# Patient Record
Sex: MALE | Race: WHITE | NOT HISPANIC OR LATINO | Employment: OTHER | URBAN - NONMETROPOLITAN AREA
[De-identification: names, ages, dates, MRNs, and addresses within clinical notes are randomized per-mention and may not be internally consistent; named-entity substitution may affect disease eponyms.]

---

## 2018-01-07 ENCOUNTER — HOSPITAL ENCOUNTER (EMERGENCY)
Facility: HOSPITAL | Age: 68
Discharge: HOME | End: 2018-01-07
Attending: EMERGENCY MEDICINE | Admitting: EMERGENCY MEDICINE

## 2018-01-07 ENCOUNTER — APPOINTMENT (OUTPATIENT)
Dept: GENERAL RADIOLOGY | Facility: HOSPITAL | Age: 68
End: 2018-01-07

## 2018-01-07 VITALS
TEMPERATURE: 98.2 F | OXYGEN SATURATION: 98 % | HEART RATE: 72 BPM | DIASTOLIC BLOOD PRESSURE: 62 MMHG | RESPIRATION RATE: 18 BRPM | HEIGHT: 74 IN | BODY MASS INDEX: 23.74 KG/M2 | SYSTOLIC BLOOD PRESSURE: 110 MMHG | WEIGHT: 185 LBS

## 2018-01-07 DIAGNOSIS — I95.2 HYPOTENSION DUE TO DRUGS: ICD-10-CM

## 2018-01-07 DIAGNOSIS — R55 NEAR SYNCOPE: Primary | ICD-10-CM

## 2018-01-07 LAB
A-A DO2: 24.7 MMHG (ref 0–300)
ALBUMIN SERPL-MCNC: 4.2 G/DL (ref 3.4–4.8)
ALBUMIN/GLOB SERPL: 1.5 G/DL (ref 1.5–2.5)
ALP SERPL-CCNC: 71 U/L (ref 40–129)
ALT SERPL W P-5'-P-CCNC: 24 U/L (ref 10–44)
ANION GAP SERPL CALCULATED.3IONS-SCNC: 6 MMOL/L (ref 3.6–11.2)
ARTERIAL PATENCY WRIST A: POSITIVE
AST SERPL-CCNC: 24 U/L (ref 10–34)
ATMOSPHERIC PRESS: 735 MMHG
BASE EXCESS BLDA CALC-SCNC: -1.6 MMOL/L
BASOPHILS # BLD AUTO: 0.02 10*3/MM3 (ref 0–0.3)
BASOPHILS NFR BLD AUTO: 0.2 % (ref 0–2)
BDY SITE: ABNORMAL
BILIRUB SERPL-MCNC: 0.6 MG/DL (ref 0.2–1.8)
BODY TEMPERATURE: 98.6 C
BUN BLD-MCNC: 19 MG/DL (ref 7–21)
BUN/CREAT SERPL: 15.2 (ref 7–25)
CALCIUM SPEC-SCNC: 9.2 MG/DL (ref 7.7–10)
CHLORIDE SERPL-SCNC: 106 MMOL/L (ref 99–112)
CO2 SERPL-SCNC: 24 MMOL/L (ref 24.3–31.9)
COHGB MFR BLD: 1.3 % (ref 0–5)
CREAT BLD-MCNC: 1.25 MG/DL (ref 0.43–1.29)
DEPRECATED RDW RBC AUTO: 41.5 FL (ref 37–54)
EOSINOPHIL # BLD AUTO: 0.23 10*3/MM3 (ref 0–0.7)
EOSINOPHIL NFR BLD AUTO: 2.7 % (ref 0–7)
ERYTHROCYTE [DISTWIDTH] IN BLOOD BY AUTOMATED COUNT: 13.3 % (ref 11.5–14.5)
GFR SERPL CREATININE-BSD FRML MDRD: 57 ML/MIN/1.73
GLOBULIN UR ELPH-MCNC: 2.8 GM/DL
GLUCOSE BLD-MCNC: 216 MG/DL (ref 70–110)
HCO3 BLDA-SCNC: 22.9 MMOL/L (ref 22–26)
HCT VFR BLD AUTO: 41.5 % (ref 42–52)
HCT VFR BLD CALC: 43 % (ref 42–52)
HGB BLD-MCNC: 14.4 G/DL (ref 14–18)
HGB BLDA-MCNC: 14.6 G/DL (ref 12–16)
HOROWITZ INDEX BLD+IHG-RTO: 21 %
IMM GRANULOCYTES # BLD: 0.02 10*3/MM3 (ref 0–0.03)
IMM GRANULOCYTES NFR BLD: 0.2 % (ref 0–0.5)
LYMPHOCYTES # BLD AUTO: 1.57 10*3/MM3 (ref 1–3)
LYMPHOCYTES NFR BLD AUTO: 18.6 % (ref 16–46)
MCH RBC QN AUTO: 30.3 PG (ref 27–33)
MCHC RBC AUTO-ENTMCNC: 34.7 G/DL (ref 33–37)
MCV RBC AUTO: 87.2 FL (ref 80–94)
METHGB BLD QL: 0.3 % (ref 0–3)
MODALITY: ABNORMAL
MONOCYTES # BLD AUTO: 0.57 10*3/MM3 (ref 0.1–0.9)
MONOCYTES NFR BLD AUTO: 6.8 % (ref 0–12)
NEUTROPHILS # BLD AUTO: 6.01 10*3/MM3 (ref 1.4–6.5)
NEUTROPHILS NFR BLD AUTO: 71.5 % (ref 40–75)
OSMOLALITY SERPL CALC.SUM OF ELEC: 280.7 MOSM/KG (ref 273–305)
OXYHGB MFR BLDV: 93.3 % (ref 85–100)
PCO2 BLDA: 38 MM HG (ref 35–45)
PH BLDA: 7.4 PH UNITS (ref 7.35–7.45)
PLATELET # BLD AUTO: 205 10*3/MM3 (ref 130–400)
PMV BLD AUTO: 11.5 FL (ref 6–10)
PO2 BLDA: 74.3 MM HG (ref 80–100)
POTASSIUM BLD-SCNC: 3.9 MMOL/L (ref 3.5–5.3)
PROT SERPL-MCNC: 7 G/DL (ref 6–8)
RBC # BLD AUTO: 4.76 10*6/MM3 (ref 4.7–6.1)
SAO2 % BLDCOA: 94.8 % (ref 90–100)
SODIUM BLD-SCNC: 136 MMOL/L (ref 135–153)
TROPONIN I SERPL-MCNC: <0.006 NG/ML
WBC NRBC COR # BLD: 8.42 10*3/MM3 (ref 4.5–12.5)

## 2018-01-07 PROCEDURE — 83050 HGB METHEMOGLOBIN QUAN: CPT | Performed by: EMERGENCY MEDICINE

## 2018-01-07 PROCEDURE — 93005 ELECTROCARDIOGRAM TRACING: CPT | Performed by: EMERGENCY MEDICINE

## 2018-01-07 PROCEDURE — 99284 EMERGENCY DEPT VISIT MOD MDM: CPT

## 2018-01-07 PROCEDURE — 84484 ASSAY OF TROPONIN QUANT: CPT | Performed by: EMERGENCY MEDICINE

## 2018-01-07 PROCEDURE — 71045 X-RAY EXAM CHEST 1 VIEW: CPT | Performed by: RADIOLOGY

## 2018-01-07 PROCEDURE — 36600 WITHDRAWAL OF ARTERIAL BLOOD: CPT | Performed by: EMERGENCY MEDICINE

## 2018-01-07 PROCEDURE — 82375 ASSAY CARBOXYHB QUANT: CPT | Performed by: EMERGENCY MEDICINE

## 2018-01-07 PROCEDURE — 82805 BLOOD GASES W/O2 SATURATION: CPT | Performed by: EMERGENCY MEDICINE

## 2018-01-07 PROCEDURE — 71045 X-RAY EXAM CHEST 1 VIEW: CPT

## 2018-01-07 PROCEDURE — 80053 COMPREHEN METABOLIC PANEL: CPT | Performed by: EMERGENCY MEDICINE

## 2018-01-07 PROCEDURE — 85025 COMPLETE CBC W/AUTO DIFF WBC: CPT | Performed by: EMERGENCY MEDICINE

## 2018-01-07 RX ORDER — LISINOPRIL 10 MG/1
10 TABLET ORAL DAILY
COMMUNITY
End: 2022-06-27

## 2018-01-07 RX ORDER — TAMSULOSIN HYDROCHLORIDE 0.4 MG/1
1 CAPSULE ORAL NIGHTLY
COMMUNITY

## 2018-01-07 RX ORDER — SODIUM CHLORIDE 0.9 % (FLUSH) 0.9 %
10 SYRINGE (ML) INJECTION AS NEEDED
Status: DISCONTINUED | OUTPATIENT
Start: 2018-01-07 | End: 2018-01-07 | Stop reason: HOSPADM

## 2018-01-07 RX ORDER — RANITIDINE 150 MG/1
150 TABLET ORAL NIGHTLY
COMMUNITY

## 2018-01-07 RX ADMIN — SODIUM CHLORIDE 500 ML: 9 INJECTION, SOLUTION INTRAVENOUS at 15:42

## 2018-01-07 NOTE — ED PROVIDER NOTES
Subjective   History of Present Illness  67-year-old white male here today for chest pain.  Initially EMS was called out for chest pain, but on arrival the patient complained more of nausea, tingling in his hands, weakness.  He states he was also diaphoretic.  The symptoms started suddenly while he was sitting at the table at home.  He has recently been started on blood pressure medicine, and at the time of this episode as his systolic blood pressure was 60 according to family members.  Previously his blood pressure had been well controlled in the 110's since starting medicine.  Patient relates that he had gone for a checkup and his blood pressure was noted to be high on the single visit and was started on blood pressure medicine.  Review of Systems   All other systems reviewed and are negative.      Past Medical History:   Diagnosis Date   • Kidney stones        Allergies   Allergen Reactions   • Penicillins        History reviewed. No pertinent surgical history.    History reviewed. No pertinent family history.    Social History     Social History   • Marital status: Single     Spouse name: N/A   • Number of children: N/A   • Years of education: N/A     Social History Main Topics   • Smoking status: Never Smoker   • Smokeless tobacco: Never Used   • Alcohol use No   • Drug use: None   • Sexual activity: Not Asked     Other Topics Concern   • None     Social History Narrative   • None           Objective   Physical Exam   Constitutional: He is oriented to person, place, and time. He appears well-developed and well-nourished.   HENT:   Head: Normocephalic and atraumatic.   Cardiovascular: Normal rate, regular rhythm and normal heart sounds.  Exam reveals no gallop and no friction rub.    No murmur heard.  Pulmonary/Chest: Effort normal and breath sounds normal. No respiratory distress. He has no wheezes. He has no rales.   Abdominal: Soft. Bowel sounds are normal. He exhibits no distension. There is no tenderness.    Musculoskeletal: Normal range of motion. He exhibits no edema.   Neurological: He is alert and oriented to person, place, and time.   Skin: Skin is warm and dry.   Psychiatric: He has a normal mood and affect.   Nursing note and vitals reviewed.      Procedures  Results for orders placed or performed during the hospital encounter of 01/07/18   Comprehensive Metabolic Panel   Result Value Ref Range    Glucose 216 (H) 70 - 110 mg/dL    BUN 19 7 - 21 mg/dL    Creatinine 1.25 0.43 - 1.29 mg/dL    Sodium 136 135 - 153 mmol/L    Potassium 3.9 3.5 - 5.3 mmol/L    Chloride 106 99 - 112 mmol/L    CO2 24.0 (L) 24.3 - 31.9 mmol/L    Calcium 9.2 7.7 - 10.0 mg/dL    Total Protein 7.0 6.0 - 8.0 g/dL    Albumin 4.20 3.40 - 4.80 g/dL    ALT (SGPT) 24 10 - 44 U/L    AST (SGOT) 24 10 - 34 U/L    Alkaline Phosphatase 71 40 - 129 U/L    Total Bilirubin 0.6 0.2 - 1.8 mg/dL    eGFR Non African Amer 57 (L) >60 mL/min/1.73    Globulin 2.8 gm/dL    A/G Ratio 1.5 1.5 - 2.5 g/dL    BUN/Creatinine Ratio 15.2 7.0 - 25.0    Anion Gap 6.0 3.6 - 11.2 mmol/L   Blood Gas, Arterial   Result Value Ref Range    Site Arterial: left radial     Fredy's Test Positive     pH, Arterial 7.397 7.350 - 7.450 pH units    pCO2, Arterial 38.0 35.0 - 45.0 mm Hg    pO2, Arterial 74.3 (L) 80.0 - 100.0 mm Hg    HCO3, Arterial 22.9 22.0 - 26.0 mmol/L    Base Excess, Arterial -1.6 mmol/L    O2 Saturation, Arterial 94.8 90.0 - 100.0 %    Hemoglobin, Blood Gas 14.6 12 - 16 g/dL    Hematocrit, Blood Gas 43.0 42.0 - 52.0 %    Oxyhemoglobin 93.3 85 - 100 %    Methemoglobin 0.30 0.00 - 3.00 %    Carboxyhemoglobin 1.3 0 - 5 %    A-a Gradiant 24.7 0.0 - 300.0 mmHg    Temperature 98.6 C    Barometric Pressure for Blood Gas 735 mmHg    Modality Room Air     FIO2 21 %   Troponin   Result Value Ref Range    Troponin I <0.006 <=0.040 ng/mL   CBC Auto Differential   Result Value Ref Range    WBC 8.42 4.50 - 12.50 10*3/mm3    RBC 4.76 4.70 - 6.10 10*6/mm3    Hemoglobin 14.4 14.0 -  18.0 g/dL    Hematocrit 41.5 (L) 42.0 - 52.0 %    MCV 87.2 80.0 - 94.0 fL    MCH 30.3 27.0 - 33.0 pg    MCHC 34.7 33.0 - 37.0 g/dL    RDW 13.3 11.5 - 14.5 %    RDW-SD 41.5 37.0 - 54.0 fl    MPV 11.5 (H) 6.0 - 10.0 fL    Platelets 205 130 - 400 10*3/mm3    Neutrophil % 71.5 40.0 - 75.0 %    Lymphocyte % 18.6 16.0 - 46.0 %    Monocyte % 6.8 0.0 - 12.0 %    Eosinophil % 2.7 0.0 - 7.0 %    Basophil % 0.2 0.0 - 2.0 %    Immature Grans % 0.2 0.0 - 0.5 %    Neutrophils, Absolute 6.01 1.40 - 6.50 10*3/mm3    Lymphocytes, Absolute 1.57 1.00 - 3.00 10*3/mm3    Monocytes, Absolute 0.57 0.10 - 0.90 10*3/mm3    Eosinophils, Absolute 0.23 0.00 - 0.70 10*3/mm3    Basophils, Absolute 0.02 0.00 - 0.30 10*3/mm3    Immature Grans, Absolute 0.02 0.00 - 0.03 10*3/mm3   Osmolality, Calculated   Result Value Ref Range    Osmolality Calc 280.7 273.0 - 305.0 mOsm/kg            ED Course  ED Course   Value Comment By Time   ECG 12 Lead Normal sinus rhythm.  Rate 72.  No acute ischemic changes. Ney Vieira MD 01/07 1807    Currently asymptomatic.  Hemodynamically stable.  Workup results discussed with patient and family.  Symptoms seem to be due to his blood pressure meds.  Will hold his lisinopril.  Recommend they continue checking BP at home.  In follow-up with primary. Ney Vieira MD 01/07 1807                  MDM  Number of Diagnoses or Management Options  Hypotension due to drugs:   Near syncope:      Amount and/or Complexity of Data Reviewed  Clinical lab tests: reviewed  Tests in the radiology section of CPT®: reviewed  Tests in the medicine section of CPT®: reviewed  Independent visualization of images, tracings, or specimens: yes    Risk of Complications, Morbidity, and/or Mortality  Presenting problems: moderate  Diagnostic procedures: moderate  Management options: moderate        Final diagnoses:   Near syncope   Hypotension due to drugs            Ney Vieira MD  01/07/18 [id number]

## 2021-01-29 ENCOUNTER — IMMUNIZATION (OUTPATIENT)
Dept: VACCINE CLINIC | Facility: HOSPITAL | Age: 71
End: 2021-01-29

## 2021-01-29 PROCEDURE — 0001A: CPT | Performed by: FAMILY MEDICINE

## 2021-01-29 PROCEDURE — 91300 HC SARSCOV02 VAC 30MCG/0.3ML IM: CPT | Performed by: FAMILY MEDICINE

## 2021-02-19 ENCOUNTER — IMMUNIZATION (OUTPATIENT)
Dept: VACCINE CLINIC | Facility: HOSPITAL | Age: 71
End: 2021-02-19

## 2021-02-19 PROCEDURE — 0002A: CPT | Performed by: INTERNAL MEDICINE

## 2021-02-19 PROCEDURE — 91300 HC SARSCOV02 VAC 30MCG/0.3ML IM: CPT | Performed by: INTERNAL MEDICINE

## 2022-06-26 ENCOUNTER — TRANSCRIBE ORDERS (OUTPATIENT)
Dept: ADMINISTRATIVE | Facility: HOSPITAL | Age: 72
End: 2022-06-26

## 2022-06-26 ENCOUNTER — HOSPITAL ENCOUNTER (OUTPATIENT)
Dept: GENERAL RADIOLOGY | Facility: HOSPITAL | Age: 72
Discharge: HOME | End: 2022-06-26

## 2022-06-26 DIAGNOSIS — R52 PAIN: Primary | ICD-10-CM

## 2022-06-26 DIAGNOSIS — R52 PAIN: ICD-10-CM

## 2022-06-26 PROCEDURE — 73070 X-RAY EXAM OF ELBOW: CPT

## 2022-06-27 ENCOUNTER — HOSPITAL ENCOUNTER (EMERGENCY)
Facility: HOSPITAL | Age: 72
Discharge: HOME | End: 2022-06-27
Attending: EMERGENCY MEDICINE | Admitting: EMERGENCY MEDICINE

## 2022-06-27 VITALS
HEART RATE: 84 BPM | SYSTOLIC BLOOD PRESSURE: 134 MMHG | BODY MASS INDEX: 21.76 KG/M2 | RESPIRATION RATE: 14 BRPM | WEIGHT: 175 LBS | HEIGHT: 75 IN | TEMPERATURE: 97.4 F | OXYGEN SATURATION: 99 % | DIASTOLIC BLOOD PRESSURE: 82 MMHG

## 2022-06-27 DIAGNOSIS — M70.21 OLECRANON BURSITIS OF RIGHT ELBOW: Primary | ICD-10-CM

## 2022-06-27 LAB
ALBUMIN SERPL-MCNC: 4.5 G/DL (ref 3.5–5.2)
ALBUMIN/GLOB SERPL: 1.6 G/DL
ALP SERPL-CCNC: 89 U/L (ref 39–117)
ALT SERPL W P-5'-P-CCNC: 5 U/L (ref 1–41)
ANION GAP SERPL CALCULATED.3IONS-SCNC: 11.4 MMOL/L (ref 5–15)
AST SERPL-CCNC: 20 U/L (ref 1–40)
BASOPHILS # BLD AUTO: 0.04 10*3/MM3 (ref 0–0.2)
BASOPHILS NFR BLD AUTO: 0.5 % (ref 0–1.5)
BILIRUB SERPL-MCNC: 0.6 MG/DL (ref 0–1.2)
BUN SERPL-MCNC: 15 MG/DL (ref 8–23)
BUN/CREAT SERPL: 14 (ref 7–25)
CALCIUM SPEC-SCNC: 9.2 MG/DL (ref 8.6–10.5)
CHLORIDE SERPL-SCNC: 106 MMOL/L (ref 98–107)
CO2 SERPL-SCNC: 24.6 MMOL/L (ref 22–29)
CREAT SERPL-MCNC: 1.07 MG/DL (ref 0.76–1.27)
CRP SERPL-MCNC: 0.47 MG/DL (ref 0–0.5)
D-LACTATE SERPL-SCNC: 1.3 MMOL/L (ref 0.5–2)
DEPRECATED RDW RBC AUTO: 43.8 FL (ref 37–54)
EGFRCR SERPLBLD CKD-EPI 2021: 73.3 ML/MIN/1.73
EOSINOPHIL # BLD AUTO: 0.21 10*3/MM3 (ref 0–0.4)
EOSINOPHIL NFR BLD AUTO: 2.8 % (ref 0.3–6.2)
ERYTHROCYTE [DISTWIDTH] IN BLOOD BY AUTOMATED COUNT: 13.5 % (ref 12.3–15.4)
ERYTHROCYTE [SEDIMENTATION RATE] IN BLOOD: 7 MM/HR (ref 0–20)
GLOBULIN UR ELPH-MCNC: 2.9 GM/DL
GLUCOSE SERPL-MCNC: 137 MG/DL (ref 65–99)
HCT VFR BLD AUTO: 43.7 % (ref 37.5–51)
HGB BLD-MCNC: 15.1 G/DL (ref 13–17.7)
IMM GRANULOCYTES # BLD AUTO: 0.03 10*3/MM3 (ref 0–0.05)
IMM GRANULOCYTES NFR BLD AUTO: 0.4 % (ref 0–0.5)
LYMPHOCYTES # BLD AUTO: 1.39 10*3/MM3 (ref 0.7–3.1)
LYMPHOCYTES NFR BLD AUTO: 18.3 % (ref 19.6–45.3)
MCH RBC QN AUTO: 30.5 PG (ref 26.6–33)
MCHC RBC AUTO-ENTMCNC: 34.6 G/DL (ref 31.5–35.7)
MCV RBC AUTO: 88.3 FL (ref 79–97)
MONOCYTES # BLD AUTO: 0.61 10*3/MM3 (ref 0.1–0.9)
MONOCYTES NFR BLD AUTO: 8 % (ref 5–12)
NEUTROPHILS NFR BLD AUTO: 5.33 10*3/MM3 (ref 1.7–7)
NEUTROPHILS NFR BLD AUTO: 70 % (ref 42.7–76)
NRBC BLD AUTO-RTO: 0 /100 WBC (ref 0–0.2)
PLATELET # BLD AUTO: 218 10*3/MM3 (ref 140–450)
PMV BLD AUTO: 10.5 FL (ref 6–12)
POTASSIUM SERPL-SCNC: 4 MMOL/L (ref 3.5–5.2)
PROT SERPL-MCNC: 7.4 G/DL (ref 6–8.5)
RBC # BLD AUTO: 4.95 10*6/MM3 (ref 4.14–5.8)
SODIUM SERPL-SCNC: 142 MMOL/L (ref 136–145)
WBC NRBC COR # BLD: 7.61 10*3/MM3 (ref 3.4–10.8)

## 2022-06-27 PROCEDURE — 36415 COLL VENOUS BLD VENIPUNCTURE: CPT

## 2022-06-27 PROCEDURE — 25010000002 CEFTRIAXONE PER 250 MG: Performed by: PHYSICIAN ASSISTANT

## 2022-06-27 PROCEDURE — 96365 THER/PROPH/DIAG IV INF INIT: CPT

## 2022-06-27 PROCEDURE — 99283 EMERGENCY DEPT VISIT LOW MDM: CPT

## 2022-06-27 PROCEDURE — 87040 BLOOD CULTURE FOR BACTERIA: CPT | Performed by: PHYSICIAN ASSISTANT

## 2022-06-27 PROCEDURE — 83605 ASSAY OF LACTIC ACID: CPT | Performed by: PHYSICIAN ASSISTANT

## 2022-06-27 PROCEDURE — 86140 C-REACTIVE PROTEIN: CPT | Performed by: PHYSICIAN ASSISTANT

## 2022-06-27 PROCEDURE — 80053 COMPREHEN METABOLIC PANEL: CPT | Performed by: PHYSICIAN ASSISTANT

## 2022-06-27 PROCEDURE — 85025 COMPLETE CBC W/AUTO DIFF WBC: CPT | Performed by: PHYSICIAN ASSISTANT

## 2022-06-27 PROCEDURE — 85652 RBC SED RATE AUTOMATED: CPT | Performed by: PHYSICIAN ASSISTANT

## 2022-06-27 RX ORDER — SODIUM CHLORIDE 0.9 % (FLUSH) 0.9 %
10 SYRINGE (ML) INJECTION AS NEEDED
Status: DISCONTINUED | OUTPATIENT
Start: 2022-06-27 | End: 2022-06-27 | Stop reason: HOSPADM

## 2022-06-27 RX ORDER — CEPHALEXIN 500 MG/1
500 CAPSULE ORAL 4 TIMES DAILY
Qty: 28 CAPSULE | Refills: 0 | Status: SHIPPED | OUTPATIENT
Start: 2022-06-27 | End: 2022-07-04

## 2022-06-27 RX ADMIN — SODIUM CHLORIDE 500 ML: 9 INJECTION, SOLUTION INTRAVENOUS at 13:58

## 2022-06-27 RX ADMIN — SODIUM CHLORIDE 2 G: 900 INJECTION INTRAVENOUS at 14:44

## 2022-07-02 LAB
BACTERIA SPEC AEROBE CULT: NORMAL
BACTERIA SPEC AEROBE CULT: NORMAL

## 2025-03-28 PROCEDURE — 99285 EMERGENCY DEPT VISIT HI MDM: CPT

## 2025-03-29 ENCOUNTER — APPOINTMENT (OUTPATIENT)
Dept: CT IMAGING | Facility: HOSPITAL | Age: 75
DRG: 329 | End: 2025-03-29
Payer: MEDICARE

## 2025-03-29 ENCOUNTER — HOSPITAL ENCOUNTER (INPATIENT)
Facility: HOSPITAL | Age: 75
LOS: 23 days | Discharge: NURSING, REHAB OR LONG-TERM CARE | DRG: 329 | End: 2025-04-21
Admitting: INTERNAL MEDICINE
Payer: MEDICARE

## 2025-03-29 ENCOUNTER — ANESTHESIA EVENT (OUTPATIENT)
Dept: PERIOP | Facility: HOSPITAL | Age: 75
End: 2025-03-29
Payer: MEDICARE

## 2025-03-29 ENCOUNTER — APPOINTMENT (OUTPATIENT)
Dept: GENERAL RADIOLOGY | Facility: HOSPITAL | Age: 75
DRG: 329 | End: 2025-03-29
Payer: MEDICARE

## 2025-03-29 ENCOUNTER — APPOINTMENT (OUTPATIENT)
Dept: ULTRASOUND IMAGING | Facility: HOSPITAL | Age: 75
DRG: 329 | End: 2025-03-29
Payer: MEDICARE

## 2025-03-29 ENCOUNTER — ANESTHESIA (OUTPATIENT)
Dept: PERIOP | Facility: HOSPITAL | Age: 75
End: 2025-03-29
Payer: MEDICARE

## 2025-03-29 DIAGNOSIS — R59.1 LYMPHADENOPATHY: ICD-10-CM

## 2025-03-29 DIAGNOSIS — R19.7 DIARRHEA, UNSPECIFIED TYPE: ICD-10-CM

## 2025-03-29 DIAGNOSIS — C78.7 METASTATIC COLON CANCER TO LIVER: ICD-10-CM

## 2025-03-29 DIAGNOSIS — R62.7 FAILURE TO THRIVE IN ADULT: Primary | ICD-10-CM

## 2025-03-29 DIAGNOSIS — C18.9 METASTATIC COLON CANCER TO LIVER: ICD-10-CM

## 2025-03-29 DIAGNOSIS — I82.412 ACUTE DEEP VEIN THROMBOSIS (DVT) OF FEMORAL VEIN OF LEFT LOWER EXTREMITY: ICD-10-CM

## 2025-03-29 LAB
ACANTHOCYTES BLD QL SMEAR: ABNORMAL
ACANTHOCYTES BLD QL SMEAR: NORMAL
ALBUMIN SERPL-MCNC: 2.2 G/DL (ref 3.5–5.2)
ALBUMIN/GLOB SERPL: 0.5 G/DL
ALP SERPL-CCNC: 316 U/L (ref 39–117)
ALT SERPL W P-5'-P-CCNC: 11 U/L (ref 1–41)
ANION GAP SERPL CALCULATED.3IONS-SCNC: 9.8 MMOL/L (ref 5–15)
APTT PPP: 30.4 SECONDS (ref 24.5–35.9)
AST SERPL-CCNC: 20 U/L (ref 1–40)
B PARAPERT DNA SPEC QL NAA+PROBE: NOT DETECTED
B PERT DNA SPEC QL NAA+PROBE: NOT DETECTED
BASOPHILS # BLD AUTO: 0.05 10*3/MM3 (ref 0–0.2)
BASOPHILS NFR BLD AUTO: 0.4 % (ref 0–1.5)
BILIRUB SERPL-MCNC: 0.4 MG/DL (ref 0–1.2)
BUN SERPL-MCNC: 21 MG/DL (ref 8–23)
BUN/CREAT SERPL: 18.4 (ref 7–25)
C PNEUM DNA NPH QL NAA+NON-PROBE: NOT DETECTED
CALCIUM SPEC-SCNC: 8.7 MG/DL (ref 8.6–10.5)
CHLORIDE SERPL-SCNC: 101 MMOL/L (ref 98–107)
CK SERPL-CCNC: 25 U/L (ref 20–200)
CO2 SERPL-SCNC: 22.2 MMOL/L (ref 22–29)
CREAT SERPL-MCNC: 1.14 MG/DL (ref 0.76–1.27)
D-LACTATE SERPL-SCNC: 1.8 MMOL/L (ref 0.5–2)
D-LACTATE SERPL-SCNC: 2.1 MMOL/L (ref 0.5–2)
DEPRECATED RDW RBC AUTO: 46.2 FL (ref 37–54)
DEPRECATED RDW RBC AUTO: 49.9 FL (ref 37–54)
EGFRCR SERPLBLD CKD-EPI 2021: 66.7 ML/MIN/1.73
EOSINOPHIL # BLD AUTO: 0.06 10*3/MM3 (ref 0–0.4)
EOSINOPHIL NFR BLD AUTO: 0.5 % (ref 0.3–6.2)
ERYTHROCYTE [DISTWIDTH] IN BLOOD BY AUTOMATED COUNT: 15.3 % (ref 12.3–15.4)
ERYTHROCYTE [DISTWIDTH] IN BLOOD BY AUTOMATED COUNT: 15.5 % (ref 12.3–15.4)
FERRITIN SERPL-MCNC: 480.3 NG/ML (ref 30–400)
FLUAV SUBTYP SPEC NAA+PROBE: NOT DETECTED
FLUBV RNA ISLT QL NAA+PROBE: NOT DETECTED
GEN 5 1HR TROPONIN T REFLEX: 41 NG/L
GLOBULIN UR ELPH-MCNC: 4.7 GM/DL
GLUCOSE SERPL-MCNC: 106 MG/DL (ref 65–99)
HADV DNA SPEC NAA+PROBE: NOT DETECTED
HCOV 229E RNA SPEC QL NAA+PROBE: NOT DETECTED
HCOV HKU1 RNA SPEC QL NAA+PROBE: NOT DETECTED
HCOV NL63 RNA SPEC QL NAA+PROBE: NOT DETECTED
HCOV OC43 RNA SPEC QL NAA+PROBE: NOT DETECTED
HCT VFR BLD AUTO: 29.2 % (ref 37.5–51)
HCT VFR BLD AUTO: 29.5 % (ref 37.5–51)
HGB BLD-MCNC: 7.9 G/DL (ref 13–17.7)
HGB BLD-MCNC: 8.5 G/DL (ref 13–17.7)
HMPV RNA NPH QL NAA+NON-PROBE: NOT DETECTED
HPIV1 RNA ISLT QL NAA+PROBE: NOT DETECTED
HPIV2 RNA SPEC QL NAA+PROBE: NOT DETECTED
HPIV3 RNA NPH QL NAA+PROBE: NOT DETECTED
HPIV4 P GENE NPH QL NAA+PROBE: NOT DETECTED
HYPOCHROMIA BLD QL: ABNORMAL
HYPOCHROMIA BLD QL: NORMAL
IMM GRANULOCYTES # BLD AUTO: 0.08 10*3/MM3 (ref 0–0.05)
IMM GRANULOCYTES NFR BLD AUTO: 0.6 % (ref 0–0.5)
INR PPP: 1.24 (ref 0.9–1.1)
IRON 24H UR-MRATE: 13 MCG/DL (ref 59–158)
IRON SATN MFR SERPL: 9 % (ref 20–50)
LIPASE SERPL-CCNC: 25 U/L (ref 13–60)
LYMPHOCYTES # BLD AUTO: 2.23 10*3/MM3 (ref 0.7–3.1)
LYMPHOCYTES # BLD MANUAL: 2.14 10*3/MM3 (ref 0.7–3.1)
LYMPHOCYTES NFR BLD AUTO: 17.4 % (ref 19.6–45.3)
LYMPHOCYTES NFR BLD MANUAL: 5 % (ref 5–12)
M PNEUMO IGG SER IA-ACNC: NOT DETECTED
MAGNESIUM SERPL-MCNC: 2 MG/DL (ref 1.6–2.4)
MAGNESIUM SERPL-MCNC: 2.2 MG/DL (ref 1.6–2.4)
MCH RBC QN AUTO: 23.9 PG (ref 26.6–33)
MCH RBC QN AUTO: 23.9 PG (ref 26.6–33)
MCHC RBC AUTO-ENTMCNC: 27.1 G/DL (ref 31.5–35.7)
MCHC RBC AUTO-ENTMCNC: 28.8 G/DL (ref 31.5–35.7)
MCV RBC AUTO: 82.9 FL (ref 79–97)
MCV RBC AUTO: 88.2 FL (ref 79–97)
METAMYELOCYTES NFR BLD MANUAL: 2 % (ref 0–0)
MONOCYTES # BLD AUTO: 1.4 10*3/MM3 (ref 0.1–0.9)
MONOCYTES # BLD: 0.63 10*3/MM3 (ref 0.1–0.9)
MONOCYTES NFR BLD AUTO: 10.9 % (ref 5–12)
NEUTROPHILS # BLD AUTO: 9.55 10*3/MM3 (ref 1.7–7)
NEUTROPHILS NFR BLD AUTO: 70.2 % (ref 42.7–76)
NEUTROPHILS NFR BLD AUTO: 9.03 10*3/MM3 (ref 1.7–7)
NEUTROPHILS NFR BLD MANUAL: 75 % (ref 42.7–76)
NEUTS BAND NFR BLD MANUAL: 1 % (ref 0–5)
NRBC BLD AUTO-RTO: 0 /100 WBC (ref 0–0.2)
NT-PROBNP SERPL-MCNC: 975.2 PG/ML (ref 0–1800)
PHOSPHATE SERPL-MCNC: 3.3 MG/DL (ref 2.5–4.5)
PLAT MORPH BLD: NORMAL
PLAT MORPH BLD: NORMAL
PLATELET # BLD AUTO: 309 10*3/MM3 (ref 140–450)
PLATELET # BLD AUTO: 333 10*3/MM3 (ref 140–450)
PMV BLD AUTO: 8.3 FL (ref 6–12)
PMV BLD AUTO: 9.2 FL (ref 6–12)
POTASSIUM SERPL-SCNC: 4.1 MMOL/L (ref 3.5–5.2)
PROT SERPL-MCNC: 6.9 G/DL (ref 6–8.5)
PROTHROMBIN TIME: 15.7 SECONDS (ref 11.6–15.1)
QT INTERVAL: 396 MS
QTC INTERVAL: 445 MS
RBC # BLD AUTO: 3.31 10*6/MM3 (ref 4.14–5.8)
RBC # BLD AUTO: 3.56 10*6/MM3 (ref 4.14–5.8)
RETICS # AUTO: 0.07 10*6/MM3 (ref 0.02–0.13)
RETICS/RBC NFR AUTO: 1.99 % (ref 0.7–1.9)
RHINOVIRUS RNA SPEC NAA+PROBE: NOT DETECTED
RSV RNA NPH QL NAA+NON-PROBE: NOT DETECTED
SARS-COV-2 RNA RESP QL NAA+PROBE: NOT DETECTED
SODIUM SERPL-SCNC: 133 MMOL/L (ref 136–145)
T4 FREE SERPL-MCNC: 1.02 NG/DL (ref 0.92–1.68)
TIBC SERPL-MCNC: 150 MCG/DL (ref 298–536)
TRANSFERRIN SERPL-MCNC: 101 MG/DL (ref 200–360)
TROPONIN T % DELTA: -7
TROPONIN T NUMERIC DELTA: -3 NG/L
TROPONIN T SERPL HS-MCNC: 44 NG/L
TSH SERPL DL<=0.05 MIU/L-ACNC: 5.27 UIU/ML (ref 0.27–4.2)
VARIANT LYMPHS NFR BLD MANUAL: 17 % (ref 19.6–45.3)
WBC NRBC COR # BLD AUTO: 12.56 10*3/MM3 (ref 3.4–10.8)
WBC NRBC COR # BLD AUTO: 12.85 10*3/MM3 (ref 3.4–10.8)

## 2025-03-29 PROCEDURE — 36415 COLL VENOUS BLD VENIPUNCTURE: CPT | Performed by: INTERNAL MEDICINE

## 2025-03-29 PROCEDURE — 25010000002 CEFEPIME PER 500 MG

## 2025-03-29 PROCEDURE — 43246 EGD PLACE GASTROSTOMY TUBE: CPT | Performed by: SURGERY

## 2025-03-29 PROCEDURE — 25510000001 IOPAMIDOL PER 1 ML

## 2025-03-29 PROCEDURE — 84100 ASSAY OF PHOSPHORUS: CPT | Performed by: SURGERY

## 2025-03-29 PROCEDURE — 25010000002 VANCOMYCIN 1 G RECONSTITUTED SOLUTION 1 EACH VIAL

## 2025-03-29 PROCEDURE — 84439 ASSAY OF FREE THYROXINE: CPT

## 2025-03-29 PROCEDURE — 71045 X-RAY EXAM CHEST 1 VIEW: CPT | Performed by: RADIOLOGY

## 2025-03-29 PROCEDURE — 45100 BIOPSY OF RECTUM: CPT | Performed by: SURGERY

## 2025-03-29 PROCEDURE — 25010000002 MORPHINE PER 10 MG: Performed by: INTERNAL MEDICINE

## 2025-03-29 PROCEDURE — 3E0G76Z INTRODUCTION OF NUTRITIONAL SUBSTANCE INTO UPPER GI, VIA NATURAL OR ARTIFICIAL OPENING: ICD-10-PCS | Performed by: SURGERY

## 2025-03-29 PROCEDURE — 93970 EXTREMITY STUDY: CPT | Performed by: RADIOLOGY

## 2025-03-29 PROCEDURE — 83540 ASSAY OF IRON: CPT | Performed by: INTERNAL MEDICINE

## 2025-03-29 PROCEDURE — 0D1E4Z4 BYPASS LARGE INTESTINE TO CUTANEOUS, PERCUTANEOUS ENDOSCOPIC APPROACH: ICD-10-PCS | Performed by: SURGERY

## 2025-03-29 PROCEDURE — 0DBP8ZX EXCISION OF RECTUM, VIA NATURAL OR ARTIFICIAL OPENING ENDOSCOPIC, DIAGNOSTIC: ICD-10-PCS | Performed by: SURGERY

## 2025-03-29 PROCEDURE — 71045 X-RAY EXAM CHEST 1 VIEW: CPT

## 2025-03-29 PROCEDURE — 82550 ASSAY OF CK (CPK): CPT

## 2025-03-29 PROCEDURE — 71260 CT THORAX DX C+: CPT

## 2025-03-29 PROCEDURE — 85025 COMPLETE CBC W/AUTO DIFF WBC: CPT | Performed by: INTERNAL MEDICINE

## 2025-03-29 PROCEDURE — 83880 ASSAY OF NATRIURETIC PEPTIDE: CPT

## 2025-03-29 PROCEDURE — 25010000002 HEPARIN (PORCINE) PER 1000 UNITS: Performed by: SURGERY

## 2025-03-29 PROCEDURE — 25810000003 LACTATED RINGERS SOLUTION

## 2025-03-29 PROCEDURE — 85045 AUTOMATED RETICULOCYTE COUNT: CPT | Performed by: SURGERY

## 2025-03-29 PROCEDURE — 25810000003 SODIUM CHLORIDE 0.9 % SOLUTION 250 ML FLEX CONT

## 2025-03-29 PROCEDURE — 0202U NFCT DS 22 TRGT SARS-COV-2: CPT

## 2025-03-29 PROCEDURE — 25010000002 MORPHINE PER 10 MG

## 2025-03-29 PROCEDURE — 74177 CT ABD & PELVIS W/CONTRAST: CPT

## 2025-03-29 PROCEDURE — 93970 EXTREMITY STUDY: CPT

## 2025-03-29 PROCEDURE — 84484 ASSAY OF TROPONIN QUANT: CPT

## 2025-03-29 PROCEDURE — 93005 ELECTROCARDIOGRAM TRACING: CPT

## 2025-03-29 PROCEDURE — 25010000002 ROPIVACAINE PER 1 MG: Performed by: ANESTHESIOLOGY

## 2025-03-29 PROCEDURE — 25810000003 SODIUM CHLORIDE 0.9 % SOLUTION: Performed by: INTERNAL MEDICINE

## 2025-03-29 PROCEDURE — 85730 THROMBOPLASTIN TIME PARTIAL: CPT | Performed by: SURGERY

## 2025-03-29 PROCEDURE — 25010000002 MORPHINE PER 10 MG: Performed by: SURGERY

## 2025-03-29 PROCEDURE — 85610 PROTHROMBIN TIME: CPT | Performed by: SURGERY

## 2025-03-29 PROCEDURE — 80053 COMPREHEN METABOLIC PANEL: CPT

## 2025-03-29 PROCEDURE — 25010000002 CEFEPIME PER 500 MG: Performed by: INTERNAL MEDICINE

## 2025-03-29 PROCEDURE — 85007 BL SMEAR W/DIFF WBC COUNT: CPT

## 2025-03-29 PROCEDURE — 82728 ASSAY OF FERRITIN: CPT | Performed by: INTERNAL MEDICINE

## 2025-03-29 PROCEDURE — 25010000002 ONDANSETRON PER 1 MG

## 2025-03-29 PROCEDURE — 85025 COMPLETE CBC W/AUTO DIFF WBC: CPT

## 2025-03-29 PROCEDURE — 25810000003 LACTATED RINGERS PER 1000 ML: Performed by: NURSE ANESTHETIST, CERTIFIED REGISTERED

## 2025-03-29 PROCEDURE — 93010 ELECTROCARDIOGRAM REPORT: CPT | Performed by: INTERNAL MEDICINE

## 2025-03-29 PROCEDURE — 71260 CT THORAX DX C+: CPT | Performed by: RADIOLOGY

## 2025-03-29 PROCEDURE — 82607 VITAMIN B-12: CPT | Performed by: SURGERY

## 2025-03-29 PROCEDURE — 83735 ASSAY OF MAGNESIUM: CPT

## 2025-03-29 PROCEDURE — 84466 ASSAY OF TRANSFERRIN: CPT | Performed by: INTERNAL MEDICINE

## 2025-03-29 PROCEDURE — 76870 US EXAM SCROTUM: CPT

## 2025-03-29 PROCEDURE — 76870 US EXAM SCROTUM: CPT | Performed by: RADIOLOGY

## 2025-03-29 PROCEDURE — 25810000003 SODIUM CHLORIDE PER 500 ML: Performed by: SURGERY

## 2025-03-29 PROCEDURE — 25510000001 IOPAMIDOL 61 % SOLUTION

## 2025-03-29 PROCEDURE — 25010000002 FENTANYL CITRATE (PF) 50 MCG/ML SOLUTION: Performed by: NURSE ANESTHETIST, CERTIFIED REGISTERED

## 2025-03-29 PROCEDURE — 74177 CT ABD & PELVIS W/CONTRAST: CPT | Performed by: RADIOLOGY

## 2025-03-29 PROCEDURE — 83735 ASSAY OF MAGNESIUM: CPT | Performed by: SURGERY

## 2025-03-29 PROCEDURE — 25010000002 NEOSTIGMINE 10 MG/10ML SOLUTION: Performed by: NURSE ANESTHETIST, CERTIFIED REGISTERED

## 2025-03-29 PROCEDURE — 85007 BL SMEAR W/DIFF WBC COUNT: CPT | Performed by: INTERNAL MEDICINE

## 2025-03-29 PROCEDURE — 25810000003 LACTATED RINGERS PER 1000 ML: Performed by: SURGERY

## 2025-03-29 PROCEDURE — 44188 LAP COLOSTOMY: CPT | Performed by: SURGERY

## 2025-03-29 PROCEDURE — 25010000002 ONDANSETRON PER 1 MG: Performed by: NURSE ANESTHETIST, CERTIFIED REGISTERED

## 2025-03-29 PROCEDURE — 83605 ASSAY OF LACTIC ACID: CPT

## 2025-03-29 PROCEDURE — 99222 1ST HOSP IP/OBS MODERATE 55: CPT | Performed by: INTERNAL MEDICINE

## 2025-03-29 PROCEDURE — 25010000002 GLYCOPYRROLATE 0.4 MG/2ML SOLUTION: Performed by: NURSE ANESTHETIST, CERTIFIED REGISTERED

## 2025-03-29 PROCEDURE — 25010000002 PROPOFOL 200 MG/20ML EMULSION: Performed by: NURSE ANESTHETIST, CERTIFIED REGISTERED

## 2025-03-29 PROCEDURE — 84443 ASSAY THYROID STIM HORMONE: CPT

## 2025-03-29 PROCEDURE — 25010000002 CEFEPIME PER 500 MG: Performed by: SURGERY

## 2025-03-29 PROCEDURE — 83690 ASSAY OF LIPASE: CPT

## 2025-03-29 PROCEDURE — 82746 ASSAY OF FOLIC ACID SERUM: CPT | Performed by: SURGERY

## 2025-03-29 PROCEDURE — 0DH63UZ INSERTION OF FEEDING DEVICE INTO STOMACH, PERCUTANEOUS APPROACH: ICD-10-PCS | Performed by: SURGERY

## 2025-03-29 PROCEDURE — 99222 1ST HOSP IP/OBS MODERATE 55: CPT | Performed by: SURGERY

## 2025-03-29 DEVICE — ABSORBABLE HEMOSTAT (OXIDIZED REGENERATED CELLULOSE)
Type: IMPLANTABLE DEVICE | Site: RECTUM | Status: FUNCTIONAL
Brand: SURGICEL

## 2025-03-29 DEVICE — PROXIMATE RELOADABLE LINEAR CUTTER WITH SAFETY LOCK-OUT.  55MM LINEAR CUTTER.
Type: IMPLANTABLE DEVICE | Site: ABDOMEN | Status: FUNCTIONAL
Brand: PROXIMATE

## 2025-03-29 RX ORDER — FAMOTIDINE 10 MG/ML
INJECTION, SOLUTION INTRAVENOUS AS NEEDED
Status: DISCONTINUED | OUTPATIENT
Start: 2025-03-29 | End: 2025-03-29 | Stop reason: SURG

## 2025-03-29 RX ORDER — BISACODYL 10 MG
10 SUPPOSITORY, RECTAL RECTAL DAILY PRN
Status: DISCONTINUED | OUTPATIENT
Start: 2025-03-29 | End: 2025-03-30

## 2025-03-29 RX ORDER — ACETAMINOPHEN 160 MG/5ML
650 SOLUTION ORAL EVERY 4 HOURS PRN
Status: DISCONTINUED | OUTPATIENT
Start: 2025-03-29 | End: 2025-03-31

## 2025-03-29 RX ORDER — NALOXONE HCL 0.4 MG/ML
0.4 VIAL (ML) INJECTION
Status: DISCONTINUED | OUTPATIENT
Start: 2025-03-29 | End: 2025-04-01

## 2025-03-29 RX ORDER — ACETAMINOPHEN 650 MG/1
650 SUPPOSITORY RECTAL EVERY 4 HOURS PRN
Status: DISCONTINUED | OUTPATIENT
Start: 2025-03-29 | End: 2025-03-29

## 2025-03-29 RX ORDER — SODIUM CHLORIDE 0.9 % (FLUSH) 0.9 %
10 SYRINGE (ML) INJECTION EVERY 12 HOURS SCHEDULED
Status: DISCONTINUED | OUTPATIENT
Start: 2025-03-29 | End: 2025-03-29 | Stop reason: HOSPADM

## 2025-03-29 RX ORDER — SODIUM CHLORIDE, SODIUM LACTATE, POTASSIUM CHLORIDE, CALCIUM CHLORIDE 600; 310; 30; 20 MG/100ML; MG/100ML; MG/100ML; MG/100ML
INJECTION, SOLUTION INTRAVENOUS CONTINUOUS PRN
Status: DISCONTINUED | OUTPATIENT
Start: 2025-03-29 | End: 2025-03-29 | Stop reason: SURG

## 2025-03-29 RX ORDER — NEOSTIGMINE METHYLSULFATE 1 MG/ML
INJECTION INTRAVENOUS AS NEEDED
Status: DISCONTINUED | OUTPATIENT
Start: 2025-03-29 | End: 2025-03-29 | Stop reason: SURG

## 2025-03-29 RX ORDER — SODIUM CHLORIDE 9 MG/ML
40 INJECTION, SOLUTION INTRAVENOUS AS NEEDED
Status: DISCONTINUED | OUTPATIENT
Start: 2025-03-29 | End: 2025-03-29 | Stop reason: HOSPADM

## 2025-03-29 RX ORDER — GLYCOPYRROLATE 0.2 MG/ML
INJECTION INTRAMUSCULAR; INTRAVENOUS AS NEEDED
Status: DISCONTINUED | OUTPATIENT
Start: 2025-03-29 | End: 2025-03-29 | Stop reason: SURG

## 2025-03-29 RX ORDER — PROPOFOL 10 MG/ML
INJECTION, EMULSION INTRAVENOUS AS NEEDED
Status: DISCONTINUED | OUTPATIENT
Start: 2025-03-29 | End: 2025-03-29 | Stop reason: SURG

## 2025-03-29 RX ORDER — ONDANSETRON 2 MG/ML
INJECTION INTRAMUSCULAR; INTRAVENOUS AS NEEDED
Status: DISCONTINUED | OUTPATIENT
Start: 2025-03-29 | End: 2025-03-29 | Stop reason: SURG

## 2025-03-29 RX ORDER — FENTANYL CITRATE 50 UG/ML
INJECTION, SOLUTION INTRAMUSCULAR; INTRAVENOUS AS NEEDED
Status: DISCONTINUED | OUTPATIENT
Start: 2025-03-29 | End: 2025-03-29 | Stop reason: SURG

## 2025-03-29 RX ORDER — POLYETHYLENE GLYCOL 3350 17 G/17G
17 POWDER, FOR SOLUTION ORAL DAILY PRN
Status: DISCONTINUED | OUTPATIENT
Start: 2025-03-29 | End: 2025-03-30

## 2025-03-29 RX ORDER — ONDANSETRON 2 MG/ML
4 INJECTION INTRAMUSCULAR; INTRAVENOUS ONCE
Status: COMPLETED | OUTPATIENT
Start: 2025-03-29 | End: 2025-03-29

## 2025-03-29 RX ORDER — SODIUM CHLORIDE 0.9 % (FLUSH) 0.9 %
10 SYRINGE (ML) INJECTION AS NEEDED
Status: DISCONTINUED | OUTPATIENT
Start: 2025-03-29 | End: 2025-03-29 | Stop reason: HOSPADM

## 2025-03-29 RX ORDER — MORPHINE SULFATE 2 MG/ML
2 INJECTION, SOLUTION INTRAMUSCULAR; INTRAVENOUS EVERY 4 HOURS PRN
Status: DISCONTINUED | OUTPATIENT
Start: 2025-03-29 | End: 2025-04-06

## 2025-03-29 RX ORDER — SODIUM CHLORIDE 0.9 % (FLUSH) 0.9 %
10 SYRINGE (ML) INJECTION AS NEEDED
Status: DISCONTINUED | OUTPATIENT
Start: 2025-03-29 | End: 2025-04-21 | Stop reason: HOSPADM

## 2025-03-29 RX ORDER — ACETAMINOPHEN 325 MG/1
650 TABLET ORAL EVERY 4 HOURS PRN
Status: DISCONTINUED | OUTPATIENT
Start: 2025-03-29 | End: 2025-03-31

## 2025-03-29 RX ORDER — IPRATROPIUM BROMIDE AND ALBUTEROL SULFATE 2.5; .5 MG/3ML; MG/3ML
3 SOLUTION RESPIRATORY (INHALATION) ONCE AS NEEDED
Status: DISCONTINUED | OUTPATIENT
Start: 2025-03-29 | End: 2025-03-29 | Stop reason: HOSPADM

## 2025-03-29 RX ORDER — ROCURONIUM BROMIDE 10 MG/ML
INJECTION, SOLUTION INTRAVENOUS AS NEEDED
Status: DISCONTINUED | OUTPATIENT
Start: 2025-03-29 | End: 2025-03-29 | Stop reason: SURG

## 2025-03-29 RX ORDER — SODIUM CHLORIDE, SODIUM LACTATE, POTASSIUM CHLORIDE, CALCIUM CHLORIDE 600; 310; 30; 20 MG/100ML; MG/100ML; MG/100ML; MG/100ML
125 INJECTION, SOLUTION INTRAVENOUS CONTINUOUS
Status: ACTIVE | OUTPATIENT
Start: 2025-03-29 | End: 2025-03-29

## 2025-03-29 RX ORDER — SODIUM CHLORIDE 9 MG/ML
40 INJECTION, SOLUTION INTRAVENOUS AS NEEDED
Status: DISCONTINUED | OUTPATIENT
Start: 2025-03-29 | End: 2025-04-02

## 2025-03-29 RX ORDER — MEPERIDINE HYDROCHLORIDE 25 MG/ML
12.5 INJECTION INTRAMUSCULAR; INTRAVENOUS; SUBCUTANEOUS
Status: DISCONTINUED | OUTPATIENT
Start: 2025-03-29 | End: 2025-03-29 | Stop reason: HOSPADM

## 2025-03-29 RX ORDER — HEPARIN SODIUM 5000 [USP'U]/ML
5000 INJECTION, SOLUTION INTRAVENOUS; SUBCUTANEOUS EVERY 8 HOURS SCHEDULED
Status: DISCONTINUED | OUTPATIENT
Start: 2025-03-29 | End: 2025-03-31

## 2025-03-29 RX ORDER — SODIUM CHLORIDE 9 MG/ML
INJECTION, SOLUTION INTRAVENOUS AS NEEDED
Status: DISCONTINUED | OUTPATIENT
Start: 2025-03-29 | End: 2025-03-29 | Stop reason: HOSPADM

## 2025-03-29 RX ORDER — AMOXICILLIN 250 MG
2 CAPSULE ORAL 2 TIMES DAILY
Status: DISCONTINUED | OUTPATIENT
Start: 2025-03-29 | End: 2025-03-30

## 2025-03-29 RX ORDER — BISACODYL 5 MG/1
5 TABLET, DELAYED RELEASE ORAL DAILY PRN
Status: DISCONTINUED | OUTPATIENT
Start: 2025-03-29 | End: 2025-03-30

## 2025-03-29 RX ORDER — MORPHINE SULFATE 2 MG/ML
2 INJECTION, SOLUTION INTRAMUSCULAR; INTRAVENOUS EVERY 4 HOURS PRN
Status: DISCONTINUED | OUTPATIENT
Start: 2025-03-29 | End: 2025-04-01

## 2025-03-29 RX ORDER — ONDANSETRON 2 MG/ML
4 INJECTION INTRAMUSCULAR; INTRAVENOUS AS NEEDED
Status: DISCONTINUED | OUTPATIENT
Start: 2025-03-29 | End: 2025-03-29 | Stop reason: HOSPADM

## 2025-03-29 RX ORDER — ROPIVACAINE HYDROCHLORIDE 5 MG/ML
INJECTION, SOLUTION EPIDURAL; INFILTRATION; PERINEURAL
Status: COMPLETED | OUTPATIENT
Start: 2025-03-29 | End: 2025-03-29

## 2025-03-29 RX ORDER — IOPAMIDOL 755 MG/ML
100 INJECTION, SOLUTION INTRAVASCULAR
Status: COMPLETED | OUTPATIENT
Start: 2025-03-29 | End: 2025-03-29

## 2025-03-29 RX ORDER — IOPAMIDOL 612 MG/ML
100 INJECTION, SOLUTION INTRAVASCULAR
Status: COMPLETED | OUTPATIENT
Start: 2025-03-29 | End: 2025-03-29

## 2025-03-29 RX ORDER — OXYCODONE AND ACETAMINOPHEN 5; 325 MG/1; MG/1
1 TABLET ORAL ONCE AS NEEDED
Status: DISCONTINUED | OUTPATIENT
Start: 2025-03-29 | End: 2025-03-29 | Stop reason: HOSPADM

## 2025-03-29 RX ORDER — FENTANYL CITRATE 50 UG/ML
50 INJECTION, SOLUTION INTRAMUSCULAR; INTRAVENOUS
Status: DISCONTINUED | OUTPATIENT
Start: 2025-03-29 | End: 2025-03-29 | Stop reason: HOSPADM

## 2025-03-29 RX ORDER — SODIUM CHLORIDE 0.9 % (FLUSH) 0.9 %
10 SYRINGE (ML) INJECTION EVERY 12 HOURS SCHEDULED
Status: DISCONTINUED | OUTPATIENT
Start: 2025-03-29 | End: 2025-04-21 | Stop reason: HOSPADM

## 2025-03-29 RX ADMIN — ONDANSETRON 4 MG: 2 INJECTION INTRAMUSCULAR; INTRAVENOUS at 04:29

## 2025-03-29 RX ADMIN — VANCOMYCIN HYDROCHLORIDE 1000 MG: 1 INJECTION, POWDER, LYOPHILIZED, FOR SOLUTION INTRAVENOUS at 07:02

## 2025-03-29 RX ADMIN — MORPHINE SULFATE 2 MG: 2 INJECTION, SOLUTION INTRAMUSCULAR; INTRAVENOUS at 08:51

## 2025-03-29 RX ADMIN — SODIUM CHLORIDE 1000 ML: 9 INJECTION, SOLUTION INTRAVENOUS at 13:33

## 2025-03-29 RX ADMIN — MORPHINE SULFATE 4 MG: 4 INJECTION, SOLUTION INTRAMUSCULAR; INTRAVENOUS at 04:29

## 2025-03-29 RX ADMIN — FAMOTIDINE 20 MG: 10 INJECTION, SOLUTION INTRAVENOUS at 15:00

## 2025-03-29 RX ADMIN — SODIUM CHLORIDE, POTASSIUM CHLORIDE, SODIUM LACTATE AND CALCIUM CHLORIDE: 600; 310; 30; 20 INJECTION, SOLUTION INTRAVENOUS at 15:49

## 2025-03-29 RX ADMIN — Medication 10 ML: at 13:35

## 2025-03-29 RX ADMIN — ROPIVACAINE HYDROCHLORIDE 200 MG: 5 INJECTION, SOLUTION EPIDURAL; INFILTRATION; PERINEURAL at 15:23

## 2025-03-29 RX ADMIN — ONDANSETRON 4 MG: 2 INJECTION INTRAMUSCULAR; INTRAVENOUS at 15:00

## 2025-03-29 RX ADMIN — SODIUM CHLORIDE, POTASSIUM CHLORIDE, SODIUM LACTATE AND CALCIUM CHLORIDE: 600; 310; 30; 20 INJECTION, SOLUTION INTRAVENOUS at 15:00

## 2025-03-29 RX ADMIN — CEFEPIME HYDROCHLORIDE 1000 MG: 1 INJECTION, POWDER, FOR SOLUTION INTRAMUSCULAR; INTRAVENOUS at 13:32

## 2025-03-29 RX ADMIN — IOPAMIDOL 60 ML: 755 INJECTION, SOLUTION INTRAVENOUS at 08:34

## 2025-03-29 RX ADMIN — SODIUM CHLORIDE, POTASSIUM CHLORIDE, SODIUM LACTATE AND CALCIUM CHLORIDE 125 ML/HR: 600; 310; 30; 20 INJECTION, SOLUTION INTRAVENOUS at 18:34

## 2025-03-29 RX ADMIN — ROCURONIUM BROMIDE 30 MG: 10 SOLUTION INTRAVENOUS at 15:06

## 2025-03-29 RX ADMIN — FENTANYL CITRATE 100 MCG: 50 INJECTION INTRAMUSCULAR; INTRAVENOUS at 15:00

## 2025-03-29 RX ADMIN — GLYCOPYRROLATE 0.6 MG: 0.2 INJECTION INTRAMUSCULAR; INTRAVENOUS at 17:00

## 2025-03-29 RX ADMIN — IOPAMIDOL 80 ML: 612 INJECTION, SOLUTION INTRAVENOUS at 05:37

## 2025-03-29 RX ADMIN — CEFEPIME 2000 MG: 2 INJECTION, POWDER, FOR SOLUTION INTRAVENOUS at 06:30

## 2025-03-29 RX ADMIN — PROPOFOL 100 MG: 10 INJECTION, EMULSION INTRAVENOUS at 15:06

## 2025-03-29 RX ADMIN — CEFEPIME HYDROCHLORIDE 1000 MG: 1 INJECTION, POWDER, FOR SOLUTION INTRAMUSCULAR; INTRAVENOUS at 21:09

## 2025-03-29 RX ADMIN — DESOGESTREL AND ETHINYL ESTRADIOL 3 MG: KIT at 17:00

## 2025-03-29 RX ADMIN — SODIUM CHLORIDE, SODIUM LACTATE, POTASSIUM CHLORIDE, AND CALCIUM CHLORIDE 1000 ML: .6; .31; .03; .02 INJECTION, SOLUTION INTRAVENOUS at 04:29

## 2025-03-29 RX ADMIN — HEPARIN SODIUM 5000 UNITS: 5000 INJECTION INTRAVENOUS; SUBCUTANEOUS at 21:09

## 2025-03-29 RX ADMIN — MORPHINE SULFATE 2 MG: 2 INJECTION, SOLUTION INTRAMUSCULAR; INTRAVENOUS at 19:53

## 2025-03-29 RX ADMIN — Medication 10 ML: at 21:09

## 2025-03-29 NOTE — CONSULTS
Consulting physician:  Dr. Monzon    Referring physician: hospitalist    Date of consultation: 03/29/25     Chief complaint locally advanced and metastatic rectal cancer with high-grade rectal obstruction    Subjective     Patient is a 74 y.o. male who presented to the ED with abdominal pain and weakness.  Patient is along standing history of a perineal mass which she thought was hemorrhoids.  He has lost a significant amount of weight and states he cannot eat.  He has lost a significant amount of weight.  Workup has demonstrated a large rectal mass with a large stool burden resulting in a high-grade obstruction.  In addition patient has a left femoral DVT secondary to compression from large left inguinal adenopathy.      Review of Systems  Review of Systems - General ROS: Positive for - weight loss, weakness positive  Psychological ROS: negative for - behavioral disorder  Ophthalmic ROS: negative for - dry eyes  ENT ROS: negative for - vertigo or vocal changes  Hematological and Lymphatic ROS: Active for - swollen lymph nodes, DVT, PE.   Respiratory ROS: negative for - sputum changes or stridor.  Cardiovascular ROS: negative for - irregular heartbeat or murmur  Gastrointestinal ROS: Positive for - blood in stools and diarrhea  Genitourinary ROS: negative for - hematuria or incontinence  Musculoskeletal ROS: Positive for - gait disturbance secondary to weakness      History  Past Medical History:   Diagnosis Date    Kidney stones      History reviewed. No pertinent surgical history.  History reviewed. No pertinent family history.  Social History     Tobacco Use    Smoking status: Never    Smokeless tobacco: Never   Vaping Use    Vaping status: Never Used   Substance Use Topics    Alcohol use: No    Drug use: Never     Medications Prior to Admission   Medication Sig Dispense Refill Last Dose/Taking    raNITIdine (ZANTAC) 150 MG tablet Take 150 mg by mouth Every Night.       tamsulosin (FLOMAX) 0.4 MG capsule 24 hr  "capsule Take 1 capsule by mouth Every Night.        Allergies:  Penicillins    Objective     Vital Signs  Temp:  [98.3 °F (36.8 °C)] 98.3 °F (36.8 °C)  Heart Rate:  [] 112  Resp:  [18] 18  BP: ()/(42-60) 94/52    Physical Exam:  General:  This is a malnourished male in no acute distress  Vital signs: Stable, afebrile  HEENT exam:  WNL. Sclerae are anicteric.  EOMI  Neck:  Supple, FROM.  No JVD.  Trachea midline  Lungs:  Respiratory effort normal. Auscultation: Clear, without wheezes, rhonchi, rales  Heart:  Regular rate and rhythm, without murmur, gallop, rub.  No pedal edema  Abdomen: Softly distended.  Bowel sounds are present  Musculoskeletal:  Muscle strength/tone is not evaluated  Psych:  Alert, oriented x 3.  Mood and affect are appropriate  Skin:  Warm with good turgor.  Without rash or lesion  Extremities:  Examination of the extremities revealed no cyanosis, clubbing or edema.    Results Review:   Results from last 7 days   Lab Units 03/29/25  0636 03/29/25  0434   CK TOTAL U/L  --  25   HSTROP T ng/L 41* 44*     Results from last 7 days   Lab Units 03/29/25  0800 03/29/25  0434   LACTATE mmol/L 1.8 2.1*   WBC 10*3/mm3  --  12.85*   HEMOGLOBIN g/dL  --  8.5*   HEMATOCRIT %  --  29.5*   PLATELETS 10*3/mm3  --  309         Results from last 7 days   Lab Units 03/29/25  0434   SODIUM mmol/L 133*   POTASSIUM mmol/L 4.1   MAGNESIUM mg/dL 2.2   CHLORIDE mmol/L 101   CO2 mmol/L 22.2   BUN mg/dL 21   CREATININE mg/dL 1.14   CALCIUM mg/dL 8.7   GLUCOSE mg/dL 106*   ALBUMIN g/dL 2.2*   BILIRUBIN mg/dL 0.4   ALK PHOS U/L 316*   AST (SGOT) U/L 20   ALT (SGPT) U/L 11   Estimated Creatinine Clearance: 38.9 mL/min (by C-G formula based on SCr of 1.14 mg/dL).  No results found for: \"AMMONIA\"      No results found for: \"BLOODCX\"  No results found for: \"URINECX\"  No results found for: \"WOUNDCX\"  No results found for: \"STOOLCX\"    Imaging:  Imaging Results (Last 24 Hours)       Procedure Component Value Units " 56M pmhx of CAD, HTN presents with fever, chills, cough, SOB for 2 days. The patient presents out of a concern for a possible COVID infection. They have not recently travelled to any of the current high risk areas and have not been in close contact with a known positive COVID19-infected patient.  This patient complains of URI-like symptoms: SOB, fever, cough, body aches.  Associated Symptoms:  +JOSHI, +SOB, no edema or chest pain.   Duration of symptoms:  3 days Date/Time    CT Chest With Contrast Diagnostic [[id number]] Collected: 03/29/25 0849     Updated: 03/29/25 0855    Narrative:      EXAMINATION: CTA chest with contrast     CLINICAL HISTORY: Pain     COMPARISON: None available     TECHNIQUE: Contiguous 3 mm thick slices were obtained through the chest  after the administration of Isovue-370 intravenous contrast. Coronal and  sagittal reformats were performed.     FINDINGS:  The heart is normal in size configuration. The thoracic aorta is normal  in caliber. No aneurysmal dilatation. No dissection. The central  pulmonary arteries are normal in caliber. No pulmonary embolus. The  central airways are patent. No pneumothorax is appreciated. The few  small nodules are noted within both lungs. These measure up to 5 mm and  are of uncertain clinical significance. Correlation with prior imaging  would be helpful as available. In the absence of prior imaging, chest CT  follow-up in 6 months is recommended.       Impression:      1. No pulmonary embolus.  2. Normal caliber thoracic aorta without aneurysmal dilatation or  dissection.  3. Multiple tiny pulmonary nodules. These may be inflammatory in nature  representing noncalcified granulomas. However, the findings are  nonspecific. Given the appearance and multiplicity of the lesions,  6-month follow-up CT is recommended.     This report was finalized on 3/29/2025 8:53 AM by Bairon Sales MD.       US Scrotum & Testicles [[id number]] Collected: 03/29/25 0646     Updated: 03/29/25 0648    Narrative:      CLINICAL HISTORY: Scrotal swelling.     COMPARISON: CT of the abdomen and pelvis performed today.     TECHNIQUE: Grayscale and duplex Doppler sonography of the testicles was  obtained.     FINDINGS: The right testicle is not visible sonographically.     There is a large left hydrocele.  The left testis is normal in size and  echogenicity, measuring 3.1 x 3.9 x 2 cm.  There is blood flow within  the left testis.       Impression:          LARGE LEFT HYDROCELE.     RIGHT TESTIS NOT SEEN.     This report was finalized on 3/29/2025 6:46 AM by Sarah Canseco MD.       US Venous Doppler Lower Extremity Bilateral (duplex) [[id number]] Collected: 03/29/25 0640     Updated: 03/29/25 0646    Narrative:      CLINICAL HISTORY: Lower extremity swelling.  Mass.     COMPARISON: No ultrasound available for direct comparison.     TECHNIQUE:   Grayscale and color Doppler sonography of the lower  extremities was obtained, with Doppler spectral analysis of the inflow  and outflow vasculature of the lower extremities.     FINDINGS:     Right lower extremity:  The right common femoral, femoral, popliteal veins are patent,  compressible, and have normal waveforms with duplex Doppler.  The  peroneal and posterior tibial veins are patent with color Doppler.     Left lower extremity:  The left common femoral vein and the proximal and mid portions of the  left superficial femoral vein are noncompressible and have no flow with  duplex Doppler.  The distal superficial femoral vein is patent with  color Doppler.  The popliteal vein is patent and has a present waveform.   The posterior tibial and peroneal veins are patent and compressible.       Impression:         POSITIVE FOR DEEP VENOUS THROMBOSIS IN THE LEFT COMMON AND SUPERFICIAL  FEMORAL VEINS.     NEGATIVE FOR DEEP VENOUS THROMBOSIS IN THE RIGHT LOWER EXTREMITY.     This report was finalized on 3/29/2025 6:44 AM by Sarah Canseco MD.       XR Chest AP [[id number]] Collected: 03/29/25 0614     Updated: 03/29/25 0618    Narrative:      CLINICAL HISTORY: Fatigue.     COMPARISON: None available.     TECHNIQUE:  Single AP view of the chest.     FINDINGS: Lungs are clear; the known nodules at the lung bases are too  small to see radiographically.  There is mild fullness in the left  hilum. No pleural effusion or pneumothorax is identified.   Cardiomediastinal silhouette is normal.  No acute osseous or upper  abdominal  abnormality is seen.       Impression:         NO ACUTE ABNORMALITY IN THE CHEST.     MILD FULLNESS IN THE LEFT HILUM, WHICH COULD BE DUE TO ENLARGED LYMPH  NODES, MASS, OR ENLARGED VESSELS.  CT SUGGESTED FOR FURTHER EVALUATION.     This report was finalized on 3/29/2025 6:16 AM by Sarah Canseco MD.       CT Abdomen Pelvis With Contrast [[id number]] Collected: 03/29/25 0555     Updated: 03/29/25 0616    Narrative:      CLINICAL HISTORY: Fatigue, pelvic and rectal mass, diarrhea.     COMPARISON: None available.     TECHNIQUE: IV contrast was administered and CT of the abdomen and pelvis  was obtained.  Multiplanar reformats were generated. Limited exposure  control, adjustment of the mA and/or KV according to patient size or use  of iterative reconstruction technique was utilized.     FINDINGS:    Lower chest: Scattered bibasilar pulmonary nodules measuring up to 5 mm.     Hepatobiliary: Hypodense mass in segment 2 of the liver measuring 3.4 x  3.5 cm.  The liver is not cirrhotic.  Gallbladder is normal.     Pancreas: normal.     Spleen: Calcified granulomata scattered through the otherwise normal  spleen.     Adrenals: normal.      Kidneys, ureters, bladder: Bilateral nonobstructing renal calculi with  an index calculus in the upper pole right kidney measuring 5 mm.   Ureters are normal in caliber.  Urinary bladder is normal.     Reproductive: Posterior wall of the prostate gland is invaded by the  heterogeneous rectal mass.  A left-sided hydrocele is noted.  The  irregular mucosal thickening and enhancement also extends to the lateral  aspect of the scrotal skin on the left side.     GI tract/Bowel: Irregular diffuse thickening of the rectal mucosa from  the level of the rectosigmoid junction and extending into the anal  region, as well as the skin surface of the perineum.  A rim-enhancing  fluid collection in the right perianal region measures 3.2 x 3.8 x 3 cm.   The rectal mass produces moderate constipation.  The  small bowel is  normal in caliber and wall thickness     Appendix: Not well seen.  No findings for acute appendicitis.     Peritoneum: normal, no free air or fluid.     Vascular: Aortoiliac atherosclerosis is noted.  There is a questionable  thrombus in the left femoral vein with edema in the imaged left lower  extremity.     Lymph nodes: Severely enlarged heterogeneous lymph nodes in the groin,  with an index left groin mass measuring 8 x 4.4 cm.  The cortex of the  lymph node is irregular, with some posterior invasion into the left  adductor musculature.  An index left external iliac node measures up to  2.8 cm in short axis.  Multiple retroperitoneal nodes are also noted.     Musculoskeletal and abdominal wall: Degenerative changes in the lumbar  spine and the hip joints.     Acute DVT and possible abscess were discussed with Dr. Rodriguez at 6:12 AM  EST on 3/29/2025.       Impression:         RECTAL MASS WITH EXTENSIVE LOCAL DISEASE INVOLVING THE PERINEUM,  INCLUDING AN ABSCESS OR NECROTIC MASS IN THE RIGHT PERINEUM MEASURING  3.3 X 3.8 X 3 CM.  THERE IS ALSO LOCAL INVASION INTO THE POSTERIOR  ASPECT OF THE PROSTATE GLAND.  EXTENSIVE INGUINAL, PELVIC, AND  RETROPERITONEAL ADENOPATHY, INCLUDING A LOCALLY AGGRESSIVE LEFT INGUINAL  NODE THAT INVADES INTO THE LEFT ADDUCTOR MUSCULATURE.     METASTATIC DISEASE INVOLVING THE LEFT HEPATIC LOBE AND POSSIBLY THE LUNG  BASES.     POSSIBLE DEEP VENOUS THROMBOSIS IN THE LEFT SUPERFICIAL FEMORAL VEIN.   CONFIRMATION WITH ULTRASOUND IS RECOMMENDED.     CONSTIPATION AS A RESULT OF THE EXTENSIVE RECTAL MASS.     LARGE LEFT HYDROCELE.     This report was finalized on 3/29/2025 6:14 AM by Saarh Canseco MD.               CT report and images were reviewed.  I agree with the assessment      Impression:  Locally advanced rectal cancer with high-grade obstruction and metastatic to regional lymph nodes and liver.  Diarrhea secondary to high-grade rectal obstruction and likely destruction  of sphincter  DVT secondary to tumor compression of left femoral vessels       Plan:  Proceed with diverting colostomy, laparoscopic  Biopsy of rectal mass for treatment planning  PEG tube for severe malnutrition      Discussion:  At this point in time and despite understanding the extent of disease patient wishes to be a full code    Matilde Monzon MD  03/29/25  13:44 EDT    Time: Time spent:    Please note that portions of this note were completed with a voice recognition program.

## 2025-03-29 NOTE — ANESTHESIA POSTPROCEDURE EVALUATION
Patient: Pradeep Donald    Procedure Summary       Date: 03/29/25 Room / Location: [facility] OR 01 /  [facility] OR    Anesthesia Start: 1501 Anesthesia Stop: 1713    Procedures:       COLOSTOMY LAPAROSCOPIC (Abdomen)      PERCUTANEOUS ENDOSCOPIC GASTROSTOMY TUBE INSERTION (Abdomen)      RECTAL MASS EXCISION - biopsy of rectal mass (Rectum) Diagnosis:       Metastatic colon cancer to liver      (Metastatic colon cancer to liver [C18.9, C78.7])    Surgeons: Matilde Monzon MD Provider: Matty Kelly MD    Anesthesia Type: general ASA Status: 4            Anesthesia Type: general    Vitals  Vitals Value Taken Time   /69 03/29/25 17:30   Temp 97 °F (36.1 °C) 03/29/25 17:15   Pulse 61 03/29/25 17:34   Resp 20 03/29/25 17:30   SpO2 99 % 03/29/25 17:34   Vitals shown include unfiled device data.        Post Anesthesia Care and Evaluation    Patient location during evaluation: PACU  Patient participation: complete - patient participated  Level of consciousness: awake and alert  Pain score: 1  Pain management: adequate    Airway patency: patent  Anesthetic complications: No anesthetic complications  PONV Status: controlled  Cardiovascular status: acceptable  Respiratory status: acceptable  Hydration status: acceptable  No anesthesia care post op

## 2025-03-29 NOTE — CASE MANAGEMENT/SOCIAL WORK
Discharge Planning Assessment   [facility]     Patient Name: Pradeep Donald  MRN: [id number]  Today's Date: 3/29/2025    Admit Date: 3/29/2025    Plan: Pt lives at home alone plans to return home at discharge pt's sister is at bedside. Pcp is Carmen Pretty though pt states he has not been to see her in a long time, he uses [facility] and has [employer]. Pt does not use home health. Pt reports having issues with performing adl's. Pt uses a walker and cane prn from unknown provider and he does not use o2.   Discharge Needs Assessment       Row Name 03/29/25 1048       Living Environment    People in Home alone    Current Living Arrangements home    Primary Care Provided by self;other (see comments)    Family Caregiver if Needed sibling(s)    Quality of Family Relationships helpful;involved;supportive       Transition Planning    Patient/Family Anticipates Transition to home       Discharge Needs Assessment    Readmission Within the Last 30 Days no previous admission in last 30 days    Equipment Currently Used at Home cane, straight;walker, rolling    Concerns to be Addressed discharge planning    Anticipated Changes Related to Illness inability to care for self                   Discharge Plan       Row Name 03/29/25 1049       Plan    Plan Pt lives at home alone plans to return home at discharge pt's sister is at bedside. Pcp is Carmen Pretty though pt states he has not been to see her in a long time, he uses [facility] and has [employer]. Pt does not use home health. Pt reports having issues with performing adl's. Pt uses a walker and cane prn from unknown provider and he does not use o2.    Patient/Family in Agreement with Plan yes                    Expected Discharge Date and Time       Expected Discharge Date Expected Discharge Time    Mar 31, 2025            Demographic Summary       Row Name 03/29/25 1048       General Information    Admission Type  inpatient    Arrived From home    Referral Source emergency department    Reason for Consult discharge planning                    Rosa Rodriguez, RN

## 2025-03-29 NOTE — ANESTHESIA PROCEDURE NOTES
Airway  Reason: elective    Date/Time: 3/29/2025 3:07 PM  Airway not difficult    General Information and Staff    Patient location during procedure: OR  CRNA/CAA: Salena Farias CRNA    Indications and Patient Condition  Indications for airway management: airway protection    Preoxygenated: yes  MILS maintained throughout    Mask difficulty assessment: 1 - vent by mask    Final Airway Details    Final airway type: endotracheal airway      Successful airway: ETT  Cuffed: yes   Successful intubation technique: direct laryngoscopy  Endotracheal tube insertion site: oral  Blade: Berkowitz  Blade size: 2  ETT size (mm): 7.5  Cormack-Lehane Classification: grade I - full view of glottis  Placement verified by: chest auscultation   Measured from: lips  Number of attempts at approach: 1  Assessment: lips, teeth, and gum same as pre-op and atraumatic intubation

## 2025-03-29 NOTE — ED PROVIDER NOTES
Subjective   History of Present Illness  Patient is a 74-year-old male without significant past medical history presenting to the ER with complaint of weakness, fatigue, extreme weight loss as well as diarrhea.  Patient sister said that she found him to be in a state of disarray at his home today.  He said that he has been losing weight over the past couple of months and that he can no longer get out of bed and take care of himself.  He has been having profuse diarrhea nonbloody.  Says that he has what he thinks is hemorrhoids and a hard area in his left groin.  Says that he knew this was to come to the doctor but did not want to.    History provided by:  Patient   used: No        Review of Systems   Constitutional:  Positive for fatigue and unexpected weight change. Negative for chills and fever.   HENT:  Negative for congestion and rhinorrhea.    Eyes:  Negative for pain and redness.   Respiratory:  Negative for cough and shortness of breath.    Cardiovascular:  Negative for chest pain and palpitations.   Gastrointestinal:  Positive for abdominal pain and diarrhea. Negative for nausea, rectal pain and vomiting.   Genitourinary:  Negative for dysuria, flank pain, frequency and hematuria.   Skin:  Negative for color change, pallor, rash and wound.   Neurological:  Negative for tremors, syncope and weakness.       Past Medical History:   Diagnosis Date    Kidney stones        Allergies   Allergen Reactions    Penicillins        No past surgical history on file.    No family history on file.    Social History     Socioeconomic History    Marital status: Single   Tobacco Use    Smoking status: Never    Smokeless tobacco: Never   Substance and Sexual Activity    Alcohol use: No           Objective   Physical Exam  Vitals and nursing note reviewed.   Constitutional:       General: He is not in acute distress.     Appearance: He is ill-appearing.      Comments: Exceptionally gaunt and cachectic   HENT:       Head: Normocephalic and atraumatic.      Right Ear: External ear normal.      Left Ear: External ear normal.      Nose: Nose normal. No congestion or rhinorrhea.      Mouth/Throat:      Mouth: Mucous membranes are dry.      Pharynx: No oropharyngeal exudate or posterior oropharyngeal erythema.   Eyes:      Extraocular Movements: Extraocular movements intact.      Conjunctiva/sclera: Conjunctivae normal.      Pupils: Pupils are equal, round, and reactive to light.   Cardiovascular:      Rate and Rhythm: Normal rate.      Pulses: Normal pulses.      Heart sounds: Normal heart sounds. No murmur heard.  Pulmonary:      Effort: Pulmonary effort is normal. No respiratory distress.      Breath sounds: Normal breath sounds.   Abdominal:      General: Abdomen is flat. Bowel sounds are normal. There is no distension.      Palpations: Abdomen is soft. There is no mass.      Tenderness: There is abdominal tenderness. There is no guarding or rebound.      Hernia: No hernia is present.   Genitourinary:     Comments: Enlarged testicles with masses to the bilateral groins  Mass protruding from patient's rectum  Musculoskeletal:         General: No swelling or tenderness. Normal range of motion.      Cervical back: Normal range of motion and neck supple. No tenderness.      Right lower leg: Edema present.      Left lower leg: Edema present.      Comments: Left lower extremity is bigger than the right but both are swollen   Skin:     General: Skin is warm.      Capillary Refill: Capillary refill takes less than 2 seconds.      Coloration: Skin is not pale.      Findings: No erythema.   Neurological:      General: No focal deficit present.      Mental Status: He is alert and oriented to person, place, and time. Mental status is at baseline.      Cranial Nerves: No cranial nerve deficit.      Sensory: No sensory deficit.      Motor: No weakness.         Procedures           ED Course  ED Course as of 03/29/25 0735   Sat Mar 29,  2025 0518 WBC(!): 12.85 [LAURA]   0518 Hemoglobin(!): 8.5 [LAURA]   0518 Platelets: 309 [LAURA]   0519 Comprehensive Metabolic Panel(!)  Overall unremarkable [LAURA]   0519 Lactate(!!): 2.1 [LAURA]   0519 HS Troponin T(!): 44 [LAURA]   0519 proBNP: 975.2 [LAURA]   0540 Respiratory Panel PCR w/COVID-19(SARS-CoV-2) [facility] In-House, NP Swab in UTM/VTM, 2 HR TAT - Swab, Nasopharynx  NEgative [LAURA]   0612 US Venous Doppler Lower Extremity Bilateral (duplex)  Per rads:  L superficial femoral vein  Abscesses possible in R perineum on US, pending imaging [LAURA]   0628 XR Chest AP  IMPRESSION:     NO ACUTE ABNORMALITY IN THE CHEST.     MILD FULLNESS IN THE LEFT HILUM, WHICH COULD BE DUE TO ENLARGED LYMPH  NODES, MASS, OR ENLARGED VESSELS.  CT SUGGESTED FOR FURTHER EVALUATION.   [LAURA]   0629 CT Abdomen Pelvis With Contrast  IMPRESSION:     RECTAL MASS WITH EXTENSIVE LOCAL DISEASE INVOLVING THE PERINEUM,  INCLUDING AN ABSCESS OR NECROTIC MASS IN THE RIGHT PERINEUM MEASURING  3.3 X 3.8 X 3 CM.  THERE IS ALSO LOCAL INVASION INTO THE POSTERIOR  ASPECT OF THE PROSTATE GLAND.  EXTENSIVE INGUINAL, PELVIC, AND  RETROPERITONEAL ADENOPATHY, INCLUDING A LOCALLY AGGRESSIVE LEFT INGUINAL  NODE THAT INVADES INTO THE LEFT ADDUCTOR MUSCULATURE.     METASTATIC DISEASE INVOLVING THE LEFT HEPATIC LOBE AND POSSIBLY THE LUNG  BASES.     POSSIBLE DEEP VENOUS THROMBOSIS IN THE LEFT SUPERFICIAL FEMORAL VEIN.   CONFIRMATION WITH ULTRASOUND IS RECOMMENDED.     CONSTIPATION AS A RESULT OF THE EXTENSIVE RECTAL MASS.     LARGE LEFT HYDROCELE.   [LAURA]   0650 US Scrotum & Testicles  IMPRESSION:  LARGE LEFT HYDROCELE.  RIGHT TESTIS NOT SEEN. [LAURA]   0711 Dr Monzon with surgery says will do biopsy if requested but HANH MON hospitalist x 1 [LAURA]   0783 Discussed with Dr. Barney, agrees to admit [LAURA]      ED Course User Index  [LAURA] Óscar Rodriguez MD                                                       Medical Decision Making  MDM:  Patient is a 74-year-old male with  past medical history as above presenting the emergency department complaint of fatigue, weight loss, masses in his groin and his rectum.  Patient has physical characteristics of cancer with severe weight loss, fatigue, failure to thrive and multiple masses all over his body.  Labs today show that patient has a leukocytosis of 12 with some anemia to 8.5 but no thrombocytopenia.  CMP is overall unremarkable.  Lactic is 2.1.  Troponin is 44 and will continue to trend.  BNP is 975.  Patient's viral testing is negative.  Lungs do not sound like CHF.  Lower concern for ACS given patient's history and longstanding issues.  Pending CT chest abdomen pelvis along with chest x-ray and DVT ultrasound.  DVT ultrasound shows possible left superficial femoral vein occlusion and CT abdomen pelvis confirms with possible abscess as well as rectal masses throughout his perineum and groin.  Patient will need admission and general surgery recommendations.  Obtaining blood cultures and start broad-spectrum antibiotics.  Will start blood thinners for DVT.  Will continue to monitor.    -----------------------------------------------------            03/29/25 03/29/25 03/29/25 03/29/25               0445      0500      0515      0519     -----------------------------------------------------   BP:       110/57    106/52    104/53               BP Location:                                           Patient Position:                                           Pulse:                                    72       Resp:                                              Temp:                                              TempSrc:                                           SpO2:                          95%                 Weight:                                            Height:                                           -----------------------------------------------------       Óscar Rodriguez MD  Emergency Medicine      Problems Addressed:  Acute deep vein thrombosis (DVT) of femoral vein of left lower extremity: complicated acute illness or injury  Diarrhea, unspecified type: complicated acute illness or injury  Failure to thrive in adult: complicated acute illness or injury  Lymphadenopathy: complicated acute illness or injury    Amount and/or Complexity of Data Reviewed  Labs: ordered. Decision-making details documented in ED Course.  Radiology: ordered. Decision-making details documented in ED Course.  ECG/medicine tests: ordered.    Risk  Prescription drug management.  Decision regarding hospitalization.        Final diagnoses:   Failure to thrive in adult   Diarrhea, unspecified type   Lymphadenopathy   Acute deep vein thrombosis (DVT) of femoral vein of left lower extremity       ED Disposition  ED Disposition       ED Disposition   Decision to Admit    Condition   --    Comment   --               No follow-up provider specified.       Medication List      No changes were made to your prescriptions during this visit.            Óscar Rodriguez MD  03/29/25 0735

## 2025-03-29 NOTE — PLAN OF CARE
Goal Outcome Evaluation:  Plan of Care Reviewed With: patient        Progress: no change  Outcome Evaluation: Patient admitted from ER this shift. A&Ox4, family at bedside. Normal saline bolus complete per order. IV abx administered per order. Wound care consult ordered and wound care complete per standing orders. VSS on room air. Plan of care ongoing.

## 2025-03-29 NOTE — OP NOTE
Laparoscopic colostomy  Biopsy of rectal mass  PEG tube with laparoscopic assistance    Pre-op: Locally advanced rectal cancer with high-grade obstruction  Severe malnutrition    Post-op:  Same    Surgeon:  Matilde Monzon M.D., F.A.C.S.    Assistant: Jennifer    Anesthesia:  general with block      Indications: Elderly gentleman with fungating obstructing advanced rectal versus anal carcinoma with extensive inguinal metastases as well as liver metastases.  The patient has constant diarrhea doing to the high-grade obstruction and has no control of his bowel movements and has near obstruction of the rectum secondary to the above. diverting colostomy was recommended.  As patient wanted to be full code placement of a feeding tube for severe malnutrition was also recommended       Procedure Details   After obtaining informed consent and receiving preoperative antibiotics and with venous compression boots in place, the patient was taken to the operating room and placed under anesthesia.  The abdomen was prepped and draped in a sterile fashion.  A small incision was made just inferior to the umbilicus and the Veress needle was inserted.  Pneumoperitoneum was obtained to 15 mmHg and the 5 mm trocar was placed.  Camera was inserted, confirming position within the abdomen.  Under direct visualization a right lower quadrant 5 mm trocar was placed.  The sigmoid colon was identified.  In order to take down the attachments to mobilize for the colostomy a third 5 mm port was placed in the left lower quadrant.  The sigmoid colon was mobilized by taking down the lateral attachments and the window was created at the base of the colon.  An incision was then made around the left lower quadrant trocar and this was brought down to the fascia.  The fascia was incised and the colon was brought through the incision.  The colon was divided with the SHEA stapler and the distal end was dropped back in the peritoneal cavity.  The colon was then  sutured directly to the fascia with interrupted 0 Vicryl sutures.  Once this was accomplished attention was turned to the PEG.  Based on the CT scan due to the distention of the colon the stomach was posterior to the colon and without laparoscopic visualization of the colon it was felt there would be a high likelihood of injury to the colon by doing the standard PEG procedure.  The gastroscope was inserted through the oral cavity down into the stomach.  With the distended stomach a window was identified to place the PEG through.  Small incision was made just inferior lateral to the xiphoid and the needle trocar was placed.  The stomach was cannot be elevated and this was visualized through the laparoscope.  The guidewire was then passed and grasped with the scope.  The feeding tube was placed over the wire and brought out through the incision at 1 cm from the skin.  Pneumoperitoneum was deflated and with visualization the stomach was up against the anterior abdominal wall.  The trocars were removed and the fascia of the 5 mm trocar was closed with 3-0 Monocryl sutures.  Skin was closed with 4-0 Monocryl subcuticular stitch.  The colostomy was then matured with 3-0 Vicryl sutures and dressing was placed.  Following placement of dressing in the colostomy bag the patient was turned to the lateral position where a huge fungating tumor was identified.  The normal rectal sphincter could not be recognized and the mass extended through the peritoneum into the base of the penis.  It is not clear whether this was a primary rectal cancer or primary anal cancer.  2 pieces of tissue were trimmed off with the curved Munoz scissors and sent to pathology for evaluation.  Bleeding was controlled at the biopsy sites.  Dressing was placed.  Patient tolerated the procedure well and was taken to the recovery room in stable condition    Findings:    Colon Resection  Operation performed with curative intent No   Tumor Location (select all  that apply) Rectum, NOS   Extent of colon and vascular resection  (select all that apply) Other no resection, diverting colostomy       Patient had a palliative diverting colostomy for either a distal rectal or locally advanced anal cancer    Estimated Blood Loss:  Minimal    Blood Administered: none           Drains: none           Specimens:   ID Type Source Tests Collected by Time   A : rectal mass biopsy Tissue Large Intestine, Rectum TISSUE EXAM, [facility] ([facility], [facility]) Matilde Monzon MD 3/29/2025 2068        Grafts and Implants: No       Complications:  none           Disposition: PACU - hemodynamically stable.           Condition: stable

## 2025-03-29 NOTE — ANESTHESIA PREPROCEDURE EVALUATION
Anesthesia Evaluation     Patient summary reviewed and Nursing notes reviewed   no history of anesthetic complications:   NPO Solid Status: > 8 hours  NPO Liquid Status: > 8 hours           Airway   Mallampati: II  TM distance: >3 FB  Neck ROM: full  Dental - normal exam     Pulmonary - negative pulmonary ROS    breath sounds clear to auscultation  (-) not a smoker (never)  Cardiovascular   Exercise tolerance: poor (<4 METS)    ECG reviewed  Rhythm: regular  Rate: normal    (+) DVT (left leg) current      Neuro/Psych  (+) weakness  GI/Hepatic/Renal/Endo    (+) GI bleeding (rectal bleed) lower active bleeding, liver disease, renal disease- stones    Musculoskeletal (-) negative ROS    Abdominal   (+) scaphoid    Abdomen: soft.   Substance History - negative use     OB/GYN          Other   blood dyscrasia anemia,   history of cancer active    ROS/Med Hx Other: Weight loss. Hx appendectomy.  Normal sinus rhythm with sinus arrhythmia  Septal infarct , age undetermined  Abnormal ECG  When compared with ECG of 07-Jan-2018 14:27,  Septal infarct is now present  Inverted T waves have replaced nonspecific T wave abnormality in Inferior  leads  Confirmed by Jayden Hyde ([id number]) on 3/29/2025 10:56:59 AM                     Anesthesia Plan    ASA 4     general and general with block     (General with possible TAP block.)  intravenous induction     Anesthetic plan, risks, benefits, and alternatives have been provided, discussed and informed consent has been obtained with: patient.  Pre-procedure education provided  Use of blood products discussed with  Consented to blood products.    Plan discussed with CRNA.    CODE STATUS:

## 2025-03-29 NOTE — ANESTHESIA PROCEDURE NOTES
"Peripheral Block      Patient reassessed immediately prior to procedure    Patient location during procedure: OR  Reason for block: at surgeon's request and post-op pain management  Performed by  CRNA/CAA: Salena Farias CRNA  Preanesthetic Checklist  Completed: patient identified, IV checked, site marked, risks and benefits discussed, surgical consent, monitors and equipment checked, pre-op evaluation and timeout performed  Prep:  Pt Position: supine  Sterile barriers:cap, gloves, sterile barriers and mask  Prep: ChloraPrep  Patient monitoring: blood pressure monitoring, continuous pulse oximetry and EKG  Procedure    Nursing cardiac assessment comments yes: Sedation, GA, Spinal,Epidural   Performed under: general  Guidance:ultrasound guided    ULTRASOUND INTERPRETATION.  Using ultrasound guidance a 20 G (20g 4\" Stimuplex) gauge needle was placed in close proximity to the nerve, at which point, under ultrasound guidance anesthetic was injected in the area of the nerve and spread of the anesthesia was seen on ultrasound in close proximity thereto.  There were no abnormalities seen on ultrasound; a digital image was taken; and the patient tolerated the procedure with no complications. Images:still images obtained    Laterality:Bilateral  Block Type:TAP  Injection Technique:single-shot  Needle Type:short-bevel  Needle Gauge:20 G  Resistance on Injection: none    Medications Used: ropivacaine (NAROPIN) injection 0.5 % - Peripheral Nerve, Transversus Abdominus Plane   200 mg - 3/29/2025 3:23:00 PM      Medications  Preservative Free Saline:10ml  Comment:Block Injection:  Total volume divided equally between Right and Left block        Post Assessment  Injection Assessment: negative aspiration for heme, incremental injection and no paresthesia on injection  Patient Tolerance:comfortable throughout block  Complications:no  Additional Notes  The pt was in the supine position under general anesthesia.    Under " Ultrasound guidance, a BBraun 4inch 360 degree needle was advanced with Normal Saline hydro dissection of tissue.  The Internal Oblique and Transversus Abdominus muscles where visualized.  At or before the aponeurosis of Internal Oblique, local anesthetic spread was visualized in the Transversus Abdominus Plane. Injection was made incrementally with aspiration every 5 mls.  There was no  intravascular injection,  injection pressure was normal, there was no neural injection, and the procedure was completed without difficulty. The same procedure was completed for left and right sided tap blocks. Thank You.    Performed by: Salena Farias CRNA

## 2025-03-29 NOTE — H&P
[facility]      Admit Date: 3/29/2025     Chief complaint blood per rectum    Subjective   Mr. Donald is a pleasant 74-year-old male without significant past medical history and has not seen a doctor for long time, not taking any medications at home and never had colonoscopy who presenting to the ER with complaint of bloody stool per rectum associated with lower left quadrant pain, fever or chills and anorexia.  He also had weakness, fatigue, extreme weight loss as well as diarrhea.  Patient sister said that she found him to be in a state of disarray at his home today.  He said that he has been losing weight over the past couple of months and that he can no longer get out of bed and take care of himself.  He has been having profuse diarrhea nonbloody.  Says that he has what he thinks is hemorrhoids and a hard area in his left groin.    - In ER, chest x-ray showed no acute abnormality of the chest.  - CT of abdomen with contrast showed rectal mass with extensive local disease involving the perineum.  Abscess or necrotic mass in the right perineum measured 3.3 x 3.8 cm.  There is also local invasion into the posterior aspect of the prostate gland, pelvic and retroperitoneal adenopathy.  Metastatic disease involving the left hepatic lobe and possibly the lung bases.  - Scrotal ultrasound showed large left hydrocele.  - Lower extremity Doppler was positive for DVT in the left common and superficial femoral veins      History  Past Medical History:   Diagnosis Date    Kidney stones      No past surgical history on file.  No family history on file.  Social History     Tobacco Use    Smoking status: Never    Smokeless tobacco: Never   Substance Use Topics    Alcohol use: No     (Not in a hospital admission)    Allergies:  Penicillins      Review of Systems  12 point review of systems is negative unless stated above in the history of present illness      Objective     Vital  Signs  Temp:  [98.3 °F (36.8 °C)] 98.3 °F (36.8 °C)  Heart Rate:  [72-86] 72  Resp:  [18] 18  BP: ()/(42-57) 104/53    Physical Exam:  General alert oriented x 3 cachectic in mild distress.    Head atraumatic normocephalic.    Neck supple JVD supple.    Cardiovascular regular rate and rhythm with no murmurs.    Respiratory clear with clear breath sounds bilaterally no wheezing and no crackles.    GI left lower quadrant tenderness, not distended with normal bowel sounds.    Extremities pitting edema and swelling in the left lower extremity.    Skin no jaundice and no erythema.      Results Review:     I reviewed the patient's new imaging results and agree with the interpretation.  I reviewed the patient's other test results and agree with the interpretation.    Results from last 7 days   Lab Units 03/29/25  0434   WBC 10*3/mm3 12.85*   HEMOGLOBIN g/dL 8.5*   PLATELETS 10*3/mm3 309     Results from last 7 days   Lab Units 03/29/25  0434   SODIUM mmol/L 133*   POTASSIUM mmol/L 4.1   CHLORIDE mmol/L 101   CO2 mmol/L 22.2   BUN mg/dL 21   CREATININE mg/dL 1.14   CALCIUM mg/dL 8.7   GLUCOSE mg/dL 106*     Results from last 7 days   Lab Units 03/29/25  0434   BILIRUBIN mg/dL 0.4   ALK PHOS U/L 316*   AST (SGOT) U/L 20   ALT (SGPT) U/L 11     Results from last 7 days   Lab Units 03/29/25  0434   MAGNESIUM mg/dL 2.2         Results from last 7 days   Lab Units 03/29/25  0636 03/29/25  0434   CK TOTAL U/L  --  25   HSTROP T ng/L 41* 44*       Imaging Results (Last 24 Hours)       Procedure Component Value Units Date/Time    CT Chest With Contrast Diagnostic [[id number]] Resulted: 03/29/25 0807     Updated: 03/29/25 0807    US Scrotum & Testicles [[id number]] Collected: 03/29/25 0646     Updated: 03/29/25 0648    Narrative:      CLINICAL HISTORY: Scrotal swelling.     COMPARISON: CT of the abdomen and pelvis performed today.     TECHNIQUE: Grayscale and duplex Doppler sonography of the testicles was  obtained.      FINDINGS: The right testicle is not visible sonographically.     There is a large left hydrocele.  The left testis is normal in size and  echogenicity, measuring 3.1 x 3.9 x 2 cm.  There is blood flow within  the left testis.       Impression:         LARGE LEFT HYDROCELE.     RIGHT TESTIS NOT SEEN.     This report was finalized on 3/29/2025 6:46 AM by Sarah Canseco MD.       US Venous Doppler Lower Extremity Bilateral (duplex) [[id number]] Collected: 03/29/25 0640     Updated: 03/29/25 0646    Narrative:      CLINICAL HISTORY: Lower extremity swelling.  Mass.     COMPARISON: No ultrasound available for direct comparison.     TECHNIQUE:   Grayscale and color Doppler sonography of the lower  extremities was obtained, with Doppler spectral analysis of the inflow  and outflow vasculature of the lower extremities.     FINDINGS:     Right lower extremity:  The right common femoral, femoral, popliteal veins are patent,  compressible, and have normal waveforms with duplex Doppler.  The  peroneal and posterior tibial veins are patent with color Doppler.     Left lower extremity:  The left common femoral vein and the proximal and mid portions of the  left superficial femoral vein are noncompressible and have no flow with  duplex Doppler.  The distal superficial femoral vein is patent with  color Doppler.  The popliteal vein is patent and has a present waveform.   The posterior tibial and peroneal veins are patent and compressible.       Impression:         POSITIVE FOR DEEP VENOUS THROMBOSIS IN THE LEFT COMMON AND SUPERFICIAL  FEMORAL VEINS.     NEGATIVE FOR DEEP VENOUS THROMBOSIS IN THE RIGHT LOWER EXTREMITY.     This report was finalized on 3/29/2025 6:44 AM by Sarah Canseco MD.       XR Chest AP [[id number]] Collected: 03/29/25 0614     Updated: 03/29/25 0618    Narrative:      CLINICAL HISTORY: Fatigue.     COMPARISON: None available.     TECHNIQUE:  Single AP view of the chest.     FINDINGS: Lungs are clear; the known  nodules at the lung bases are too  small to see radiographically.  There is mild fullness in the left  hilum. No pleural effusion or pneumothorax is identified.   Cardiomediastinal silhouette is normal.  No acute osseous or upper  abdominal abnormality is seen.       Impression:         NO ACUTE ABNORMALITY IN THE CHEST.     MILD FULLNESS IN THE LEFT HILUM, WHICH COULD BE DUE TO ENLARGED LYMPH  NODES, MASS, OR ENLARGED VESSELS.  CT SUGGESTED FOR FURTHER EVALUATION.     This report was finalized on 3/29/2025 6:16 AM by Sarah Canseco MD.       CT Abdomen Pelvis With Contrast [[id number]] Collected: 03/29/25 0555     Updated: 03/29/25 0616    Narrative:      CLINICAL HISTORY: Fatigue, pelvic and rectal mass, diarrhea.     COMPARISON: None available.     TECHNIQUE: IV contrast was administered and CT of the abdomen and pelvis  was obtained.  Multiplanar reformats were generated. Limited exposure  control, adjustment of the mA and/or KV according to patient size or use  of iterative reconstruction technique was utilized.     FINDINGS:    Lower chest: Scattered bibasilar pulmonary nodules measuring up to 5 mm.     Hepatobiliary: Hypodense mass in segment 2 of the liver measuring 3.4 x  3.5 cm.  The liver is not cirrhotic.  Gallbladder is normal.     Pancreas: normal.     Spleen: Calcified granulomata scattered through the otherwise normal  spleen.     Adrenals: normal.      Kidneys, ureters, bladder: Bilateral nonobstructing renal calculi with  an index calculus in the upper pole right kidney measuring 5 mm.   Ureters are normal in caliber.  Urinary bladder is normal.     Reproductive: Posterior wall of the prostate gland is invaded by the  heterogeneous rectal mass.  A left-sided hydrocele is noted.  The  irregular mucosal thickening and enhancement also extends to the lateral  aspect of the scrotal skin on the left side.     GI tract/Bowel: Irregular diffuse thickening of the rectal mucosa from  the level of the  rectosigmoid junction and extending into the anal  region, as well as the skin surface of the perineum.  A rim-enhancing  fluid collection in the right perianal region measures 3.2 x 3.8 x 3 cm.   The rectal mass produces moderate constipation.  The small bowel is  normal in caliber and wall thickness     Appendix: Not well seen.  No findings for acute appendicitis.     Peritoneum: normal, no free air or fluid.     Vascular: Aortoiliac atherosclerosis is noted.  There is a questionable  thrombus in the left femoral vein with edema in the imaged left lower  extremity.     Lymph nodes: Severely enlarged heterogeneous lymph nodes in the groin,  with an index left groin mass measuring 8 x 4.4 cm.  The cortex of the  lymph node is irregular, with some posterior invasion into the left  adductor musculature.  An index left external iliac node measures up to  2.8 cm in short axis.  Multiple retroperitoneal nodes are also noted.     Musculoskeletal and abdominal wall: Degenerative changes in the lumbar  spine and the hip joints.     Acute DVT and possible abscess were discussed with Dr. Rodriguez at 6:12 AM  EST on 3/29/2025.       Impression:         RECTAL MASS WITH EXTENSIVE LOCAL DISEASE INVOLVING THE PERINEUM,  INCLUDING AN ABSCESS OR NECROTIC MASS IN THE RIGHT PERINEUM MEASURING  3.3 X 3.8 X 3 CM.  THERE IS ALSO LOCAL INVASION INTO THE POSTERIOR  ASPECT OF THE PROSTATE GLAND.  EXTENSIVE INGUINAL, PELVIC, AND  RETROPERITONEAL ADENOPATHY, INCLUDING A LOCALLY AGGRESSIVE LEFT INGUINAL  NODE THAT INVADES INTO THE LEFT ADDUCTOR MUSCULATURE.     METASTATIC DISEASE INVOLVING THE LEFT HEPATIC LOBE AND POSSIBLY THE LUNG  BASES.     POSSIBLE DEEP VENOUS THROMBOSIS IN THE LEFT SUPERFICIAL FEMORAL VEIN.   CONFIRMATION WITH ULTRASOUND IS RECOMMENDED.     CONSTIPATION AS A RESULT OF THE EXTENSIVE RECTAL MASS.     LARGE LEFT HYDROCELE.     This report was finalized on 3/29/2025 6:14 AM by Sarah Canseco MD.                   Assessment &  Plan   - New diagnosis colorectal cancer  - Abscess or necrotic mass in the right perineum measured 3.3 x 3.8 cm  - Anemia from bleeding per rectum  - blood per rectum   - Leukocytosis  - Acute left lower extremity  DVT  - Elevated lactic acid from cancer  - Failure to thrive, anorexia and weight loss  - Non-STEMI    Patient looks very weak and lost significant weight.  I had long discussion with his sister at bedside.   Admit to [facility] bed.    IV fluid and keep on clear liquid diet.  Appreciate surgery input. Started on heparin for DVT proph.  CT angiogram pending.  Consulted hematology/oncology for metastatic colon cancer and also DVT treatment options.  Follow-up cultures. Continue antibiotic empiric treatment with cefepime.    Stress ulcer prophylaxis  VTE prophylaxis    CODE STATUS: Full code      I discussed the patient's findings and my recommendations with patient and family.       Jayden Barney MD  03/29/25  08:19 EDT

## 2025-03-30 LAB
BASOPHILS # BLD AUTO: 0.07 10*3/MM3 (ref 0–0.2)
BASOPHILS NFR BLD AUTO: 0.5 % (ref 0–1.5)
CEA SERPL-MCNC: 38.4 NG/ML
DEPRECATED RDW RBC AUTO: 51.6 FL (ref 37–54)
EOSINOPHIL # BLD AUTO: 0.03 10*3/MM3 (ref 0–0.4)
EOSINOPHIL NFR BLD AUTO: 0.2 % (ref 0.3–6.2)
ERYTHROCYTE [DISTWIDTH] IN BLOOD BY AUTOMATED COUNT: 15.6 % (ref 12.3–15.4)
FOLATE SERPL-MCNC: 5.46 NG/ML (ref 4.78–24.2)
HCT VFR BLD AUTO: 29.4 % (ref 37.5–51)
HGB BLD-MCNC: 7.9 G/DL (ref 13–17.7)
IMM GRANULOCYTES # BLD AUTO: 0.1 10*3/MM3 (ref 0–0.05)
IMM GRANULOCYTES NFR BLD AUTO: 0.7 % (ref 0–0.5)
LYMPHOCYTES # BLD AUTO: 0.85 10*3/MM3 (ref 0.7–3.1)
LYMPHOCYTES NFR BLD AUTO: 5.6 % (ref 19.6–45.3)
MAGNESIUM SERPL-MCNC: 2.1 MG/DL (ref 1.6–2.4)
MCH RBC QN AUTO: 24.2 PG (ref 26.6–33)
MCHC RBC AUTO-ENTMCNC: 26.9 G/DL (ref 31.5–35.7)
MCV RBC AUTO: 90.2 FL (ref 79–97)
MONOCYTES # BLD AUTO: 1.07 10*3/MM3 (ref 0.1–0.9)
MONOCYTES NFR BLD AUTO: 7.1 % (ref 5–12)
NEUTROPHILS NFR BLD AUTO: 12.99 10*3/MM3 (ref 1.7–7)
NEUTROPHILS NFR BLD AUTO: 85.9 % (ref 42.7–76)
NRBC BLD AUTO-RTO: 0 /100 WBC (ref 0–0.2)
PHOSPHATE SERPL-MCNC: 4 MG/DL (ref 2.5–4.5)
PLATELET # BLD AUTO: 340 10*3/MM3 (ref 140–450)
PMV BLD AUTO: 8.5 FL (ref 6–12)
PREALB SERPL-MCNC: 3.7 MG/DL (ref 20–40)
RBC # BLD AUTO: 3.26 10*6/MM3 (ref 4.14–5.8)
VIT B12 BLD-MCNC: 451 PG/ML (ref 211–946)
WBC NRBC COR # BLD AUTO: 15.11 10*3/MM3 (ref 3.4–10.8)

## 2025-03-30 PROCEDURE — 84100 ASSAY OF PHOSPHORUS: CPT | Performed by: SURGERY

## 2025-03-30 PROCEDURE — 85025 COMPLETE CBC W/AUTO DIFF WBC: CPT | Performed by: SURGERY

## 2025-03-30 PROCEDURE — 25010000002 MORPHINE PER 10 MG: Performed by: SURGERY

## 2025-03-30 PROCEDURE — 99232 SBSQ HOSP IP/OBS MODERATE 35: CPT | Performed by: INTERNAL MEDICINE

## 2025-03-30 PROCEDURE — 83735 ASSAY OF MAGNESIUM: CPT | Performed by: SURGERY

## 2025-03-30 PROCEDURE — 25010000002 CEFEPIME PER 500 MG: Performed by: SURGERY

## 2025-03-30 PROCEDURE — 94799 UNLISTED PULMONARY SVC/PX: CPT

## 2025-03-30 PROCEDURE — 25010000002 NA FERRIC GLUC CPLX PER 12.5 MG: Performed by: INTERNAL MEDICINE

## 2025-03-30 PROCEDURE — 25010000002 HEPARIN (PORCINE) PER 1000 UNITS: Performed by: SURGERY

## 2025-03-30 PROCEDURE — 84134 ASSAY OF PREALBUMIN: CPT | Performed by: SURGERY

## 2025-03-30 PROCEDURE — 94761 N-INVAS EAR/PLS OXIMETRY MLT: CPT

## 2025-03-30 PROCEDURE — 82378 CARCINOEMBRYONIC ANTIGEN: CPT | Performed by: INTERNAL MEDICINE

## 2025-03-30 RX ORDER — AMOXICILLIN 250 MG
2 CAPSULE ORAL 2 TIMES DAILY
Status: DISCONTINUED | OUTPATIENT
Start: 2025-03-30 | End: 2025-04-02

## 2025-03-30 RX ORDER — POLYETHYLENE GLYCOL 3350 17 G/17G
17 POWDER, FOR SOLUTION ORAL 2 TIMES DAILY
Status: DISCONTINUED | OUTPATIENT
Start: 2025-03-30 | End: 2025-04-02

## 2025-03-30 RX ORDER — BISACODYL 5 MG/1
5 TABLET, DELAYED RELEASE ORAL DAILY PRN
Status: DISCONTINUED | OUTPATIENT
Start: 2025-03-30 | End: 2025-04-02

## 2025-03-30 RX ADMIN — Medication 10 ML: at 21:52

## 2025-03-30 RX ADMIN — SENNOSIDES AND DOCUSATE SODIUM 2 TABLET: 50; 8.6 TABLET ORAL at 21:25

## 2025-03-30 RX ADMIN — POLYETHYLENE GLYCOL (3350) 17 G: 17 POWDER, FOR SOLUTION ORAL at 21:25

## 2025-03-30 RX ADMIN — SODIUM CHLORIDE 125 MG: 9 INJECTION, SOLUTION INTRAVENOUS at 09:52

## 2025-03-30 RX ADMIN — CEFEPIME HYDROCHLORIDE 1000 MG: 1 INJECTION, POWDER, FOR SOLUTION INTRAMUSCULAR; INTRAVENOUS at 21:25

## 2025-03-30 RX ADMIN — MORPHINE SULFATE 2 MG: 2 INJECTION, SOLUTION INTRAMUSCULAR; INTRAVENOUS at 13:24

## 2025-03-30 RX ADMIN — CEFEPIME HYDROCHLORIDE 1000 MG: 1 INJECTION, POWDER, FOR SOLUTION INTRAMUSCULAR; INTRAVENOUS at 09:52

## 2025-03-30 RX ADMIN — HEPARIN SODIUM 5000 UNITS: 5000 INJECTION INTRAVENOUS; SUBCUTANEOUS at 21:25

## 2025-03-30 RX ADMIN — HEPARIN SODIUM 5000 UNITS: 5000 INJECTION INTRAVENOUS; SUBCUTANEOUS at 05:37

## 2025-03-30 RX ADMIN — DOCUSATE SODIUM 50MG AND SENNOSIDES 8.6MG 2 TABLET: 8.6; 5 TABLET, FILM COATED ORAL at 09:52

## 2025-03-30 RX ADMIN — MORPHINE SULFATE 2 MG: 2 INJECTION, SOLUTION INTRAMUSCULAR; INTRAVENOUS at 00:16

## 2025-03-30 NOTE — PROGRESS NOTES
LOS: 1 day   Patient Care Team:  Carmen Pretty APRN as PCP - General (Family Medicine)    Post op day # 1, status post laparoscopic colostomy, PEG placement and anal biopsy    Subjective     Interval History:  Patient's main complaint today is inability to urinate.  He has been unable to void except for small amounts from overflow incontinence.  His bladder scan showed almost 900 cc.  Nursing staff was unable to pass a Walsh.    History taken from patient, nursing staff.      Objective     Vital Signs  Temp:  [97.3 °F (36.3 °C)-98.5 °F (36.9 °C)] 98 °F (36.7 °C)  Heart Rate:  [59-73] 73  Resp:  [16-22] 20  BP: ()/(50-63) 100/53    Physical Exam:   This is a thin malnourished male in no acute distress  HEENT examination: Sclera are anicteric  Abdomen: Colostomy in left lower quadrant.  No flatus or stool  Skin/incisions: Incision sites were inspected and demonstrate no drainage or erythema.    Walsh catheter was unable to be placed at bedside and therefore it was determined that patient be brought to the preop area and Walsh placed under sedation with an alternative being placement of a percutaneous suprapubic catheter      Procedure Note    Procedure: Placement of 16 Kazakh coudé catheter under propofol sedation     Indication: Postoperative urinary retention    Anesthesia: Propofol    Description:  The risks and benefits of the procedure were discussed.  The patient had a large foreskin and the penile orifice was unable to be visualized at the bedside and blind passage of the Walsh was not successful.  Patient was taken to the preop area of the OR where the penis and scrotum were prepped with Betadine.  Under propofol anesthesia and appropriate retraction the penile orifice was able to be identified.  A 12 Kazakh Walsh was able to be passed but once the orifice was actually identified a 16 coudé catheter was placed without difficulty.    Complications:  none         Results Review:    Results from last 7  "days   Lab Units 03/29/25  0636 03/29/25 0434   CK TOTAL U/L  --  25   HSTROP T ng/L 41* 44*     Results from last 7 days   Lab Units 03/30/25 0150 03/29/25 1944 03/29/25  0800 03/29/25 0434   LACTATE mmol/L  --   --  1.8 2.1*   WBC 10*3/mm3 15.11* 12.56*  --  12.85*   HEMOGLOBIN g/dL 7.9* 7.9*  --  8.5*   HEMATOCRIT % 29.4* 29.2*  --  29.5*   PLATELETS 10*3/mm3 340 333  --  309   INR   --  1.24*  --   --          Results from last 7 days   Lab Units 03/30/25 0150 03/29/25 1944 03/29/25 0434   SODIUM mmol/L  --   --  133*   POTASSIUM mmol/L  --   --  4.1   MAGNESIUM mg/dL 2.1 2.0 2.2   CHLORIDE mmol/L  --   --  101   CO2 mmol/L  --   --  22.2   BUN mg/dL  --   --  21   CREATININE mg/dL  --   --  1.14   CALCIUM mg/dL  --   --  8.7   GLUCOSE mg/dL  --   --  106*   ALBUMIN g/dL  --   --  2.2*   BILIRUBIN mg/dL  --   --  0.4   ALK PHOS U/L  --   --  316*   AST (SGOT) U/L  --   --  20   ALT (SGPT) U/L  --   --  11   Estimated Creatinine Clearance: 44.7 mL/min (by C-G formula based on SCr of 1.14 mg/dL).  No results found for: \"AMMONIA\"      No results found for: \"BLOODCX\"  No results found for: \"URINECX\"  No results found for: \"WOUNDCX\"  No results found for: \"STOOLCX\"    Imaging:  Imaging Results (Last 24 Hours)       ** No results found for the last 24 hours. **             Impression:  Locally advanced rectal versus anal cancer with metastatic disease  Extensive inguinal adenopathy resulting in DVT  Acute urinary retention    Plan:  Start tube feeds  May start heparin drip in a.m. or other anticoagulation.    Matilde Monzon MD  03/30/25  17:48 EDT      Please note that portions of this note were completed with a voice recognition program.    "

## 2025-03-30 NOTE — NURSING NOTE
Pt IV infiltrated redness and swelling noted at IV site. Site marked, photo taken, Wound placed in LDA, Notified MD, Elevate extremity 24-48hrs.

## 2025-03-30 NOTE — PLAN OF CARE
Goal Outcome Evaluation:  Plan of Care Reviewed With: patient           Outcome Evaluation: Pt resting in bed at this time. VSS on room air. Pt unable to void, 16 Fr coude cath placed by Dr. Monzon. Pt sat u in chair today. Pt IV infilitrated with ferric gluconate infusing. Wound care done per orders. No acute changes or concerns at this time. Will continue with plan of care.

## 2025-03-30 NOTE — NURSING NOTE
Patient had two bladder scans over 500 ml, Dr. Barney was notified. Order to place doe catheter. Several people attempted doe catheter with no luck. Dr. Barney notified about trouble placing doe catheter and no urology was on call until Tuesday. Dr Barney said to just monitor UOP and renal functions. Dr. Drake arrived on unit around 1553 today and was told about bladder scans and attempted to place a coude catheter on unit without success. Dr. Monzon took patient with PACU RN's x2 to PACU to place a doe with sedation, consent signed and in chart, sister notified per pt request.   1728- patient arrived back to floor with doe from surgery. 16 Fr Coude catheter in place at this time.

## 2025-03-30 NOTE — PLAN OF CARE
Goal Outcome Evaluation:           Progress: no change  Outcome Evaluation: Pt has been resting in bed through the night. Pt complained of pain PRN pain meds given per orders. No acute changes or concerns at this time.

## 2025-03-30 NOTE — CONSULTS
Date:  03/30/25  Referring Provider: Dr. Barney  Reason for Consultation: Colon Cancer    Patient Care Team:  Carmen Pretty APRN as PCP - General (Family Medicine)    Chief complaint: Rectal bleeding, LLQ pain, diarrhea, weight loss    History of present illness:  Pradeep Donald is a 74 y.o. year-old male evalutated at the request of Dr. Barney  for evaluation and treatment of locally advanced and metastatic rectal cancer.  Mr. Donald presented to the ER on 3/29/25.  He hadn't seen a doctor in a long time and presented with bloody stool, LLQ pain, generalized weakness, diarrhea, weight loss, and inability to care for himself.  He underwent further evaluation and was found to have a  obstructing rectal mass which stretched from the  area of the rectosigmoid junction into the anal region as well as the skin surfaceof the perineum.  The mass also invaded into the prostate and  infiltrated to the lateral aspect of the L scrotal skin.  Three were severely enlarged heterogenous LNs in the L groin with index mass measuring 8x4.4 cm and the mass invaded into the adductor musculature.  Multiple retroperitoneal LNs were noted and there was a hypodense mass in segment 2 of the liver measuring 3.4 x 3.5 cm. Thre was a rim enhancing fluid collection in the R perianal region measuring 3.8 cm in maximal dimension.  Given obstructing mass, Dr. Monzon took him for diverting colostomy, biopsy of the rectal mass as well as PEG tube placement for severe malnutrition on 3/29/25.  Pathology is pending.          History  Past Medical History:   Diagnosis Date    Kidney stones        History reviewed. No pertinent surgical history.    History reviewed. No pertinent family history.    Social History     Tobacco Use    Smoking status: Never    Smokeless tobacco: Never   Vaping Use    Vaping status: Never Used   Substance Use Topics    Alcohol use: No    Drug use: Never       Allergies:  Penicillins    Outpatient Medications:  No medications  prior to admission.       Inpatient Medications:  Scheduled Meds:  cefepime, 1,000 mg, Intravenous, Q12H  ferric gluconate, 125 mg, Intravenous, Daily  heparin (porcine), 5,000 Units, Subcutaneous, Q8H  senna-docusate sodium, 2 tablet, Oral, BID  sodium chloride, 10 mL, Intravenous, Q12H        Continuous Infusions:       PRN Meds:    acetaminophen **OR** acetaminophen **OR** [DISCONTINUED] acetaminophen    senna-docusate sodium **AND** polyethylene glycol **AND** bisacodyl **AND** bisacodyl    Morphine **AND** naloxone    Morphine    sodium chloride    sodium chloride    Review of Systems  A comprehensive 14 point review of systems was performed.  Significant findings as mentioned above.  All other systems reviewed and are negative.       Physical Exam:  Vital Signs   Patient Vitals for the past 24 hrs:   BP Temp Temp src Pulse Resp SpO2 Height Weight   03/30/25 1000 104/56 97.7 °F (36.5 °C) Oral 73 20 91 % -- --   03/30/25 0548 104/57 98.5 °F (36.9 °C) Oral -- 20 98 % -- --   03/30/25 0318 108/58 98.1 °F (36.7 °C) Oral -- 16 -- -- --   03/29/25 2323 -- -- -- 60 -- -- -- --   03/29/25 2240 103/63 97.9 °F (36.6 °C) Oral -- 16 -- -- --   03/29/25 2140 103/59 98 °F (36.7 °C) Oral -- 16 -- -- --   03/29/25 2040 119/57 98 °F (36.7 °C) Oral -- 16 -- -- --   03/29/25 1940 101/57 97.9 °F (36.6 °C) Oral -- 16 -- -- --   03/29/25 1910 111/59 97.9 °F (36.6 °C) Oral -- 16 -- -- --   03/29/25 1840 105/54 97.9 °F (36.6 °C) Oral 67 16 95 % -- --   03/29/25 1825 103/53 97.9 °F (36.6 °C) Oral 59 16 97 % -- --   03/29/25 1810 111/55 97.9 °F (36.6 °C) Oral 67 16 97 % -- --   03/29/25 1755 114/51 97.3 °F (36.3 °C) Oral 63 16 97 % -- --   03/29/25 1745 115/68 -- -- 62 12 99 % -- --   03/29/25 1740 119/68 -- -- 62 15 99 % -- --   03/29/25 1735 120/66 -- -- 61 13 99 % -- --   03/29/25 1730 115/69 -- -- 63 20 99 % -- --   03/29/25 1725 112/68 -- -- 67 18 100 % -- --   03/29/25 1720 108/64 -- -- 67 12 100 % -- --   03/29/25 1715 110/64 97  "°F (36.1 °C) Tympanic 67 13 100 % -- --   03/29/25 1439 104/49 98 °F (36.7 °C) Tympanic 74 9 98 % -- --   03/29/25 1129 92/50 97.7 °F (36.5 °C) Oral 71 18 97 % 190.5 cm (75\") 57.3 kg (126 lb 4 oz)     Not examined today      Labs / Studies:  Lab Results   Component Value Date    WBC 15.11 (H) 03/30/2025    HGB 7.9 (L) 03/30/2025    HCT 29.4 (L) 03/30/2025    MCV 90.2 03/30/2025    RDW 15.6 (H) 03/30/2025     03/30/2025    NEUTRORELPCT 85.9 (H) 03/30/2025    LYMPHORELPCT 5.6 (L) 03/30/2025    MONORELPCT 7.1 03/30/2025    EOSRELPCT 0.2 (L) 03/30/2025    BASORELPCT 0.5 03/30/2025    NEUTROABS 12.99 (H) 03/30/2025    LYMPHSABS 0.85 03/30/2025       Lab Results   Component Value Date     (L) 03/29/2025    K 4.1 03/29/2025    CO2 22.2 03/29/2025     03/29/2025    BUN 21 03/29/2025    CREATININE 1.14 03/29/2025    EGFRIFNONA 57 (L) 01/07/2018    GLUCOSE 106 (H) 03/29/2025    CALCIUM 8.7 03/29/2025    ALKPHOS 316 (H) 03/29/2025    AST 20 03/29/2025    ALT 11 03/29/2025    BILITOT 0.4 03/29/2025    ALBUMIN 2.2 (L) 03/29/2025    PROTEINTOT 6.9 03/29/2025    MG 2.1 03/30/2025    PHOS 4.0 03/30/2025     Lab Results   Component Value Date    FERRITIN 480.30 (H) 03/29/2025    IRON 13 (L) 03/29/2025    TIBC 150 (L) 03/29/2025    LABIRON 9 (L) 03/29/2025       Imaging Results (Last 72 Hours)       Procedure Component Value Units Date/Time    CT Chest With Contrast Diagnostic [[id number]] Collected: 03/29/25 0849     Updated: 03/29/25 0855    Narrative:      EXAMINATION: CTA chest with contrast     CLINICAL HISTORY: Pain     COMPARISON: None available     TECHNIQUE: Contiguous 3 mm thick slices were obtained through the chest  after the administration of Isovue-370 intravenous contrast. Coronal and  sagittal reformats were performed.     FINDINGS:  The heart is normal in size configuration. The thoracic aorta is normal  in caliber. No aneurysmal dilatation. No dissection. The central  pulmonary arteries are normal " in caliber. No pulmonary embolus. The  central airways are patent. No pneumothorax is appreciated. The few  small nodules are noted within both lungs. These measure up to 5 mm and  are of uncertain clinical significance. Correlation with prior imaging  would be helpful as available. In the absence of prior imaging, chest CT  follow-up in 6 months is recommended.       Impression:      1. No pulmonary embolus.  2. Normal caliber thoracic aorta without aneurysmal dilatation or  dissection.  3. Multiple tiny pulmonary nodules. These may be inflammatory in nature  representing noncalcified granulomas. However, the findings are  nonspecific. Given the appearance and multiplicity of the lesions,  6-month follow-up CT is recommended.     This report was finalized on 3/29/2025 8:53 AM by Bairon Sales MD.       US Scrotum & Testicles [[id number]] Collected: 03/29/25 0646     Updated: 03/29/25 0648    Narrative:      CLINICAL HISTORY: Scrotal swelling.     COMPARISON: CT of the abdomen and pelvis performed today.     TECHNIQUE: Grayscale and duplex Doppler sonography of the testicles was  obtained.     FINDINGS: The right testicle is not visible sonographically.     There is a large left hydrocele.  The left testis is normal in size and  echogenicity, measuring 3.1 x 3.9 x 2 cm.  There is blood flow within  the left testis.       Impression:         LARGE LEFT HYDROCELE.     RIGHT TESTIS NOT SEEN.     This report was finalized on 3/29/2025 6:46 AM by Sarah Canseco MD.       US Venous Doppler Lower Extremity Bilateral (duplex) [[id number]] Collected: 03/29/25 0640     Updated: 03/29/25 0646    Narrative:      CLINICAL HISTORY: Lower extremity swelling.  Mass.     COMPARISON: No ultrasound available for direct comparison.     TECHNIQUE:   Grayscale and color Doppler sonography of the lower  extremities was obtained, with Doppler spectral analysis of the inflow  and outflow vasculature of the lower extremities.     FINDINGS:      Right lower extremity:  The right common femoral, femoral, popliteal veins are patent,  compressible, and have normal waveforms with duplex Doppler.  The  peroneal and posterior tibial veins are patent with color Doppler.     Left lower extremity:  The left common femoral vein and the proximal and mid portions of the  left superficial femoral vein are noncompressible and have no flow with  duplex Doppler.  The distal superficial femoral vein is patent with  color Doppler.  The popliteal vein is patent and has a present waveform.   The posterior tibial and peroneal veins are patent and compressible.       Impression:         POSITIVE FOR DEEP VENOUS THROMBOSIS IN THE LEFT COMMON AND SUPERFICIAL  FEMORAL VEINS.     NEGATIVE FOR DEEP VENOUS THROMBOSIS IN THE RIGHT LOWER EXTREMITY.     This report was finalized on 3/29/2025 6:44 AM by Sarah Canseco MD.       XR Chest AP [[id number]] Collected: 03/29/25 0614     Updated: 03/29/25 0618    Narrative:      CLINICAL HISTORY: Fatigue.     COMPARISON: None available.     TECHNIQUE:  Single AP view of the chest.     FINDINGS: Lungs are clear; the known nodules at the lung bases are too  small to see radiographically.  There is mild fullness in the left  hilum. No pleural effusion or pneumothorax is identified.   Cardiomediastinal silhouette is normal.  No acute osseous or upper  abdominal abnormality is seen.       Impression:         NO ACUTE ABNORMALITY IN THE CHEST.     MILD FULLNESS IN THE LEFT HILUM, WHICH COULD BE DUE TO ENLARGED LYMPH  NODES, MASS, OR ENLARGED VESSELS.  CT SUGGESTED FOR FURTHER EVALUATION.     This report was finalized on 3/29/2025 6:16 AM by Sarah Canseco MD.       CT Abdomen Pelvis With Contrast [[id number]] Collected: 03/29/25 0555     Updated: 03/29/25 0616    Narrative:      CLINICAL HISTORY: Fatigue, pelvic and rectal mass, diarrhea.     COMPARISON: None available.     TECHNIQUE: IV contrast was administered and CT of the abdomen and pelvis  was  obtained.  Multiplanar reformats were generated. Limited exposure  control, adjustment of the mA and/or KV according to patient size or use  of iterative reconstruction technique was utilized.     FINDINGS:    Lower chest: Scattered bibasilar pulmonary nodules measuring up to 5 mm.     Hepatobiliary: Hypodense mass in segment 2 of the liver measuring 3.4 x  3.5 cm.  The liver is not cirrhotic.  Gallbladder is normal.     Pancreas: normal.     Spleen: Calcified granulomata scattered through the otherwise normal  spleen.     Adrenals: normal.      Kidneys, ureters, bladder: Bilateral nonobstructing renal calculi with  an index calculus in the upper pole right kidney measuring 5 mm.   Ureters are normal in caliber.  Urinary bladder is normal.     Reproductive: Posterior wall of the prostate gland is invaded by the  heterogeneous rectal mass.  A left-sided hydrocele is noted.  The  irregular mucosal thickening and enhancement also extends to the lateral  aspect of the scrotal skin on the left side.     GI tract/Bowel: Irregular diffuse thickening of the rectal mucosa from  the level of the rectosigmoid junction and extending into the anal  region, as well as the skin surface of the perineum.  A rim-enhancing  fluid collection in the right perianal region measures 3.2 x 3.8 x 3 cm.   The rectal mass produces moderate constipation.  The small bowel is  normal in caliber and wall thickness     Appendix: Not well seen.  No findings for acute appendicitis.     Peritoneum: normal, no free air or fluid.     Vascular: Aortoiliac atherosclerosis is noted.  There is a questionable  thrombus in the left femoral vein with edema in the imaged left lower  extremity.     Lymph nodes: Severely enlarged heterogeneous lymph nodes in the groin,  with an index left groin mass measuring 8 x 4.4 cm.  The cortex of the  lymph node is irregular, with some posterior invasion into the left  adductor musculature.  An index left external iliac  node measures up to  2.8 cm in short axis.  Multiple retroperitoneal nodes are also noted.     Musculoskeletal and abdominal wall: Degenerative changes in the lumbar  spine and the hip joints.     Acute DVT and possible abscess were discussed with Dr. Rodriguez at 6:12 AM  EST on 3/29/2025.       Impression:         RECTAL MASS WITH EXTENSIVE LOCAL DISEASE INVOLVING THE PERINEUM,  INCLUDING AN ABSCESS OR NECROTIC MASS IN THE RIGHT PERINEUM MEASURING  3.3 X 3.8 X 3 CM.  THERE IS ALSO LOCAL INVASION INTO THE POSTERIOR  ASPECT OF THE PROSTATE GLAND.  EXTENSIVE INGUINAL, PELVIC, AND  RETROPERITONEAL ADENOPATHY, INCLUDING A LOCALLY AGGRESSIVE LEFT INGUINAL  NODE THAT INVADES INTO THE LEFT ADDUCTOR MUSCULATURE.     METASTATIC DISEASE INVOLVING THE LEFT HEPATIC LOBE AND POSSIBLY THE LUNG  BASES.     POSSIBLE DEEP VENOUS THROMBOSIS IN THE LEFT SUPERFICIAL FEMORAL VEIN.   CONFIRMATION WITH ULTRASOUND IS RECOMMENDED.     CONSTIPATION AS A RESULT OF THE EXTENSIVE RECTAL MASS.     LARGE LEFT HYDROCELE.     This report was finalized on 3/29/2025 6:14 AM by Sarah Canseco MD.                 Assessment & Plan:  Pradeep Donald is a 74 y.o. year-old male presenting with rectal bleeding with obstructing mass with both locally advanced and metastatic disease in the setting of significant debility, generalized weakness, malnutrition with comorbid LLE DVT likely related to hypercoagulable state of malignancy combined with bulky L groin adenopathy.    Metastatic rectal cancer:  -  Given locally advanced obstructive cancer, surgical diversion was necessary and Dr. Monzon performed diverting colostomy on 3/29/25.  -  He has invasion locally into the prostate and perineum as well as evidence of retroperitoneal LAD, bulky L inguinal LAD, liver metastasis.  He has many tiny lung nodules which may also represent metastatic disease v. Granulomatous disease.    -  Rectal mass has been biopsied with pathology pending.  -  Will send CEA level  -   Given metastatic disease, his condition could potentially be treated but would not be curable.  -  The challenge is likely to come from his profoundly debilitated state.   -  Will examine him tomorrow and see how he does as he heals from his surgery and takes in nutriition.    2.  Anemia:  -  Likely a combined iron deficiency anemia and anemia of chronic disease.  -  B12, Folate have been sent and are pending.  -  Would give Venofer 200 mg IV daily x 5 days.    3.  LLE DVT:  -  Likely related to compressive bulky adenopathy in the L groin and hypercoagulable state related to his malignancy  -  When felt safe from surgical point of view, would start anticoagulation with unfractionated heparin ggt.  Would start without a bolus given increased risk of bleeding.  When the initial ptt checked, would only adjust if over-anticoagulated, but would wait for the second ptt check to make adjustment.      Will see Mr. oDnald at the bedside tomorrow.-  If you have any questions, please don't hesitate to call          Lila Regalado MD  03/30/25  10:47 EDT

## 2025-03-31 LAB
ABO GROUP BLD: NORMAL
ABO GROUP BLD: NORMAL
ACANTHOCYTES BLD QL SMEAR: NORMAL
ANISOCYTOSIS BLD QL: NORMAL
APTT PPP: 36 SECONDS (ref 24.5–35.9)
BASOPHILS # BLD AUTO: 0.05 10*3/MM3 (ref 0–0.2)
BASOPHILS NFR BLD AUTO: 0.4 % (ref 0–1.5)
BLD GP AB SCN SERPL QL: NEGATIVE
C3 FRG RBC-MCNC: NORMAL
DACRYOCYTES BLD QL SMEAR: NORMAL
DEPRECATED RDW RBC AUTO: 47.7 FL (ref 37–54)
ELLIPTOCYTES BLD QL SMEAR: NORMAL
EOSINOPHIL # BLD AUTO: 0.26 10*3/MM3 (ref 0–0.4)
EOSINOPHIL NFR BLD AUTO: 2.2 % (ref 0.3–6.2)
ERYTHROCYTE [DISTWIDTH] IN BLOOD BY AUTOMATED COUNT: 15.6 % (ref 12.3–15.4)
GLUCOSE BLDC GLUCOMTR-MCNC: 101 MG/DL (ref 70–130)
GLUCOSE BLDC GLUCOMTR-MCNC: 101 MG/DL (ref 70–130)
GLUCOSE BLDC GLUCOMTR-MCNC: 112 MG/DL (ref 70–130)
GLUCOSE BLDC GLUCOMTR-MCNC: 95 MG/DL (ref 70–130)
HCT VFR BLD AUTO: 23.8 % (ref 37.5–51)
HCT VFR BLD AUTO: 25.9 % (ref 37.5–51)
HGB BLD-MCNC: 6.8 G/DL (ref 13–17.7)
HGB BLD-MCNC: 7.6 G/DL (ref 13–17.7)
HYPOCHROMIA BLD QL: NORMAL
IMM GRANULOCYTES # BLD AUTO: 0.1 10*3/MM3 (ref 0–0.05)
IMM GRANULOCYTES NFR BLD AUTO: 0.8 % (ref 0–0.5)
INR PPP: 1.26 (ref 0.9–1.1)
LYMPHOCYTES # BLD AUTO: 2.08 10*3/MM3 (ref 0.7–3.1)
LYMPHOCYTES NFR BLD AUTO: 17.6 % (ref 19.6–45.3)
MAGNESIUM SERPL-MCNC: 2.1 MG/DL (ref 1.6–2.4)
MCH RBC QN AUTO: 24.2 PG (ref 26.6–33)
MCHC RBC AUTO-ENTMCNC: 28.6 G/DL (ref 31.5–35.7)
MCV RBC AUTO: 84.7 FL (ref 79–97)
MONOCYTES # BLD AUTO: 1.23 10*3/MM3 (ref 0.1–0.9)
MONOCYTES NFR BLD AUTO: 10.4 % (ref 5–12)
NEUTROPHILS NFR BLD AUTO: 68.6 % (ref 42.7–76)
NEUTROPHILS NFR BLD AUTO: 8.12 10*3/MM3 (ref 1.7–7)
NRBC BLD AUTO-RTO: 0 /100 WBC (ref 0–0.2)
PHOSPHATE SERPL-MCNC: 4.5 MG/DL (ref 2.5–4.5)
PLAT MORPH BLD: NORMAL
PLATELET # BLD AUTO: 343 10*3/MM3 (ref 140–450)
PMV BLD AUTO: 9.3 FL (ref 6–12)
PROTHROMBIN TIME: 16 SECONDS (ref 11.6–15.1)
RBC # BLD AUTO: 2.81 10*6/MM3 (ref 4.14–5.8)
RH BLD: POSITIVE
RH BLD: POSITIVE
T&S EXPIRATION DATE: NORMAL
UFH PPP CHRO-ACNC: <0.1 IU/ML (ref 0.3–0.7)
UFH PPP CHRO-ACNC: <0.1 IU/ML (ref 0.3–0.7)
WBC NRBC COR # BLD AUTO: 11.84 10*3/MM3 (ref 3.4–10.8)

## 2025-03-31 PROCEDURE — 86901 BLOOD TYPING SEROLOGIC RH(D): CPT

## 2025-03-31 PROCEDURE — 99232 SBSQ HOSP IP/OBS MODERATE 35: CPT | Performed by: INTERNAL MEDICINE

## 2025-03-31 PROCEDURE — 85018 HEMOGLOBIN: CPT | Performed by: INTERNAL MEDICINE

## 2025-03-31 PROCEDURE — 25810000003 LACTATED RINGERS SOLUTION: Performed by: STUDENT IN AN ORGANIZED HEALTH CARE EDUCATION/TRAINING PROGRAM

## 2025-03-31 PROCEDURE — 84100 ASSAY OF PHOSPHORUS: CPT | Performed by: SURGERY

## 2025-03-31 PROCEDURE — 25010000002 HEPARIN (PORCINE) PER 1000 UNITS: Performed by: STUDENT IN AN ORGANIZED HEALTH CARE EDUCATION/TRAINING PROGRAM

## 2025-03-31 PROCEDURE — 82948 REAGENT STRIP/BLOOD GLUCOSE: CPT

## 2025-03-31 PROCEDURE — 85610 PROTHROMBIN TIME: CPT | Performed by: STUDENT IN AN ORGANIZED HEALTH CARE EDUCATION/TRAINING PROGRAM

## 2025-03-31 PROCEDURE — 86923 COMPATIBILITY TEST ELECTRIC: CPT

## 2025-03-31 PROCEDURE — 85025 COMPLETE CBC W/AUTO DIFF WBC: CPT | Performed by: SURGERY

## 2025-03-31 PROCEDURE — 86850 RBC ANTIBODY SCREEN: CPT

## 2025-03-31 PROCEDURE — 25010000002 HEPARIN (PORCINE) PER 1000 UNITS: Performed by: SURGERY

## 2025-03-31 PROCEDURE — 36415 COLL VENOUS BLD VENIPUNCTURE: CPT | Performed by: SURGERY

## 2025-03-31 PROCEDURE — P9016 RBC LEUKOCYTES REDUCED: HCPCS

## 2025-03-31 PROCEDURE — 25010000002 HEPARIN (PORCINE) 25000-0.45 UT/250ML-% SOLUTION: Performed by: STUDENT IN AN ORGANIZED HEALTH CARE EDUCATION/TRAINING PROGRAM

## 2025-03-31 PROCEDURE — 86900 BLOOD TYPING SEROLOGIC ABO: CPT

## 2025-03-31 PROCEDURE — 85520 HEPARIN ASSAY: CPT

## 2025-03-31 PROCEDURE — 85520 HEPARIN ASSAY: CPT | Performed by: STUDENT IN AN ORGANIZED HEALTH CARE EDUCATION/TRAINING PROGRAM

## 2025-03-31 PROCEDURE — 85730 THROMBOPLASTIN TIME PARTIAL: CPT | Performed by: STUDENT IN AN ORGANIZED HEALTH CARE EDUCATION/TRAINING PROGRAM

## 2025-03-31 PROCEDURE — 99233 SBSQ HOSP IP/OBS HIGH 50: CPT | Performed by: STUDENT IN AN ORGANIZED HEALTH CARE EDUCATION/TRAINING PROGRAM

## 2025-03-31 PROCEDURE — 85007 BL SMEAR W/DIFF WBC COUNT: CPT | Performed by: SURGERY

## 2025-03-31 PROCEDURE — 25010000002 NA FERRIC GLUC CPLX PER 12.5 MG: Performed by: INTERNAL MEDICINE

## 2025-03-31 PROCEDURE — 85014 HEMATOCRIT: CPT | Performed by: INTERNAL MEDICINE

## 2025-03-31 PROCEDURE — 83735 ASSAY OF MAGNESIUM: CPT | Performed by: SURGERY

## 2025-03-31 PROCEDURE — 36430 TRANSFUSION BLD/BLD COMPNT: CPT

## 2025-03-31 PROCEDURE — 25010000002 MORPHINE PER 10 MG: Performed by: SURGERY

## 2025-03-31 PROCEDURE — 25010000002 CEFEPIME PER 500 MG: Performed by: SURGERY

## 2025-03-31 RX ORDER — ACETAMINOPHEN 500 MG
1000 TABLET ORAL 3 TIMES DAILY
Status: DISCONTINUED | OUTPATIENT
Start: 2025-03-31 | End: 2025-04-21 | Stop reason: HOSPADM

## 2025-03-31 RX ORDER — NYSTATIN 100000 [USP'U]/ML
5 SUSPENSION ORAL 4 TIMES DAILY
Status: DISPENSED | OUTPATIENT
Start: 2025-03-31 | End: 2025-04-01

## 2025-03-31 RX ORDER — HEPARIN SODIUM 5000 [USP'U]/ML
50 INJECTION, SOLUTION INTRAVENOUS; SUBCUTANEOUS AS NEEDED
Status: DISCONTINUED | OUTPATIENT
Start: 2025-03-31 | End: 2025-03-31

## 2025-03-31 RX ORDER — PANTOPRAZOLE SODIUM 40 MG/10ML
40 INJECTION, POWDER, LYOPHILIZED, FOR SOLUTION INTRAVENOUS
Status: DISCONTINUED | OUTPATIENT
Start: 2025-03-31 | End: 2025-04-21 | Stop reason: HOSPADM

## 2025-03-31 RX ORDER — MIDODRINE HYDROCHLORIDE 2.5 MG/1
5 TABLET ORAL
Status: DISCONTINUED | OUTPATIENT
Start: 2025-03-31 | End: 2025-04-01

## 2025-03-31 RX ORDER — HEPARIN SODIUM 10000 [USP'U]/100ML
26 INJECTION, SOLUTION INTRAVENOUS
Status: DISCONTINUED | OUTPATIENT
Start: 2025-03-31 | End: 2025-04-02

## 2025-03-31 RX ORDER — FOLIC ACID 1 MG/1
1 TABLET ORAL DAILY
Status: DISCONTINUED | OUTPATIENT
Start: 2025-03-31 | End: 2025-04-02

## 2025-03-31 RX ORDER — HEPARIN SODIUM 5000 [USP'U]/ML
50 INJECTION, SOLUTION INTRAVENOUS; SUBCUTANEOUS ONCE
Status: COMPLETED | OUTPATIENT
Start: 2025-03-31 | End: 2025-03-31

## 2025-03-31 RX ORDER — HEPARIN SODIUM 5000 [USP'U]/ML
25 INJECTION, SOLUTION INTRAVENOUS; SUBCUTANEOUS AS NEEDED
Status: DISCONTINUED | OUTPATIENT
Start: 2025-03-31 | End: 2025-03-31

## 2025-03-31 RX ORDER — LANOLIN ALCOHOL/MO/W.PET/CERES
1000 CREAM (GRAM) TOPICAL DAILY
Status: DISCONTINUED | OUTPATIENT
Start: 2025-03-31 | End: 2025-04-02

## 2025-03-31 RX ORDER — OXYCODONE HYDROCHLORIDE 5 MG/1
5 TABLET ORAL EVERY 4 HOURS PRN
Refills: 0 | Status: DISCONTINUED | OUTPATIENT
Start: 2025-03-31 | End: 2025-04-21 | Stop reason: HOSPADM

## 2025-03-31 RX ORDER — TRAMADOL HYDROCHLORIDE 50 MG/1
50 TABLET ORAL 3 TIMES DAILY
Status: DISCONTINUED | OUTPATIENT
Start: 2025-03-31 | End: 2025-04-01

## 2025-03-31 RX ORDER — HYDROXYZINE HYDROCHLORIDE 10 MG/1
10 TABLET, FILM COATED ORAL NIGHTLY
Status: DISCONTINUED | OUTPATIENT
Start: 2025-03-31 | End: 2025-04-21 | Stop reason: HOSPADM

## 2025-03-31 RX ADMIN — HYDROXYZINE HYDROCHLORIDE 10 MG: 10 TABLET, FILM COATED ORAL at 20:13

## 2025-03-31 RX ADMIN — Medication 1000 MCG: at 12:06

## 2025-03-31 RX ADMIN — POLYETHYLENE GLYCOL (3350) 17 G: 17 POWDER, FOR SOLUTION ORAL at 20:13

## 2025-03-31 RX ADMIN — TRAMADOL HYDROCHLORIDE 50 MG: 50 TABLET, COATED ORAL at 20:13

## 2025-03-31 RX ADMIN — TRAMADOL HYDROCHLORIDE 50 MG: 50 TABLET, COATED ORAL at 15:46

## 2025-03-31 RX ADMIN — NYSTATIN 500000 UNITS: 100000 SUSPENSION ORAL at 12:06

## 2025-03-31 RX ADMIN — MIDODRINE HYDROCHLORIDE 5 MG: 2.5 TABLET ORAL at 17:17

## 2025-03-31 RX ADMIN — SODIUM CHLORIDE, POTASSIUM CHLORIDE, SODIUM LACTATE AND CALCIUM CHLORIDE 1000 ML: 600; 310; 30; 20 INJECTION, SOLUTION INTRAVENOUS at 12:06

## 2025-03-31 RX ADMIN — PANTOPRAZOLE SODIUM 40 MG: 40 INJECTION, POWDER, FOR SOLUTION INTRAVENOUS at 17:17

## 2025-03-31 RX ADMIN — SENNOSIDES AND DOCUSATE SODIUM 2 TABLET: 50; 8.6 TABLET ORAL at 20:13

## 2025-03-31 RX ADMIN — SENNOSIDES AND DOCUSATE SODIUM 2 TABLET: 50; 8.6 TABLET ORAL at 08:16

## 2025-03-31 RX ADMIN — HEPARIN SODIUM 5000 UNITS: 5000 INJECTION INTRAVENOUS; SUBCUTANEOUS at 06:33

## 2025-03-31 RX ADMIN — HEPARIN SODIUM 18 UNITS/KG/HR: 10000 INJECTION, SOLUTION INTRAVENOUS at 09:51

## 2025-03-31 RX ADMIN — CEFEPIME HYDROCHLORIDE 1000 MG: 1 INJECTION, POWDER, FOR SOLUTION INTRAMUSCULAR; INTRAVENOUS at 09:26

## 2025-03-31 RX ADMIN — ACETAMINOPHEN 1000 MG: 500 TABLET ORAL at 20:13

## 2025-03-31 RX ADMIN — FOLIC ACID 1 MG: 1 TABLET ORAL at 12:06

## 2025-03-31 RX ADMIN — HEPARIN SODIUM 2900 UNITS: 5000 INJECTION INTRAVENOUS; SUBCUTANEOUS at 17:20

## 2025-03-31 RX ADMIN — Medication 10 ML: at 08:17

## 2025-03-31 RX ADMIN — MORPHINE SULFATE 2 MG: 2 INJECTION, SOLUTION INTRAMUSCULAR; INTRAVENOUS at 12:06

## 2025-03-31 RX ADMIN — POLYETHYLENE GLYCOL (3350) 17 G: 17 POWDER, FOR SOLUTION ORAL at 08:16

## 2025-03-31 RX ADMIN — MIDODRINE HYDROCHLORIDE 5 MG: 2.5 TABLET ORAL at 12:06

## 2025-03-31 RX ADMIN — ACETAMINOPHEN 1000 MG: 500 TABLET ORAL at 15:45

## 2025-03-31 RX ADMIN — SODIUM CHLORIDE 125 MG: 9 INJECTION, SOLUTION INTRAVENOUS at 09:26

## 2025-03-31 RX ADMIN — Medication 10 ML: at 20:19

## 2025-03-31 RX ADMIN — NYSTATIN 500000 UNITS: 100000 SUSPENSION ORAL at 17:18

## 2025-03-31 NOTE — DISCHARGE PLACEMENT REQUEST
"Jaclyn Donald (74 y.o. Male)       Date of Birth   1950    Social Security Number       Address   [address] [id number]    Home Phone   [phone]    MRN   [id number]       Mu-ism   None    Marital Status   Single                            Admission Date   3/29/2025    Admission Type   Emergency    Admitting Provider   Jayden Barney MD    Attending Provider   Quintin Jon DO    Department, Room/Bed   [address], [address]32/       Discharge Date       Discharge Disposition       Discharge Destination                                 Attending Provider: Quintin Jon DO    Allergies: Penicillins    Isolation: None   Infection: None   Code Status: CPR    Ht: 190.5 cm (75\")   Wt: 57.3 kg (126 lb 4 oz)    Admission Cmt: None   Principal Problem: Metastatic colon cancer to liver [C18.9,C78.7]                   Active Insurance as of 3/29/2025       Primary Coverage       Payor Plan Insurance Group Employer/Plan Group    [employer] MEDICARE REPLACEMENT [employer] MEDICARE ADVANTAGE GROUP [id number]       Payor Plan Address Payor Plan Phone Number Payor Plan Fax Number Effective Dates    [address] [address]   1/1/2025 - None Entered    [facility] [id number]         Subscriber Name Subscriber Birth Date Member ID       JACLYN DONALD 1950 [id number]                     Emergency Contacts        (Rel.) Home Phone Work Phone Mobile Phone    Kisha Vogel (Sister) [phone] -- --                 History & Physical        Jayden Barney MD at 03/29/25 0819              [facility] HOSPITALIST HISTORY AND PHYSICAL      Admit Date: 3/29/2025     Chief complaint blood per rectum    Subjective  Mr. Donald is a pleasant 74-year-old male without significant past medical history and has not seen a doctor for long time, not taking any medications at home and never had colonoscopy who presenting to the ER " with complaint of bloody stool per rectum associated with lower left quadrant pain, fever or chills and anorexia.  He also had weakness, fatigue, extreme weight loss as well as diarrhea.  Patient sister said that she found him to be in a state of disarray at his home today.  He said that he has been losing weight over the past couple of months and that he can no longer get out of bed and take care of himself.  He has been having profuse diarrhea nonbloody.  Says that he has what he thinks is hemorrhoids and a hard area in his left groin.    - In ER, chest x-ray showed no acute abnormality of the chest.  - CT of abdomen with contrast showed rectal mass with extensive local disease involving the perineum.  Abscess or necrotic mass in the right perineum measured 3.3 x 3.8 cm.  There is also local invasion into the posterior aspect of the prostate gland, pelvic and retroperitoneal adenopathy.  Metastatic disease involving the left hepatic lobe and possibly the lung bases.  - Scrotal ultrasound showed large left hydrocele.  - Lower extremity Doppler was positive for DVT in the left common and superficial femoral veins      History  Past Medical History:   Diagnosis Date    Kidney stones      No past surgical history on file.  No family history on file.  Social History     Tobacco Use    Smoking status: Never    Smokeless tobacco: Never   Substance Use Topics    Alcohol use: No     (Not in a hospital admission)    Allergies:  Penicillins      Review of Systems  12 point review of systems is negative unless stated above in the history of present illness      Objective    Vital Signs  Temp:  [98.3 °F (36.8 °C)] 98.3 °F (36.8 °C)  Heart Rate:  [72-86] 72  Resp:  [18] 18  BP: ()/(42-57) 104/53    Physical Exam:  General alert oriented x 3 cachectic in mild distress.    Head atraumatic normocephalic.    Neck supple JVD supple.    Cardiovascular regular rate and rhythm with no murmurs.    Respiratory clear with clear  breath sounds bilaterally no wheezing and no crackles.    GI left lower quadrant tenderness, not distended with normal bowel sounds.    Extremities pitting edema and swelling in the left lower extremity.    Skin no jaundice and no erythema.      Results Review:     I reviewed the patient's new imaging results and agree with the interpretation.  I reviewed the patient's other test results and agree with the interpretation.    Results from last 7 days   Lab Units 03/29/25  0434   WBC 10*3/mm3 12.85*   HEMOGLOBIN g/dL 8.5*   PLATELETS 10*3/mm3 309     Results from last 7 days   Lab Units 03/29/25  0434   SODIUM mmol/L 133*   POTASSIUM mmol/L 4.1   CHLORIDE mmol/L 101   CO2 mmol/L 22.2   BUN mg/dL 21   CREATININE mg/dL 1.14   CALCIUM mg/dL 8.7   GLUCOSE mg/dL 106*     Results from last 7 days   Lab Units 03/29/25  0434   BILIRUBIN mg/dL 0.4   ALK PHOS U/L 316*   AST (SGOT) U/L 20   ALT (SGPT) U/L 11     Results from last 7 days   Lab Units 03/29/25  0434   MAGNESIUM mg/dL 2.2         Results from last 7 days   Lab Units 03/29/25  0636 03/29/25  0434   CK TOTAL U/L  --  25   HSTROP T ng/L 41* 44*       Imaging Results (Last 24 Hours)       Procedure Component Value Units Date/Time    CT Chest With Contrast Diagnostic [[id number]] Resulted: 03/29/25 0807     Updated: 03/29/25 0807    US Scrotum & Testicles [[id number]] Collected: 03/29/25 0646     Updated: 03/29/25 0648    Narrative:      CLINICAL HISTORY: Scrotal swelling.     COMPARISON: CT of the abdomen and pelvis performed today.     TECHNIQUE: Grayscale and duplex Doppler sonography of the testicles was  obtained.     FINDINGS: The right testicle is not visible sonographically.     There is a large left hydrocele.  The left testis is normal in size and  echogenicity, measuring 3.1 x 3.9 x 2 cm.  There is blood flow within  the left testis.       Impression:         LARGE LEFT HYDROCELE.     RIGHT TESTIS NOT SEEN.     This report was finalized on 3/29/2025 6:46 AM by  Sarah Canseco MD.       US Venous Doppler Lower Extremity Bilateral (duplex) [[id number]] Collected: 03/29/25 0640     Updated: 03/29/25 0646    Narrative:      CLINICAL HISTORY: Lower extremity swelling.  Mass.     COMPARISON: No ultrasound available for direct comparison.     TECHNIQUE:   Grayscale and color Doppler sonography of the lower  extremities was obtained, with Doppler spectral analysis of the inflow  and outflow vasculature of the lower extremities.     FINDINGS:     Right lower extremity:  The right common femoral, femoral, popliteal veins are patent,  compressible, and have normal waveforms with duplex Doppler.  The  peroneal and posterior tibial veins are patent with color Doppler.     Left lower extremity:  The left common femoral vein and the proximal and mid portions of the  left superficial femoral vein are noncompressible and have no flow with  duplex Doppler.  The distal superficial femoral vein is patent with  color Doppler.  The popliteal vein is patent and has a present waveform.   The posterior tibial and peroneal veins are patent and compressible.       Impression:         POSITIVE FOR DEEP VENOUS THROMBOSIS IN THE LEFT COMMON AND SUPERFICIAL  FEMORAL VEINS.     NEGATIVE FOR DEEP VENOUS THROMBOSIS IN THE RIGHT LOWER EXTREMITY.     This report was finalized on 3/29/2025 6:44 AM by Sarah Canseco MD.       XR Chest AP [[id number]] Collected: 03/29/25 0614     Updated: 03/29/25 0618    Narrative:      CLINICAL HISTORY: Fatigue.     COMPARISON: None available.     TECHNIQUE:  Single AP view of the chest.     FINDINGS: Lungs are clear; the known nodules at the lung bases are too  small to see radiographically.  There is mild fullness in the left  hilum. No pleural effusion or pneumothorax is identified.   Cardiomediastinal silhouette is normal.  No acute osseous or upper  abdominal abnormality is seen.       Impression:         NO ACUTE ABNORMALITY IN THE CHEST.     MILD FULLNESS IN THE LEFT  HILUM, WHICH COULD BE DUE TO ENLARGED LYMPH  NODES, MASS, OR ENLARGED VESSELS.  CT SUGGESTED FOR FURTHER EVALUATION.     This report was finalized on 3/29/2025 6:16 AM by Sarah Canseco MD.       CT Abdomen Pelvis With Contrast [[id number]] Collected: 03/29/25 0555     Updated: 03/29/25 0616    Narrative:      CLINICAL HISTORY: Fatigue, pelvic and rectal mass, diarrhea.     COMPARISON: None available.     TECHNIQUE: IV contrast was administered and CT of the abdomen and pelvis  was obtained.  Multiplanar reformats were generated. Limited exposure  control, adjustment of the mA and/or KV according to patient size or use  of iterative reconstruction technique was utilized.     FINDINGS:    Lower chest: Scattered bibasilar pulmonary nodules measuring up to 5 mm.     Hepatobiliary: Hypodense mass in segment 2 of the liver measuring 3.4 x  3.5 cm.  The liver is not cirrhotic.  Gallbladder is normal.     Pancreas: normal.     Spleen: Calcified granulomata scattered through the otherwise normal  spleen.     Adrenals: normal.      Kidneys, ureters, bladder: Bilateral nonobstructing renal calculi with  an index calculus in the upper pole right kidney measuring 5 mm.   Ureters are normal in caliber.  Urinary bladder is normal.     Reproductive: Posterior wall of the prostate gland is invaded by the  heterogeneous rectal mass.  A left-sided hydrocele is noted.  The  irregular mucosal thickening and enhancement also extends to the lateral  aspect of the scrotal skin on the left side.     GI tract/Bowel: Irregular diffuse thickening of the rectal mucosa from  the level of the rectosigmoid junction and extending into the anal  region, as well as the skin surface of the perineum.  A rim-enhancing  fluid collection in the right perianal region measures 3.2 x 3.8 x 3 cm.   The rectal mass produces moderate constipation.  The small bowel is  normal in caliber and wall thickness     Appendix: Not well seen.  No findings for acute  appendicitis.     Peritoneum: normal, no free air or fluid.     Vascular: Aortoiliac atherosclerosis is noted.  There is a questionable  thrombus in the left femoral vein with edema in the imaged left lower  extremity.     Lymph nodes: Severely enlarged heterogeneous lymph nodes in the groin,  with an index left groin mass measuring 8 x 4.4 cm.  The cortex of the  lymph node is irregular, with some posterior invasion into the left  adductor musculature.  An index left external iliac node measures up to  2.8 cm in short axis.  Multiple retroperitoneal nodes are also noted.     Musculoskeletal and abdominal wall: Degenerative changes in the lumbar  spine and the hip joints.     Acute DVT and possible abscess were discussed with Dr. Rodriguez at 6:12 AM  EST on 3/29/2025.       Impression:         RECTAL MASS WITH EXTENSIVE LOCAL DISEASE INVOLVING THE PERINEUM,  INCLUDING AN ABSCESS OR NECROTIC MASS IN THE RIGHT PERINEUM MEASURING  3.3 X 3.8 X 3 CM.  THERE IS ALSO LOCAL INVASION INTO THE POSTERIOR  ASPECT OF THE PROSTATE GLAND.  EXTENSIVE INGUINAL, PELVIC, AND  RETROPERITONEAL ADENOPATHY, INCLUDING A LOCALLY AGGRESSIVE LEFT INGUINAL  NODE THAT INVADES INTO THE LEFT ADDUCTOR MUSCULATURE.     METASTATIC DISEASE INVOLVING THE LEFT HEPATIC LOBE AND POSSIBLY THE LUNG  BASES.     POSSIBLE DEEP VENOUS THROMBOSIS IN THE LEFT SUPERFICIAL FEMORAL VEIN.   CONFIRMATION WITH ULTRASOUND IS RECOMMENDED.     CONSTIPATION AS A RESULT OF THE EXTENSIVE RECTAL MASS.     LARGE LEFT HYDROCELE.     This report was finalized on 3/29/2025 6:14 AM by Sarah Canseco MD.                   Assessment & Plan  - New diagnosis colorectal cancer  - Abscess or necrotic mass in the right perineum measured 3.3 x 3.8 cm  - Anemia from bleeding per rectum  - blood per rectum   - Leukocytosis  - Acute left lower extremity  DVT  - Elevated lactic acid from cancer  - Failure to thrive, anorexia and weight loss  - Non-STEMI    Patient looks very weak and lost  significant weight.  I had long discussion with his sister at bedside.   Admit to [facility] bed.    IV fluid and keep on clear liquid diet.  Appreciate surgery input. Started on heparin for DVT proph.  CT angiogram pending.  Consulted hematology/oncology for metastatic colon cancer and also DVT treatment options.  Follow-up cultures. Continue antibiotic empiric treatment with cefepime.    Stress ulcer prophylaxis  VTE prophylaxis    CODE STATUS: Full code      I discussed the patient's findings and my recommendations with patient and family.       Jayden Barney MD  03/29/25  08:19 EDT        Electronically signed by Jayden Barney MD at 03/29/25 1225       Vital Signs (last day)       Date/Time Temp Temp src Pulse Resp BP Patient Position SpO2    03/31/25 1014 -- -- -- 16 92/50 Lying --    03/31/25 0924 98.4 (36.9) -- 68 18 93/50 -- 96    03/31/25 0606 98.1 (36.7) Oral 72 16 99/50 -- 93    03/31/25 0551 98.2 (36.8) Oral 69 20 96/48 -- 95    03/31/25 0539 98.2 (36.8) Oral 73 16 94/50 -- 97    03/31/25 0259 98.2 (36.8) Oral 74 17 98/50 Lying --    03/30/25 2337 98.1 (36.7) Oral 75 16 92/51 Lying --    03/30/25 2150 -- -- -- -- -- -- 97    03/30/25 1905 97.5 (36.4) Oral 75 20 90/46 Lying --    03/30/25 1726 98 (36.7) Oral 73 20 100/53 Lying 90    03/30/25 1336 98 (36.7) Oral -- 22 98/50 Lying --    03/30/25 1000 97.7 (36.5) Oral 73 20 104/56 Lying 91    03/30/25 0548 98.5 (36.9) Oral -- 20 104/57 Lying 98    03/30/25 0318 98.1 (36.7) Oral -- 16 108/58 Lying --          Lines, Drains & Airways       Active LDAs       Name Placement date Placement time Site Days    Peripheral IV 03/31/25 0600 Posterior;Right Wrist 03/31/25  0600  Wrist  less than 1    Peripheral IV 03/31/25 0951 Anterior;Proximal;Right Forearm 03/31/25  0951  Forearm  less than 1    Gastrostomy/Enterostomy Percutaneous endoscopic gastrostomy (PEG) 20 Fr. LUQ 03/29/25  1623  LUQ  1    Colostomy LLQ 03/29/25  1635  LLQ  1    Urethral  Catheter Coude 16 Fr. 03/30/25  1736  -- less than 1                  Current Facility-Administered Medications   Medication Dose Route Frequency Provider Last Rate Last Admin    acetaminophen (TYLENOL) tablet 1,000 mg  1,000 mg Per PEG Tube TID Quintin Jon DO        sennosides-docusate (PERICOLACE) 8.6-50 MG per tablet 2 tablet  2 tablet Oral BID Matilde Monzon MD   2 tablet at 03/31/25 0816    And    polyethylene glycol (MIRALAX) packet 17 g  17 g Oral BID Matilde Monzon MD   17 g at 03/31/25 0816    And    bisacodyl (DULCOLAX) EC tablet 5 mg  5 mg Oral Daily PRN Matilde Monzon MD        cefepime 1000 mg IVPB in 100 mL NS (VTB)  1,000 mg Intravenous Q12H Matilde Monzon MD   1,000 mg at 03/31/25 0926    ferric gluconate (FERRLECIT) 125 mg in sodium chloride 0.9 % 100 mL IVPB  125 mg Intravenous Daily Jayden Barney  mL/hr at 03/31/25 0926 125 mg at 03/31/25 0926    folic acid (FOLVITE) tablet 1 mg  1 mg Per PEG Tube Daily Quintin Jon DO   1 mg at 03/31/25 1206    heparin 45149 units/250 mL (100 units/mL) in 0.45 % NaCl infusion  18 Units/kg/hr (Dosing Weight) Intravenous Titrated Quintin Jon DO 10.31 mL/hr at 03/31/25 0951 18 Units/kg/hr at 03/31/25 0951    midodrine (PROAMATINE) tablet 5 mg  5 mg Per PEG Tube TID AC Quintin Jon DO   5 mg at 03/31/25 1206    morphine injection 2 mg  2 mg Intravenous Q4H PRN Matilde Monzon MD   2 mg at 03/31/25 1206    And    naloxone (NARCAN) injection 0.4 mg  0.4 mg Intravenous Q5 Min PRN Matilde Monzon MD        morphine injection 2 mg  2 mg Intravenous Q4H PRN Matilde Monzon MD   2 mg at 03/30/25 1324    nystatin (MYCOSTATIN) 100,000 unit/mL suspension 500,000 Units  5 mL Swish & Spit 4x Daily Quintin Jon,    500,000 Units at 03/31/25 1206    oxyCODONE (ROXICODONE) immediate release tablet 5 mg  5 mg Per PEG Tube Q4H PRN Quintin Jon, DO         pantoprazole (PROTONIX) injection 40 mg  40 mg Intravenous BID AC Quintin Jon DO        Pharmacy to Dose Heparin   Not Applicable Continuous PRN Quintin Jon DO        sodium chloride 0.9 % flush 10 mL  10 mL Intravenous Q12H Matilde Monzon MD   10 mL at 03/31/25 0817    sodium chloride 0.9 % flush 10 mL  10 mL Intravenous PRN Matilde Monzon MD        sodium chloride 0.9 % infusion 40 mL  40 mL Intravenous PRN Matilde Monzon MD        traMADol (ULTRAM) tablet 50 mg  50 mg Per PEG Tube TID Quintin Jon DO        vitamin B-12 (CYANOCOBALAMIN) tablet 1,000 mcg  1,000 mcg Per PEG Tube Daily Quintin Jon DO   1,000 mcg at 03/31/25 1206        Operative/Procedure Notes (most recent note)        Matilde Monzon MD at 03/29/25 1527          Laparoscopic colostomy  Biopsy of rectal mass  PEG tube with laparoscopic assistance    Pre-op: Locally advanced rectal cancer with high-grade obstruction  Severe malnutrition    Post-op:  Same    Surgeon:  Matilde Monzon M.D., F.A.C.S.    Assistant: Jennifer    Anesthesia:  general with block      Indications: Elderly gentleman with fungating obstructing advanced rectal versus anal carcinoma with extensive inguinal metastases as well as liver metastases.  The patient has constant diarrhea doing to the high-grade obstruction and has no control of his bowel movements and has near obstruction of the rectum secondary to the above. diverting colostomy was recommended.  As patient wanted to be full code placement of a feeding tube for severe malnutrition was also recommended       Procedure Details   After obtaining informed consent and receiving preoperative antibiotics and with venous compression boots in place, the patient was taken to the operating room and placed under anesthesia.  The abdomen was prepped and draped in a sterile fashion.  A small incision was made just inferior to the umbilicus and the Veress needle was  inserted.  Pneumoperitoneum was obtained to 15 mmHg and the 5 mm trocar was placed.  Camera was inserted, confirming position within the abdomen.  Under direct visualization a right lower quadrant 5 mm trocar was placed.  The sigmoid colon was identified.  In order to take down the attachments to mobilize for the colostomy a third 5 mm port was placed in the left lower quadrant.  The sigmoid colon was mobilized by taking down the lateral attachments and the window was created at the base of the colon.  An incision was then made around the left lower quadrant trocar and this was brought down to the fascia.  The fascia was incised and the colon was brought through the incision.  The colon was divided with the SHEA stapler and the distal end was dropped back in the peritoneal cavity.  The colon was then sutured directly to the fascia with interrupted 0 Vicryl sutures.  Once this was accomplished attention was turned to the PEG.  Based on the CT scan due to the distention of the colon the stomach was posterior to the colon and without laparoscopic visualization of the colon it was felt there would be a high likelihood of injury to the colon by doing the standard PEG procedure.  The gastroscope was inserted through the oral cavity down into the stomach.  With the distended stomach a window was identified to place the PEG through.  Small incision was made just inferior lateral to the xiphoid and the needle trocar was placed.  The stomach was cannot be elevated and this was visualized through the laparoscope.  The guidewire was then passed and grasped with the scope.  The feeding tube was placed over the wire and brought out through the incision at 1 cm from the skin.  Pneumoperitoneum was deflated and with visualization the stomach was up against the anterior abdominal wall.  The trocars were removed and the fascia of the 5 mm trocar was closed with 3-0 Monocryl sutures.  Skin was closed with 4-0 Monocryl subcuticular  stitch.  The colostomy was then matured with 3-0 Vicryl sutures and dressing was placed.  Following placement of dressing in the colostomy bag the patient was turned to the lateral position where a huge fungating tumor was identified.  The normal rectal sphincter could not be recognized and the mass extended through the peritoneum into the base of the penis.  It is not clear whether this was a primary rectal cancer or primary anal cancer.  2 pieces of tissue were trimmed off with the curved Munoz scissors and sent to pathology for evaluation.  Bleeding was controlled at the biopsy sites.  Dressing was placed.  Patient tolerated the procedure well and was taken to the recovery room in stable condition    Findings:    Colon Resection  Operation performed with curative intent No   Tumor Location (select all that apply) Rectum, NOS   Extent of colon and vascular resection  (select all that apply) Other no resection, diverting colostomy       Patient had a palliative diverting colostomy for either a distal rectal or locally advanced anal cancer    Estimated Blood Loss:  Minimal    Blood Administered: none           Drains: none           Specimens:   ID Type Source Tests Collected by Time   A : rectal mass biopsy Tissue Large Intestine, Rectum TISSUE EXAM, [facility] ([facility], [facility]) Matilde Monzon MD 3/29/2025 1657        Grafts and Implants: No       Complications:  none           Disposition: PACU - hemodynamically stable.           Condition: stable        Electronically signed by Matilde Monzon MD at 03/29/25 4823          Physician Progress Notes (most recent note)        Jayden Barney MD at 03/30/25 2103                [facility]    Subjective     History:   Pradeep Donald is a 74 y.o. male admitted on 3/29/2025 secondary to Metastatic colon cancer to liver     Procedures: None    History taken from: patient, chart, and RN.      Objective     Vital Signs  Temp:   [97.5 °F (36.4 °C)-98.5 °F (36.9 °C)] 97.5 °F (36.4 °C)  Heart Rate:  [60-75] 75  Resp:  [16-22] 20  BP: ()/(46-63) 90/46    Intake/Output Summary (Last 24 hours) at 3/30/2025 2103  Last data filed at 3/30/2025 1726  Gross per 24 hour   Intake 1140 ml   Output 1290 ml   Net -150 ml         Physical Exam:  General alert oriented x 3 cachectic in mild distress.    Head atraumatic normocephalic.    Neck supple JVD supple.    Cardiovascular regular rate and rhythm with no murmurs.    Respiratory clear with clear breath sounds bilaterally no wheezing and no crackles.    GI left lower quadrant tenderness, not distended with normal bowel sounds.    Extremities pitting edema and swelling in the left lower extremity.    Skin no jaundice and no erythema    Results Review:    Results from last 7 days   Lab Units 03/30/25  0150 03/29/25 1944 03/29/25 0434   WBC 10*3/mm3 15.11* 12.56* 12.85*   HEMOGLOBIN g/dL 7.9* 7.9* 8.5*   PLATELETS 10*3/mm3 340 333 309     Results from last 7 days   Lab Units 03/29/25  0434   SODIUM mmol/L 133*   POTASSIUM mmol/L 4.1   CHLORIDE mmol/L 101   CO2 mmol/L 22.2   BUN mg/dL 21   CREATININE mg/dL 1.14   CALCIUM mg/dL 8.7   GLUCOSE mg/dL 106*     Results from last 7 days   Lab Units 03/29/25  0434   BILIRUBIN mg/dL 0.4   ALK PHOS U/L 316*   AST (SGOT) U/L 20   ALT (SGPT) U/L 11     Results from last 7 days   Lab Units 03/30/25  0150 03/29/25 1944 03/29/25 0434   MAGNESIUM mg/dL 2.1 2.0 2.2     Results from last 7 days   Lab Units 03/29/25 1944   INR  1.24*     Results from last 7 days   Lab Units 03/29/25  0636 03/29/25  0434   CK TOTAL U/L  --  25   HSTROP T ng/L 41* 44*       Imaging Results (Last 24 Hours)       ** No results found for the last 24 hours. **              Medications:  cefepime, 1,000 mg, Intravenous, Q12H  ferric gluconate, 125 mg, Intravenous, Daily  heparin (porcine), 5,000 Units, Subcutaneous, Q8H  senna-docusate sodium, 2 tablet, Oral, BID   And  polyethylene  glycol, 17 g, Oral, BID  sodium chloride, 10 mL, Intravenous, Q12H               Assessment & Plan   - New diagnosis colorectal cancer  - Abscess or necrotic mass in the right perineum measured 3.3 x 3.8 cm  - Anemia from bleeding per rectum  -Iron deficiency anemia  - blood per rectum   - Leukocytosis  - Acute left lower extremity  DVT  - Elevated lactic acid from cancer  - Failure to thrive, anorexia and weight loss  - Non-STEMI     Patient looks very weak and lost significant weight.  I had long discussion with his sister at bedside.   Admit to [facility].    IV fluid and keep on clear liquid diet.  Appreciate surgery input. Started on heparin for DVT proph.  CT angiogram pending.  Consulted hematology/oncology for metastatic colon cancer and also DVT treatment options.  Follow-up cultures. Continue antibiotic empiric treatment with cefepime.     Stress ulcer prophylaxis  VTE prophylaxis     CODE STATUS: Full code    3/30: Found to have urinary retention and and failed hold Walsh catheter insertion attempts on the floor patient went to OR and surgeon was able to place Walsh catheter.  Continue Walsh catheter and strict intake and output.  Started on Ferrlecit IV.  Appreciate surgery and oncology input.    Disposition pending clinical improvement    Jayden Barney MD  03/30/25  21:03 EDT      Electronically signed by Jayden Barney MD at 03/30/25 2113          Consult Notes (most recent note)        SabineLila cooper MD at 03/30/25 1046        Consult Orders    1. Inpatient Hematology & Oncology Consult [[id number]] ordered by Matilde Monzon MD at 03/29/25 0819                 Date:  03/30/25  Referring Provider: Dr. Barney  Reason for Consultation: Colon Cancer    Patient Care Team:  Carmen Pretty APRN as PCP - General (Family Medicine)    Chief complaint: Rectal bleeding, LLQ pain, diarrhea, weight loss    History of present illness:  Pradeep Donald is a 74 y.o. year-old male evalutated  at the request of Dr. Barney  for evaluation and treatment of locally advanced and metastatic rectal cancer.  Mr. Donald presented to the ER on 3/29/25.  He hadn't seen a doctor in a long time and presented with bloody stool, LLQ pain, generalized weakness, diarrhea, weight loss, and inability to care for himself.  He underwent further evaluation and was found to have a  obstructing rectal mass which stretched from the  area of the rectosigmoid junction into the anal region as well as the skin surfaceof the perineum.  The mass also invaded into the prostate and  infiltrated to the lateral aspect of the L scrotal skin.  Three were severely enlarged heterogenous LNs in the L groin with index mass measuring 8x4.4 cm and the mass invaded into the adductor musculature.  Multiple retroperitoneal LNs were noted and there was a hypodense mass in segment 2 of the liver measuring 3.4 x 3.5 cm. Thre was a rim enhancing fluid collection in the R perianal region measuring 3.8 cm in maximal dimension.  Given obstructing mass, Dr. Monzon took him for diverting colostomy, biopsy of the rectal mass as well as PEG tube placement for severe malnutrition on 3/29/25.  Pathology is pending.          History  Past Medical History:   Diagnosis Date    Kidney stones        History reviewed. No pertinent surgical history.    History reviewed. No pertinent family history.    Social History     Tobacco Use    Smoking status: Never    Smokeless tobacco: Never   Vaping Use    Vaping status: Never Used   Substance Use Topics    Alcohol use: No    Drug use: Never       Allergies:  Penicillins    Outpatient Medications:  No medications prior to admission.       Inpatient Medications:  Scheduled Meds:  cefepime, 1,000 mg, Intravenous, Q12H  ferric gluconate, 125 mg, Intravenous, Daily  heparin (porcine), 5,000 Units, Subcutaneous, Q8H  senna-docusate sodium, 2 tablet, Oral, BID  sodium chloride, 10 mL, Intravenous, Q12H        Continuous Infusions:   "     PRN Meds:    acetaminophen **OR** acetaminophen **OR** [DISCONTINUED] acetaminophen    senna-docusate sodium **AND** polyethylene glycol **AND** bisacodyl **AND** bisacodyl    Morphine **AND** naloxone    Morphine    sodium chloride    sodium chloride    Review of Systems  A comprehensive 14 point review of systems was performed.  Significant findings as mentioned above.  All other systems reviewed and are negative.       Physical Exam:  Vital Signs   Patient Vitals for the past 24 hrs:   BP Temp Temp src Pulse Resp SpO2 Height Weight   03/30/25 1000 104/56 97.7 °F (36.5 °C) Oral 73 20 91 % -- --   03/30/25 0548 104/57 98.5 °F (36.9 °C) Oral -- 20 98 % -- --   03/30/25 0318 108/58 98.1 °F (36.7 °C) Oral -- 16 -- -- --   03/29/25 2323 -- -- -- 60 -- -- -- --   03/29/25 2240 103/63 97.9 °F (36.6 °C) Oral -- 16 -- -- --   03/29/25 2140 103/59 98 °F (36.7 °C) Oral -- 16 -- -- --   03/29/25 2040 119/57 98 °F (36.7 °C) Oral -- 16 -- -- --   03/29/25 1940 101/57 97.9 °F (36.6 °C) Oral -- 16 -- -- --   03/29/25 1910 111/59 97.9 °F (36.6 °C) Oral -- 16 -- -- --   03/29/25 1840 105/54 97.9 °F (36.6 °C) Oral 67 16 95 % -- --   03/29/25 1825 103/53 97.9 °F (36.6 °C) Oral 59 16 97 % -- --   03/29/25 1810 111/55 97.9 °F (36.6 °C) Oral 67 16 97 % -- --   03/29/25 1755 114/51 97.3 °F (36.3 °C) Oral 63 16 97 % -- --   03/29/25 1745 115/68 -- -- 62 12 99 % -- --   03/29/25 1740 119/68 -- -- 62 15 99 % -- --   03/29/25 1735 120/66 -- -- 61 13 99 % -- --   03/29/25 1730 115/69 -- -- 63 20 99 % -- --   03/29/25 1725 112/68 -- -- 67 18 100 % -- --   03/29/25 1720 108/64 -- -- 67 12 100 % -- --   03/29/25 1715 110/64 97 °F (36.1 °C) Tympanic 67 13 100 % -- --   03/29/25 1439 104/49 98 °F (36.7 °C) Tympanic 74 9 98 % -- --   03/29/25 1129 92/50 97.7 °F (36.5 °C) Oral 71 18 97 % 190.5 cm (75\") 57.3 kg (126 lb 4 oz)     Not examined today      Labs / Studies:  Lab Results   Component Value Date    WBC 15.11 (H) 03/30/2025    HGB 7.9 (L) " 03/30/2025    HCT 29.4 (L) 03/30/2025    MCV 90.2 03/30/2025    RDW 15.6 (H) 03/30/2025     03/30/2025    NEUTRORELPCT 85.9 (H) 03/30/2025    LYMPHORELPCT 5.6 (L) 03/30/2025    MONORELPCT 7.1 03/30/2025    EOSRELPCT 0.2 (L) 03/30/2025    BASORELPCT 0.5 03/30/2025    NEUTROABS 12.99 (H) 03/30/2025    LYMPHSABS 0.85 03/30/2025       Lab Results   Component Value Date     (L) 03/29/2025    K 4.1 03/29/2025    CO2 22.2 03/29/2025     03/29/2025    BUN 21 03/29/2025    CREATININE 1.14 03/29/2025    EGFRIFNONA 57 (L) 01/07/2018    GLUCOSE 106 (H) 03/29/2025    CALCIUM 8.7 03/29/2025    ALKPHOS 316 (H) 03/29/2025    AST 20 03/29/2025    ALT 11 03/29/2025    BILITOT 0.4 03/29/2025    ALBUMIN 2.2 (L) 03/29/2025    PROTEINTOT 6.9 03/29/2025    MG 2.1 03/30/2025    PHOS 4.0 03/30/2025     Lab Results   Component Value Date    FERRITIN 480.30 (H) 03/29/2025    IRON 13 (L) 03/29/2025    TIBC 150 (L) 03/29/2025    LABIRON 9 (L) 03/29/2025       Imaging Results (Last 72 Hours)       Procedure Component Value Units Date/Time    CT Chest With Contrast Diagnostic [[id number]] Collected: 03/29/25 0849     Updated: 03/29/25 0855    Narrative:      EXAMINATION: CTA chest with contrast     CLINICAL HISTORY: Pain     COMPARISON: None available     TECHNIQUE: Contiguous 3 mm thick slices were obtained through the chest  after the administration of Isovue-370 intravenous contrast. Coronal and  sagittal reformats were performed.     FINDINGS:  The heart is normal in size configuration. The thoracic aorta is normal  in caliber. No aneurysmal dilatation. No dissection. The central  pulmonary arteries are normal in caliber. No pulmonary embolus. The  central airways are patent. No pneumothorax is appreciated. The few  small nodules are noted within both lungs. These measure up to 5 mm and  are of uncertain clinical significance. Correlation with prior imaging  would be helpful as available. In the absence of prior imaging,  chest CT  follow-up in 6 months is recommended.       Impression:      1. No pulmonary embolus.  2. Normal caliber thoracic aorta without aneurysmal dilatation or  dissection.  3. Multiple tiny pulmonary nodules. These may be inflammatory in nature  representing noncalcified granulomas. However, the findings are  nonspecific. Given the appearance and multiplicity of the lesions,  6-month follow-up CT is recommended.     This report was finalized on 3/29/2025 8:53 AM by Bairon Sales MD.       US Scrotum & Testicles [[id number]] Collected: 03/29/25 0646     Updated: 03/29/25 0648    Narrative:      CLINICAL HISTORY: Scrotal swelling.     COMPARISON: CT of the abdomen and pelvis performed today.     TECHNIQUE: Grayscale and duplex Doppler sonography of the testicles was  obtained.     FINDINGS: The right testicle is not visible sonographically.     There is a large left hydrocele.  The left testis is normal in size and  echogenicity, measuring 3.1 x 3.9 x 2 cm.  There is blood flow within  the left testis.       Impression:         LARGE LEFT HYDROCELE.     RIGHT TESTIS NOT SEEN.     This report was finalized on 3/29/2025 6:46 AM by Sarah Canseco MD.       US Venous Doppler Lower Extremity Bilateral (duplex) [[id number]] Collected: 03/29/25 0640     Updated: 03/29/25 0646    Narrative:      CLINICAL HISTORY: Lower extremity swelling.  Mass.     COMPARISON: No ultrasound available for direct comparison.     TECHNIQUE:   Grayscale and color Doppler sonography of the lower  extremities was obtained, with Doppler spectral analysis of the inflow  and outflow vasculature of the lower extremities.     FINDINGS:     Right lower extremity:  The right common femoral, femoral, popliteal veins are patent,  compressible, and have normal waveforms with duplex Doppler.  The  peroneal and posterior tibial veins are patent with color Doppler.     Left lower extremity:  The left common femoral vein and the proximal and mid portions of  the  left superficial femoral vein are noncompressible and have no flow with  duplex Doppler.  The distal superficial femoral vein is patent with  color Doppler.  The popliteal vein is patent and has a present waveform.   The posterior tibial and peroneal veins are patent and compressible.       Impression:         POSITIVE FOR DEEP VENOUS THROMBOSIS IN THE LEFT COMMON AND SUPERFICIAL  FEMORAL VEINS.     NEGATIVE FOR DEEP VENOUS THROMBOSIS IN THE RIGHT LOWER EXTREMITY.     This report was finalized on 3/29/2025 6:44 AM by Sarah Canseco MD.       XR Chest AP [[id number]] Collected: 03/29/25 0614     Updated: 03/29/25 0618    Narrative:      CLINICAL HISTORY: Fatigue.     COMPARISON: None available.     TECHNIQUE:  Single AP view of the chest.     FINDINGS: Lungs are clear; the known nodules at the lung bases are too  small to see radiographically.  There is mild fullness in the left  hilum. No pleural effusion or pneumothorax is identified.   Cardiomediastinal silhouette is normal.  No acute osseous or upper  abdominal abnormality is seen.       Impression:         NO ACUTE ABNORMALITY IN THE CHEST.     MILD FULLNESS IN THE LEFT HILUM, WHICH COULD BE DUE TO ENLARGED LYMPH  NODES, MASS, OR ENLARGED VESSELS.  CT SUGGESTED FOR FURTHER EVALUATION.     This report was finalized on 3/29/2025 6:16 AM by Sarah Canseco MD.       CT Abdomen Pelvis With Contrast [[id number]] Collected: 03/29/25 0555     Updated: 03/29/25 0616    Narrative:      CLINICAL HISTORY: Fatigue, pelvic and rectal mass, diarrhea.     COMPARISON: None available.     TECHNIQUE: IV contrast was administered and CT of the abdomen and pelvis  was obtained.  Multiplanar reformats were generated. Limited exposure  control, adjustment of the mA and/or KV according to patient size or use  of iterative reconstruction technique was utilized.     FINDINGS:    Lower chest: Scattered bibasilar pulmonary nodules measuring up to 5 mm.     Hepatobiliary: Hypodense mass in  segment 2 of the liver measuring 3.4 x  3.5 cm.  The liver is not cirrhotic.  Gallbladder is normal.     Pancreas: normal.     Spleen: Calcified granulomata scattered through the otherwise normal  spleen.     Adrenals: normal.      Kidneys, ureters, bladder: Bilateral nonobstructing renal calculi with  an index calculus in the upper pole right kidney measuring 5 mm.   Ureters are normal in caliber.  Urinary bladder is normal.     Reproductive: Posterior wall of the prostate gland is invaded by the  heterogeneous rectal mass.  A left-sided hydrocele is noted.  The  irregular mucosal thickening and enhancement also extends to the lateral  aspect of the scrotal skin on the left side.     GI tract/Bowel: Irregular diffuse thickening of the rectal mucosa from  the level of the rectosigmoid junction and extending into the anal  region, as well as the skin surface of the perineum.  A rim-enhancing  fluid collection in the right perianal region measures 3.2 x 3.8 x 3 cm.   The rectal mass produces moderate constipation.  The small bowel is  normal in caliber and wall thickness     Appendix: Not well seen.  No findings for acute appendicitis.     Peritoneum: normal, no free air or fluid.     Vascular: Aortoiliac atherosclerosis is noted.  There is a questionable  thrombus in the left femoral vein with edema in the imaged left lower  extremity.     Lymph nodes: Severely enlarged heterogeneous lymph nodes in the groin,  with an index left groin mass measuring 8 x 4.4 cm.  The cortex of the  lymph node is irregular, with some posterior invasion into the left  adductor musculature.  An index left external iliac node measures up to  2.8 cm in short axis.  Multiple retroperitoneal nodes are also noted.     Musculoskeletal and abdominal wall: Degenerative changes in the lumbar  spine and the hip joints.     Acute DVT and possible abscess were discussed with Dr. Rodriguez at 6:12 AM  EST on 3/29/2025.       Impression:         RECTAL  MASS WITH EXTENSIVE LOCAL DISEASE INVOLVING THE PERINEUM,  INCLUDING AN ABSCESS OR NECROTIC MASS IN THE RIGHT PERINEUM MEASURING  3.3 X 3.8 X 3 CM.  THERE IS ALSO LOCAL INVASION INTO THE POSTERIOR  ASPECT OF THE PROSTATE GLAND.  EXTENSIVE INGUINAL, PELVIC, AND  RETROPERITONEAL ADENOPATHY, INCLUDING A LOCALLY AGGRESSIVE LEFT INGUINAL  NODE THAT INVADES INTO THE LEFT ADDUCTOR MUSCULATURE.     METASTATIC DISEASE INVOLVING THE LEFT HEPATIC LOBE AND POSSIBLY THE LUNG  BASES.     POSSIBLE DEEP VENOUS THROMBOSIS IN THE LEFT SUPERFICIAL FEMORAL VEIN.   CONFIRMATION WITH ULTRASOUND IS RECOMMENDED.     CONSTIPATION AS A RESULT OF THE EXTENSIVE RECTAL MASS.     LARGE LEFT HYDROCELE.     This report was finalized on 3/29/2025 6:14 AM by Sarah Canseco MD.                 Assessment & Plan:  Pradeep Donald is a 74 y.o. year-old male presenting with rectal bleeding with obstructing mass with both locally advanced and metastatic disease in the setting of significant debility, generalized weakness, malnutrition with comorbid LLE DVT likely related to hypercoagulable state of malignancy combined with bulky L groin adenopathy.    Metastatic rectal cancer:  -  Given locally advanced obstructive cancer, surgical diversion was necessary and Dr. Monzon performed diverting colostomy on 3/29/25.  -  He has invasion locally into the prostate and perineum as well as evidence of retroperitoneal LAD, bulky L inguinal LAD, liver metastasis.  He has many tiny lung nodules which may also represent metastatic disease v. Granulomatous disease.    -  Rectal mass has been biopsied with pathology pending.  -  Will send CEA level  -  Given metastatic disease, his condition could potentially be treated but would not be curable.  -  The challenge is likely to come from his profoundly debilitated state.   -  Will examine him tomorrow and see how he does as he heals from his surgery and takes in nutriition.    2.  Anemia:  -  Likely a combined iron  deficiency anemia and anemia of chronic disease.  -  B12, Folate have been sent and are pending.  -  Would give Venofer 200 mg IV daily x 5 days.    3.  LLE DVT:  -  Likely related to compressive bulky adenopathy in the L groin and hypercoagulable state related to his malignancy  -  When felt safe from surgical point of view, would start anticoagulation with unfractionated heparin ggt.  Would start without a bolus given increased risk of bleeding.  When the initial ptt checked, would only adjust if over-anticoagulated, but would wait for the second ptt check to make adjustment.      Will see Mr. Donald at the bedside tomorrow.-  If you have any questions, please don't hesitate to call          Lila Regalado MD  03/30/25  10:47 EDT      Electronically signed by Lila Regalado MD at 03/30/25 5386          Gen: Alert, NAD  Head: NC, AT   Eyes: PERRL, EOMI, normal lids/conjunctiva  ENT: normal hearing, patent oropharynx without erythema/exudate, uvula midline  Neck: supple, no tenderness, Trachea midline  Pulm: Bilateral BS, normal resp effort, no wheeze/stridor/retractions  CV: RRR, no M/R/G, 2+ radial and dp pulses bl, no edema  Abd: soft, NT/ND, +BS, no hepatosplenomegaly  Mskel: extremities x4 with normal ROM and no joint effusions. no ctl spine ttp.   Skin: no rash, no bruising   Neuro: AAOx3, no sensory/motor deficits, CN 2-12 intact

## 2025-03-31 NOTE — NURSING NOTE
Abd pads secured with foam tape noted to gluteals.  Copious serous drainage noted to both abd pads and to packing within the wound.  Wound does not appear to be pressure related but is a cancerous lesion to perirectal area extending from the coccyx down the inner gluteal fold to the perineum.  Wound bed is moist and red with a small area of black eschar noted.  Wound bed bleeding when packing removed.  Cleansed wound with wound cleanser and gently patted dry.  Covered with Vaseline gauze and then an abd pad.  Secured with paper tape.  Order placed to changed dressing BID.    Stage III PI to right elbow. See attached flowsheet documentation.  Cleansed wound with wound cleanser, TheraHoney gel applied to wound bed, covered with a silicone bordered dressing.  Order placed for BID dressing changes.    Stage II PI to right hip/greater trochanter.  See attached flowsheet documentation.  Order placed to cleanse with wound cleanser, pat dry, and cover with silicone bordered dressing BID.    Dry scab noted to the tip of the right ring finger.  Scab is intact.  No drainage.  No erythema.  Patient feels this is related to a fall he had sometime prior to admit.  Order placed to assess daily and leave open to air.    Surgical lap sites to abdomen noted.      Patient with new colostomy to [facility].  Appliance is in place at this time.  Ostomy protocol orders initiated.  Will follow patient for discharge teaching needs.    Karri score 16.  PI prevention orders initiated.           03/31/25 1450   Wound 03/29/25 1533 abdomen Surgical   Placement Date/Time: 03/29/25 1533   Location: abdomen  Primary Wound Type: (c) Surgical   Dressing Appearance dry;intact;other (see comments);no drainage  (2x2 covaderm x's 2)   Base unable to visualize   Wound 03/30/25 0955 Right posterior elbow Pressure Injury   Placement Date/Time: 03/30/25 0955   Present on Original Admission: No  Side: Right  Orientation: posterior  Location: elbow  Primary Wound  Type: Pressure Injury   Pressure Injury Stage 3   Dressing Appearance intact;no drainage   Closure None   Base moist;pink;yellow;subcutaneous   Periwound intact   Periwound Temperature warm   Periwound Skin Turgor soft   Edges open   Wound Length (cm) 0.8 cm   Wound Width (cm) 1.5 cm   Wound Depth (cm) 0.5 cm   Wound Surface Area (cm^2) 0.94 cm^2   Wound Volume (cm^3) 0.314 cm^3   Drainage Amount none   Care, Wound cleansed with;wound cleanser;honey applied   Dressing Care dressing applied   Wound 03/30/25 1900 Right posterior greater trochanter Pressure Injury   Placement Date/Time: 03/30/25 1900   Present on Original Admission: No  Side: Right  Orientation: posterior  Location: greater trochanter  Primary Wound Type: (c) Pressure Injury   Pressure Injury Stage 2   Dressing Appearance dry;intact;no drainage   Closure None   Base red;nonblanchable;moist;other (see comments)  (ruptured blister)   Periwound intact;pink   Periwound Temperature warm   Periwound Skin Turgor soft   Edges open   Wound Length (cm) 1 cm   Wound Width (cm) 1 cm   Wound Depth (cm) 0.1 cm   Wound Surface Area (cm^2) 0.79 cm^2   Wound Volume (cm^3) 0.052 cm^3   Drainage Characteristics/Odor serous   Drainage Amount scant   Dressing Care dressing applied   Wound 03/31/25 1553 medial perirectal Atypical Malignant Ulcer   Placement Date/Time: 03/31/25 1553   Present on Original Admission: Yes  Orientation: medial  Location: perirectal  Primary Wound Type: Atypical  Secondary Wound Type - Atypical: Malignant Ulcer   Pressure Injury Stage OTH  (Cancerous lesions)   Dressing Appearance copious drainage   Closure None   Base bleeding;moist;red;black;eschar   Black (%), Wound Tissue Color 10   Yellow (%), Wound Tissue Color 90   Periwound intact   Periwound Temperature warm   Periwound Skin Turgor firm   Edges open   Drainage Characteristics/Odor serosanguineous   Drainage Amount copious   Care, Wound cleansed with;wound cleanser   Dressing Care  petroleum-based;gauze;abdominal pad   Periwound Care dry periwound area maintained

## 2025-03-31 NOTE — CONSULTS
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  Evaluate for palliative pelvic radiotherapy.    History of Present Illness:   Mr.Verlin MAKAYLA Donald is a 74-year-old white male who was admitted to [facility] [place] on 2025 for weakness, fatigue, diarrhea, significant nonintentional weight loss, pelvic pain, bilateral inguinal masses, lower extremity swelling and rectal bleeding.  The patient stated that he was bed ridden and unable to take care of himself.  The patient states that he has been unable to walk for 4 days.    Physical examination revealed an elderly frail, somewhat cachectic white male with a a mass protruding from the rectum, bilateral inguinal lymphadenopathy, and scrotal enlargement.    CT scans of the abdomen and pelvis (2025) revealed a rectal mass with extensive local disease involving the perineum including an abscess/necrotic mass in the right perineum measuring 3.3 x 3.8 x 3 cm.  The mass appeared to invade into the posterior aspect of the prostate gland.  Extensive inguinal, pelvic and retroperitoneal lymphadenopathy including a locally aggressive left inguinal mass that invaded into the left adductor musculature was noted.  Metastatic disease involving the left hepatic lobe and possibly the lung bases.  Deep venous thrombosis in the left superior femoral vein was noted.  A large left hydrocele was also present.    Mr. Donald underwent creation of a diverting colostomy on 2025.  PEG placement for nutritional support was also placed.  Biopsy of the rectal mass was performed with results pending.    Mr. Donald is referred  to our service for consideration of low pelvic radiotherapy to reduce tumor bulk at the primary rectal/anal canal site, to reduce inguinal adenopathy which is believed to cause lower extremity swelling, and to relieve pain.    Subjective      Review of Systems:   Constitutional: Positive for fatigue, diminished appetite, nonintentional weight loss  HEENT: Positive for diminished vision  Cardiovascular: Negative  Pulmonary: Negative  Gastrointestinal: Positive for diarrhea, bleeding, and sensation of mass in the lower pelvis  Genitourinary: Positive for discomfort from left hydrocele  Endocrine: Negative  Hematopoietic: Negative  Immunologic: Negative  Musculoskeletal: Negative for swollen joints.  Positive for arthralgias in major and minor joints  Neurologic: Negative  Psychiatric: Positive for anxiety and depression    Past Oncology History:   Oncology/Hematology History    No history exists.      Past Radiation History:  No previous exposures to ionizing radiation therapy and there are not relative contraindications including connective tissue disorder.     Past Medical History:   Past Medical History:   Diagnosis Date    Kidney stones      Past Surgical History:   Past Surgical History:   Procedure Laterality Date    COLOSTOMY N/A 3/29/2025    Procedure: COLOSTOMY LAPAROSCOPIC;  Surgeon: Matilde Monzon MD;  Location: [facility];  Service: General;  Laterality: N/A;    PEG TUBE INSERTION N/A 3/29/2025    Procedure: PERCUTANEOUS ENDOSCOPIC GASTROSTOMY TUBE INSERTION;  Surgeon: Matilde Monzon MD;  Location: [facility] OR;  Service: General;  Laterality: N/A;    RECTAL MASS EXCISION N/A 3/29/2025    Procedure: RECTAL MASS EXCISION - biopsy of rectal mass;  Surgeon: Matilde Monzon MD;  Location: [facility];  Service: General;  Laterality: N/A;     Family History: History reviewed. No pertinent family history.    Social History:   Social History     Socioeconomic History    Marital status: Single   Tobacco Use     Smoking status: Never    Smokeless tobacco: Never   Vaping Use    Vaping status: Never Used   Substance and Sexual Activity    Alcohol use: No    Drug use: Never    Sexual activity: Defer     The patient resides at [place].  Unmarried.  No offspring.  Education: Masters degree in education.  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  No history of  service.  The patient denies smoking and use of EtOH.  He did chew tobacco in the past but has quit.    Medications:     Current Facility-Administered Medications:     acetaminophen (TYLENOL) tablet 1,000 mg, 1,000 mg, Per PEG Tube, TID, Quintin Jon DO, 1,000 mg at 03/31/25 1545    sennosides-docusate (PERICOLACE) 8.6-50 MG per tablet 2 tablet, 2 tablet, Oral, BID, 2 tablet at 03/31/25 0816 **AND** polyethylene glycol (MIRALAX) packet 17 g, 17 g, Oral, BID, 17 g at 03/31/25 0816 **AND** bisacodyl (DULCOLAX) EC tablet 5 mg, 5 mg, Oral, Daily PRN **AND** [DISCONTINUED] bisacodyl (DULCOLAX) suppository 10 mg, 10 mg, Rectal, Daily PRN, Matilde Monzon MD    cefepime 1000 mg IVPB in 100 mL NS (VTB), 1,000 mg, Intravenous, Q12H, Matilde Monzon MD, Stopped at 03/31/25 1330    ferric gluconate (FERRLECIT) 125 mg in sodium chloride 0.9 % 100 mL IVPB, 125 mg, Intravenous, Daily, Jayden Barney MD, Stopped at 03/31/25 1030    folic acid (FOLVITE) tablet 1 mg, 1 mg, Per PEG Tube, Daily, Quintin Jon DO, 1 mg at 03/31/25 1206    heparin 51890 units/250 mL (100 units/mL) in 0.45 % NaCl infusion, 22 Units/kg/hr (Dosing Weight), Intravenous, Titrated, Quintin Jon DO, Last Rate: 10.31 mL/hr at 03/31/25 0951, 18 Units/kg/hr at 03/31/25 0951    hydrOXYzine (ATARAX) tablet 10 mg, 10 mg, Oral, Nightly, Nisreen Wong, TODD    midodrine (PROAMATINE) tablet 5 mg, 5 mg, Per PEG Tube, TID AC, Quintin Jon, DO, 5 mg at 03/31/25  1717    morphine injection 2 mg, 2 mg, Intravenous, Q4H PRN, 2 mg at 03/31/25 1206 **AND** naloxone (NARCAN) injection 0.4 mg, 0.4 mg, Intravenous, Q5 Min PRN, Matilde Monzon MD    morphine injection 2 mg, 2 mg, Intravenous, Q4H PRN, Matilde Monzon MD, 2 mg at 03/30/25 1324    nystatin (MYCOSTATIN) 100,000 unit/mL suspension 500,000 Units, 5 mL, Swish & Spit, 4x Daily, Quintin Jon DO, 500,000 Units at 03/31/25 1718    oxyCODONE (ROXICODONE) immediate release tablet 5 mg, 5 mg, Per PEG Tube, Q4H PRN, Quintin Jon DO    pantoprazole (PROTONIX) injection 40 mg, 40 mg, Intravenous, BID AC, Quintin Jon DO, 40 mg at 03/31/25 1717    Pharmacy to Dose Heparin, , Not Applicable, Continuous PRN, Quintin Jon DO    sodium chloride 0.9 % flush 10 mL, 10 mL, Intravenous, Q12H, Matilde Monzon MD, 10 mL at 03/31/25 0817    sodium chloride 0.9 % flush 10 mL, 10 mL, Intravenous, PRN, Matilde Monzon MD    sodium chloride 0.9 % infusion 40 mL, 40 mL, Intravenous, PRN, Matilde Monzon MD    traMADol (ULTRAM) tablet 50 mg, 50 mg, Per PEG Tube, TID, Quintin Jon DO, 50 mg at 03/31/25 1546    vitamin B-12 (CYANOCOBALAMIN) tablet 1,000 mcg, 1,000 mcg, Per PEG Tube, Daily, uQintin Jon DO, 1,000 mcg at 03/31/25 1206    Allergies:   Allergies   Allergen Reactions    Penicillins      PHQ-9 Depression Screening  Little interest or pleasure in doing things? Not at all   Feeling down, depressed, or hopeless? Not at all   PHQ-2 Total Score 0   Trouble falling or staying asleep, or sleeping too much?     Feeling tired or having little energy?     Poor appetite or overeating?     Feeling bad about yourself - or that you are a failure or have let yourself or your family down?     Trouble concentrating on things, such as reading the newspaper or watching television?     Moving or speaking so slowly that other people could have noticed? Or the opposite -  being so fidgety or restless that you have been moving around a lot more than usual?     Thoughts that you would be better off dead, or of hurting yourself in some way?     PHQ-9 Total Score     If you checked off any problems, how difficult have these problems made it for you to do your work, take care of things at home, or get along with other people?       Distress Screenin     KPS: 40     Imaging:  CT Chest With Contrast Diagnostic  Result Date: 3/29/2025  1. No pulmonary embolus. 2. Normal caliber thoracic aorta without aneurysmal dilatation or dissection. 3. Multiple tiny pulmonary nodules. These may be inflammatory in nature representing noncalcified granulomas. However, the findings are nonspecific. Given the appearance and multiplicity of the lesions, 6-month follow-up CT is recommended.  This report was finalized on 3/29/2025 8:53 AM by Bairon Sales MD.      US Scrotum & Testicles  Result Date: 3/29/2025   LARGE LEFT HYDROCELE.  RIGHT TESTIS NOT SEEN.  This report was finalized on 3/29/2025 6:46 AM by Sarah Canseco MD.      US Venous Doppler Lower Extremity Bilateral (duplex)  Result Date: 3/29/2025   POSITIVE FOR DEEP VENOUS THROMBOSIS IN THE LEFT COMMON AND SUPERFICIAL FEMORAL VEINS.  NEGATIVE FOR DEEP VENOUS THROMBOSIS IN THE RIGHT LOWER EXTREMITY.  This report was finalized on 3/29/2025 6:44 AM by Sarah Canseco MD.      XR Chest AP  Result Date: 3/29/2025   NO ACUTE ABNORMALITY IN THE CHEST.  MILD FULLNESS IN THE LEFT HILUM, WHICH COULD BE DUE TO ENLARGED LYMPH NODES, MASS, OR ENLARGED VESSELS.  CT SUGGESTED FOR FURTHER EVALUATION.  This report was finalized on 3/29/2025 6:16 AM by Sarah Canseco MD.      CT Abdomen Pelvis With Contrast  Result Date: 3/29/2025   RECTAL MASS WITH EXTENSIVE LOCAL DISEASE INVOLVING THE PERINEUM, INCLUDING AN ABSCESS OR NECROTIC MASS IN THE RIGHT PERINEUM MEASURING 3.3 X 3.8 X 3 CM.  THERE IS ALSO LOCAL INVASION INTO THE POSTERIOR ASPECT OF THE PROSTATE GLAND.   EXTENSIVE INGUINAL, PELVIC, AND RETROPERITONEAL ADENOPATHY, INCLUDING A LOCALLY AGGRESSIVE LEFT INGUINAL NODE THAT INVADES INTO THE LEFT ADDUCTOR MUSCULATURE.  METASTATIC DISEASE INVOLVING THE LEFT HEPATIC LOBE AND POSSIBLY THE LUNG BASES.  POSSIBLE DEEP VENOUS THROMBOSIS IN THE LEFT SUPERFICIAL FEMORAL VEIN. CONFIRMATION WITH ULTRASOUND IS RECOMMENDED.  CONSTIPATION AS A RESULT OF THE EXTENSIVE RECTAL MASS.  LARGE LEFT HYDROCELE.  This report was finalized on 3/29/2025 6:14 AM by Sarah Canseco MD.       Pathology:  Biopsy results of of rectal mass obtained 03/29/2025 is pending    Objective     Physical Exam:  The patient is a frail, elderly cachectic appearing white male in mild distress secondary to low pelvic discomfort and lower extremity swelling.  Vital signs as below.  KPS 40     Vital Signs:   Vitals:    03/31/25 0606 03/31/25 0924 03/31/25 1014 03/31/25 1441   BP: 99/50 93/50 92/50 96/46  Comment: rn aware   BP Location:   Right arm Left arm   Patient Position:   Lying Lying   Pulse: 72 68     Resp: 16 18 16 16   Temp: 98.1 °F (36.7 °C) 98.4 °F (36.9 °C)  98.3 °F (36.8 °C)   TempSrc: Oral   Oral   SpO2: 93% 96%  96%   Weight:       Height:         Body mass index is 15.78 kg/m².     Head: Normocephalic, atraumatic.  Bilateral temporal wasting noted.  ENT: Eyes PERRLA, EOMs intact.  Sclera and conjunctiva clear.  Mouth: No intraoral lesions noted.  Edentulous.  Neck: Short, supple, trachea midline.  No thyromegaly.  No JVD.  No detectable cervical or periclavicular lymphadenopathy  Heart: Regular rate and rhythm  Lungs: Lungs appear clear to auscultation bilaterally  Abdomen: Scaphoid.  No organomegaly.  Normal bowel sounds present.  Pelvis: Bilateral bulky inguinal lymphadenopathy noted.  Genitalia: Scrotal and penile edema noted.  Walsh catheter in place.  Extremities: 2+ lower extremity swelling noted  Integumentary: No suspicious lesions noted on sun exposed skin  Musculoskeletal: No swollen or  erythematous joints  Neurologic: Cranial nerves appear intact no motor, sensory, or cerebellar deficit.  Gait not observed.  Psychiatric: Patient appears anxious.  Alert and oriented x 3.    Assessment / Plan      Assessment:  Locally advanced pelvic malignancy believed to arise in rectum or anus with invasion into posterior aspect of prostate gland and perineum with extensive inguinal, pelvic, when all and retroperitoneal lymph nodes, and hepatic metastases.    Stage IV    Recommendations:  I have reviewed Mr. Donald's  medical records and imaging studies.  I have also discussed the case in person with Frannie Regalado and Na.  The patient is a candidate for palliative pelvic and inguinal carla radiotherapy.  The goals of treatment are to reduce the primary cancer tumor bulk, to slow/stop bleeding, to relieve low pelvic discomfort, and to reduce left and right inguinal adenopathy which is causing lower extremity swelling.    I explained to the patient that palliative radiotherapy will consist in the administration of 3000 cGy to the low pelvis over a 2-week period I anticipate that radiotherapy will be administered using 3D conformal radiotherapy.  I discussed the rationale for palliative radiotherapy, duration of treatment, and anticipated acute and chronic side effects.  I stressed to the patient that radiotherapy is not curative treatment.    Mr. Donald stated that his radiotherapy related questions had been answered to his satisfaction.  The patient is undecided whether to proceed with radiotherapy at this time.  He wishes to review this option with his girlfriend.  I will request that the radiation oncology nurse contact the patient and/or the medical oncology service to learn of the patient's treatment choice.  A radiotherapy treatment CT scan will be done for treatment planning if the patient opts for palliative radiotherapy.    Time spent with patient 60 minutes (the total time included previsit review of  medical records and imaging studies, discussing the case with other physicians, obtaining an independent history of present illness from the patient, performing an appropriate medical examination/evaluation, and patient counseling).    Thank you for allowing us to participate in Mr. Daniels's treatment.    Paul Poon MD   03/31/25 19:20 EDT

## 2025-03-31 NOTE — CONSULTS
"Palliative Care Initial Consult     Attending Physician: Quintin Jon*  Referring Provider: Dr. Quintin Jon     assistance with advance directives, assistance with clarification of goals of care, and hospice referral or discussion  Code Status: full code   Code Status and Medical Interventions: CPR (Attempt to Resuscitate); Full Support   Ordered at: 03/29/25 2057     Code Status (Patient has no pulse and is not breathing):    CPR (Attempt to Resuscitate)     Medical Interventions (Patient has pulse or is breathing):    Full Support     Level Of Support Discussed With:    Patient      Advanced Directives: Advance Directive Status: Patient does not have advance directive   Healthcare surrogate: Kisha Vogel- Sister POA (going to bring paperwork)   Goals of Care: Unclear/undetermined at this time. We had a very lengthy discussion with patient about goals of care, advanced care planning and current CT reports. Pt and family are aware that his biopsy results are still pending.     HPI:  Pradeep Donald is a 74 y.o. male admitted on 3/29/2025 for metastatic colon cancer to liver. The only history that he reports is kidney stones in the past. He hasn't seeked medical care in nearly a decade or longer. He took no medications at home other than his yearly flu shot. His girlfriend reports that he had had a \"bad fall\" a few weeks ago. He has been having rectal pain and bleeding for over a year now. He reports having to use a pedro-pad to contain the blood but never told his family. He reports weight loss, weakness, fatigue, and bloody diarrhea. His sister Kisha reports that she is his medical POA however we do not have documentation of that yet. She plans to bring. He reports that upon arriving to the ED, he was having left sided abd pain/left groin pain. CT of abdomen with contrast showed rectal mass with extensive local disease involving the perineum.  Abscess or necrotic mass in the right perineum " measured 3.3 x 3.8 cm.  There is also local invasion into the posterior aspect of the prostate gland, pelvic and retroperitoneal adenopathy.  Metastatic disease involving the left hepatic lobe and possibly the lung bases, Scrotal ultrasound showed large left hydrocele and lower extremity Doppler was positive for DVT in the left common and superficial femoral veins. His initial labs troponin T 44 , sodium 133, alk phos 316, albumin 2.2, tsh 5.270, iron 13 iron sat 9, transferrin 101, TIBC 150, lactate 2.1 , wbc 12.85, H&H8.5/29.5 , CEA 38.40, hemoglobin did drop 6.8 and blood transfusion was provided. His most recent 7.6/25.9 H&H. Bp 96/46 hr 68 rr 16 sat 96 . Pt reports that he has been having insomnia and wants something to help him rest. He reports that his pain is controlled well with current regimen. He denies any anxiety, nausea or vomiting, tube feeding going at 10cc/hr and tolerating well. He reports diarrhea, rectal pain and left hip pressure.         ROS: Negative except as above in HPI.     Past Medical History:   Diagnosis Date    Kidney stones      Past Surgical History:   Procedure Laterality Date    COLOSTOMY N/A 3/29/2025    Procedure: COLOSTOMY LAPAROSCOPIC;  Surgeon: Matilde Monzon MD;  Location: [facility];  Service: General;  Laterality: N/A;    PEG TUBE INSERTION N/A 3/29/2025    Procedure: PERCUTANEOUS ENDOSCOPIC GASTROSTOMY TUBE INSERTION;  Surgeon: Matilde Monzon MD;  Location: [facility] OR;  Service: General;  Laterality: N/A;    RECTAL MASS EXCISION N/A 3/29/2025    Procedure: RECTAL MASS EXCISION - biopsy of rectal mass;  Surgeon: Matilde Monzon MD;  Location: [facility] OR;  Service: General;  Laterality: N/A;     Social History     Socioeconomic History    Marital status: Single   Tobacco Use    Smoking status: Never    Smokeless tobacco: Never   Vaping Use    Vaping status: Never Used   Substance and Sexual Activity    Alcohol use: No    Drug use: Never    Sexual activity: Defer      History reviewed. No pertinent family history.    Allergies   Allergen Reactions    Penicillins        Current Facility-Administered Medications   Medication Dose Route Frequency Provider Last Rate Last Admin    acetaminophen (TYLENOL) tablet 1,000 mg  1,000 mg Per PEG Tube TID Quintin Jon DO   1,000 mg at 03/31/25 1545    sennosides-docusate (PERICOLACE) 8.6-50 MG per tablet 2 tablet  2 tablet Oral BID Matilde Monzon MD   2 tablet at 03/31/25 0816    And    polyethylene glycol (MIRALAX) packet 17 g  17 g Oral BID Matilde Monzon MD   17 g at 03/31/25 0816    And    bisacodyl (DULCOLAX) EC tablet 5 mg  5 mg Oral Daily PRN Matilde Monzon MD        cefepime 1000 mg IVPB in 100 mL NS (VTB)  1,000 mg Intravenous Q12H Matilde Monzon MD   Stopped at 03/31/25 1330    ferric gluconate (FERRLECIT) 125 mg in sodium chloride 0.9 % 100 mL IVPB  125 mg Intravenous Daily Jayden Barney MD   Stopped at 03/31/25 1030    folic acid (FOLVITE) tablet 1 mg  1 mg Per PEG Tube Daily Quintin Jon DO   1 mg at 03/31/25 1206    heparin 41066 units/250 mL (100 units/mL) in 0.45 % NaCl infusion  18 Units/kg/hr (Dosing Weight) Intravenous Titrated Quintin Jon DO 10.31 mL/hr at 03/31/25 0951 18 Units/kg/hr at 03/31/25 0951    midodrine (PROAMATINE) tablet 5 mg  5 mg Per PEG Tube TID AC Quintin Jon DO   5 mg at 03/31/25 1206    morphine injection 2 mg  2 mg Intravenous Q4H PRN Matilde Monzon MD   2 mg at 03/31/25 1206    And    naloxone (NARCAN) injection 0.4 mg  0.4 mg Intravenous Q5 Min PRN Matilde Monzon MD        morphine injection 2 mg  2 mg Intravenous Q4H PRN Matilde Monzon MD   2 mg at 03/30/25 1324    nystatin (MYCOSTATIN) 100,000 unit/mL suspension 500,000 Units  5 mL Swish & Spit 4x Daily Quintin Jon DO   500,000 Units at 03/31/25 1206    oxyCODONE (ROXICODONE) immediate release tablet 5 mg  5 mg Per PEG Tube Q4H PRN Dontrell  "Quintin Villegas DO        pantoprazole (PROTONIX) injection 40 mg  40 mg Intravenous BID AC Quintin Jon DO        Pharmacy to Dose Heparin   Not Applicable Continuous PRN Quintin Jon DO        sodium chloride 0.9 % flush 10 mL  10 mL Intravenous Q12H Matilde Monzon MD   10 mL at 03/31/25 0817    sodium chloride 0.9 % flush 10 mL  10 mL Intravenous PRN Matilde Monzon MD        sodium chloride 0.9 % infusion 40 mL  40 mL Intravenous PRN Matilde Monzon MD        traMADol (ULTRAM) tablet 50 mg  50 mg Per PEG Tube TID Quintin Jon DO   50 mg at 03/31/25 1546    vitamin B-12 (CYANOCOBALAMIN) tablet 1,000 mcg  1,000 mcg Per PEG Tube Daily Quintin Jon DO   1,000 mcg at 03/31/25 1206     heparin, 18 Units/kg/hr (Dosing Weight), Last Rate: 18 Units/kg/hr (03/31/25 0951)  Pharmacy to Dose Heparin,         senna-docusate sodium **AND** polyethylene glycol **AND** bisacodyl **AND** [DISCONTINUED] bisacodyl    Morphine **AND** naloxone    Morphine    oxyCODONE    Pharmacy to Dose Heparin    sodium chloride    sodium chloride    Current medication reviewed for route, type, dose and frequency and are current per MAR.    Palliative Performance Scale Score:     BP 96/46 (BP Location: Left arm, Patient Position: Lying) Comment: rn aware  Pulse 68   Temp 98.3 °F (36.8 °C) (Oral)   Resp 16   Ht 190.5 cm (75\")   Wt 57.3 kg (126 lb 4 oz)   SpO2 96%   BMI 15.78 kg/m²     Intake/Output Summary (Last 24 hours) at 3/31/2025 1548  Last data filed at 3/31/2025 1330  Gross per 24 hour   Intake 2338.75 ml   Output 2000 ml   Net 338.75 ml       PE:  General Appearance:    Chronically ill appearing, alert, frail, cooperative, NAD   HEENT:    NC/AT, without obvious abnormality, EOMI, anicteric , dry MM    Neck:   supple, trachea midline, no JVD   Lungs:     CTAB without w/r/r    Heart:    RRR, normal S1 and S2, no M/R/G   Abdomen:     Soft, lt lower quad tenderness, ND, NABS , " abd concave   Extremities:   Moves all extremities with generalized weakness, no edema, cachetic   Pulses:   Pulses palpable and equal bilaterally   Skin:   Warm, dry, tinting    Neurologic:   A/Ox3, cooperative   Psych:   Calm, sad     Labs:   Results from last 7 days   Lab Units 03/31/25  1221 03/31/25  0204   WBC 10*3/mm3  --  11.84*   HEMOGLOBIN g/dL 7.6* 6.8*   HEMATOCRIT % 25.9* 23.8*   PLATELETS 10*3/mm3  --  343     Results from last 7 days   Lab Units 03/29/25  0434   SODIUM mmol/L 133*   POTASSIUM mmol/L 4.1   CHLORIDE mmol/L 101   CO2 mmol/L 22.2   BUN mg/dL 21   CREATININE mg/dL 1.14   GLUCOSE mg/dL 106*   CALCIUM mg/dL 8.7     Results from last 7 days   Lab Units 03/29/25  0434   SODIUM mmol/L 133*   POTASSIUM mmol/L 4.1   CHLORIDE mmol/L 101   CO2 mmol/L 22.2   BUN mg/dL 21   CREATININE mg/dL 1.14   CALCIUM mg/dL 8.7   BILIRUBIN mg/dL 0.4   ALK PHOS U/L 316*   ALT (SGPT) U/L 11   AST (SGOT) U/L 20   GLUCOSE mg/dL 106*     Imaging Results (Last 72 Hours)       Procedure Component Value Units Date/Time    CT Chest With Contrast Diagnostic [[id number]] Collected: 03/29/25 0849     Updated: 03/29/25 0855    Narrative:      EXAMINATION: CTA chest with contrast     CLINICAL HISTORY: Pain     COMPARISON: None available     TECHNIQUE: Contiguous 3 mm thick slices were obtained through the chest  after the administration of Isovue-370 intravenous contrast. Coronal and  sagittal reformats were performed.     FINDINGS:  The heart is normal in size configuration. The thoracic aorta is normal  in caliber. No aneurysmal dilatation. No dissection. The central  pulmonary arteries are normal in caliber. No pulmonary embolus. The  central airways are patent. No pneumothorax is appreciated. The few  small nodules are noted within both lungs. These measure up to 5 mm and  are of uncertain clinical significance. Correlation with prior imaging  would be helpful as available. In the absence of prior imaging, chest  CT  follow-up in 6 months is recommended.       Impression:      1. No pulmonary embolus.  2. Normal caliber thoracic aorta without aneurysmal dilatation or  dissection.  3. Multiple tiny pulmonary nodules. These may be inflammatory in nature  representing noncalcified granulomas. However, the findings are  nonspecific. Given the appearance and multiplicity of the lesions,  6-month follow-up CT is recommended.     This report was finalized on 3/29/2025 8:53 AM by Bairon Sales MD.       US Scrotum & Testicles [[id number]] Collected: 03/29/25 0646     Updated: 03/29/25 0648    Narrative:      CLINICAL HISTORY: Scrotal swelling.     COMPARISON: CT of the abdomen and pelvis performed today.     TECHNIQUE: Grayscale and duplex Doppler sonography of the testicles was  obtained.     FINDINGS: The right testicle is not visible sonographically.     There is a large left hydrocele.  The left testis is normal in size and  echogenicity, measuring 3.1 x 3.9 x 2 cm.  There is blood flow within  the left testis.       Impression:         LARGE LEFT HYDROCELE.     RIGHT TESTIS NOT SEEN.     This report was finalized on 3/29/2025 6:46 AM by Sarah Canseco MD.       US Venous Doppler Lower Extremity Bilateral (duplex) [[id number]] Collected: 03/29/25 0640     Updated: 03/29/25 0646    Narrative:      CLINICAL HISTORY: Lower extremity swelling.  Mass.     COMPARISON: No ultrasound available for direct comparison.     TECHNIQUE:   Grayscale and color Doppler sonography of the lower  extremities was obtained, with Doppler spectral analysis of the inflow  and outflow vasculature of the lower extremities.     FINDINGS:     Right lower extremity:  The right common femoral, femoral, popliteal veins are patent,  compressible, and have normal waveforms with duplex Doppler.  The  peroneal and posterior tibial veins are patent with color Doppler.     Left lower extremity:  The left common femoral vein and the proximal and mid portions of  the  left superficial femoral vein are noncompressible and have no flow with  duplex Doppler.  The distal superficial femoral vein is patent with  color Doppler.  The popliteal vein is patent and has a present waveform.   The posterior tibial and peroneal veins are patent and compressible.       Impression:         POSITIVE FOR DEEP VENOUS THROMBOSIS IN THE LEFT COMMON AND SUPERFICIAL  FEMORAL VEINS.     NEGATIVE FOR DEEP VENOUS THROMBOSIS IN THE RIGHT LOWER EXTREMITY.     This report was finalized on 3/29/2025 6:44 AM by Sarah Canseco MD.       XR Chest AP [[id number]] Collected: 03/29/25 0614     Updated: 03/29/25 0618    Narrative:      CLINICAL HISTORY: Fatigue.     COMPARISON: None available.     TECHNIQUE:  Single AP view of the chest.     FINDINGS: Lungs are clear; the known nodules at the lung bases are too  small to see radiographically.  There is mild fullness in the left  hilum. No pleural effusion or pneumothorax is identified.   Cardiomediastinal silhouette is normal.  No acute osseous or upper  abdominal abnormality is seen.       Impression:         NO ACUTE ABNORMALITY IN THE CHEST.     MILD FULLNESS IN THE LEFT HILUM, WHICH COULD BE DUE TO ENLARGED LYMPH  NODES, MASS, OR ENLARGED VESSELS.  CT SUGGESTED FOR FURTHER EVALUATION.     This report was finalized on 3/29/2025 6:16 AM by Sarah Canseco MD.       CT Abdomen Pelvis With Contrast [[id number]] Collected: 03/29/25 0555     Updated: 03/29/25 0616    Narrative:      CLINICAL HISTORY: Fatigue, pelvic and rectal mass, diarrhea.     COMPARISON: None available.     TECHNIQUE: IV contrast was administered and CT of the abdomen and pelvis  was obtained.  Multiplanar reformats were generated. Limited exposure  control, adjustment of the mA and/or KV according to patient size or use  of iterative reconstruction technique was utilized.     FINDINGS:    Lower chest: Scattered bibasilar pulmonary nodules measuring up to 5 mm.     Hepatobiliary: Hypodense mass in  segment 2 of the liver measuring 3.4 x  3.5 cm.  The liver is not cirrhotic.  Gallbladder is normal.     Pancreas: normal.     Spleen: Calcified granulomata scattered through the otherwise normal  spleen.     Adrenals: normal.      Kidneys, ureters, bladder: Bilateral nonobstructing renal calculi with  an index calculus in the upper pole right kidney measuring 5 mm.   Ureters are normal in caliber.  Urinary bladder is normal.     Reproductive: Posterior wall of the prostate gland is invaded by the  heterogeneous rectal mass.  A left-sided hydrocele is noted.  The  irregular mucosal thickening and enhancement also extends to the lateral  aspect of the scrotal skin on the left side.     GI tract/Bowel: Irregular diffuse thickening of the rectal mucosa from  the level of the rectosigmoid junction and extending into the anal  region, as well as the skin surface of the perineum.  A rim-enhancing  fluid collection in the right perianal region measures 3.2 x 3.8 x 3 cm.   The rectal mass produces moderate constipation.  The small bowel is  normal in caliber and wall thickness     Appendix: Not well seen.  No findings for acute appendicitis.     Peritoneum: normal, no free air or fluid.     Vascular: Aortoiliac atherosclerosis is noted.  There is a questionable  thrombus in the left femoral vein with edema in the imaged left lower  extremity.     Lymph nodes: Severely enlarged heterogeneous lymph nodes in the groin,  with an index left groin mass measuring 8 x 4.4 cm.  The cortex of the  lymph node is irregular, with some posterior invasion into the left  adductor musculature.  An index left external iliac node measures up to  2.8 cm in short axis.  Multiple retroperitoneal nodes are also noted.     Musculoskeletal and abdominal wall: Degenerative changes in the lumbar  spine and the hip joints.     Acute DVT and possible abscess were discussed with Dr. Rodriguez at 6:12 AM  EST on 3/29/2025.       Impression:         RECTAL  "MASS WITH EXTENSIVE LOCAL DISEASE INVOLVING THE PERINEUM,  INCLUDING AN ABSCESS OR NECROTIC MASS IN THE RIGHT PERINEUM MEASURING  3.3 X 3.8 X 3 CM.  THERE IS ALSO LOCAL INVASION INTO THE POSTERIOR  ASPECT OF THE PROSTATE GLAND.  EXTENSIVE INGUINAL, PELVIC, AND  RETROPERITONEAL ADENOPATHY, INCLUDING A LOCALLY AGGRESSIVE LEFT INGUINAL  NODE THAT INVADES INTO THE LEFT ADDUCTOR MUSCULATURE.     METASTATIC DISEASE INVOLVING THE LEFT HEPATIC LOBE AND POSSIBLY THE LUNG  BASES.     POSSIBLE DEEP VENOUS THROMBOSIS IN THE LEFT SUPERFICIAL FEMORAL VEIN.   CONFIRMATION WITH ULTRASOUND IS RECOMMENDED.     CONSTIPATION AS A RESULT OF THE EXTENSIVE RECTAL MASS.     LARGE LEFT HYDROCELE.     This report was finalized on 3/29/2025 6:14 AM by Sarah Canseco MD.               Diagnostics: Reviewed    A: Pradeep Donald is a 74 y.o. male admitted on 3/29/2025 for metastatic colon cancer to liver. The only history that he reports is kidney stones in the past. He hasn't seeked medical care in nearly a decade or longer. He took no medications at home other than his yearly flu shot. His girlfriend reports that he had had a \"bad fall\" a few weeks ago. He has been having rectal pain and bleeding for over a year now. He reports having to use a pedro-pad to contain the blood but never told his family. He reports weight loss, weakness, fatigue, and bloody diarrhea. His sister Kisha reports that she is his medical POA however we do not have documentation of that yet. She plans to bring. He reports that upon arriving to the ED, he was having left sided abd pain/left groin pain. CT of abdomen with contrast showed rectal mass with extensive local disease involving the perineum.  Abscess or necrotic mass in the right perineum measured 3.3 x 3.8 cm.  There is also local invasion into the posterior aspect of the prostate gland, pelvic and retroperitoneal adenopathy.  Metastatic disease involving the left hepatic lobe and possibly the lung bases, Scrotal " ultrasound showed large left hydrocele and lower extremity Doppler was positive for DVT in the left common and superficial femoral veins. His initial labs troponin T 44 , sodium 133, alk phos 316, albumin 2.2, tsh 5.270, iron 13 iron sat 9, transferrin 101, TIBC 150, lactate 2.1 , wbc 12.85, H&H8.5/29.5 , CEA 38.40, hemoglobin did drop 6.8 and blood transfusion was provided. His most recent 7.6/25.9 H&H. Bp 96/46 hr 68 rr 16 sat 96 . Pt reports that he has been having insomnia and wants something to help him rest. He reports that his pain is controlled well with current regimen. He denies any anxiety, nausea or vomiting, tube feeding going at 10cc/hr and tolerating well. He reports diarrhea, rectal pain and left hip pressure.          P: After lengthy conversation with sister, girlfriend and additional family members, pt wants to think about code status and goals of care. He reports that he is far too weak right now to go home and is likely not able to physically do rehab. He wants to wait to talk with Dr. Regalado about biopsy results before they make any decisions. We have discussed, LTC with hospice, [facility]. We also discussed NH placement with rehab vs. LTC placement/ seeking aggressive measures including chemotherapy and radiation. He does appear very stressed, fatigued and worried. He would like something for insomnia so atarax 10mg will be scheduled at bedtime per pt request. Will continue to provide symptomatic and supportive palliative care for patient and family.   Dr. Jon updated with conversation.     We appreciate the consult and the opportunity to participate in Pradeep Donald's care. We will continue to follow along. Please do not hesitate to contact us regarding further symptom management or goals of care needs, including after hours or on weekends via our on call provider at [phone].     Time: 70 minutes spent reviewing medical and  medication records, assessing and examining patient, discussing with family, answering questions, providing some guidance about a plan and documentation of care, and coordinating care with other healthcare members, with > 50% time spent face to face.     Nisreen Wong, APRN    3/31/2025

## 2025-03-31 NOTE — PROGRESS NOTES
LOS: 2 days   Patient Care Team:  Carmen Pretty APRN as PCP - General (Family Medicine)    Post op day # 2, status post diverting colostomy and biopsy of rectal mass as well as PEG placement    Subjective     Interval History:  Patient is tolerating his liquid diet.  No output per colostomy.  Denies nausea or vomiting.  Patient was able to get significant relief after Walsh catheter was placed.  Tube feeds started yesterday    History taken from patient.      Objective     Vital Signs  Temp:  [97.5 °F (36.4 °C)-98.4 °F (36.9 °C)] 98.3 °F (36.8 °C)  Heart Rate:  [68-75] 68  Resp:  [16-20] 16  BP: ()/(46-53) 96/46    Physical Exam:  This is a nourished male in no acute distress  HEENT examination: Sclera are anicteric  Abdomen: Softly distended.  Ostomy in left lower quadrant without stool or flatus  Skin/incisions: Incision sites were inspected and demonstrate no drainage or erythema     Results Review:    Results from last 7 days   Lab Units 03/29/25  0636 03/29/25  0434   CK TOTAL U/L  --  25   HSTROP T ng/L 41* 44*     Results from last 7 days   Lab Units 03/31/25  1221 03/31/25  0904 03/31/25  0204 03/30/25  0150 03/29/25 1944 03/29/25  0800 03/29/25  0434   LACTATE mmol/L  --   --   --   --   --  1.8 2.1*   WBC 10*3/mm3  --   --  11.84* 15.11* 12.56*  --  12.85*   HEMOGLOBIN g/dL 7.6*  --  6.8* 7.9* 7.9*  --  8.5*   HEMATOCRIT % 25.9*  --  23.8* 29.4* 29.2*  --  29.5*   PLATELETS 10*3/mm3  --   --  343 340 333  --  309   INR   --  1.26*  --   --  1.24*  --   --          Results from last 7 days   Lab Units 03/31/25  0118 03/30/25  0150 03/29/25 1944 03/29/25  0434   SODIUM mmol/L  --   --   --  133*   POTASSIUM mmol/L  --   --   --  4.1   MAGNESIUM mg/dL 2.1 2.1 2.0 2.2   CHLORIDE mmol/L  --   --   --  101   CO2 mmol/L  --   --   --  22.2   BUN mg/dL  --   --   --  21   CREATININE mg/dL  --   --   --  1.14   CALCIUM mg/dL  --   --   --  8.7   GLUCOSE mg/dL  --   --   --  106*   ALBUMIN g/dL  --   --   " --  2.2*   BILIRUBIN mg/dL  --   --   --  0.4   ALK PHOS U/L  --   --   --  316*   AST (SGOT) U/L  --   --   --  20   ALT (SGPT) U/L  --   --   --  11   Estimated Creatinine Clearance: 44.7 mL/min (by C-G formula based on SCr of 1.14 mg/dL).  No results found for: \"AMMONIA\"      No results found for: \"BLOODCX\"  No results found for: \"URINECX\"  No results found for: \"WOUNDCX\"  No results found for: \"STOOLCX\"    Imaging:  Imaging Results (Last 24 Hours)       ** No results found for the last 24 hours. **             Impression:  Status post diverting colostomy for locally advanced rectal versus anal cancer    Plan:  Radiation oncology consulted  Await pathology report  Walsh to stay in place  Advance diet    Matilde Monzon MD  03/31/25  15:11 EDT      Please note that portions of this note were completed with a voice recognition program.    "

## 2025-03-31 NOTE — PROGRESS NOTES
HEPARIN INFUSION  Pradeep Donald is a  74 y.o. male receiving heparin infusion.     Therapy for (VTE/Cardiac):   VTE  Patient Dosing Weight: 57.3 kg  Initial Bolus (Y/N):   N  Any Bolus (Y/N):   Y        Signs or Symptoms of Bleeding: N        VTE (PE/DVT)   Initial Bolus: 80 units/kg (Max 10,000 units)  Initial rate: 18 units/kg/hr (Max 1,500 units/hr)    Anti Xa Rebolus Infusion Hold time Change infusion Dose (Units/kg/hr) Next Anti Xa Level Due   < 0.11 50 Units/kg  (4000 Units Max) None Increase by  4 Units/kg/hr 6 hours   0.11 - 0.19 25 Units/kg  (2000 Units Max) None Increase by  3 Units/kg/hr 6 hours   0.2 - 0.29 0 None Increase by  2 Units/kg/hr 6 hours   0.3 - 0.7 0 None No Change 6 hours (after 2 consecutive levels in range check q24h @0700)   0.71 - 0.8 0 None Decrease by  1 Units/kg/hr 6 hours   0.81 - 0.9 0 None Decrease by  2 Units/kg/hr 6 hours   0.91 - 1 0 60 Minutes Decrease by  3 Units/kg/hr 6 hours   >1 0 Hold  After Anti Xa less than 0.7 decrease previous rate by  4 Units/kg/hr  Every 2 hours until Anti Xa is less than 0.7 then when infusion restarts in 6 hours     Recommend anti-Xa every 6 hours.          Date   Time   Anti-Xa Current Rate (Unit/kg/hr) Bolus   (Units) Rate Change   (Unit/kg/hr) New Rate (Unit/kg/hr) Next   Anti-Xa Comments  Pump Check Daily   3/31 0904 <0.1 - No initial Start drip 18 1600 Discussed initial rate with nurse Smart.                                                                                                                                                                                                                                  Pharmacy will continue to follow anti-Xa results and monitor for signs and symptoms of bleeding or thrombosis.    Yennifer Dempsey, PharmD  03/31/25 09:30 EDT

## 2025-03-31 NOTE — PROGRESS NOTES
"Date:  03/31/25    CC:  Obstructing Anorectal mass, rectal bleeding, weight loss, progressive debility,     Subjective:  Pradeep Donald complains today of LLE swelling.  He says his LLE seemed to \"go down\" but then started to swell again.  He is receiving PRBC for Hb 6.8.  Of note, Hb drop was prior to initiation of anticoagulation.  Mr. Donald says he has had rectal bleeding for \"a long time\" (hard for him to further quantitfy) which he has attributed to hemorrhoids which he thought were worsening with time.  He has had increasing difficulty with rectal bleeding, diarrhea and presented to the hospital  with obstruction.  He says he lives along but usually is up and about, saying he goes to his girlfriend's every night for dinner.  He did have a fall a few days prior to admission when he was trying to maneuver his girlfriend's walker to give to her.  Over time, however his appetite has worsened. He has been losing weight.  He denies n/v.  When he presented to the ER he had imaging as below revealing an obstructing anorectal mass which stretched from the area of the rectosigmoid junction into the anal region as well as the skin surfaceof the perineum. The mass also invaded into the prostate and infiltrated to the lateral aspect of the L scrotal skin. Three were severely enlarged heterogenous LNs in the L groin with index mass measuring 8x4.4 cm and the mass invaded into the adductor musculature. Multiple retroperitoneal LNs were noted and there was a hypodense mass in segment 2 of the liver measuring 3.4 x 3.5 cm. Thre was a rim enhancing fluid collection in the R perianal region measuring 3.8 cm in maximal dimension. Given obstructing mass, Dr. Monzon took him for diverting colostomy, biopsy of the rectal mass as well as PEG tube placement for severe malnutrition on 3/29/25. Pathology is still pending. Mr. Donald also developed difficulty with bladder outlet obstruction and doe catheter had to be placed under " anesthesia to decompress the bladder.  Mr. Donald says he hadn't been up to ambulate since admission.        Review Of Systems:  A comprehensive 14-point review of systems performed.  Significant findings as mentioned above.  All other systems reviewed and are negative.      Objective:  Medications:  Scheduled Meds:cefepime, 1,000 mg, Intravenous, Q12H  ferric gluconate, 125 mg, Intravenous, Daily  senna-docusate sodium, 2 tablet, Oral, BID   And  polyethylene glycol, 17 g, Oral, BID  sodium chloride, 10 mL, Intravenous, Q12H      Continuous Infusions:heparin, 18 Units/kg/hr (Dosing Weight), Last Rate: 18 Units/kg/hr (03/31/25 0951)  Pharmacy to Dose Heparin,       PRN Meds:.  acetaminophen **OR** acetaminophen **OR** [DISCONTINUED] acetaminophen    senna-docusate sodium **AND** polyethylene glycol **AND** bisacodyl **AND** [DISCONTINUED] bisacodyl    Morphine **AND** naloxone    Morphine    Pharmacy to Dose Heparin    sodium chloride    sodium chloride    Physical Exam:  Vital Signs     Patient Vitals for the past 24 hrs:   BP Temp Temp src Pulse Resp SpO2   03/31/25 0924 93/50 98.4 °F (36.9 °C) -- 68 18 96 %   03/31/25 0606 99/50 98.1 °F (36.7 °C) Oral 72 16 93 %   03/31/25 0551 96/48 98.2 °F (36.8 °C) Oral 69 20 95 %   03/31/25 0539 94/50 98.2 °F (36.8 °C) Oral 73 16 97 %   03/31/25 0259 98/50 98.2 °F (36.8 °C) Oral 74 17 --   03/30/25 2337 92/51 98.1 °F (36.7 °C) Oral 75 16 --   03/30/25 2150 -- -- -- -- -- 97 %   03/30/25 1905 90/46 97.5 °F (36.4 °C) Oral 75 20 --   03/30/25 1726 100/53 98 °F (36.7 °C) Oral 73 20 90 %   03/30/25 1336 98/50 98 °F (36.7 °C) Oral -- 22 --         General:  Awake, alert and oriented, cachectic  HEENT:  Pupils are equal, round and reactive to light and accommodation, Extra-ocular movements full, oropharynx clear, mucous membranes moist, edendulous  Neck:  No JVD, thyromegaly or lymphadenopathy  CV:  Regular rate and rhythm, no murmurs, rubs or gallops  Resp:  Lungs are clear to  auscultation bilaterally without wheezing  Abd:  Soft, non-tender, non-distended, bowel sounds present, no palpable organomegaly.  Stoma present with a small amount of bloody fluid in the bag.  Ext:  No clubbing, cyanosis.  He has L>>RLE edema.  Walsh anchored to RLE.  Lymph:  No cervical, supraclavicular, axillary,adenoapathy.  + bulky L inguinal LNS present  Neuro:  MS as above, CN II-XII intact, grossly non-focal exam    Labs / Studies:    Lab Results   Component Value Date    WBC 11.84 (H) 03/31/2025    HGB 6.8 (C) 03/31/2025    HCT 23.8 (L) 03/31/2025    MCV 84.7 03/31/2025    RDW 15.6 (H) 03/31/2025     03/31/2025    NEUTRORELPCT 68.6 03/31/2025    LYMPHORELPCT 17.6 (L) 03/31/2025    MONORELPCT 10.4 03/31/2025    EOSRELPCT 2.2 03/31/2025    BASORELPCT 0.4 03/31/2025    NEUTROABS 8.12 (H) 03/31/2025    LYMPHSABS 2.08 03/31/2025       Lab Results   Component Value Date     (L) 03/29/2025    K 4.1 03/29/2025    CO2 22.2 03/29/2025     03/29/2025    BUN 21 03/29/2025    CREATININE 1.14 03/29/2025    EGFRIFNONA 57 (L) 01/07/2018    GLUCOSE 106 (H) 03/29/2025    CALCIUM 8.7 03/29/2025    ALKPHOS 316 (H) 03/29/2025    AST 20 03/29/2025    ALT 11 03/29/2025    BILITOT 0.4 03/29/2025    ALBUMIN 2.2 (L) 03/29/2025    PROTEINTOT 6.9 03/29/2025    MG 2.1 03/31/2025    PHOS 4.5 03/31/2025     Lab Results   Component Value Date    FERRITIN 480.30 (H) 03/29/2025    IRON 13 (L) 03/29/2025    TIBC 150 (L) 03/29/2025    LABIRON 9 (L) 03/29/2025    WJIUKPXT65 451 03/29/2025    FOLATE 5.46 03/29/2025     Lab Results   Component Value Date    CEA 38.40 03/30/2025         Imaging Results (Last 72 Hours)       Procedure Component Value Units Date/Time    CT Chest With Contrast Diagnostic [[id number]] Collected: 03/29/25 0849     Updated: 03/29/25 0855    Narrative:      EXAMINATION: CTA chest with contrast     CLINICAL HISTORY: Pain     COMPARISON: None available     TECHNIQUE: Contiguous 3 mm thick slices were  obtained through the chest  after the administration of Isovue-370 intravenous contrast. Coronal and  sagittal reformats were performed.     FINDINGS:  The heart is normal in size configuration. The thoracic aorta is normal  in caliber. No aneurysmal dilatation. No dissection. The central  pulmonary arteries are normal in caliber. No pulmonary embolus. The  central airways are patent. No pneumothorax is appreciated. The few  small nodules are noted within both lungs. These measure up to 5 mm and  are of uncertain clinical significance. Correlation with prior imaging  would be helpful as available. In the absence of prior imaging, chest CT  follow-up in 6 months is recommended.       Impression:      1. No pulmonary embolus.  2. Normal caliber thoracic aorta without aneurysmal dilatation or  dissection.  3. Multiple tiny pulmonary nodules. These may be inflammatory in nature  representing noncalcified granulomas. However, the findings are  nonspecific. Given the appearance and multiplicity of the lesions,  6-month follow-up CT is recommended.     This report was finalized on 3/29/2025 8:53 AM by Bairon Sales MD.       US Scrotum & Testicles [[id number]] Collected: 03/29/25 0646     Updated: 03/29/25 0648    Narrative:      CLINICAL HISTORY: Scrotal swelling.     COMPARISON: CT of the abdomen and pelvis performed today.     TECHNIQUE: Grayscale and duplex Doppler sonography of the testicles was  obtained.     FINDINGS: The right testicle is not visible sonographically.     There is a large left hydrocele.  The left testis is normal in size and  echogenicity, measuring 3.1 x 3.9 x 2 cm.  There is blood flow within  the left testis.       Impression:         LARGE LEFT HYDROCELE.     RIGHT TESTIS NOT SEEN.     This report was finalized on 3/29/2025 6:46 AM by Sarah Canseco MD.       US Venous Doppler Lower Extremity Bilateral (duplex) [[id number]] Collected: 03/29/25 0640     Updated: 03/29/25 0646    Narrative:       CLINICAL HISTORY: Lower extremity swelling.  Mass.     COMPARISON: No ultrasound available for direct comparison.     TECHNIQUE:   Grayscale and color Doppler sonography of the lower  extremities was obtained, with Doppler spectral analysis of the inflow  and outflow vasculature of the lower extremities.     FINDINGS:     Right lower extremity:  The right common femoral, femoral, popliteal veins are patent,  compressible, and have normal waveforms with duplex Doppler.  The  peroneal and posterior tibial veins are patent with color Doppler.     Left lower extremity:  The left common femoral vein and the proximal and mid portions of the  left superficial femoral vein are noncompressible and have no flow with  duplex Doppler.  The distal superficial femoral vein is patent with  color Doppler.  The popliteal vein is patent and has a present waveform.   The posterior tibial and peroneal veins are patent and compressible.       Impression:         POSITIVE FOR DEEP VENOUS THROMBOSIS IN THE LEFT COMMON AND SUPERFICIAL  FEMORAL VEINS.     NEGATIVE FOR DEEP VENOUS THROMBOSIS IN THE RIGHT LOWER EXTREMITY.     This report was finalized on 3/29/2025 6:44 AM by Sarah Canseco MD.       XR Chest AP [[id number]] Collected: 03/29/25 0614     Updated: 03/29/25 0618    Narrative:      CLINICAL HISTORY: Fatigue.     COMPARISON: None available.     TECHNIQUE:  Single AP view of the chest.     FINDINGS: Lungs are clear; the known nodules at the lung bases are too  small to see radiographically.  There is mild fullness in the left  hilum. No pleural effusion or pneumothorax is identified.   Cardiomediastinal silhouette is normal.  No acute osseous or upper  abdominal abnormality is seen.       Impression:         NO ACUTE ABNORMALITY IN THE CHEST.     MILD FULLNESS IN THE LEFT HILUM, WHICH COULD BE DUE TO ENLARGED LYMPH  NODES, MASS, OR ENLARGED VESSELS.  CT SUGGESTED FOR FURTHER EVALUATION.     This report was finalized on 3/29/2025 6:16  AM by Sraah Canseco MD.       CT Abdomen Pelvis With Contrast [[id number]] Collected: 03/29/25 0555     Updated: 03/29/25 0616    Narrative:      CLINICAL HISTORY: Fatigue, pelvic and rectal mass, diarrhea.     COMPARISON: None available.     TECHNIQUE: IV contrast was administered and CT of the abdomen and pelvis  was obtained.  Multiplanar reformats were generated. Limited exposure  control, adjustment of the mA and/or KV according to patient size or use  of iterative reconstruction technique was utilized.     FINDINGS:    Lower chest: Scattered bibasilar pulmonary nodules measuring up to 5 mm.     Hepatobiliary: Hypodense mass in segment 2 of the liver measuring 3.4 x  3.5 cm.  The liver is not cirrhotic.  Gallbladder is normal.     Pancreas: normal.     Spleen: Calcified granulomata scattered through the otherwise normal  spleen.     Adrenals: normal.      Kidneys, ureters, bladder: Bilateral nonobstructing renal calculi with  an index calculus in the upper pole right kidney measuring 5 mm.   Ureters are normal in caliber.  Urinary bladder is normal.     Reproductive: Posterior wall of the prostate gland is invaded by the  heterogeneous rectal mass.  A left-sided hydrocele is noted.  The  irregular mucosal thickening and enhancement also extends to the lateral  aspect of the scrotal skin on the left side.     GI tract/Bowel: Irregular diffuse thickening of the rectal mucosa from  the level of the rectosigmoid junction and extending into the anal  region, as well as the skin surface of the perineum.  A rim-enhancing  fluid collection in the right perianal region measures 3.2 x 3.8 x 3 cm.   The rectal mass produces moderate constipation.  The small bowel is  normal in caliber and wall thickness     Appendix: Not well seen.  No findings for acute appendicitis.     Peritoneum: normal, no free air or fluid.     Vascular: Aortoiliac atherosclerosis is noted.  There is a questionable  thrombus in the left femoral vein  with edema in the imaged left lower  extremity.     Lymph nodes: Severely enlarged heterogeneous lymph nodes in the groin,  with an index left groin mass measuring 8 x 4.4 cm.  The cortex of the  lymph node is irregular, with some posterior invasion into the left  adductor musculature.  An index left external iliac node measures up to  2.8 cm in short axis.  Multiple retroperitoneal nodes are also noted.     Musculoskeletal and abdominal wall: Degenerative changes in the lumbar  spine and the hip joints.     Acute DVT and possible abscess were discussed with Dr. Rodriguez at 6:12 AM  EST on 3/29/2025.       Impression:         RECTAL MASS WITH EXTENSIVE LOCAL DISEASE INVOLVING THE PERINEUM,  INCLUDING AN ABSCESS OR NECROTIC MASS IN THE RIGHT PERINEUM MEASURING  3.3 X 3.8 X 3 CM.  THERE IS ALSO LOCAL INVASION INTO THE POSTERIOR  ASPECT OF THE PROSTATE GLAND.  EXTENSIVE INGUINAL, PELVIC, AND  RETROPERITONEAL ADENOPATHY, INCLUDING A LOCALLY AGGRESSIVE LEFT INGUINAL  NODE THAT INVADES INTO THE LEFT ADDUCTOR MUSCULATURE.     METASTATIC DISEASE INVOLVING THE LEFT HEPATIC LOBE AND POSSIBLY THE LUNG  BASES.     POSSIBLE DEEP VENOUS THROMBOSIS IN THE LEFT SUPERFICIAL FEMORAL VEIN.   CONFIRMATION WITH ULTRASOUND IS RECOMMENDED.     CONSTIPATION AS A RESULT OF THE EXTENSIVE RECTAL MASS.     LARGE LEFT HYDROCELE.     This report was finalized on 3/29/2025 6:14 AM by Sarah Canseco MD.               Assessment & Plan:  Pradeep Donald is a 74 y.o. year-old male presenting with rectal bleeding with obstructing mass with both locally advanced and metastatic disease in the setting of significant debility, generalized weakness, malnutrition with comorbid LLE DVT likely related to hypercoagulable state of malignancy combined with bulky L groin adenopathy.     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 Monzon performed diverting  colostomy on 3/29/25.  -  He has invasion locally into the prostate and perineum as well as evidence of retroperitoneal LAD, bulky L inguinal LAD, liver metastasis.  He has many tiny lung nodules which may also represent metastatic disease v. Granulomatous disease.    -  Anorectal mass has been biopsied with pathology pending.  -  CEA level elevated at 38.4.  -  Given metastatic disease, his condition could potentially be treated but would not be curable and at this point, he is quite debilitated such that I am not sure he would tolerate systemic therapy.  -  Will have to see how he does as he heals from his surgery and takes in nutriition.  -  Dr. Monzon has consulted Dr. Poon for consideration of palliative radiation and Dr. Poon believes he could offer palliation to the rectal mass and L groin.     2.  Anemia:  -  Likely a combined iron deficiency anemia and anemia of chronic disease.  -  B12, Folate have been sent and are pending.  -  Receiving Ferrlecit.  -  Given Hb <7, receiving PRBC today.     3.  LLE DVT:  -  Likely related to compressive bulky adenopathy in the L groin and hypercoagulable state related to his malignancy  -  When felt safe from surgical point of view, recommend starting anticoagulation with unfractionated heparin ggt.  Would start without a bolus given increased risk of bleeding.  When the initial ptt checked, would only adjust if over-anticoagulated, but would wait for the second ptt check to make adjustment.    4.  Inability to void:  -  Dr. Monzon placed doe catheter under anesthesia.  -  Will need further f/u with Urology at some point.    -  He has invasion of the prostate from the anorectal mass so I suspect that is the reason for obstructive uropathy.        We will continue to follow with you.  Dr. Weems will take my place on our inpatient service tomorrow.  Discussed with Dr. Poon and Dr. Jon.    Lila Regalado MD  03/31/25  10:13 EDT

## 2025-03-31 NOTE — PLAN OF CARE
Goal Outcome Evaluation:              Outcome Evaluation: pt resting in bed at this time with no complaints or concerns. VSS on room air. wound care completed as ordered. tube feed started as ordered. pt is tolerating well. blood started and pt is also tolerating well. plan of care on going             Could not find left pedal pulse with doppler, PA aware

## 2025-04-01 PROBLEM — E43 SEVERE PROTEIN-CALORIE MALNUTRITION: Status: ACTIVE | Noted: 2025-04-01

## 2025-04-01 LAB
ALBUMIN SERPL-MCNC: 1.5 G/DL (ref 3.5–5.2)
ALBUMIN/GLOB SERPL: 0.4 G/DL
ALP SERPL-CCNC: 270 U/L (ref 39–117)
ALT SERPL W P-5'-P-CCNC: 7 U/L (ref 1–41)
ANION GAP SERPL CALCULATED.3IONS-SCNC: 3.3 MMOL/L (ref 5–15)
AST SERPL-CCNC: 20 U/L (ref 1–40)
BASOPHILS # BLD AUTO: 0.08 10*3/MM3 (ref 0–0.2)
BASOPHILS # BLD AUTO: 0.09 10*3/MM3 (ref 0–0.2)
BASOPHILS NFR BLD AUTO: 0.7 % (ref 0–1.5)
BASOPHILS NFR BLD AUTO: 0.7 % (ref 0–1.5)
BH BB BLOOD EXPIRATION DATE: NORMAL
BH BB BLOOD TYPE BARCODE: 7300
BH BB DISPENSE STATUS: NORMAL
BH BB PRODUCT CODE: NORMAL
BH BB UNIT NUMBER: NORMAL
BILIRUB SERPL-MCNC: 0.2 MG/DL (ref 0–1.2)
BUN SERPL-MCNC: 20 MG/DL (ref 8–23)
BUN/CREAT SERPL: 17.9 (ref 7–25)
CALCIUM SPEC-SCNC: 7.8 MG/DL (ref 8.6–10.5)
CHLORIDE SERPL-SCNC: 105 MMOL/L (ref 98–107)
CO2 SERPL-SCNC: 24.7 MMOL/L (ref 22–29)
CREAT SERPL-MCNC: 1.12 MG/DL (ref 0.76–1.27)
CROSSMATCH INTERPRETATION: NORMAL
DEPRECATED RDW RBC AUTO: 46.7 FL (ref 37–54)
DEPRECATED RDW RBC AUTO: 48.4 FL (ref 37–54)
EGFRCR SERPLBLD CKD-EPI 2021: 68.1 ML/MIN/1.73
EOSINOPHIL # BLD AUTO: 0.4 10*3/MM3 (ref 0–0.4)
EOSINOPHIL # BLD AUTO: 0.5 10*3/MM3 (ref 0–0.4)
EOSINOPHIL NFR BLD AUTO: 3.3 % (ref 0.3–6.2)
EOSINOPHIL NFR BLD AUTO: 4.2 % (ref 0.3–6.2)
ERYTHROCYTE [DISTWIDTH] IN BLOOD BY AUTOMATED COUNT: 15.3 % (ref 12.3–15.4)
ERYTHROCYTE [DISTWIDTH] IN BLOOD BY AUTOMATED COUNT: 15.6 % (ref 12.3–15.4)
GLOBULIN UR ELPH-MCNC: 3.6 GM/DL
GLUCOSE BLDC GLUCOMTR-MCNC: 103 MG/DL (ref 70–130)
GLUCOSE BLDC GLUCOMTR-MCNC: 189 MG/DL (ref 70–130)
GLUCOSE BLDC GLUCOMTR-MCNC: 85 MG/DL (ref 70–130)
GLUCOSE BLDC GLUCOMTR-MCNC: 99 MG/DL (ref 70–130)
GLUCOSE SERPL-MCNC: 88 MG/DL (ref 65–99)
HCT VFR BLD AUTO: 24.4 % (ref 37.5–51)
HCT VFR BLD AUTO: 25.3 % (ref 37.5–51)
HGB BLD-MCNC: 7.1 G/DL (ref 13–17.7)
HGB BLD-MCNC: 7.2 G/DL (ref 13–17.7)
IMM GRANULOCYTES # BLD AUTO: 0.11 10*3/MM3 (ref 0–0.05)
IMM GRANULOCYTES # BLD AUTO: 0.14 10*3/MM3 (ref 0–0.05)
IMM GRANULOCYTES NFR BLD AUTO: 0.9 % (ref 0–0.5)
IMM GRANULOCYTES NFR BLD AUTO: 1.2 % (ref 0–0.5)
INR PPP: 1.33 (ref 0.9–1.1)
LYMPHOCYTES # BLD AUTO: 2.23 10*3/MM3 (ref 0.7–3.1)
LYMPHOCYTES # BLD AUTO: 2.39 10*3/MM3 (ref 0.7–3.1)
LYMPHOCYTES NFR BLD AUTO: 18.7 % (ref 19.6–45.3)
LYMPHOCYTES NFR BLD AUTO: 19.9 % (ref 19.6–45.3)
MCH RBC QN AUTO: 24.2 PG (ref 26.6–33)
MCH RBC QN AUTO: 24.4 PG (ref 26.6–33)
MCHC RBC AUTO-ENTMCNC: 28.5 G/DL (ref 31.5–35.7)
MCHC RBC AUTO-ENTMCNC: 29.1 G/DL (ref 31.5–35.7)
MCV RBC AUTO: 83.8 FL (ref 79–97)
MCV RBC AUTO: 85.2 FL (ref 79–97)
MONOCYTES # BLD AUTO: 1.32 10*3/MM3 (ref 0.1–0.9)
MONOCYTES # BLD AUTO: 1.33 10*3/MM3 (ref 0.1–0.9)
MONOCYTES NFR BLD AUTO: 11.1 % (ref 5–12)
MONOCYTES NFR BLD AUTO: 11.1 % (ref 5–12)
NEUTROPHILS NFR BLD AUTO: 64.1 % (ref 42.7–76)
NEUTROPHILS NFR BLD AUTO: 64.1 % (ref 42.7–76)
NEUTROPHILS NFR BLD AUTO: 7.66 10*3/MM3 (ref 1.7–7)
NEUTROPHILS NFR BLD AUTO: 7.7 10*3/MM3 (ref 1.7–7)
NRBC BLD AUTO-RTO: 0 /100 WBC (ref 0–0.2)
NRBC BLD AUTO-RTO: 0 /100 WBC (ref 0–0.2)
PLATELET # BLD AUTO: 264 10*3/MM3 (ref 140–450)
PLATELET # BLD AUTO: 299 10*3/MM3 (ref 140–450)
PMV BLD AUTO: 8.5 FL (ref 6–12)
PMV BLD AUTO: 8.7 FL (ref 6–12)
POTASSIUM SERPL-SCNC: 4.6 MMOL/L (ref 3.5–5.2)
PROT SERPL-MCNC: 5.1 G/DL (ref 6–8.5)
PROTHROMBIN TIME: 16.6 SECONDS (ref 11.6–15.1)
RBC # BLD AUTO: 2.91 10*6/MM3 (ref 4.14–5.8)
RBC # BLD AUTO: 2.97 10*6/MM3 (ref 4.14–5.8)
SODIUM SERPL-SCNC: 133 MMOL/L (ref 136–145)
UFH PPP CHRO-ACNC: 0.2 IU/ML (ref 0.3–0.7)
UFH PPP CHRO-ACNC: 0.25 IU/ML (ref 0.3–0.7)
UFH PPP CHRO-ACNC: 0.34 IU/ML (ref 0.3–0.7)
UFH PPP CHRO-ACNC: 0.38 IU/ML (ref 0.3–0.7)
UNIT  ABO: NORMAL
UNIT  RH: NORMAL
WBC NRBC COR # BLD AUTO: 11.93 10*3/MM3 (ref 3.4–10.8)
WBC NRBC COR # BLD AUTO: 12.02 10*3/MM3 (ref 3.4–10.8)

## 2025-04-01 PROCEDURE — 97163 PT EVAL HIGH COMPLEX 45 MIN: CPT

## 2025-04-01 PROCEDURE — 85610 PROTHROMBIN TIME: CPT | Performed by: STUDENT IN AN ORGANIZED HEALTH CARE EDUCATION/TRAINING PROGRAM

## 2025-04-01 PROCEDURE — 77300 RADIATION THERAPY DOSE PLAN: CPT | Performed by: RADIOLOGY

## 2025-04-01 PROCEDURE — 77334 RADIATION TREATMENT AID(S): CPT | Performed by: RADIOLOGY

## 2025-04-01 PROCEDURE — 85025 COMPLETE CBC W/AUTO DIFF WBC: CPT | Performed by: STUDENT IN AN ORGANIZED HEALTH CARE EDUCATION/TRAINING PROGRAM

## 2025-04-01 PROCEDURE — 77295 3-D RADIOTHERAPY PLAN: CPT | Performed by: RADIOLOGY

## 2025-04-01 PROCEDURE — 77290 THER RAD SIMULAJ FIELD CPLX: CPT | Performed by: RADIOLOGY

## 2025-04-01 PROCEDURE — 99233 SBSQ HOSP IP/OBS HIGH 50: CPT | Performed by: STUDENT IN AN ORGANIZED HEALTH CARE EDUCATION/TRAINING PROGRAM

## 2025-04-01 PROCEDURE — 80053 COMPREHEN METABOLIC PANEL: CPT | Performed by: STUDENT IN AN ORGANIZED HEALTH CARE EDUCATION/TRAINING PROGRAM

## 2025-04-01 PROCEDURE — 97167 OT EVAL HIGH COMPLEX 60 MIN: CPT

## 2025-04-01 PROCEDURE — 99221 1ST HOSP IP/OBS SF/LOW 40: CPT

## 2025-04-01 PROCEDURE — 77263 THER RADIOLOGY TX PLNG CPLX: CPT | Performed by: RADIOLOGY

## 2025-04-01 PROCEDURE — 82948 REAGENT STRIP/BLOOD GLUCOSE: CPT

## 2025-04-01 PROCEDURE — 85520 HEPARIN ASSAY: CPT

## 2025-04-01 PROCEDURE — 25010000002 HEPARIN (PORCINE) 25000-0.45 UT/250ML-% SOLUTION: Performed by: STUDENT IN AN ORGANIZED HEALTH CARE EDUCATION/TRAINING PROGRAM

## 2025-04-01 PROCEDURE — 25010000002 NA FERRIC GLUC CPLX PER 12.5 MG: Performed by: INTERNAL MEDICINE

## 2025-04-01 PROCEDURE — 85520 HEPARIN ASSAY: CPT | Performed by: STUDENT IN AN ORGANIZED HEALTH CARE EDUCATION/TRAINING PROGRAM

## 2025-04-01 RX ORDER — LIDOCAINE HYDROCHLORIDE 20 MG/ML
10 SOLUTION OROPHARYNGEAL
Status: DISCONTINUED | OUTPATIENT
Start: 2025-04-01 | End: 2025-04-21 | Stop reason: HOSPADM

## 2025-04-01 RX ORDER — HYDROCORTISONE 25 MG/G
CREAM TOPICAL 2 TIMES DAILY
Status: DISCONTINUED | OUTPATIENT
Start: 2025-04-01 | End: 2025-04-21 | Stop reason: HOSPADM

## 2025-04-01 RX ORDER — LACTULOSE 10 G/15ML
10 SOLUTION ORAL DAILY
Status: DISCONTINUED | OUTPATIENT
Start: 2025-04-01 | End: 2025-04-02

## 2025-04-01 RX ORDER — MIDODRINE HYDROCHLORIDE 2.5 MG/1
10 TABLET ORAL
Status: DISCONTINUED | OUTPATIENT
Start: 2025-04-01 | End: 2025-04-02

## 2025-04-01 RX ORDER — OXYCODONE HCL 10 MG/1
10 TABLET, FILM COATED, EXTENDED RELEASE ORAL EVERY 12 HOURS SCHEDULED
Refills: 0 | Status: DISPENSED | OUTPATIENT
Start: 2025-04-01 | End: 2025-04-09

## 2025-04-01 RX ORDER — TRAMADOL HYDROCHLORIDE 50 MG/1
100 TABLET ORAL 3 TIMES DAILY
Status: DISCONTINUED | OUTPATIENT
Start: 2025-04-01 | End: 2025-04-02

## 2025-04-01 RX ADMIN — ACETAMINOPHEN 1000 MG: 500 TABLET ORAL at 09:47

## 2025-04-01 RX ADMIN — OXYCODONE 5 MG: 5 TABLET ORAL at 13:25

## 2025-04-01 RX ADMIN — ACETAMINOPHEN 1000 MG: 500 TABLET ORAL at 15:39

## 2025-04-01 RX ADMIN — HYDROXYZINE HYDROCHLORIDE 10 MG: 10 TABLET, FILM COATED ORAL at 20:08

## 2025-04-01 RX ADMIN — PANTOPRAZOLE SODIUM 40 MG: 40 INJECTION, POWDER, FOR SOLUTION INTRAVENOUS at 17:25

## 2025-04-01 RX ADMIN — NYSTATIN 500000 UNITS: 100000 SUSPENSION ORAL at 12:20

## 2025-04-01 RX ADMIN — TRAMADOL HYDROCHLORIDE 100 MG: 50 TABLET, COATED ORAL at 15:39

## 2025-04-01 RX ADMIN — Medication 10 ML: at 20:09

## 2025-04-01 RX ADMIN — NYSTATIN 500000 UNITS: 100000 SUSPENSION ORAL at 09:49

## 2025-04-01 RX ADMIN — Medication 1000 MCG: at 09:47

## 2025-04-01 RX ADMIN — Medication 10 ML: at 09:50

## 2025-04-01 RX ADMIN — PANTOPRAZOLE SODIUM 40 MG: 40 INJECTION, POWDER, FOR SOLUTION INTRAVENOUS at 09:49

## 2025-04-01 RX ADMIN — TRAMADOL HYDROCHLORIDE 100 MG: 50 TABLET, COATED ORAL at 20:08

## 2025-04-01 RX ADMIN — LACTULOSE 10 G: 20 SOLUTION ORAL at 12:20

## 2025-04-01 RX ADMIN — SENNOSIDES AND DOCUSATE SODIUM 2 TABLET: 50; 8.6 TABLET ORAL at 20:08

## 2025-04-01 RX ADMIN — SENNOSIDES AND DOCUSATE SODIUM 2 TABLET: 50; 8.6 TABLET ORAL at 09:47

## 2025-04-01 RX ADMIN — MIDODRINE HYDROCHLORIDE 5 MG: 2.5 TABLET ORAL at 09:47

## 2025-04-01 RX ADMIN — POLYETHYLENE GLYCOL (3350) 17 G: 17 POWDER, FOR SOLUTION ORAL at 20:08

## 2025-04-01 RX ADMIN — FOLIC ACID 1 MG: 1 TABLET ORAL at 09:47

## 2025-04-01 RX ADMIN — POLYETHYLENE GLYCOL (3350) 17 G: 17 POWDER, FOR SOLUTION ORAL at 09:50

## 2025-04-01 RX ADMIN — MIDODRINE HYDROCHLORIDE 5 MG: 2.5 TABLET ORAL at 12:20

## 2025-04-01 RX ADMIN — MIDODRINE HYDROCHLORIDE 10 MG: 2.5 TABLET ORAL at 17:24

## 2025-04-01 RX ADMIN — HEPARIN SODIUM 24 UNITS/KG/HR: 10000 INJECTION, SOLUTION INTRAVENOUS at 06:53

## 2025-04-01 RX ADMIN — HYDROCORTISONE 2.5%: 25 CREAM TOPICAL at 20:09

## 2025-04-01 RX ADMIN — OXYCODONE 5 MG: 5 TABLET ORAL at 17:24

## 2025-04-01 RX ADMIN — ACETAMINOPHEN 1000 MG: 500 TABLET ORAL at 20:08

## 2025-04-01 RX ADMIN — TRAMADOL HYDROCHLORIDE 50 MG: 50 TABLET, COATED ORAL at 09:47

## 2025-04-01 RX ADMIN — SODIUM CHLORIDE 125 MG: 9 INJECTION, SOLUTION INTRAVENOUS at 09:48

## 2025-04-01 NOTE — THERAPY EVALUATION
Acute Care - Physical Therapy Initial Evaluation  [facility]     Patient Name: Pradeep Donald  : 1950  MRN: [id number]  Today's Date: 2025   Onset of Illness/Injury or Date of Surgery: 25  Visit Dx:     ICD-10-CM ICD-9-CM   1. Failure to thrive in adult  R62.7 783.7   2. Diarrhea, unspecified type  R19.7 787.91   3. Lymphadenopathy  R59.1 785.6   4. Acute deep vein thrombosis (DVT) of femoral vein of left lower extremity  I82.412 453.41   5. Metastatic colon cancer to liver  C18.9 153.9    C78.7 197.7     Patient Active Problem List   Diagnosis    Metastatic colon cancer to liver     Past Medical History:   Diagnosis Date    Kidney stones      Past Surgical History:   Procedure Laterality Date    COLOSTOMY N/A 3/29/2025    Procedure: COLOSTOMY LAPAROSCOPIC;  Surgeon: Matilde Monzon MD;  Location: [facility];  Service: General;  Laterality: N/A;    PEG TUBE INSERTION N/A 3/29/2025    Procedure: PERCUTANEOUS ENDOSCOPIC GASTROSTOMY TUBE INSERTION;  Surgeon: Matilde Monzon MD;  Location: [facility];  Service: General;  Laterality: N/A;    RECTAL MASS EXCISION N/A 3/29/2025    Procedure: RECTAL MASS EXCISION - biopsy of rectal mass;  Surgeon: Matilde Monzon MD;  Location: [facility];  Service: General;  Laterality: N/A;     PT Assessment (Last 12 Hours)       PT Evaluation and Treatment       Row Name 25 1130          Physical Therapy Time and Intention    Subjective Information complains of;weakness;pain  -AG     Document Type evaluation  -AG     Mode of Treatment physical therapy  -AG     Patient Effort good  -AG     Symptoms Noted During/After Treatment fatigue  -AG       Row Name 25 1130          General Information    Patient Profile Reviewed yes  -AG     Onset of Illness/Injury or Date of Surgery 25  -     Referring Physician Dr. Monzon  -     Patient Observations agree to therapy;cooperative;alert  -AG     Patient/Family/Caregiver Comments/Observations pt. supine in  "bed; telemetry, doe catheter, O2 nc, colostomy and PEG, 2 R UE IVs in place.  Pt. cooperative for evaluation.  Reports weakness and hemorrhoid pain.  -AG     Prior Level of Function independent:  -AG     Equipment Currently Used at Home --  use of \"tobacco stick\"/ cane or use of quad cane for mobility; states recent functional decline after falling.  Able to still live alone until this hospitalization.  -AG     Pertinent History of Current Functional Problem pt. admitted with metastatic colon CA to liver; underwent PEG and colostomy placement; rectal biopsy.  -AG     Existing Precautions/Restrictions fall;oxygen therapy device and L/min  -AG     Risks Reviewed patient:;increased discomfort;dizziness  -AG     Benefits Reviewed patient:;improve function;increase independence;increase strength;increase balance;increase knowledge;decrease risk of DVT  -AG     Barriers to Rehab medically complex  -AG       Row Name 04/01/25 1130          Previous Level of Function/Home Environm    Transfers, Previous Functional Level independent  -AG     Household Ambulation, Previous Functional Level independent;uses device or equipment  -AG     Community Ambulation, Previous Functional Level uses device or equipment;independent  -AG       Row Name 04/01/25 1130          Living Environment    Current Living Arrangements home  -AG     People in Home alone  -AG     Primary Care Provided by self  -AG       Row Name 04/01/25 1130          Home Use of Assistive/Adaptive Equipment    Equipment Currently Used at Home cane, quad;cane, straight  -AG       Row Name 04/01/25 1130          Pain    Pain Side/Orientation generalized  abdominal, rectal  -AG       Row Name 04/01/25 1130          Cognition    Affect/Mental Status (Cognition) WFL  -AG     Orientation Status (Cognition) oriented x 3  -AG     Follows Commands (Cognition) WFL  -AG     Personal Safety Interventions fall prevention program maintained;nonskid shoes/slippers when out of " bed;supervised activity  -       Row Name 04/01/25 1130          Range of Motion Comprehensive    Comment, General Range of Motion decr B knee extension d/t hamstrings tightness; decr B ankle DF; B UE AROM WFL  -       Row Name 04/01/25 1130          Strength (Manual Muscle Testing)    Strength (Manual Muscle Testing) --  B LE grossly 4/5  -       Row Name 04/01/25 1130          Bed Mobility    Bed Mobility rolling left;rolling right;supine-sit;scooting/bridging;sit-supine  -AG     Scooting/Bridging [place] (Bed Mobility) verbal cues;nonverbal cues (demo/gesture);maximum assist (25% patient effort);2 person assist  -     Supine-Sit [place] (Bed Mobility) verbal cues;nonverbal cues (demo/gesture);maximum assist (25% patient effort)  -     Sit-Supine [place] (Bed Mobility) verbal cues;nonverbal cues (demo/gesture);maximum assist (25% patient effort)  -     Bed Mobility, Safety Issues decreased use of legs for bridging/pushing;decreased use of arms for pushing/pulling  -     Assistive Device (Bed Mobility) bed rails;repositioning sheet  -       Row Name 04/01/25 1130          Transfers    Transfers sit-stand transfer;stand-sit transfer  -       Row Name 04/01/25 1130          Sit-Stand Transfer    Sit-Stand [place] (Transfers) verbal cues;nonverbal cues (demo/gesture);moderate assist (50% patient effort);2 person assist;minimum assist (75% patient effort)  -     Assistive Device (Sit-Stand Transfers) walker, front-wheeled  -       Row Name 04/01/25 1130          Stand-Sit Transfer    Stand-Sit [place] (Transfers) verbal cues;nonverbal cues (demo/gesture);minimum assist (75% patient effort);2 person assist  -     Assistive Device (Stand-Sit Transfers) walker, front-wheeled  -       Row Name 04/01/25 1130          Gait/Stairs (Locomotion)    [place] Level (Gait) verbal cues;nonverbal cues (demo/gesture);minimum assist (75% patient effort);moderate assist (50%  patient effort);2 person assist  -AG     Assistive Device (Gait) walker, front-wheeled  -AG     Patient was able to Ambulate no, other medical factors prevent ambulation  forward ambulation TBA; pt. was able to take L lateral steps toward HOB; mobility limited by medical complexity and environmental barriers.  -AG     Comment, (Gait/Stairs) excessive trunk, hip knee flexion  -       Row Name 04/01/25 1130          Safety Issues/Impairments Affecting Functional Mobility    Impairments Affecting Function (Mobility) endurance/activity tolerance;balance;range of motion (ROM);strength;pain  -       Row Name 04/01/25 1130          Balance    Balance Assessment sitting static balance;sitting dynamic balance;sit to stand dynamic balance;standing static balance;standing dynamic balance  -AG     Static Sitting Balance standby assist  -AG     Position, Sitting Balance unsupported;sitting edge of bed  -     Static Standing Balance non-verbal cues (demo/gesture);verbal cues;minimal assist;2-person assist  -AG     Dynamic Standing Balance verbal cues;non-verbal cues (demo/gesture);minimal assist;moderate assist;2-person assist  -AG     Position/Device Used, Standing Balance walker, front-wheeled  -AG       Row Name 04/01/25 1130          Motor Skills    Therapeutic Exercise hip;knee;ankle  -       Row Name 04/01/25 1130          Knee (Therapeutic Exercise)    Knee (Therapeutic Exercise) isometric exercises  -     Knee Isometrics (Therapeutic Exercise) bilateral;quad sets;supine  -       Row Name 04/01/25 1130          Ankle (Therapeutic Exercise)    Ankle (Therapeutic Exercise) AROM (active range of motion)  -     Ankle AROM (Therapeutic Exercise) bilateral;dorsiflexion;plantarflexion;supine  -       Row Name             Wound 03/29/25 1533 abdomen Surgical    Wound - Properties Group Placement Date: 03/29/25  -LT Placement Time: 1533 -LT Location: abdomen  -LT Primary Wound Type: Surgical  -LT, x2 trochar sites      Retired Wound - Properties Group Placement Date: 03/29/25  -LT Placement Time: 1533  -LT Location: abdomen  -LT    Retired Wound - Properties Group Placement Date: 03/29/25 -LT Placement Time: 1533  -LT Location: abdomen  -LT    Retired Wound - Properties Group Date first assessed: 03/29/25  -LT Time first assessed: 1533 -LT Location: abdomen  -LT      Row Name             Wound 03/30/25 0955 Right posterior elbow Pressure Injury    Wound - Properties Group Placement Date: 03/30/25  -CF Placement Time: 0955  -CF Present on Original Admission: N  -CF Side: Right  -CF Orientation: posterior  -CF Location: elbow  -CF Primary Wound Type: Pressure Inj  -TW    Retired Wound - Properties Group Placement Date: 03/30/25  -CF Placement Time: 0955  -CF Present on Original Admission: N  -CF Side: Right  -CF Orientation: posterior  -CF Location: elbow  -CF    Retired Wound - Properties Group Placement Date: 03/30/25  -CF Placement Time: 0955  -CF Present on Original Admission: N  -CF Side: Right  -CF Orientation: posterior  -CF Location: elbow  -CF    Retired Wound - Properties Group Date first assessed: 03/30/25  -CF Time first assessed: 0955  -CF Present on Original Admission: N  -CF Side: Right  -CF Location: elbow  -CF      Row Name             Wound 03/30/25 1201 Right lower arm Infiltration/Extravasation    Wound - Properties Group Placement Date: 03/30/25  -CF Placement Time: 1201  -CF Side: Right  -CF Orientation: lower  -CF Location: arm  -CF Primary Wound Type: Infilt/Extra  -CF    Retired Wound - Properties Group Placement Date: 03/30/25  -CF Placement Time: 1201  -CF Side: Right  -CF Orientation: lower  -CF Location: arm  -CF    Retired Wound - Properties Group Placement Date: 03/30/25  -CF Placement Time: 1201  -CF Side: Right  -CF Orientation: lower  -CF Location: arm  -CF    Retired Wound - Properties Group Date first assessed: 03/30/25  -CF Time first assessed: 1201  -CF Side: Right  -CF Location: arm  -CF       Row Name             Wound 03/30/25 1900 Right posterior greater trochanter Pressure Injury    Wound - Properties Group Placement Date: 03/30/25  -SL Placement Time: 1900  -SL Present on Original Admission: N  -SL Side: Right  -SL Orientation: posterior  -SL Location: greater trochanter  -SL Primary Wound Type: Pressure Inj  -SL, blister     Retired Wound - Properties Group Placement Date: 03/30/25  -SL Placement Time: 1900  -SL Present on Original Admission: N  -SL Side: Right  -SL Orientation: posterior  -SL Location: greater trochanter  -SL    Retired Wound - Properties Group Placement Date: 03/30/25  -SL Placement Time: 1900  -SL Present on Original Admission: N  -SL Side: Right  -SL Orientation: posterior  -SL Location: greater trochanter  -SL    Retired Wound - Properties Group Date first assessed: 03/30/25  -SL Time first assessed: 1900  -SL Present on Original Admission: N  -SL Side: Right  -SL Location: greater trochanter  -SL      Row Name             Wound 03/30/25 1900 scrotum    Wound - Properties Group Placement Date: 03/30/25  -SL Placement Time: 1900  -SL Present on Original Admission: Y  -SL Location: scrotum  -SL    Retired Wound - Properties Group Placement Date: 03/30/25  -SL Placement Time: 1900  -SL Present on Original Admission: Y  -SL Location: scrotum  -SL    Retired Wound - Properties Group Placement Date: 03/30/25  -SL Placement Time: 1900  -SL Present on Original Admission: Y  -SL Location: scrotum  -SL    Retired Wound - Properties Group Date first assessed: 03/30/25  -SL Time first assessed: 1900  -SL Present on Original Admission: Y  -SL Location: scrotum  -SL      Row Name             Wound 03/31/25 1553 medial perirectal Atypical Malignant Ulcer    Wound - Properties Group Placement Date: 03/31/25  -TW Placement Time: 1553  -TW Present on Original Admission: Y  -TW Orientation: medial  -TW Location: perirectal  -TW Primary Wound Type: Atypical  -TW Secondary Wound Type -  Atypical: Malignant Ul  -TW    Retired Wound - Properties Group Placement Date: 03/31/25 -TW Placement Time: 1553  -TW Present on Original Admission: Y  -TW Orientation: medial  -TW Location: perirectal  -TW    Retired Wound - Properties Group Placement Date: 03/31/25 -TW Placement Time: 1553  -TW Present on Original Admission: Y  -TW Orientation: medial  -TW Location: perirectal  -TW    Retired Wound - Properties Group Date first assessed: 03/31/25 -TW Time first assessed: 1553 -TW Present on Original Admission: Y  -TW Location: perirectal  -TW      Row Name 04/01/25 1130          Coping    Observed Emotional State anxious;cooperative  -AG     Verbalized Emotional State acceptance  -AG     Trust Relationship/Rapport care explained;choices provided;thoughts/feelings acknowledged  -     Family/Support Persons family  -AG     Involvement in Care at bedside  -AG     Family/Support System Care self-care encouraged;support provided  -       Row Name 04/01/25 1130          Plan of Care Review    Plan of Care Reviewed With patient  -AG     Outcome Evaluation PT evaluation performed.  Pt. required max A for supine<>sit, scooting with max A x 2. able to maintain prolonged unsupported sitting EOB; STS w/ RW and mod A x 2; took L lateral steps toward HOB, excessive trunk, hip and knee flexion.  Fatigued quickly.  Pt. will require ongoing skilled PT to return to optimal LOF.  -AG       Row Name 04/01/25 1130          Vital Signs    Intra SpO2 (%) 94  -AG     O2 Delivery Intra Treatment supplemental O2  -AG     Intra Patient Position Standing  -AG     Post Patient Position Supine  -AG       Row Name 04/01/25 1130          Positioning and Restraints    Pre-Treatment Position in bed  -AG     Post Treatment Position bed  -AG     In Bed fowlers;call light within reach;encouraged to call for assist;exit alarm on;with family/caregiver;with nsg;side rails up x3;heels elevated  -AG       Row Name 04/01/25 1130          Therapy  Assessment/Plan (PT)    Patient/Family Therapy Goals Statement (PT) return home  -AG     Functional Level at Time of Evaluation (PT) max A x 2  -AG     PT Diagnosis (PT) impaired functional mobility, endurance  -AG     Rehab Potential (PT) good  -AG     Criteria for Skilled Interventions Met (PT) yes;meets criteria  -AG     Therapy Frequency (PT) 2 times/wk  1-5 times/ week per priority  -AG     Predicted Duration of Therapy Intervention (PT) LOS  -AG     Problem List (PT) problems related to;balance;mobility;strength  -AG     Activity Limitations Related to Problem List (PT) unable to ambulate safely;unable to transfer safely;BADLs not performed adequately or safely  -AG       Row Name 04/01/25 4010          Therapy Plan Review/Discharge Plan (PT)    Therapy Plan Review (PT) evaluation/treatment results reviewed;care plan/treatment goals reviewed;risks/benefits reviewed;current/potential barriers reviewed;participants voiced agreement with care plan;participants included;patient  -AG       [facility] Name 04/01/25 6750          Physical Therapy Goals    Bed Mobility Goal Selection (PT) bed mobility, PT goal 1  -AG     Transfer Goal Selection (PT) transfer, PT goal 1  -AG     Gait Training Goal Selection (PT) gait training, PT goal 1  -AG       [facility] Name 04/01/25 7250          Bed Mobility Goal 1 (PT)    Activity/Assistive Device (Bed Mobility Goal 1, PT) scooting;sit to supine;supine to sit  -AG     [place] Level/Cues Needed (Bed Mobility Goal 1, PT) minimum assist (75% or more patient effort)  -AG     Time Frame (Bed Mobility Goal 1, PT) by discharge  -[facility] Name 04/01/25 5790          Transfer Goal 1 (PT)    Activity/Assistive Device (Transfer Goal 1, PT) sit-to-stand/stand-to-sit;bed-to-chair/chair-to-bed;toilet  -AG     [place] Level/Cues Needed (Transfer Goal 1, PT) contact guard required  -AG     Time Frame (Transfer Goal 1, PT) by discharge  -[facility] Name 04/01/25 9430          Gait Training  Goal 1 (PT)    Activity/Assistive Device (Gait Training Goal 1, PT) gait (walking locomotion);assistive device use;decrease fall risk;diminish gait deviation;improve balance and speed;increase endurance/gait distance;walker, rolling  -AG     [place] Level (Gait Training Goal 1, PT) minimum assist (75% or more patient effort)  -AG     Distance (Gait Training Goal 1, PT) 30  -AG     Time Frame (Gait Training Goal 1, PT) by discharge  -AG               User Key  (r) = Recorded By, (t) = Taken By, (c) = Cosigned By      Initials Name Provider Type    TW Erica Jurado, RN Registered Nurse    Haley Erickson, PT Physical Therapist    Narcisa Hahn, RN Registered Nurse    Ofelia Juan RN Registered Nurse    Naima Rollins RN Registered Nurse                    Physical Therapy Education       Title: PT OT SLP Therapies (Done)       Topic: Physical Therapy (Done)       Point: Mobility training (Done)       Learning Progress Summary            Patient Acceptance, E,D, VU,NR by  at 4/1/2025 1129   Family Acceptance, E,D, VU,NR by  at 4/1/2025 1129                      Point: Home exercise program (Done)       Learning Progress Summary            Patient Acceptance, E,D, VU,NR by  at 4/1/2025 1129   Family Acceptance, E,D, VU,NR by  at 4/1/2025 1129                      Point: Body mechanics (Done)       Learning Progress Summary            Patient Acceptance, E,D, VU,NR by  at 4/1/2025 1129   Family Acceptance, E,D, VU,NR by  at 4/1/2025 1129                      Point: Precautions (Done)       Learning Progress Summary            Patient Acceptance, E,D, VU,NR by  at 4/1/2025 1129   Family Acceptance, E,D, VU,NR by  at 4/1/2025 1129                                      User Key       Initials Effective Dates Name Provider Type [facility] 06/16/21 -  Haley Armstrong, PT Physical Therapist PT                  PT Recommendation and Plan  Anticipated Discharge Disposition (PT):  inpatient rehabilitation facility (possible IRF candidate when medically stable)  Planned Therapy Interventions (PT): balance training, bed mobility training, gait training, home exercise program, transfer training, strengthening  Therapy Frequency (PT): 2 times/wk (1-5 times/ week per priority)  Plan of Care Reviewed With: patient  Outcome Evaluation: PT evaluation performed.  Pt. required max A for supine<>sit, scooting with max A x 2. able to maintain prolonged unsupported sitting EOB; STS w/ RW and mod A x 2; took L lateral steps toward HOB, excessive trunk, hip and knee flexion.  Fatigued quickly.  Pt. will require ongoing skilled PT to return to optimal LOF.       Time Calculation:    PT Charges       Row Name 04/01/25 1128             Time Calculation    PT Received On 04/01/25  -      PT Goal Re-Cert Due Date 04/15/25  -AG                User Key  (r) = Recorded By, (t) = Taken By, (c) = Cosigned By      Initials Name Provider Type     Haley Armstrong, PT Physical Therapist                  Therapy Charges for Today       Code Description Service Date Service Provider Modifiers Qty    40543578458 HC PT EVAL HIGH COMPLEXITY 4 4/1/2025 Haley Armstrong, PT GP 1            PT G-Codes  AM-PAC 6 Clicks Score (PT): 6    Haley Armstrong PT  4/1/2025

## 2025-04-01 NOTE — PROGRESS NOTES
[facility] HOSPITALIST PROGRESS NOTE     Patient Identification:  Name:  Pradeep Donald  Age:  74 y.o.  Sex:  male  :  1950  MRN:  [id number]  Visit Number:  [id number]  ROOM: [address] [place] [address]     Primary Care Provider:  Carmen Pretty APRN    Length of stay in inpatient status:  2    Subjective     Chief Compliant:    Chief Complaint   Patient presents with    Leg Swelling    Hemorrhoids       History of Presenting Illness:    Patient seen in follow-up for large rectal mass status post resection, ostomy and DVT.  Patient is awake, alert and oriented x 3 with family at bedside.  He complains of pain, BP has been labile.  Afebrile.  Pathology still pending.    Objective     Current Hospital Meds:acetaminophen, 1,000 mg, Per PEG Tube, TID  cefepime, 1,000 mg, Intravenous, Q12H  ferric gluconate, 125 mg, Intravenous, Daily  folic acid, 1 mg, Per PEG Tube, Daily  hydrOXYzine, 10 mg, Oral, Nightly  midodrine, 5 mg, Per PEG Tube, TID AC  nystatin, 5 mL, Swish & Spit, 4x Daily  pantoprazole, 40 mg, Intravenous, BID AC  senna-docusate sodium, 2 tablet, Oral, BID   And  polyethylene glycol, 17 g, Oral, BID  sodium chloride, 10 mL, Intravenous, Q12H  traMADol, 50 mg, Per PEG Tube, TID  vitamin B-12, 1,000 mcg, Per PEG Tube, Daily    heparin, 22 Units/kg/hr (Dosing Weight), Last Rate: 18 Units/kg/hr (25 0951)  Pharmacy to Dose Heparin,         Current Antimicrobial Therapy:  Anti-Infectives (From admission, onward)      Ordered     Dose/Rate Route Frequency Start Stop    25 1442  ceFAZolin 2000 mg IVPB in 100 mL NS (VTB)  Status:  Discontinued        Ordering Provider: Matilde Monzon MD    2,000 mg  over 30 Minutes Intravenous On Call to O.R. 25 0600 25 1718    25 1146  cefepime 1000 mg IVPB in 100 mL NS (VTB)        Ordering Provider: Matilde Monzon MD    1,000 mg  over 4 Hours Intravenous Every 12 Hours 25 2100 25 [id number] 1146  cefepime 1000 mg IVPB in  100 mL NS (VTB)        Ordering Provider: Jayden Barney MD    1,000 mg  over 30 Minutes Intravenous Once 03/29/25 1245 03/29/25 1402    03/29/25 0613  cefepime 2000 mg IVPB in 100 mL NS (VTB)        Ordering Provider: Óscar Rodriguez MD    2,000 mg  over 30 Minutes Intravenous Once 03/29/25 0629 03/29/25 0700    03/29/25 0613  vancomycin (VANCOCIN) 1,000 mg in sodium chloride 0.9 % 250 mL IVPB-VTB        Ordering Provider: Óscar Rodriguez MD    20 mg/kg × 49.9 kg  250 mL/hr over 60 Minutes Intravenous Once 03/29/25 0629 03/29/25 0802          Current Diuretic Therapy:  Diuretics (From admission, onward)      None          ----------------------------------------------------------------------------------------------------------------------  Vital Signs:  Temp:  [97.8 °F (36.6 °C)-98.4 °F (36.9 °C)] 97.8 °F (36.6 °C)  Heart Rate:  [68-75] 68  Resp:  [16-20] 20  BP: (92-99)/(46-51) 99/47  SpO2:  [90 %-97 %] 90 %  on   ;   Device (Oxygen Therapy): room air  Body mass index is 15.78 kg/m².    Wt Readings from Last 3 Encounters:   03/29/25 57.3 kg (126 lb 4 oz)   06/27/22 79.4 kg (175 lb)   01/07/18 83.9 kg (185 lb)     Intake & Output (last 3 days)         03/29 0701 03/30 0700 03/30 0701 03/31 0700 03/31 0701 04/01 0700    P.O. 60 1680 360    I.V. (mL/kg) 1000 (17.5)  1084 (18.9)    Blood   315.8    Other  110 80    NG/GT  83 130    IV Piggyback   200    Total Intake(mL/kg) 1060 (18.5) 1873 (32.7) 2169.8 (37.9)    Urine (mL/kg/hr) 90 (0.1) 2050 (1.5) 750 (1)    Total Output 90 2050 750    Net +970 -177 +1419.8           Urine Unmeasured Occurrence 1 x            Diet: Regular/House; Texture: Soft to Chew (NDD 3); Soft to Chew: Chopped Meat; Fluid Consistency: Thin (IDDSI 0)  ----------------------------------------------------------------------------------------------------------------------  Physical exam:  Constitutional: Elderly male, appears cachectic, resting comfortably in bed, no acute distress.       HENT:  Head:  Normocephalic and atraumatic.  Mouth:  Moist mucous membranes.  Eyes:  Conjunctivae and EOM are normal. No scleral icterus.   Cardiovascular:  Normal rate, regular rhythm and normal heart sounds with no murmur. No JVD.   Pulmonary/Chest:  No respiratory distress, no wheezes, no crackles, with normal breath sounds and good air movement. Unlabored. No accessory muscle use.  Abdominal:  Soft. No distension and no tenderness.  Bowel sounds present. No rebound or guarding.  PEG tube with tube feeds.  Incision sites well-healed.  Musculoskeletal: Cachectic.  No tenderness, and no deformity.  No red or swollen joints anywhere.    Neurological:  Alert and oriented to person, place, and time.  No cranial nerve deficit.   Nonfocal.   Skin:  Skin is warm and dry. No rash noted. No pallor.   Peripheral vascular:  No clubbing, no cyanosis, no edema. Pedal and tibial pulses 2 out of 4 bilaterally.     ----------------------------------------------------------------------------------------------------------------------  Results from last 7 days   Lab Units 03/31/25  1221 03/31/25  0904 03/31/25  0204 03/30/25  0150 03/29/25  1944 03/29/25  0800 03/29/25  0434   LACTATE mmol/L  --   --   --   --   --  1.8 2.1*   WBC 10*3/mm3  --   --  11.84* 15.11* 12.56*  --  12.85*   HEMOGLOBIN g/dL 7.6*  --  6.8* 7.9* 7.9*  --  8.5*   HEMATOCRIT % 25.9*  --  23.8* 29.4* 29.2*  --  29.5*   MCV fL  --   --  84.7 90.2 88.2  --  82.9   MCHC g/dL  --   --  28.6* 26.9* 27.1*  --  28.8*   PLATELETS 10*3/mm3  --   --  343 340 333  --  309   INR   --  1.26*  --   --  1.24*  --   --          Results from last 7 days   Lab Units 03/31/25  0118 03/30/25  0150 03/29/25  1944 03/29/25  0434   SODIUM mmol/L  --   --   --  133*   POTASSIUM mmol/L  --   --   --  4.1   MAGNESIUM mg/dL 2.1 2.1 2.0 2.2   CHLORIDE mmol/L  --   --   --  101   CO2 mmol/L  --   --   --  22.2   BUN mg/dL  --   --   --  21   CREATININE mg/dL  --   --   --  1.14   CALCIUM  "mg/dL  --   --   --  8.7   PHOSPHORUS mg/dL 4.5 4.0 3.3  --    GLUCOSE mg/dL  --   --   --  106*   ALBUMIN g/dL  --   --   --  2.2*   BILIRUBIN mg/dL  --   --   --  0.4   ALK PHOS U/L  --   --   --  316*   AST (SGOT) U/L  --   --   --  20   ALT (SGPT) U/L  --   --   --  11   Estimated Creatinine Clearance: 44.7 mL/min (by C-G formula based on SCr of 1.14 mg/dL).  No results found for: \"AMMONIA\"  Results from last 7 days   Lab Units 03/29/25  0636 03/29/25  0434   CK TOTAL U/L  --  25   HSTROP T ng/L 41* 44*     Results from last 7 days   Lab Units 03/29/25  0434   PROBNP pg/mL 975.2         Glucose   Date/Time Value Ref Range Status   03/31/2025 1626 112 70 - 130 mg/dL Final   03/31/2025 1114 101 70 - 130 mg/dL Final   03/31/2025 0620 95 70 - 130 mg/dL Final   03/31/2025 0302 101 70 - 130 mg/dL Final     Lab Results   Component Value Date    TSH 5.270 (H) 03/29/2025    FREET4 1.02 03/29/2025     No results found for: \"PREGTESTUR\", \"PREGSERUM\", \"HCG\", \"HCGQUANT\"  Pain Management Panel           No data to display              Brief Urine Lab Results       None          No results found for: \"BLOODCX\"  No results found for: \"URINECX\"  No results found for: \"WOUNDCX\"  No results found for: \"STOOLCX\"  No results found for: \"RESPCX\"  No results found for: \"AFBCX\"  Results from last 7 days   Lab Units 03/29/25  0800 03/29/25  0434   LACTATE mmol/L 1.8 2.1*       I have personally looked at the labs and they are summarized above.  ----------------------------------------------------------------------------------------------------------------------  Detailed radiology reports for the last 24 hours:  Imaging Results (Last 24 Hours)       ** No results found for the last 24 hours. **          Assessment & Plan      Patient is a 74-year-old male with no known significant medical problems who presented to the ER with reports of bright red blood per rectum, weight loss and poor appetite.    #Rectal mass with high-grade " obstruction  #Suspected rectal versus colon cancer with metastases  #Status post rectal biopsy  #DVT due to malignancy  --Patient presented to the ER with reports of bright red blood per rectum which hhe thought was related to hemorrhoids.  --CT abdomen/pelvis with contrast noted rectal mass with extensive local disease into the peritoneum measuring 3.3 x 3.8 cm with local invasion into the posterior aspect of the prostate gland with pelvic and RP adenopathy and possible metastatic disease involving the liver  --Venous Dopplers noted DVT in the left common/superficial femoral veins  --Status post biopsy of rectal mass/colostomy/PEG  --Pathology still pending  --Start scheduled Tylenol, tramadol and as needed opiates for cancer related pain  --Will start p.o. midodrine 3 times daily  --Patient's overall functional status and inability to care for himself at home will proved to be a major barrier and treatment plan moving forward.  He require short-term if not long-term placement and will likely be unable to undergo chemotherapy/radiation if he is unable to return home with family  --Start heparin GTT for DVT  --Radiation oncology consulted  --Heme/onc following, appreciate recommendation  --Surgery following appreciate assistance    #Acute on chronic blood loss anemia  #Possible gastritis/UGIB  --Hemoglobin 6.8 this a.m., dark-colored stool in ostomy bag  --PPI twice daily  --Transfuse to hemoglobin greater than 7 or symptomatic  --IV iron daily  --Surgery following per above    #Oral candidiasis  --nystatin swish and spit    #Goals of care  #Severe malnutrition  #Failure to thrive  --PEG placed with supplemental tube feeds  --Palliative care consulted, appreciate assistance    CHECKLIST:  Abx: None  VTE: Heparin GTT  GI ppx: PPI twice daily  Diet: Modified, tube feeds  Code: CPR, full  Dispo: Patient is high risk due to rectal mass concerning for metastatic rectal/colon cancer with path pending.  Dispo unclear, will  clarify further once biopsy results.  May end up requiring hospice at the nursing facility pending course.    Quintin Jon DO  [facility] Hospitalist  03/31/25  20:18 EDT

## 2025-04-01 NOTE — CONSULTS
Name:  Pradeep Donald  :  1950    DATE OF ADMISSION  3/29/2025    DATE OF CONSULT  2025     REFERRING PHYSICIAN  * No referring provider recorded for this case *    PRIMARY CARE PHYSICIAN  Carmen Pretty APRN    REASON FOR CONSULT  Urinary retention    CHIEF COMPLAINT  Chief Complaint   Patient presents with    Leg Swelling    Hemorrhoids       HISTORY OF PRESENT ILLNESS:   Pradeep Donald is a 74 y.o. male who presented to [facility] emergency department secondary to rectal bleeding and lower extremity swelling.  He had a CT scan the abdomen pelvis that showed a significant large rectal mass with extensive local disease involving the perineum including abscess of necrotic mass measuring 3.3 x 3.8 x 3 cm.  There was local invasion of the aspect of the prostate with extensive inguinal retroperitoneal adenopathy on the left side.  Metastatic disease involving the left hepatic lobe and possible lung base was seen as well.  On CT report he was also noted to have a large left hydrocele and this was confirmed on ultrasound.  Ultrasound was not able to identify the right testicle.  Patient did undergo a PEG tube placement as well as diverting colostomy by Dr. Monzon on 3/29/2025. Since surgery he has found to be significant anemia requiring replacement by packed red blood cells.  He has received 1 unit so far.  Patient was also having significant urinary retention postop and indwelling Walsh catheters been placed by Dr. Monzon in the OR. He has been evaluated by oncology and radiation oncology for possible palliative treatments.    I saw Pradeep Donald in their hospital room this morning.  Patient was lying in bed in no acute distress.  Patient is very feeble and weak.  He denied any complaints with his catheter at this time.  Physical exam he does have significant large hydrocele as well as anasarca of the penis.  There is lower extremity edema.  Indwelling Walsh catheter was draining well with some  sediments.  He had no other urological complaints.  Biopsy results are still pending at this time.    PAST MEDICAL HISTORY  Past Medical History:   Diagnosis Date    Kidney stones        PAST SURGICAL HISTORY  Past Surgical History:   Procedure Laterality Date    COLOSTOMY N/A 3/29/2025    Procedure: COLOSTOMY LAPAROSCOPIC;  Surgeon: Matilde Monzon MD;  Location: [facility] OR;  Service: General;  Laterality: N/A;    PEG TUBE INSERTION N/A 3/29/2025    Procedure: PERCUTANEOUS ENDOSCOPIC GASTROSTOMY TUBE INSERTION;  Surgeon: Matilde Monzon MD;  Location: [facility] OR;  Service: General;  Laterality: N/A;    RECTAL MASS EXCISION N/A 3/29/2025    Procedure: RECTAL MASS EXCISION - biopsy of rectal mass;  Surgeon: Matilde Monzon MD;  Location: [facility] OR;  Service: General;  Laterality: N/A;       SOCIAL HISTORY  Social History     Socioeconomic History    Marital status: Single   Tobacco Use    Smoking status: Never    Smokeless tobacco: Never   Vaping Use    Vaping status: Never Used   Substance and Sexual Activity    Alcohol use: No    Drug use: Never    Sexual activity: Defer       FAMILY HISTORY  History reviewed. No pertinent family history.    ALLERGIES  Allergies   Allergen Reactions    Penicillins      INPATIENT MEDICATIONS  Current Facility-Administered Medications   Medication Dose Route Frequency Provider Last Rate Last Admin    acetaminophen (TYLENOL) tablet 1,000 mg  1,000 mg Per PEG Tube TID Quintin Jon DO   1,000 mg at 03/31/25 2013    sennosides-docusate (PERICOLACE) 8.6-50 MG per tablet 2 tablet  2 tablet Oral BID Matilde Monzon MD   2 tablet at 03/31/25 2013    And    polyethylene glycol (MIRALAX) packet 17 g  17 g Oral BID Matilde Monzon MD   17 g at 03/31/25 2013    And    bisacodyl (DULCOLAX) EC tablet 5 mg  5 mg Oral Daily PRN Matilde Monzon MD        ferric gluconate (FERRLECIT) 125 mg in sodium chloride 0.9 % 100 mL IVPB  125 mg Intravenous Daily Jayden Barney  MD Syed   Stopped at 03/31/25 1030    folic acid (FOLVITE) tablet 1 mg  1 mg Per PEG Tube Daily Quintin Jon DO   1 mg at 03/31/25 1206    heparin 52683 units/250 mL (100 units/mL) in 0.45 % NaCl infusion  24 Units/kg/hr (Dosing Weight) Intravenous Titrated Quintin Jon DO 13.75 mL/hr at 04/01/25 0653 24 Units/kg/hr at 04/01/25 0653    hydrOXYzine (ATARAX) tablet 10 mg  10 mg Oral Nightly Nisreen Wong APRN   10 mg at 03/31/25 2013    midodrine (PROAMATINE) tablet 5 mg  5 mg Per PEG Tube TID AC Quintin Jon DO   5 mg at 03/31/25 1717    morphine injection 2 mg  2 mg Intravenous Q4H PRN Matilde Monzon MD   2 mg at 03/31/25 1206    And    naloxone (NARCAN) injection 0.4 mg  0.4 mg Intravenous Q5 Min PRN Matilde Monzon MD        morphine injection 2 mg  2 mg Intravenous Q4H PRN Matilde Monzon MD   2 mg at 03/30/25 1324    nystatin (MYCOSTATIN) 100,000 unit/mL suspension 500,000 Units  5 mL Swish & Spit 4x Daily Quintin Jon DO   500,000 Units at 03/31/25 1718    oxyCODONE (ROXICODONE) immediate release tablet 5 mg  5 mg Per PEG Tube Q4H PRN Quintin Jon DO        pantoprazole (PROTONIX) injection 40 mg  40 mg Intravenous BID AC Quintin Jon DO   40 mg at 03/31/25 1717    Pharmacy to Dose Heparin   Not Applicable Continuous PRN Quintin Jon DO        sodium chloride 0.9 % flush 10 mL  10 mL Intravenous Q12H Matilde Monzon MD   10 mL at 03/31/25 2019    sodium chloride 0.9 % flush 10 mL  10 mL Intravenous PRN Matilde Monzon MD        sodium chloride 0.9 % infusion 40 mL  40 mL Intravenous PRN Matilde Monzon MD        traMADol (ULTRAM) tablet 50 mg  50 mg Per PEG Tube TID Quintin Jon DO   50 mg at 03/31/25 2013    vitamin B-12 (CYANOCOBALAMIN) tablet 1,000 mcg  1,000 mcg Per PEG Tube Daily Quintin Jon DO   1,000 mcg at 03/31/25 1206       PHYSICAL EXAMINATION    BP 93/47 (BP  "Location: Left arm, Patient Position: Lying) Comment: rn Nataliia aware.  Pulse 65   Temp 98.7 °F (37.1 °C) (Oral)   Resp 20   Ht 190.5 cm (75\")   Wt 57.3 kg (126 lb 4 oz)   SpO2 98%   BMI 15.78 kg/m²     GENERAL: Chronically ill-appearing under nourished elderly frail white male in no acute distress.  HEENT:  Pupils equally round and reactive to light.  Extraocular muscles intact.  CARDIOVASCULAR:  Regular rate and rhythm.    ABDOMEN:  Soft, slight tenderness to palpation, nondistended.  Colostomy.  EXTREMITIES: Lower extremity edema.     : Indwelling Walsh catheter in place with yellow urine noted in collection tubing and bag.  Anasarca of the penis.    PSYCH:  Alert and oriented x3.    LABORATORY     WBC   Date Value Ref Range Status   04/01/2025 12.02 (H) 3.40 - 10.80 10*3/mm3 Final     RBC   Date Value Ref Range Status   04/01/2025 2.97 (L) 4.14 - 5.80 10*6/mm3 Final     Hemoglobin   Date Value Ref Range Status   04/01/2025 7.2 (L) 13.0 - 17.7 g/dL Final     Hematocrit   Date Value Ref Range Status   04/01/2025 25.3 (L) 37.5 - 51.0 % Final     MCV   Date Value Ref Range Status   04/01/2025 85.2 79.0 - 97.0 fL Final     MCH   Date Value Ref Range Status   04/01/2025 24.2 (L) 26.6 - 33.0 pg Final     MCHC   Date Value Ref Range Status   04/01/2025 28.5 (L) 31.5 - 35.7 g/dL Final     RDW   Date Value Ref Range Status   04/01/2025 15.6 (H) 12.3 - 15.4 % Final     RDW-SD   Date Value Ref Range Status   04/01/2025 48.4 37.0 - 54.0 fl Final     MPV   Date Value Ref Range Status   04/01/2025 8.5 6.0 - 12.0 fL Final     Platelets   Date Value Ref Range Status   04/01/2025 299 140 - 450 10*3/mm3 Final     Neutrophil %   Date Value Ref Range Status   04/01/2025 64.1 42.7 - 76.0 % Final     Lymphocyte %   Date Value Ref Range Status   04/01/2025 19.9 19.6 - 45.3 % Final     Monocyte %   Date Value Ref Range Status   04/01/2025 11.1 5.0 - 12.0 % Final     Eosinophil %   Date Value Ref Range Status   04/01/2025 3.3 " 0.3 - 6.2 % Final     Basophil %   Date Value Ref Range Status   04/01/2025 0.7 0.0 - 1.5 % Final     Immature Grans %   Date Value Ref Range Status   04/01/2025 0.9 (H) 0.0 - 0.5 % Final     Neutrophils, Absolute   Date Value Ref Range Status   04/01/2025 7.70 (H) 1.70 - 7.00 10*3/mm3 Final     Lymphocytes, Absolute   Date Value Ref Range Status   04/01/2025 2.39 0.70 - 3.10 10*3/mm3 Final     Monocytes, Absolute   Date Value Ref Range Status   04/01/2025 1.33 (H) 0.10 - 0.90 10*3/mm3 Final     Eosinophils, Absolute   Date Value Ref Range Status   04/01/2025 0.40 0.00 - 0.40 10*3/mm3 Final     Basophils, Absolute   Date Value Ref Range Status   04/01/2025 0.09 0.00 - 0.20 10*3/mm3 Final     Immature Grans, Absolute   Date Value Ref Range Status   04/01/2025 0.11 (H) 0.00 - 0.05 10*3/mm3 Final     nRBC   Date Value Ref Range Status   04/01/2025 0.0 0.0 - 0.2 /100 WBC Final       Glucose   Date Value Ref Range Status   04/01/2025 88 65 - 99 mg/dL Final     Sodium   Date Value Ref Range Status   04/01/2025 133 (L) 136 - 145 mmol/L Final     Potassium   Date Value Ref Range Status   04/01/2025 4.6 3.5 - 5.2 mmol/L Final     CO2   Date Value Ref Range Status   04/01/2025 24.7 22.0 - 29.0 mmol/L Final     Chloride   Date Value Ref Range Status   04/01/2025 105 98 - 107 mmol/L Final     Anion Gap   Date Value Ref Range Status   04/01/2025 3.3 (L) 5.0 - 15.0 mmol/L Final     Creatinine   Date Value Ref Range Status   04/01/2025 1.12 0.76 - 1.27 mg/dL Final     BUN   Date Value Ref Range Status   04/01/2025 20 8 - 23 mg/dL Final     BUN/Creatinine Ratio   Date Value Ref Range Status   04/01/2025 17.9 7.0 - 25.0 Final     Calcium   Date Value Ref Range Status   04/01/2025 7.8 (L) 8.6 - 10.5 mg/dL Final     Alkaline Phosphatase   Date Value Ref Range Status   04/01/2025 270 (H) 39 - 117 U/L Final     Total Protein   Date Value Ref Range Status   04/01/2025 5.1 (L) 6.0 - 8.5 g/dL Final     ALT (SGPT)   Date Value Ref Range  "Status   04/01/2025 7 1 - 41 U/L Final     AST (SGOT)   Date Value Ref Range Status   04/01/2025 20 1 - 40 U/L Final     Total Bilirubin   Date Value Ref Range Status   04/01/2025 0.2 0.0 - 1.2 mg/dL Final     Albumin   Date Value Ref Range Status   04/01/2025 1.5 (L) 3.5 - 5.2 g/dL Final     Globulin   Date Value Ref Range Status   04/01/2025 3.6 gm/dL Final       Magnesium   Date Value Ref Range Status   03/31/2025 2.1 1.6 - 2.4 mg/dL Final     Phosphorus   Date Value Ref Range Status   03/31/2025 4.5 2.5 - 4.5 mg/dL Final     No results found for: \"LDH\", \"URICACID\"     IMAGING  Imaging Results (Last 72 Hours)       Procedure Component Value Units Date/Time    CT Chest With Contrast Diagnostic [[id number]] Collected: 03/29/25 0849     Updated: 03/29/25 0855    Narrative:      EXAMINATION: CTA chest with contrast     CLINICAL HISTORY: Pain     COMPARISON: None available     TECHNIQUE: Contiguous 3 mm thick slices were obtained through the chest  after the administration of Isovue-370 intravenous contrast. Coronal and  sagittal reformats were performed.     FINDINGS:  The heart is normal in size configuration. The thoracic aorta is normal  in caliber. No aneurysmal dilatation. No dissection. The central  pulmonary arteries are normal in caliber. No pulmonary embolus. The  central airways are patent. No pneumothorax is appreciated. The few  small nodules are noted within both lungs. These measure up to 5 mm and  are of uncertain clinical significance. Correlation with prior imaging  would be helpful as available. In the absence of prior imaging, chest CT  follow-up in 6 months is recommended.       Impression:      1. No pulmonary embolus.  2. Normal caliber thoracic aorta without aneurysmal dilatation or  dissection.  3. Multiple tiny pulmonary nodules. These may be inflammatory in nature  representing noncalcified granulomas. However, the findings are  nonspecific. Given the appearance and multiplicity of the " lesions,  6-month follow-up CT is recommended.     This report was finalized on 3/29/2025 8:53 AM by Bairon Sales MD.               CT Abdomen and Pelvis: No results found for this or any previous visit.       CT Stone Protocol: No results found for this or any previous visit.       KUB: No results found for this or any previous visit.   \    LABS:   No results displayed because visit has over 200 results.           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  Given inguinal lymphadenopathy this is probably contributing to his retention as well.  We appreciate Dr. Monzon placing a doe catheter.  Recommend to continue with indwelling Doe catheter at this time. We would recommend close follow-up in roughly 2 to 3 weeks post discharge for discussion of Doe catheter removal/voiding trial.  Given patient's difficult anatomy from colon mass he may have to have Doe catheter replaced under general anesthesia.   Large left hydrocele -patient's large left hydrocele is nonemergent and recommend to continue with observation.  Given significant anemia patient is not a candidate for any surgical intervention at this time.  We will sign off from urological standpoint.  Do not hesitate to call with any questions or concerns.    The patient was in agreement with these plans.    Thank you for asking us to participate in Pradeep Donald's care.  We will sign off from urological  standpoint.  Please do not hesitate to call with any questions or concerns that you may have.    A total of 60 minutes were spent coordinating this patient’s care in clinic today; 30 minutes of which were face-to-face with the patient, reviewing medical history and counseling on the current treatment and followup plan.  All questions were answered to patient's satisfaction.         This document has been electronically signed by Ilir Kim PA-C  April 1, 2025 08:15 EDT    Part of this note may be an electronic transcription/translation of spoken language to printed text using the Dragon Dictation System.

## 2025-04-01 NOTE — PROGRESS NOTES
Nutrition Services    Patient Name:  Pradeep Donald  YOB: 1950  MRN: [id number]  Admit Date:  3/29/2025    Malnutrition Severity Assessment      Patient meets criteria for : Severe Malnutrition  Malnutrition Type (Last 8 Hours)       Malnutrition Severity Assessment       Row Name 04/01/25 1353       Malnutrition Severity Assessment    Malnutrition Type Chronic Disease - Related Malnutrition      Row Name 04/01/25 1353       Insufficient Energy Intake     Insufficient Energy Intake Findings Severe    Insufficient Energy Intake  <75% of est. energy requirement for > or equal to 1 month      Row Name 04/01/25 1353       Unintentional Weight Loss     Unintentional Weight Loss Findings Severe    Unintentional Weight Loss  --  unable to determine how much wt lost over what period of time d/t pt does not know and no recent wt record in chart      Row Name 04/01/25 1353       Muscle Loss    Loss of Muscle Mass Findings Severe    [place] Severe - deep hollowing/scooping, lack of muscle to touch, facial bones well defined    Clavicle Bone Region Severe - protruding prominent bone    Acromion Bone Region Severe - squared shoulders, bones, and acromion process protrusion prominent    Scapular Bone Region --  LOKESH d/t limited mobility      Row Name 04/01/25 1353       Fat Loss    Subcutaneous Fat Loss Findings Severe    Orbital Region  Severe - pronounced hollowness/depression, dark circles, loose saggy skin    Upper Arm Region Severe - mostly skin, very little space between folds, fingers touch    Thoracic & Lumbar Region --  LOKESH r/t pt limited mobility to sit forward      Row Name 04/01/25 1353       Fluid Accumulation (Edema)    Fluid Acumulation Findings Severe    Fluid Accumulation  Severe equals 3+ or 4+ pitting edema      Row Name 04/01/25 1353       Criteria Met (Must meet criteria for severity in at least 2 of these categories: M Wasting, Fat Loss, Fluid, Secondary Signs, Wt. Status, Intake)     Patient meets criteria for  Severe Malnutrition                         Electronically signed by:  Kylah Palmer RD  04/01/25 14:04 EDT

## 2025-04-01 NOTE — PLAN OF CARE
Goal Outcome Evaluation:           Progress: no change  Outcome Evaluation: Pts VSS on 2L NC. Pain medication given PRN. Wound care completed. Tube feed going per order. Heparin infusing per MAR. Pt voices no complaints or concerns. Will continue with poc.

## 2025-04-01 NOTE — PROGRESS NOTES
[facility] HOSPITALIST PROGRESS NOTE     Patient Identification:  Name:  Pradeep Donald  Age:  74 y.o.  Sex:  male  :  1950  MRN:  [id number]  Visit Number:  [id number]  ROOM: [address] [place] [address]     Primary Care Provider:  Carmen Pretty APRN    Length of stay in inpatient status:  3    Subjective     Chief Compliant:    Chief Complaint   Patient presents with    Leg Swelling    Hemorrhoids       History of Presenting Illness:    Patient seen in follow-up for large rectal mass status post resection, ostomy and DVT.  Patient is awake, alert and oriented x 3. No family at bedside.  He says the pain medicine has helped but still in pain this morning.  Otherwise no adverse events noted overnight.    Objective     Current Hospital Meds:acetaminophen, 1,000 mg, Per PEG Tube, TID  ferric gluconate, 125 mg, Intravenous, Daily  folic acid, 1 mg, Per PEG Tube, Daily  Hydrocortisone (Perianal), , Rectal, BID  hydrOXYzine, 10 mg, Oral, Nightly  lactulose, 10 g, Oral, Daily  midodrine, 5 mg, Per PEG Tube, TID AC  nystatin, 5 mL, Swish & Spit, 4x Daily  pantoprazole, 40 mg, Intravenous, BID AC  senna-docusate sodium, 2 tablet, Oral, BID   And  polyethylene glycol, 17 g, Oral, BID  sodium chloride, 10 mL, Intravenous, Q12H  traMADol, 100 mg, Per PEG Tube, TID  vitamin B-12, 1,000 mcg, Per PEG Tube, Daily    heparin, 24 Units/kg/hr (Dosing Weight), Last Rate: 24 Units/kg/hr (25 0653)  Pharmacy to Dose Heparin,         Current Antimicrobial Therapy:  Anti-Infectives (From admission, onward)      Ordered     Dose/Rate Route Frequency Start Stop    25 1442  ceFAZolin 2000 mg IVPB in 100 mL NS (VTB)  Status:  Discontinued        Ordering Provider: Matilde Monzon MD    2,000 mg  over 30 Minutes Intravenous On Call to O.R. 25 0600 25 1718    25 1146  cefepime 1000 mg IVPB in 100 mL NS (VTB)  Status:  Discontinued        Ordering Provider: Matilde Monzon MD    1,000 mg  over 4 Hours  Intravenous Every 12 Hours 03/29/25 2100 03/31/25 2027 03/29/25 1146  cefepime 1000 mg IVPB in 100 mL NS (VTB)        Ordering Provider: Jayden Barney MD    1,000 mg  over 30 Minutes Intravenous Once 03/29/25 1245 03/29/25 1402    03/29/25 0613  cefepime 2000 mg IVPB in 100 mL NS (VTB)        Ordering Provider: Óscar Rodriguez MD    2,000 mg  over 30 Minutes Intravenous Once 03/29/25 0629 03/29/25 0700    03/29/25 0613  vancomycin (VANCOCIN) 1,000 mg in sodium chloride 0.9 % 250 mL IVPB-VTB        Ordering Provider: Óscar Rodriguez MD    20 mg/kg × 49.9 kg  250 mL/hr over 60 Minutes Intravenous Once 03/29/25 0629 03/29/25 0802          Current Diuretic Therapy:  Diuretics (From admission, onward)      None          ----------------------------------------------------------------------------------------------------------------------  Vital Signs:  Temp:  [97.3 °F (36.3 °C)-98.7 °F (37.1 °C)] 97.7 °F (36.5 °C)  Heart Rate:  [65-74] 74  Resp:  [16-20] 16  BP: (92-99)/(46-50) 92/47  SpO2:  [90 %-98 %] 98 %  on  Flow (L/min) (Oxygen Therapy):  [2] 2;   Device (Oxygen Therapy): nasal cannula  Body mass index is 15.78 kg/m².    Wt Readings from Last 3 Encounters:   03/29/25 57.3 kg (126 lb 4 oz)   06/27/22 79.4 kg (175 lb)   01/07/18 83.9 kg (185 lb)     Intake & Output (last 3 days)         03/29 0701  03/30 0700 03/30 0701  03/31 0700 03/31 0701  04/01 0700    P.O. 60 1680 360    I.V. (mL/kg) 1000 (17.5)  1084 (18.9)    Blood   315.8    Other  110 80    NG/GT  83 130    IV Piggyback   200    Total Intake(mL/kg) 1060 (18.5) 1873 (32.7) 2169.8 (37.9)    Urine (mL/kg/hr) 90 (0.1) 2050 (1.5) 750 (1)    Total Output 90 2050 750    Net +970 -177 +1419.8           Urine Unmeasured Occurrence 1 x            Diet: Regular/House; Texture: Soft to Chew (NDD 3); Soft to Chew: Chopped Meat; Fluid Consistency: Thin (IDDSI  0)  ----------------------------------------------------------------------------------------------------------------------  Physical exam:  Constitutional: Elderly male, appears cachectic, resting comfortably in bed, no acute distress.      HENT:  Head:  Normocephalic and atraumatic.  Mouth:  Moist mucous membranes.  Eyes:  Conjunctivae and EOM are normal. No scleral icterus.   Cardiovascular:  Normal rate, regular rhythm and normal heart sounds with no murmur. No JVD.   Pulmonary/Chest:  No respiratory distress, no wheezes, no crackles, with normal breath sounds and good air movement. Unlabored. No accessory muscle use.  Abdominal:  Soft. No distension and no tenderness.  Bowel sounds present. No rebound or guarding.  PEG tube with tube feeds.  Incision sites well-healed.  Musculoskeletal: Cachectic.  No tenderness, and no deformity.  No red or swollen joints anywhere.    Neurological:  Alert and oriented to person, place, and time.  No cranial nerve deficit.   Nonfocal.   Skin:  Skin is warm and dry. No rash noted. No pallor.   Peripheral vascular:  No clubbing, no cyanosis, no edema. Pedal and tibial pulses 2 out of 4 bilaterally.     ----------------------------------------------------------------------------------------------------------------------  Results from last 7 days   Lab Units 04/01/25  0104 03/31/25  1221 03/31/25  0904 03/31/25  0204 03/30/25  0150 03/29/25  1944 03/29/25  0800 03/29/25  0434   LACTATE mmol/L  --   --   --   --   --   --  1.8 2.1*   WBC 10*3/mm3 12.02*  --   --  11.84* 15.11* 12.56*  --  12.85*   HEMOGLOBIN g/dL 7.2* 7.6*  --  6.8* 7.9* 7.9*  --  8.5*   HEMATOCRIT % 25.3* 25.9*  --  23.8* 29.4* 29.2*  --  29.5*   MCV fL 85.2  --   --  84.7 90.2 88.2  --  82.9   MCHC g/dL 28.5*  --   --  28.6* 26.9* 27.1*  --  28.8*   PLATELETS 10*3/mm3 299  --   --  343 340 333  --  309   INR  1.33*  --  1.26*  --   --  1.24*  --   --          Results from last 7 days   Lab Units 04/01/25  0104  "03/31/25  0118 03/30/25  0150 03/29/25  1944 03/29/25 0434   SODIUM mmol/L 133*  --   --   --  133*   POTASSIUM mmol/L 4.6  --   --   --  4.1   MAGNESIUM mg/dL  --  2.1 2.1 2.0 2.2   CHLORIDE mmol/L 105  --   --   --  101   CO2 mmol/L 24.7  --   --   --  22.2   BUN mg/dL 20  --   --   --  21   CREATININE mg/dL 1.12  --   --   --  1.14   CALCIUM mg/dL 7.8*  --   --   --  8.7   PHOSPHORUS mg/dL  --  4.5 4.0 3.3  --    GLUCOSE mg/dL 88  --   --   --  106*   ALBUMIN g/dL 1.5*  --   --   --  2.2*   BILIRUBIN mg/dL 0.2  --   --   --  0.4   ALK PHOS U/L 270*  --   --   --  316*   AST (SGOT) U/L 20  --   --   --  20   ALT (SGPT) U/L 7  --   --   --  11   Estimated Creatinine Clearance: 45.5 mL/min (by C-G formula based on SCr of 1.12 mg/dL).  No results found for: \"AMMONIA\"  Results from last 7 days   Lab Units 03/29/25  0636 03/29/25 0434   CK TOTAL U/L  --  25   HSTROP T ng/L 41* 44*     Results from last 7 days   Lab Units 03/29/25 0434   PROBNP pg/mL 975.2         Glucose   Date/Time Value Ref Range Status   04/01/2025 1219 99 70 - 130 mg/dL Final   04/01/2025 0639 85 70 - 130 mg/dL Final   04/01/2025 0015 103 70 - 130 mg/dL Final   03/31/2025 1626 112 70 - 130 mg/dL Final   03/31/2025 1114 101 70 - 130 mg/dL Final   03/31/2025 0620 95 70 - 130 mg/dL Final   03/31/2025 0302 101 70 - 130 mg/dL Final     Lab Results   Component Value Date    TSH 5.270 (H) 03/29/2025    FREET4 1.02 03/29/2025     No results found for: \"PREGTESTUR\", \"PREGSERUM\", \"HCG\", \"HCGQUANT\"  Pain Management Panel           No data to display              Brief Urine Lab Results       None          No results found for: \"BLOODCX\"  No results found for: \"URINECX\"  No results found for: \"WOUNDCX\"  No results found for: \"STOOLCX\"  No results found for: \"RESPCX\"  No results found for: \"AFBCX\"  Results from last 7 days   Lab Units 03/29/25  0800 03/29/25  0434   LACTATE mmol/L 1.8 2.1*       I have personally looked at the labs and they are summarized " above.  ----------------------------------------------------------------------------------------------------------------------  Detailed radiology reports for the last 24 hours:  Imaging Results (Last 24 Hours)       ** No results found for the last 24 hours. **          Assessment & Plan      Patient is a 74-year-old male with no known significant medical problems who presented to the ER with reports of bright red blood per rectum, weight loss and poor appetite.    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  --CT abdomen/pelvis with contrast noted rectal mass with extensive local disease into the peritoneum measuring 3.3 x 3.8 cm with local invasion into the posterior aspect of the prostate gland with pelvic and RP adenopathy and possible metastatic disease involving the liver  --Venous Dopplers noted DVT in the left common/superficial femoral veins  --Status post biopsy of rectal mass/colostomy/PEG  --Pathology still pending  --Patient's overall functional status and inability to care for himself at home will proved to be a major barrier and treatment plan moving forward.  He require short-term if not long-term placement and will likely be unable to undergo chemotherapy/radiation if he is unable to return home with family  --continue scheduled Tylenol, tramadol-says pain is not controlled therefore we will change to OxyContin twice daily  --continue p.o. midodrine 3 times daily  --Continue heparin GTT for DVT  --Radiation oncology following  --Heme/onc following, appreciate recommendation  --Surgery following appreciate assistance    #Acute on chronic blood loss anemia  #Possible gastritis/UGIB  --Hemoglobin 7.2 this a.m., has received 1 unit PRBC thus far  --PPI twice  daily  --Transfuse to hemoglobin greater than 7 or symptomatic  --IV iron daily  --Surgery following per above    #Oral candidiasis  --nystatin swish and spit    #Goals of care  #Severe malnutrition  #Failure to thrive  --PEG placed with supplemental tube feeds  --Palliative care consulted, appreciate assistance    Copied portions of this report have been reviewed and are accurate as of 04/01/25     CHECKLIST:  Abx: None  VTE: Heparin GTT  GI ppx: PPI twice daily  Diet: Modified, tube feeds  Code: CPR, full  Dispo: Patient is high risk due to rectal mass concerning for metastatic rectal/colon cancer with path pending.  Dispo unclear, will clarify further once biopsy results.  May end up requiring hospice at the nursing facility pending course.  I think overall prognosis is poor but more will be done with biopsy results.    Quintin Jon DO  [facility] Hospitalist  04/01/25  12:40 EDT

## 2025-04-01 NOTE — PLAN OF CARE
Goal Outcome Evaluation:  Plan of Care Reviewed With: patient        Progress: improving  Outcome Evaluation: Patient resting in bed at this time. VSS. 2L NC placed while patient sleeping due to drop in oxygen. Tube feeding going per orders. Wound care completed per orders. Heparin drip infusing per orders. No signs of acute distress noted. Will continue with plan of care.

## 2025-04-01 NOTE — PROGRESS NOTES
LOS: 3 days   Patient Care Team:  Carmen Pretty APRN as PCP - General (Family Medicine)    Post op day # 3, status post diverting colostomy and biopsy of rectal mass as well as PEG placement    Subjective     Interval History:  Patient is tolerating his diet.  Only serous output per colostomy.  Denies nausea or vomiting.  Patient was seen by radiation oncology and offered palliative radiation.  Patient initially said he was undecided and then talking to the patient he stated that he had friends who told him that the radiation could kill him.  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      Objective     Vital Signs  Temp:  [97.3 °F (36.3 °C)-98.7 °F (37.1 °C)] 97.7 °F (36.5 °C)  Heart Rate:  [65-74] 74  Resp:  [16-20] 16  BP: (92-99)/(47-50) 92/47    Physical Exam:  This is a nourished male in no acute distress  HEENT examination: Sclera are anicteric  Abdomen: Softly distended.  Ostomy in left lower quadrant without stool or flatus.  Bowel sounds are present  Skin/incisions: Incision sites were inspected and demonstrate no drainage or erythema     Results Review:    Results from last 7 days   Lab Units 03/29/25  0636 03/29/25  0434   CK TOTAL U/L  --  25   HSTROP T ng/L 41* 44*     Results from last 7 days   Lab Units 04/01/25  0104 03/31/25  1221 03/31/25  0904 03/31/25  0204 03/30/25  0150 03/29/25  1944 03/29/25  0800 03/29/25  0434   LACTATE mmol/L  --   --   --   --   --   --  1.8 2.1*   WBC 10*3/mm3 12.02*  --   --  11.84* 15.11* 12.56*  --  12.85*   HEMOGLOBIN g/dL 7.2* 7.6*  --  6.8* 7.9* 7.9*  --  8.5*   HEMATOCRIT % 25.3* 25.9*  --  23.8* 29.4* 29.2*  --  29.5*   PLATELETS 10*3/mm3 299  --   --  343 340 333  --  309   INR  1.33*  --  1.26*  --   --  1.24*  --   --          Results from last 7 days   Lab Units 04/01/25  0104 03/31/25  0118 03/30/25  0150 03/29/25  1944 03/29/25  0434   SODIUM mmol/L 133*  --   --   --   Acute Care - Wound/Debridement Treatment Note  [facility]     Patient Name: Tomas Salavdor  : 1956  MRN: [id number]  Today's Date: 2017  Onset of Illness/Injury or Date of Surgery Date: 17   Date of Referral to PT: 17   Referring Physician: MD Jimenez       Admit Date: 2017    Visit Dx:    ICD-10-CM ICD-9-CM   1. Dysphagia, unspecified type R13.10 787.20   2. Impaired functional mobility, balance, gait, and endurance Z74.09 V49.89   3. Impaired mobility and ADLs Z74.09 799.89   4. Cognitive communication deficit R41.841 799.52       Patient Active Problem List   Diagnosis   • Coronary disease   • Ischemic cardiomyopathy with EF 10% on Echo 17   • Peripheral vascular disease   • ICD in place, implantation .   • Dyslipidemia, on atorvastain.    • S/P Sepsis due to  source 3/30/17   • CVA 17 with rapid improvement   • Diabetes Mellitus HgbA1c 10.4   • Chronic hypoxic respiratory failure on nocturnal o2   • CAD s/p CABG , [facility] . Grafts patent except SVG to diagonal   • Urinary retention requiring colbert   • Hyperlipidemia   • Essential hypertension   • Hx of PVD (peripheral vascular disease)   • Recent E. 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 Indwelling Colbert catheter   • Atrial fibrillation but now converted. Still on Eliquis               LDA Wound       17 0850          Incision 17 1056 other (see comments) scrotum    Incision - Properties Group Placement Date: 17  -JW Placement Time: 105  -JW Side: other (see comments)  -JW Location: scrotum  -JW    Incision WDL ex  -MF      Dressing Appearance intact;moist drainage  -MF      Appearance open areas;pink;redness;swelling  -MF      Incision Length (cm) 10.5  -MF      Incision Width (cm) 3  -MF      Incision Depth (cm) 4  -MF      Drainage Characteristics/Odor  "133*   POTASSIUM mmol/L 4.6  --   --   --  4.1   MAGNESIUM mg/dL  --  2.1 2.1 2.0 2.2   CHLORIDE mmol/L 105  --   --   --  101   CO2 mmol/L 24.7  --   --   --  22.2   BUN mg/dL 20  --   --   --  21   CREATININE mg/dL 1.12  --   --   --  1.14   CALCIUM mg/dL 7.8*  --   --   --  8.7   GLUCOSE mg/dL 88  --   --   --  106*   ALBUMIN g/dL 1.5*  --   --   --  2.2*   BILIRUBIN mg/dL 0.2  --   --   --  0.4   ALK PHOS U/L 270*  --   --   --  316*   AST (SGOT) U/L 20  --   --   --  20   ALT (SGPT) U/L 7  --   --   --  11   Estimated Creatinine Clearance: 45.5 mL/min (by C-G formula based on SCr of 1.12 mg/dL).  No results found for: \"AMMONIA\"      No results found for: \"BLOODCX\"  No results found for: \"URINECX\"  No results found for: \"WOUNDCX\"  No results found for: \"STOOLCX\"    Imaging:  Imaging Results (Last 24 Hours)       ** No results found for the last 24 hours. **             Impression:  Status post diverting colostomy for locally advanced anal cancer    Plan:  Patient states he is willing to have adjuvant radiation, which I believe will provide significant symptomatic relief.  Continue tube feeds  Lactulose added today to help with bowel function.,  Will continue to monitor  Matilde Monzon MD  04/01/25  16:25 EDT      Please note that portions of this note were completed with a voice recognition program.    " "serosanguineous;serous;yellow  -MF      Drainage Amount small  -MF      Wound Cleaning irrigated with;sterile normal saline  -MF      Wound Interventions debrided  -MF      Dressing low-adherent;foam  -MF      Packing other (see comments)   hydrofera blue  -MF      Pressure Ulcer 04/20/17 2000 coccyx Stage I    Pressure Ulcer - Properties Group Date first assessed: 04/20/17  -JE Time first assessed: 2000  -JE Present On Admission (Pressure Ulcer): no  -JE Location: coccyx  -JE Stage: Stage I  -JE    Wound 04/20/17 0850 medial nose abrasion    Wound - Properties Group Date first assessed: 04/20/17  -AS Time first assessed: 0850  -AS Orientation: medial  -AS Location: nose  -AS, bridge   Type: abrasion  -AS      User Key  (r) = Recorded By, (t) = Taken By, (c) = Cosigned By    Initials Name Provider Type     Pee Roldan, PT Physical Therapist    FELY Urbano, RN Registered Nurse    AS Jayson Louis RN Registered Nurse    JAY JAY Silveira RN Registered Nurse            WOUND DEBRIDEMENT  Debridement Site 1  Location- Site 1: perineal wound  Selective Debridement- Site 1: Wound Surface <20cmsq (~5cm2)  Instruments- Site 1: tweezers  Excised Tissue Description- Site 1: minimum, slough  Bleeding- Site 1: none                  Adult Rehabilitation Note       04/24/17 0850 04/22/17 1425 04/22/17 1140    Rehab Assessment/Intervention    Discipline physical therapist  -MF physical therapist  -MW physical therapist  -SJ    Document Type therapy note (daily note)  - therapy note (daily note)   wound care   -MW therapy note (daily note)  -    Subjective Information agree to therapy;complains of;weakness;fatigue  - no complaints;agree to therapy   asking to \"get out of here\"  -MW no complaints;agree to therapy  -SJ    Patient Effort, Rehab Treatment   fair  -SJ    Symptoms Noted During/After Treatment   none  -SJ    Precautions/Limitations   fall precautions;swallowing precautions;oxygen therapy device " and L/min  -SJ    Specific Treatment Considerations   RN requested bed level-therex only today   Pt with low BP, decr resp status this a.m.  -SJ    Recorded by [MF] Pee Roldan, PT [MW] Connie Ballesteros, PT [SJ] Cinthia Baptiste, PT    Vital Signs    Pre Systolic BP Rehab   79  -SJ    Pre Treatment Diastolic BP   55  -SJ    Pretreatment Heart Rate (beats/min)   78  -SJ    Posttreatment Heart Rate (beats/min)   77  -SJ    Pre SpO2 (%)   100  -SJ    O2 Delivery Pre Treatment   supplemental O2   5L  -SJ    Post SpO2 (%)   100  -SJ    O2 Delivery Post Treatment   supplemental O2   5L  -SJ    Pre Patient Position   Supine  -SJ    Intra Patient Position   Supine  -SJ    Post Patient Position   Supine  -SJ    Recorded by   [SJ] Cinthia Baptiste, PT    Pain Assessment    Pain Assessment Jay-Baker FACES  - Jay-Baker FACES  - No/denies pain  -    Jay-Kent FACES Pain Rating 0  -MF  0  -SJ    Pain Score  0  -MW 0  -SJ    Post Pain Score  2  -MW 0  -SJ    Pain Type  Acute pain  -MW     Pain Location  Scrotum  -     Pain Intervention(s) Repositioned  - Repositioned   moist dressing placed   -MW     Response to Interventions  tolerated   -MW     Recorded by [MF] Pee Roldan, PT [MW] Connie Ballesteros, PT [SJ] Cinthia Baptiste, PT    Cognitive Assessment/Intervention    Current Cognitive/Communication Assessment impaired  - impaired  -MW impaired  -    Orientation Status disoriented x 4  -MF  disoriented x 4  -SJ    Follows Commands/Answers Questions   25% of the time;able to follow single-step instructions;needs cueing;needs increased time;needs repetition;speech unintelligible  -SJ    Personal Safety   severe impairment;unaware of cognitive deficits;unaware of consequences of deficits;unaware of functional deficits  -SJ    Recorded by [MF] Pee Roldan, PT [MW] Connie Ballesteros, PT [SJ] Cinthia Baptiste, PT    Therapy Exercises    Bilateral Lower Extremities   AAROM:;20 reps;supine;ankle  pumps/circles;calf stretch;hip abduction/adduction;hip ER;hip IR;heel slides  -SJ    Recorded by   [SJ] Cinthia Baptiste, PT    Positioning and Restraints    Pre-Treatment Position in bed  - in bed  - in bed  -    Post Treatment Position bed  - bed  - bed  -SJ    In Bed supine;call light within reach;notified nsg  - notified nsg;fowlers;call light within reach   mits in place;   - fowlers;call light within reach;encouraged to call for assist;heels elevated  -SJ    Recorded by [MF] Pee Roldan, PT [MW] Connie Ballesteros, PT [SJ] Cinthia Baptiste, PT      04/22/17 1120          Rehab Assessment/Intervention    Discipline occupational therapist  -JR      Document Type therapy note (daily note)  -JR      Subjective Information no complaints;agree to therapy  -JR      Patient Effort, Rehab Treatment fair  -JR      Symptoms Noted During/After Treatment none  -JR      Symptoms Noted Comment RN agreed to exercises in bed only this date  -JR      Precautions/Limitations fall precautions;oxygen therapy device and L/min   decreased skin integrity to scrotum, PEG  -JR      Recorded by [JR] Judy Leach, OT      Vital Signs    Pre Systolic BP Rehab 90  -JR      Pre Treatment Diastolic BP 57  -JR      Post Systolic BP Rehab 79  -JR      Post Treatment Diastolic BP 55  -JR      Pretreatment Heart Rate (beats/min) 76  -JR      Posttreatment Heart Rate (beats/min) 75  -JR      Pre SpO2 (%) 100  -JR      O2 Delivery Pre Treatment supplemental O2   5L  -JR      Post SpO2 (%) 100  -JR      O2 Delivery Post Treatment supplemental O2  -JR      Pre Patient Position Supine  -JR      Intra Patient Position Supine  -JR      Post Patient Position Supine  -JR      Recorded by [JR] Judy Leach OT      Pain Assessment    Pain Assessment Jay-Kent FACES  -JR      Jay-Kent FACES Pain Rating 0  -JR      Recorded by [JR] Judy Leach, OT      Cognitive Assessment/Intervention    Current Cognitive/Communication  Assessment impaired  -JR      Orientation Status oriented to;place  -JR      Follows Commands/Answers Questions 25% of the time;able to follow single-step instructions;needs cueing;needs increased time;needs repetition  -JR      Personal Safety severe impairment;decreased awareness, need for assist;decreased awareness, need for safety;decreased insight to deficits  -JR      Recorded by [JR] Judy Leach, OT      Therapy Exercises    Bilateral Upper Extremity PROM:;AAROM:;10 reps;elbow flexion/extension;pronation/supination;shoulder abduction/adduction;shoulder extension/flexion  -JR      Recorded by [JR] Judy Leach, OT      Positioning and Restraints    Pre-Treatment Position in bed  -JR      Post Treatment Position bed  -JR      In Bed notified nsg;supine;call light within reach;encouraged to call for assist;exit alarm on;RUE elevated;LUE elevated  -JR      Restraints released:;reapplied:;notified nsg:;soft limb;mitten  -JR      Recorded by [JR] Judy Leach, OT        User Key  (r) = Recorded By, (t) = Taken By, (c) = Cosigned By    Initials Name Effective Dates    MF Pee Roldan, PT 06/19/15 -     SJ Cinthia Baptiste, PT 06/19/15 -     JR Judy Leach, OT 06/22/15 -     MW Connie Ballesteros, PT 05/18/15 -                 IP PT Goals       04/24/17 0850 04/22/17 1721 04/22/17 1153    Bed Mobility PT LTG    Bed Mobility PT LTG, Outcome   goal ongoing  -SJ    Transfer Training PT LTG    Transfer Training PT LTG, Outcome   goal ongoing  -SJ    Gait Training PT LTG    Gait Training Goal PT LTG, Outcome   goal ongoing  -SJ    Wound Care PT LTG    Wound Care PT LTG 1, Outcome goal partially met  -MF goal ongoing  -MW       04/20/17 1118 04/20/17 1115 04/19/17 0835    Bed Mobility PT LTG    Bed Mobility PT LTG, [place] Level minimum assist (75% patient effort)  -LS      Bed Mobility PT LTG, Date Goal Reviewed 04/20/17  -LS      Bed Mobility PT LTG, Outcome goal revised  -LS      Transfer  Training PT LTG    Transfer Training PT LTG, [place] Level minimum assist (75% patient effort)  -LS      Transfer Training PT  LTG, Date Goal Reviewed 04/20/17  -LS      Transfer Training PT LTG, Outcome goal revised  -LS      Gait Training PT LTG    Gait Training Goal PT LTG, [place] Level  minimum assist (75% patient effort);2 person assist required  -LS     Gait Training Goal PT LTG, Distance to Achieve  100  -LS     Gait Training Goal PT LTG, Date Goal Reviewed  04/20/17  -LS     Gait Training Goal PT LTG, Outcome  goal revised  -LS     Wound Care PT LTG    Wound Care PT LTG 1, Outcome   goal ongoing  -MC      04/16/17 1450 04/15/17 1455 04/14/17 1120    Wound Care PT LTG    Wound Care PT LTG 1, Outcome goal ongoing  -MC goal ongoing  - goal ongoing  -      04/11/17 1015          Bed Mobility PT LTG    Bed Mobility PT LTG, Outcome goal not met  -LS      Bed Mobility PT LTG, Reason Goal Not Met medical status inhibits participation  -LS      Transfer Training PT LTG    Transfer Training PT LTG, Outcome goal not met  -LS      Transfer Training PT LTG, Reason Goal Not Met medical status inhibits participation  -LS      Gait Training PT LTG    Gait Training Goal PT LTG, Outcome goal not met  -LS      Gait Training Goal PT LTG, Reason Goal Not Met medical status inhibits participation  -LS        User Key  (r) = Recorded By, (t) = Taken By, (c) = Cosigned By    Initials Name Provider Type     Pee Roldan, PT Physical Therapist    GERMAN Baptiste, PT Physical Therapist    LS Lily Juárez, PT Physical Therapist    MW Connie Ballesteros, PT Physical Therapist    DUNCAN Mcgarry, PT Physical Therapist          Physical Therapy Education     Title: PT OT SLP Therapies (Active)     Topic: Physical Therapy (Active)     Point: Mobility training (Active)    Learning Progress Summary    Learner Readiness Method Response Comment Documented by Status   Patient Acceptance E NR  HA 04/23/17 1932 Active     Acceptance E NR,NL   04/22/17 1155 Active    Acceptance E,D NR   04/20/17 1114 Active    Acceptance E VU  AP 04/18/17 0557 Done    Acceptance E NR   04/09/17 1551 Active    Acceptance E NR  EDOUARD 04/07/17 1534 Active    Acceptance E,D NR   04/06/17 1523 Active    Acceptance E,D NR   04/05/17 0908 Active   Family Acceptance E VU  AP 04/18/17 0557 Done   Significant Other Acceptance E NR  HA 04/23/17 1932 Active    Acceptance E,D NR   04/06/17 1523 Active    Acceptance E,D NR   04/05/17 0908 Active               Point: Home exercise program (Active)    Learning Progress Summary    Learner Readiness Method Response Comment Documented by Status   Patient Acceptance E NR  HA 04/23/17 1932 Active    Acceptance E NR,NL   04/22/17 1155 Active    Acceptance E,D NR   04/20/17 1114 Active    Acceptance E VU  AP 04/18/17 0557 Done    Acceptance E NR   04/07/17 1534 Active    Acceptance E,D NR   04/06/17 1523 Active   Family Acceptance E VU  AP 04/18/17 0557 Done   Significant Other Acceptance E NR  HA 04/23/17 1932 Active    Acceptance E,D NR   04/06/17 1523 Active               Point: Body mechanics (Active)    Learning Progress Summary    Learner Readiness Method Response Comment Documented by Status   Patient Acceptance E NR  HA 04/23/17 1932 Active    Acceptance E NR,NL   04/22/17 1155 Active    Acceptance E,D NR   04/20/17 1114 Active    Acceptance E VU  AP 04/18/17 0557 Done    Acceptance E NR   04/09/17 1551 Active    Acceptance E NR  EDOUARD 04/07/17 1534 Active    Acceptance E,D NR   04/06/17 1523 Active    Acceptance E,D NR   04/05/17 0908 Active   Family Acceptance E VU  AP 04/18/17 0557 Done   Significant Other Acceptance E NR  HA 04/23/17 1932 Active    Acceptance E,D NR   04/06/17 1523 Active    Acceptance E,D NR   04/05/17 0908 Active               Point: Precautions (Active)    Learning Progress Summary    Learner Readiness Method Response Comment Documented by Status   Patient  Acceptance E NR  HA 04/23/17 1932 Active    Acceptance E NR,NL  SJ 04/22/17 1155 Active    Acceptance E,D NR  LS 04/20/17 1114 Active    Acceptance E VU  AP 04/18/17 0557 Done    Acceptance E NR   04/09/17 1551 Active    Acceptance E NR  EDOUARD 04/07/17 1534 Active    Acceptance E,D NR  LS 04/06/17 1523 Active    Acceptance E,D NR  LS 04/05/17 0908 Active   Family Acceptance E VU  AP 04/18/17 0557 Done   Significant Other Acceptance E NR  HA 04/23/17 1932 Active    Acceptance E,D NR  LS 04/06/17 1523 Active    Acceptance E,D NR  LS 04/05/17 0908 Active                      User Key     Initials Effective Dates Name Provider Type Discipline     06/19/15 -  Anita Prado, PT Physical Therapist PT     06/19/15 -  Cinthia Baptiste, PT Physical Therapist PT     06/19/15 -  Lily Juárez, PT Physical Therapist PT    HA 06/16/16 -  Valdemar Levy, RN Registered Nurse Nurse     06/16/16 -  Shahla Sherman RN Registered Nurse Nurse     09/06/16 -  Mikey Moore, PT Physical Therapist PT                   PT ASSESSMENT (last 72 hours)      PT Evaluation       04/24/17 0850 04/23/17 1500    Rehab Evaluation    Document Type therapy note (daily note)  -MF     Subjective Information agree to therapy;complains of;weakness;fatigue  -     Pain Assessment    Pain Assessment Jay-Baker FACES  -MF     Jay-Baker FACES Pain Rating 0  -MF     Pain Score  0  -LD    Pain Intervention(s) Repositioned  -     Cognitive Assessment/Intervention    Current Cognitive/Communication Assessment impaired  -     Orientation Status disoriented x 4  -MF     Positioning and Restraints    Pre-Treatment Position in bed  -MF     Post Treatment Position bed  -MF     In Bed supine;call light within reach;notified nsg  -       04/22/17 1425 04/22/17 1140    Rehab Evaluation    Document Type therapy note (daily note)   wound care   - therapy note (daily note)  -SJ    Subjective Information no complaints;agree to therapy   asking to  "\"get out of here\"  - no complaints;agree to therapy  -SJ    Patient Effort, Rehab Treatment  fair  -SJ    Symptoms Noted During/After Treatment  none  -    General Information    Precautions/Limitations  fall precautions;swallowing precautions;oxygen therapy device and L/min  -    Vital Signs    Pre Systolic BP Rehab  79  -SJ    Pre Treatment Diastolic BP  55  -SJ    Pretreatment Heart Rate (beats/min)  78  -SJ    Posttreatment Heart Rate (beats/min)  77  -SJ    Pre SpO2 (%)  100  -SJ    O2 Delivery Pre Treatment  supplemental O2   5L  -SJ    Post SpO2 (%)  100  -SJ    O2 Delivery Post Treatment  supplemental O2   5L  -SJ    Pre Patient Position  Supine  -SJ    Intra Patient Position  Supine  -SJ    Post Patient Position  Supine  -SJ    Pain Assessment    Pain Assessment Jay-Kent FACES  - No/denies pain  -SJ    Jay-Kent FACES Pain Rating  0  -SJ    Pain Score 0  -MW 0  -SJ    Post Pain Score 2  -MW 0  -SJ    Pain Type Acute pain  -MW     Pain Location Scrotum  -     Pain Intervention(s) Repositioned   moist dressing placed   -     Response to Interventions tolerated   -     Cognitive Assessment/Intervention    Current Cognitive/Communication Assessment impaired  - impaired  -    Orientation Status  disoriented x 4  -SJ    Follows Commands/Answers Questions  25% of the time;able to follow single-step instructions;needs cueing;needs increased time;needs repetition;speech unintelligible  -    Personal Safety  severe impairment;unaware of cognitive deficits;unaware of consequences of deficits;unaware of functional deficits  -    Therapy Exercises    Bilateral Lower Extremities  AAROM:;20 reps;supine;ankle pumps/circles;calf stretch;hip abduction/adduction;hip ER;hip IR;heel slides  -    Positioning and Restraints    Pre-Treatment Position in bed  - in bed  -    Post Treatment Position bed  - bed  -    In Bed notified nsg;fowlers;call light within reach   mits in place;   -MW " fowlers;call light within reach;encouraged to call for assist;heels elevated  -SJ      04/22/17 1120       Rehab Evaluation    Document Type therapy note (daily note)  -JR     Subjective Information no complaints;agree to therapy  -JR     Patient Effort, Rehab Treatment fair  -JR     Symptoms Noted During/After Treatment none  -JR     Symptoms Noted Comment RN agreed to exercises in bed only this date  -JR     General Information    Precautions/Limitations fall precautions;oxygen therapy device and L/min   decreased skin integrity to scrotum, PEG  -JR     Vital Signs    Pre Systolic BP Rehab 90  -JR     Pre Treatment Diastolic BP 57  -JR     Post Systolic BP Rehab 79  -JR     Post Treatment Diastolic BP 55  -JR     Pretreatment Heart Rate (beats/min) 76  -JR     Posttreatment Heart Rate (beats/min) 75  -JR     Pre SpO2 (%) 100  -JR     O2 Delivery Pre Treatment supplemental O2   5L  -JR     Post SpO2 (%) 100  -JR     O2 Delivery Post Treatment supplemental O2  -JR     Pre Patient Position Supine  -JR     Intra Patient Position Supine  -JR     Post Patient Position Supine  -JR     Pain Assessment    Pain Assessment Jay-Kent FACES  -JR     Jay-Kent FACES Pain Rating 0  -JR     Cognitive Assessment/Intervention    Current Cognitive/Communication Assessment impaired  -JR     Orientation Status oriented to;place  -JR     Follows Commands/Answers Questions 25% of the time;able to follow single-step instructions;needs cueing;needs increased time;needs repetition  -JR     Personal Safety severe impairment;decreased awareness, need for assist;decreased awareness, need for safety;decreased insight to deficits  -JR     Therapy Exercises    Bilateral Upper Extremity PROM:;AAROM:;10 reps;elbow flexion/extension;pronation/supination;shoulder abduction/adduction;shoulder extension/flexion  -JR     Positioning and Restraints    Pre-Treatment Position in bed  -JR     Post Treatment Position bed  -JR     In Bed notified  nsg;supine;call light within reach;encouraged to call for assist;exit alarm on;RUE elevated;LUE elevated  -JR     Restraints released:;reapplied:;notified nsg:;soft limb;mitten  -JR       User Key  (r) = Recorded By, (t) = Taken By, (c) = Cosigned By    Initials Name Provider Type    MF Pee Roldan, PT Physical Therapist    SJ Cinthia Baptiste, PT Physical Therapist    JR Judy Leach, OT Occupational Therapist    MW Connie Ballesteros, PT Physical Therapist    JENSEN Iyer, RN Registered Nurse            PT Recommendation and Plan  Anticipated Discharge Disposition: skilled nursing facility  PT Frequency: daily    Plan Of Care Reviewed With: patient   Progress: improving   Outcome Summary/Follow up Plan: Depth of wound noted to be decreasing with good granulation base noted. No s/s of infection in wound and decreasing exudate noted. PT will cont with light debridement and decrease freq of drsg change to 3x/week to allow for more comfort and decreased inflammation of wound bed.           Outcome Measures       04/22/17 1140 04/22/17 1120       How much help from another person do you currently need...    Turning from your back to your side while in flat bed without using bedrails? 2  -SJ      Moving from lying on back to sitting on the side of a flat bed without bedrails? 2  -SJ      Moving to and from a bed to a chair (including a wheelchair)? 2  -SJ      Standing up from a chair using your arms (e.g., wheelchair, bedside chair)? 2  -SJ      Climbing 3-5 steps with a railing? 1  -SJ      To walk in hospital room? 1  -SJ      AM-PAC 6 Clicks Score 10  -SJ      How much help from another is currently needed...    Putting on and taking off regular lower body clothing?  1  -JR     Bathing (including washing, rinsing, and drying)  1  -JR     Toileting (which includes using toilet bed pan or urinal)  1  -JR     Putting on and taking off regular upper body clothing  1  -JR     Taking care of personal grooming  (such as brushing teeth)  1  -JR     Eating meals  1  -JR     Score  6  -JR     Modified Linh Scale    Modified Dillon Scale 4 - Moderately severe disability.  Unable to walk without assistance, and unable to attend to own bodily needs without assistance.  -SJ 4 - Moderately severe disability.  Unable to walk without assistance, and unable to attend to own bodily needs without assistance.  -JR     Functional Assessment    Outcome Measure Options AM-PAC 6 Clicks Basic Mobility (PT)  - AM-PAC 6 Clicks Daily Activity (OT);Modified Linh  -JR       User Key  (r) = Recorded By, (t) = Taken By, (c) = Cosigned By    Initials Name Provider Type     Cinthia Baptiste, PT Physical Therapist     Judy Leach, OT Occupational Therapist              Time Calculation        PT Charges       04/24/17 0850          Time Calculation    Start Time 0850  -      PT Goal Re-Cert Due Date 04/30/17  -      Time Calculation- PT    Total Timed Code Minutes- PT 25 minute(s)  -        User Key  (r) = Recorded By, (t) = Taken By, (c) = Cosigned By    Initials Name Provider Type     Pee Roldan, PT Physical Therapist             Therapy Charges for Today     Code Description Service Date Service Provider Modifiers Qty    [id number] HC ROSEANNA DEBRIDE OPEN WOUND UP TO 20CM 4/24/2017 Pee Roldan, PT GP 1            PT G-Codes  Outcome Measure Options: AM-PAC 6 Clicks Basic Mobility (PT)        Pee Roldan, PT  4/24/2017

## 2025-04-01 NOTE — PROGRESS NOTES
"Palliative Care Daily Progress Note     S: Medical record reviewed, upon entering the room pt was sitting up in bed. He does have a furrowed brow and some facial grimacing this morning. He does report that he is having \"pretty bad\" rectal pain and some discomfort to the colostomy site. He tells me that he has to be strong and keep on pushing. He denies any anxiety but tells me that his atarax for sleep worked very well last night. He reports that his appetite has been\" not so good\".       O:   Palliative Performance Scale Score:     BP 93/47 (BP Location: Left arm, Patient Position: Lying) Comment: rn Nataliia aware.  Pulse 65   Temp 98.7 °F (37.1 °C) (Oral)   Resp 20   Ht 190.5 cm (75\")   Wt 57.3 kg (126 lb 4 oz)   SpO2 98%   BMI 15.78 kg/m²     Intake/Output Summary (Last 24 hours) at 4/1/2025 1024  Last data filed at 4/1/2025 0500  Gross per 24 hour   Intake 2390.66 ml   Output 1675 ml   Net 715.66 ml       PE:    General Appearance:    Chronically ill appearing, alert, frail, cooperative, NAD   HEENT:    NC/AT, without obvious abnormality, EOMI, anicteric , dry MM    Neck:   supple, trachea midline, no JVD   Lungs:     CTAB without w/r/r, diminished in bases     Heart:    RRR, normal S1 and S2, no M/R/G   Abdomen:     Soft, lt lower quad tenderness, ND, NABS , abd concave, peg tube in place, colostomy in place with loose stool noted   Extremities:   Moves all extremities with generalized weakness, generalized edema BLE, cachetic   Pulses:   Pulses palpable and equal bilaterally   Skin:   Warm, dry, tinting    Neurologic:   A/Ox3, cooperative   Psych:   Calm, depressed, flat          Meds: Reviewed and changes noted    Labs:   Results from last 7 days   Lab Units 04/01/25  0104   WBC 10*3/mm3 12.02*   HEMOGLOBIN g/dL 7.2*   HEMATOCRIT % 25.3*   PLATELETS 10*3/mm3 299     Results from last 7 days   Lab Units 04/01/25  0104   SODIUM mmol/L 133*   POTASSIUM mmol/L 4.6   CHLORIDE mmol/L 105   CO2 mmol/L 24.7 "   BUN mg/dL 20   CREATININE mg/dL 1.12   GLUCOSE mg/dL 88   CALCIUM mg/dL 7.8*     Results from last 7 days   Lab Units 04/01/25  0104   SODIUM mmol/L 133*   POTASSIUM mmol/L 4.6   CHLORIDE mmol/L 105   CO2 mmol/L 24.7   BUN mg/dL 20   CREATININE mg/dL 1.12   CALCIUM mg/dL 7.8*   BILIRUBIN mg/dL 0.2   ALK PHOS U/L 270*   ALT (SGPT) U/L 7   AST (SGOT) U/L 20   GLUCOSE mg/dL 88     Imaging Results (Last 72 Hours)       ** No results found for the last 72 hours. **              Diagnostics: Reviewed    A: Pradeep Donald remains very frail and has a very flat affect today. He expresses being worried. He is having more pain today, mainly rectal pressure, burning and soreness. His most recent labs sodium 133, anion gap 3.3, calcium 7.8, alk phos 270 protein 5.1 albumin 1.5 , wbc 12.02, H&H 7.2/25.3 bp 93/47 hr 65 rr 20 02@2lpm n/c       P: Pt reports that he spoke with the Radiation Oncologist and he still is not sure what he would like to do. He wants to speak with his girlfriend and his sister before he makes any more decisions. Pt reports that he still would like to know what his biopsy results have shown. I have increased his tramadol 100mg tid due to increased pain as well as some topical rectal medications for pain control. His albumin is very low. I will add some boost and magic cups for added nutrients. I have added a stool softener to prevent constipation. He does not like to ask for medications when he is hurting. He tells me that he wants to be tough. I have provided additional education about pain control and quality of life. Pt verbalized understanding. Will continue to provide symptomatic and supportive palliative care for patient and family. If patient decides to forgo chemotherapy and radiation , he would benefit from hospice services; whether that be at a hospice care center or LTC with hospice care.     - I spoke with Kisha , pt's sister with updated on treatment and medication changes. Kisha is going  to speak with pt again today about his code status and goals. They plan to likely go with hospice care center for long term hospice care due to terminal diagnosis. She reports that she will follow up later today with decision, if not tomorrow. She was talking with the oncologist at the moment.       We will continue to follow along. Please do not hesitate to contact us regarding further sx mgmt or GOC needs, including after hours or on weekends via our on call provider at [phone].     Nisreen Wong, APRN    4/1/2025

## 2025-04-01 NOTE — PROGRESS NOTES
HEPARIN INFUSION  Pradeep Donald is a  74 y.o. male receiving heparin infusion.     Therapy for (VTE/Cardiac):   VTE  Patient Dosing Weight: 57.3 kg  Initial Bolus (Y/N):   N  Any Bolus (Y/N):   Y        Signs or Symptoms of Bleeding: N        VTE (PE/DVT)   Initial Bolus: 80 units/kg (Max 10,000 units)  Initial rate: 18 units/kg/hr (Max 1,500 units/hr)    Anti Xa Rebolus Infusion Hold time Change infusion Dose (Units/kg/hr) Next Anti Xa Level Due   < 0.11 50 Units/kg  (4000 Units Max) None Increase by  4 Units/kg/hr 6 hours   0.11 - 0.19 25 Units/kg  (2000 Units Max) None Increase by  3 Units/kg/hr 6 hours   0.2 - 0.29 0 None Increase by  2 Units/kg/hr 6 hours   0.3 - 0.7 0 None No Change 6 hours (after 2 consecutive levels in range check q24h @0700)   0.71 - 0.8 0 None Decrease by  1 Units/kg/hr 6 hours   0.81 - 0.9 0 None Decrease by  2 Units/kg/hr 6 hours   0.91 - 1 0 60 Minutes Decrease by  3 Units/kg/hr 6 hours   >1 0 Hold  After Anti Xa less than 0.7 decrease previous rate by  4 Units/kg/hr  Every 2 hours until Anti Xa is less than 0.7 then when infusion restarts in 6 hours     Recommend anti-Xa every 6 hours.          Date   Time   Anti-Xa Current Rate (Unit/kg/hr) Bolus   (Units) Rate Change   (Unit/kg/hr) New Rate (Unit/kg/hr) Next   Anti-Xa Comments  Pump Check Daily   3/31 0904 <0.1 - No initial Start drip 18 1600 Discussed initial rate with nurse Smart.    3/31 1543 <0.1 18 2900 +4 22 2300 Discussed with nurse Bing. She said the drip has been running since it was started. No s/sx bleeding.    4/1 0245 0.25 22  +2 24 0900 Discussed with TEE Lui. No s/s of bleeding.    4/1 0914 0.38 24 - - 24 1530 Discussed with nurse William. No s/sx bleeding.                                                                                                                                                                                                Pharmacy will continue to follow anti-Xa results and monitor for  signs and symptoms of bleeding or thrombosis.

## 2025-04-01 NOTE — THERAPY EVALUATION
Acute Care - Occupational Therapy Initial Evaluation  [facility]     Patient Name: Pradeep Donald  : 1950  MRN: [id number]  Today's Date: 2025             Admit Date: 3/29/2025       ICD-10-CM ICD-9-CM   1. Failure to thrive in adult  R62.7 783.7   2. Diarrhea, unspecified type  R19.7 787.91   3. Lymphadenopathy  R59.1 785.6   4. Acute deep vein thrombosis (DVT) of femoral vein of left lower extremity  I82.412 453.41   5. Metastatic colon cancer to liver  C18.9 153.9    C78.7 197.7     Patient Active Problem List   Diagnosis    Metastatic colon cancer to liver     Past Medical History:   Diagnosis Date    Kidney stones      Past Surgical History:   Procedure Laterality Date    COLOSTOMY N/A 3/29/2025    Procedure: COLOSTOMY LAPAROSCOPIC;  Surgeon: Matilde Monzon MD;  Location: [facility];  Service: General;  Laterality: N/A;    PEG TUBE INSERTION N/A 3/29/2025    Procedure: PERCUTANEOUS ENDOSCOPIC GASTROSTOMY TUBE INSERTION;  Surgeon: Matilde Monzon MD;  Location: [facility];  Service: General;  Laterality: N/A;    RECTAL MASS EXCISION N/A 3/29/2025    Procedure: RECTAL MASS EXCISION - biopsy of rectal mass;  Surgeon: Matilde Monzon MD;  Location: [facility];  Service: General;  Laterality: N/A;         OT ASSESSMENT FLOWSHEET (Last 12 Hours)       OT Evaluation and Treatment       Row Name 25 1048                   OT Time and Intention    Document Type evaluation  -KR        Mode of Treatment occupational therapy  -KR        Patient Effort adequate  -KR           General Information    General Observations of Patient alert/cooperative  -KR        Prior Level of Function independent:  -KR           Living Environment    Current Living Arrangements home  -KR        People in Home alone  stays with significant other on occasion  -KR        Primary Care Provided by self  -KR           Cognition    Affect/Mental Status (Cognition) WFL  -KR        Orientation Status (Cognition) oriented  to;person;place;situation  -KR        Follows Commands (Cognition) WFL  -KR           Range of Motion Comprehensive    Comment, General Range of Motion BUE WFL  -KR           Strength Comprehensive (MMT)    Comment, General Manual Muscle Testing (MMT) Assessment BUE 4/5  -KR           Activities of Daily Living    BADL Assessment/Intervention bathing;upper body dressing;lower body dressing;grooming;toileting  -KR           Bathing Assessment/Intervention    [place] Level (Bathing) bathing skills;moderate assist (50% patient effort)  -KR           Upper Body Dressing Assessment/Training    [place] Level (Upper Body Dressing) upper body dressing skills;moderate assist (50% patient effort)  -KR           Lower Body Dressing Assessment/Training    [place] Level (Lower Body Dressing) lower body dressing skills;maximum assist (25% patient effort)  -KR           Grooming Assessment/Training    [place] Level (Grooming) grooming skills;minimum assist (75% patient effort)  -KR           Toileting Assessment/Training    [place] Level (Toileting) toileting skills;maximum assist (25% patient effort);dependent (less than 25% patient effort)  -KR           Wound 03/29/25 1533 abdomen Surgical    Wound - Properties Group Placement Date: 03/29/25  -LT Placement Time: 1533  -LT Location: abdomen  -LT Primary Wound Type: Surgical  -LT, x2 trochar sites     Retired Wound - Properties Group Placement Date: 03/29/25  -LT Placement Time: 1533  -LT Location: abdomen  -LT    Retired Wound - Properties Group Placement Date: 03/29/25  -LT Placement Time: 1533  -LT Location: abdomen  -LT    Retired Wound - Properties Group Date first assessed: 03/29/25  -LT Time first assessed: 1533  -LT Location: abdomen  -LT       Wound 03/30/25 0955 Right posterior elbow Pressure Injury    Wound - Properties Group Placement Date: 03/30/25  -CF Placement Time: 0955  -CF Present on Original Admission: N  -CF Side: Right  -CF  Orientation: posterior  -CF Location: elbow  -CF Primary Wound Type: Pressure Inj  -TW    Retired Wound - Properties Group Placement Date: 03/30/25  -CF Placement Time: 0955  -CF Present on Original Admission: N  -CF Side: Right  -CF Orientation: posterior  -CF Location: elbow  -CF    Retired Wound - Properties Group Placement Date: 03/30/25  -CF Placement Time: 0955  -CF Present on Original Admission: N  -CF Side: Right  -CF Orientation: posterior  -CF Location: elbow  -CF    Retired Wound - Properties Group Date first assessed: 03/30/25  -CF Time first assessed: 0955  -CF Present on Original Admission: N  -CF Side: Right  -CF Location: elbow  -CF       Wound 03/30/25 1201 Right lower arm Infiltration/Extravasation    Wound - Properties Group Placement Date: 03/30/25  -CF Placement Time: 1201  -CF Side: Right  -CF Orientation: lower  -CF Location: arm  -CF Primary Wound Type: Infilt/Extra  -CF    Retired Wound - Properties Group Placement Date: 03/30/25  -CF Placement Time: 1201  -CF Side: Right  -CF Orientation: lower  -CF Location: arm  -CF    Retired Wound - Properties Group Placement Date: 03/30/25  -CF Placement Time: 1201  -CF Side: Right  -CF Orientation: lower  -CF Location: arm  -CF    Retired Wound - Properties Group Date first assessed: 03/30/25  -CF Time first assessed: 1201  -CF Side: Right  -CF Location: arm  -CF       Wound 03/30/25 1900 Right posterior greater trochanter Pressure Injury    Wound - Properties Group Placement Date: 03/30/25  -SL Placement Time: 1900  -SL Present on Original Admission: N  -SL Side: Right  -SL Orientation: posterior  -SL Location: greater trochanter  -SL Primary Wound Type: Pressure Inj  -SL, blister     Retired Wound - Properties Group Placement Date: 03/30/25  -SL Placement Time: 1900  -SL Present on Original Admission: N  -SL Side: Right  -SL Orientation: posterior  -SL Location: greater trochanter  -SL    Retired Wound - Properties Group Placement Date: 03/30/25   -SL Placement Time: 1900  -SL Present on Original Admission: N  -SL Side: Right  -SL Orientation: posterior  -SL Location: greater trochanter  -SL    Retired Wound - Properties Group Date first assessed: 03/30/25  -SL Time first assessed: 1900  -SL Present on Original Admission: N  -SL Side: Right  -SL Location: greater trochanter  -SL       Wound 03/30/25 1900 scrotum    Wound - Properties Group Placement Date: 03/30/25  -SL Placement Time: 1900  -SL Present on Original Admission: Y  -SL Location: scrotum  -SL    Retired Wound - Properties Group Placement Date: 03/30/25  -SL Placement Time: 1900  -SL Present on Original Admission: Y  -SL Location: scrotum  -SL    Retired Wound - Properties Group Placement Date: 03/30/25  -SL Placement Time: 1900  -SL Present on Original Admission: Y  -SL Location: scrotum  -SL    Retired Wound - Properties Group Date first assessed: 03/30/25  -SL Time first assessed: 1900  -SL Present on Original Admission: Y  -SL Location: scrotum  -SL       Wound 03/31/25 1553 medial perirectal Atypical Malignant Ulcer    Wound - Properties Group Placement Date: 03/31/25  -TW Placement Time: 1553  -TW Present on Original Admission: Y  -TW Orientation: medial  -TW Location: perirectal  -TW Primary Wound Type: Atypical  -TW Secondary Wound Type - Atypical: Malignant Ul  -TW    Retired Wound - Properties Group Placement Date: 03/31/25  -TW Placement Time: 1553  -TW Present on Original Admission: Y  -TW Orientation: medial  -TW Location: perirectal  -TW    Retired Wound - Properties Group Placement Date: 03/31/25  -TW Placement Time: 1553  -TW Present on Original Admission: Y  -TW Orientation: medial  -TW Location: perirectal  -TW    Retired Wound - Properties Group Date first assessed: 03/31/25  -TW Time first assessed: 1553  -TW Present on Original Admission: Y  -TW Location: perirectal  -TW       Plan of Care Review    Plan of Care Reviewed With patient  -KR           Therapy Assessment/Plan (OT)     Planned Therapy Interventions (OT) activity tolerance training;adaptive equipment training;BADL retraining  -KR           OT Goals    Dressing Goal Selection (OT) dressing, OT goal 1  -KR        Activity Tolerance Goal Selection (OT) activity tolerance, OT goal 1  -KR           Dressing Goal 1 (OT)    Activity/Device (Dressing Goal 1, OT) dressing skills, all  -KR        Taylor/Cues Needed (Dressing Goal 1, OT) standby assist  -KR        Time Frame (Dressing Goal 1, OT) by discharge  -KR            Activity Tolerance Goal 1 (OT)    Activity Tolerance Goal 1 (OT) Increase to enhance performance of self care tasks  -KR        Activity Level (Endurance Goal 1, OT) 15 min activity  -KR        Time Frame (Activity Tolerance Goal 1, OT) by discharge  -KR           Patient Education Goal (OT)    Activity (Patient Education Goal, OT) AE/DME training as needed to enhance safety/independence in home environment  -KR        Taylor/Cues/Accuracy (Memory Goal 2, OT) verbalizes understanding  -KR        Time Frame (Patient Education Goal, OT) by discharge  -KR                  User Key  (r) = Recorded By, (t) = Taken By, (c) = Cosigned By      Initials Name Effective Dates    TW Erica Jurado, TEE 06/16/21 -     Lonnie Orlando OT 06/16/21 -     Narcisa Hahn RN 06/16/21 -     SL Ofelia Hester RN 09/16/24 -     CF Naima Lemus RN 06/25/24 -                            OT Recommendation and Plan  Planned Therapy Interventions (OT): activity tolerance training, adaptive equipment training, BADL retraining  Plan of Care Review  Plan of Care Reviewed With: patient  Plan of Care Reviewed With: patient        Time Calculation:     Therapy Charges for Today       Code Description Service Date Service Provider Modifiers Qty    [id number]  OT EVAL HIGH COMPLEXITY 4 4/1/2025 Lonnie Cho OT GO 1                 Lonnie Cho OT  4/1/2025

## 2025-04-01 NOTE — CASE MANAGEMENT/SOCIAL WORK
Discharge Planning Assessment    [facility]     Patient Name: Pradeep Donald  MRN: [id number]  Today's Date: 4/1/2025    Admit Date: 3/29/2025            Discharge Plan       Row Name 04/01/25 1743       Plan    Plan SS spoke with [facility] per Jessica who states Pt's referral continues to be reviewed. SS updated Pt at bedside. SS to follow.                  Continued Care and Services - Admitted Since 3/29/2025       Destination       Service Provider Request Status Services Address Phone Fax Patient Preferred    [facility] Pending - Request Sent -- [address] [place] [id number] [phone] --                    TOI Najera

## 2025-04-02 LAB
ALBUMIN SERPL-MCNC: 1.6 G/DL (ref 3.5–5.2)
ALBUMIN/GLOB SERPL: 0.4 G/DL
ALP SERPL-CCNC: 325 U/L (ref 39–117)
ALT SERPL W P-5'-P-CCNC: 12 U/L (ref 1–41)
ANION GAP SERPL CALCULATED.3IONS-SCNC: 3.6 MMOL/L (ref 5–15)
AST SERPL-CCNC: 26 U/L (ref 1–40)
BILIRUB SERPL-MCNC: 0.2 MG/DL (ref 0–1.2)
BUN SERPL-MCNC: 18 MG/DL (ref 8–23)
BUN/CREAT SERPL: 17.6 (ref 7–25)
CALCIUM SPEC-SCNC: 7.9 MG/DL (ref 8.6–10.5)
CHLORIDE SERPL-SCNC: 100 MMOL/L (ref 98–107)
CO2 SERPL-SCNC: 26.4 MMOL/L (ref 22–29)
CREAT SERPL-MCNC: 1.02 MG/DL (ref 0.76–1.27)
EGFRCR SERPLBLD CKD-EPI 2021: 76.2 ML/MIN/1.73
GLOBULIN UR ELPH-MCNC: 3.7 GM/DL
GLUCOSE BLDC GLUCOMTR-MCNC: 118 MG/DL (ref 70–130)
GLUCOSE BLDC GLUCOMTR-MCNC: 288 MG/DL (ref 70–130)
GLUCOSE BLDC GLUCOMTR-MCNC: 92 MG/DL (ref 70–130)
GLUCOSE SERPL-MCNC: 122 MG/DL (ref 65–99)
HCT VFR BLD AUTO: 27.6 % (ref 37.5–51)
HGB BLD-MCNC: 8 G/DL (ref 13–17.7)
POTASSIUM SERPL-SCNC: 4.6 MMOL/L (ref 3.5–5.2)
PROT SERPL-MCNC: 5.3 G/DL (ref 6–8.5)
RAD ONC ARIA COURSE ID: NORMAL
RAD ONC ARIA COURSE INTENT: NORMAL
RAD ONC ARIA COURSE LAST TREATMENT DATE: NORMAL
RAD ONC ARIA COURSE START DATE: NORMAL
RAD ONC ARIA COURSE TREATMENT ELAPSED DAYS: 0
RAD ONC ARIA FIRST TREATMENT DATE: NORMAL
RAD ONC ARIA PLAN FRACTIONS TREATED TO DATE: 1
RAD ONC ARIA PLAN ID: NORMAL
RAD ONC ARIA PLAN PRESCRIBED DOSE PER FRACTION: 3 GY
RAD ONC ARIA PLAN PRIMARY REFERENCE POINT: NORMAL
RAD ONC ARIA PLAN TOTAL FRACTIONS PRESCRIBED: 10
RAD ONC ARIA PLAN TOTAL PRESCRIBED DOSE: 3000 CGY
RAD ONC ARIA REFERENCE POINT DOSAGE GIVEN TO DATE: 3 GY
RAD ONC ARIA REFERENCE POINT ID: NORMAL
RAD ONC ARIA REFERENCE POINT SESSION DOSAGE GIVEN: 3 GY
REF LAB TEST METHOD: NORMAL
SODIUM SERPL-SCNC: 130 MMOL/L (ref 136–145)
UFH PPP CHRO-ACNC: 0.31 IU/ML (ref 0.3–0.7)

## 2025-04-02 PROCEDURE — 80053 COMPREHEN METABOLIC PANEL: CPT | Performed by: STUDENT IN AN ORGANIZED HEALTH CARE EDUCATION/TRAINING PROGRAM

## 2025-04-02 PROCEDURE — 82948 REAGENT STRIP/BLOOD GLUCOSE: CPT

## 2025-04-02 PROCEDURE — 77336 RADIATION PHYSICS CONSULT: CPT | Performed by: RADIOLOGY

## 2025-04-02 PROCEDURE — 97110 THERAPEUTIC EXERCISES: CPT

## 2025-04-02 PROCEDURE — 77280 THER RAD SIMULAJ FIELD SMPL: CPT | Performed by: RADIOLOGY

## 2025-04-02 PROCEDURE — 97530 THERAPEUTIC ACTIVITIES: CPT

## 2025-04-02 PROCEDURE — 25010000002 HEPARIN (PORCINE) 25000-0.45 UT/250ML-% SOLUTION: Performed by: STUDENT IN AN ORGANIZED HEALTH CARE EDUCATION/TRAINING PROGRAM

## 2025-04-02 PROCEDURE — 97535 SELF CARE MNGMENT TRAINING: CPT

## 2025-04-02 PROCEDURE — 85520 HEPARIN ASSAY: CPT | Performed by: STUDENT IN AN ORGANIZED HEALTH CARE EDUCATION/TRAINING PROGRAM

## 2025-04-02 PROCEDURE — 85014 HEMATOCRIT: CPT | Performed by: STUDENT IN AN ORGANIZED HEALTH CARE EDUCATION/TRAINING PROGRAM

## 2025-04-02 PROCEDURE — 77387 GUIDANCE FOR RADJ TX DLVR: CPT | Performed by: RADIOLOGY

## 2025-04-02 PROCEDURE — 77412 RADIATION TX DELIVERY LVL 3: CPT | Performed by: RADIOLOGY

## 2025-04-02 PROCEDURE — 85018 HEMOGLOBIN: CPT | Performed by: STUDENT IN AN ORGANIZED HEALTH CARE EDUCATION/TRAINING PROGRAM

## 2025-04-02 PROCEDURE — 25010000002 NA FERRIC GLUC CPLX PER 12.5 MG: Performed by: INTERNAL MEDICINE

## 2025-04-02 PROCEDURE — 99233 SBSQ HOSP IP/OBS HIGH 50: CPT | Performed by: STUDENT IN AN ORGANIZED HEALTH CARE EDUCATION/TRAINING PROGRAM

## 2025-04-02 RX ORDER — SODIUM CHLORIDE 9 MG/ML
10 INJECTION, SOLUTION INTRAVENOUS CONTINUOUS
Status: DISCONTINUED | OUTPATIENT
Start: 2025-04-02 | End: 2025-04-03

## 2025-04-02 RX ORDER — GLYCOPYRROLATE 0.2 MG/ML
0.4 INJECTION INTRAMUSCULAR; INTRAVENOUS EVERY 4 HOURS PRN
Status: DISCONTINUED | OUTPATIENT
Start: 2025-04-02 | End: 2025-04-02

## 2025-04-02 RX ORDER — LORAZEPAM 2 MG/ML
0.5 INJECTION INTRAMUSCULAR
Status: DISCONTINUED | OUTPATIENT
Start: 2025-04-02 | End: 2025-04-02

## 2025-04-02 RX ORDER — NALOXONE HCL 0.4 MG/ML
0.1 VIAL (ML) INJECTION
Status: DISCONTINUED | OUTPATIENT
Start: 2025-04-02 | End: 2025-04-02

## 2025-04-02 RX ORDER — AMOXICILLIN 250 MG
2 CAPSULE ORAL 2 TIMES DAILY
Status: DISCONTINUED | OUTPATIENT
Start: 2025-04-02 | End: 2025-04-06

## 2025-04-02 RX ORDER — MORPHINE SULFATE/0.9% NACL/PF 1 MG/ML
SYRINGE (ML) INJECTION CONTINUOUS
Refills: 0 | Status: DISCONTINUED | OUTPATIENT
Start: 2025-04-02 | End: 2025-04-02

## 2025-04-02 RX ORDER — POLYETHYLENE GLYCOL 3350 17 G/17G
17 POWDER, FOR SOLUTION ORAL 2 TIMES DAILY PRN
Status: DISCONTINUED | OUTPATIENT
Start: 2025-04-02 | End: 2025-04-06

## 2025-04-02 RX ORDER — ONDANSETRON 2 MG/ML
4 INJECTION INTRAMUSCULAR; INTRAVENOUS EVERY 4 HOURS PRN
Status: DISCONTINUED | OUTPATIENT
Start: 2025-04-02 | End: 2025-04-21 | Stop reason: HOSPADM

## 2025-04-02 RX ORDER — TRAMADOL HYDROCHLORIDE 50 MG/1
50 TABLET ORAL 3 TIMES DAILY
Status: COMPLETED | OUTPATIENT
Start: 2025-04-02 | End: 2025-04-10

## 2025-04-02 RX ADMIN — LACTULOSE 10 G: 20 SOLUTION ORAL at 08:50

## 2025-04-02 RX ADMIN — ACETAMINOPHEN 1000 MG: 500 TABLET ORAL at 08:50

## 2025-04-02 RX ADMIN — TRAMADOL HYDROCHLORIDE 50 MG: 50 TABLET, COATED ORAL at 20:49

## 2025-04-02 RX ADMIN — SODIUM CHLORIDE 125 MG: 9 INJECTION, SOLUTION INTRAVENOUS at 08:51

## 2025-04-02 RX ADMIN — TRAMADOL HYDROCHLORIDE 100 MG: 50 TABLET, COATED ORAL at 15:03

## 2025-04-02 RX ADMIN — SENNOSIDES AND DOCUSATE SODIUM 2 TABLET: 50; 8.6 TABLET ORAL at 20:47

## 2025-04-02 RX ADMIN — TRAMADOL HYDROCHLORIDE 100 MG: 50 TABLET, COATED ORAL at 08:50

## 2025-04-02 RX ADMIN — Medication 10 ML: at 20:47

## 2025-04-02 RX ADMIN — APIXABAN 10 MG: 5 TABLET, FILM COATED ORAL at 20:47

## 2025-04-02 RX ADMIN — OXYCODONE HYDROCHLORIDE 10 MG: 10 TABLET, FILM COATED, EXTENDED RELEASE ORAL at 08:50

## 2025-04-02 RX ADMIN — PANTOPRAZOLE SODIUM 40 MG: 40 INJECTION, POWDER, FOR SOLUTION INTRAVENOUS at 17:48

## 2025-04-02 RX ADMIN — HYDROXYZINE HYDROCHLORIDE 10 MG: 10 TABLET, FILM COATED ORAL at 20:47

## 2025-04-02 RX ADMIN — ACETAMINOPHEN 1000 MG: 500 TABLET ORAL at 15:04

## 2025-04-02 RX ADMIN — Medication 10 ML: at 08:51

## 2025-04-02 RX ADMIN — POLYETHYLENE GLYCOL (3350) 17 G: 17 POWDER, FOR SOLUTION ORAL at 08:50

## 2025-04-02 RX ADMIN — MIDODRINE HYDROCHLORIDE 10 MG: 2.5 TABLET ORAL at 08:50

## 2025-04-02 RX ADMIN — OXYCODONE 5 MG: 5 TABLET ORAL at 13:51

## 2025-04-02 RX ADMIN — PANTOPRAZOLE SODIUM 40 MG: 40 INJECTION, POWDER, FOR SOLUTION INTRAVENOUS at 08:50

## 2025-04-02 RX ADMIN — Medication 1000 MCG: at 08:50

## 2025-04-02 RX ADMIN — FOLIC ACID 1 MG: 1 TABLET ORAL at 08:50

## 2025-04-02 RX ADMIN — SENNOSIDES AND DOCUSATE SODIUM 2 TABLET: 50; 8.6 TABLET ORAL at 08:50

## 2025-04-02 RX ADMIN — HEPARIN SODIUM 26 UNITS/KG/HR: 10000 INJECTION, SOLUTION INTRAVENOUS at 03:45

## 2025-04-02 RX ADMIN — MIDODRINE HYDROCHLORIDE 10 MG: 2.5 TABLET ORAL at 11:35

## 2025-04-02 RX ADMIN — ACETAMINOPHEN 1000 MG: 500 TABLET ORAL at 20:47

## 2025-04-02 RX ADMIN — HYDROCORTISONE 2.5%: 25 CREAM TOPICAL at 20:49

## 2025-04-02 RX ADMIN — TRAMADOL HYDROCHLORIDE 50 MG: 50 TABLET, COATED ORAL at 17:48

## 2025-04-02 RX ADMIN — OXYCODONE HYDROCHLORIDE 10 MG: 10 TABLET, FILM COATED, EXTENDED RELEASE ORAL at 20:47

## 2025-04-02 NOTE — PROGRESS NOTES
[facility] HOSPITALIST PROGRESS NOTE     Patient Identification:  Name:  Pradeep Donald  Age:  74 y.o.  Sex:  male  :  1950  MRN:  [id number]  Visit Number:  [id number]  ROOM: [address] [place] [address]     Primary Care Provider:  Carmen Pretty APRN    Length of stay in inpatient status:  4    Subjective     Chief Compliant:    Chief Complaint   Patient presents with    Leg Swelling    Hemorrhoids       History of Presenting Illness:    Patient seen in follow-up for large rectal mass status post resection, ostomy and DVT.  Patient is awake, alert and oriented x 3. No family at bedside.  Nursing reports some bleeding from rectal mass but this is since subsided.  Has tolerated wound care.  He has no complaints this morning.  No further adverse events noted overnight.    Objective     Current Hospital Meds:acetaminophen, 1,000 mg, Per PEG Tube, TID  ferric gluconate, 125 mg, Intravenous, Daily  folic acid, 1 mg, Per PEG Tube, Daily  Hydrocortisone (Perianal), , Rectal, BID  hydrOXYzine, 10 mg, Oral, Nightly  lactulose, 10 g, Oral, Daily  midodrine, 10 mg, Per PEG Tube, TID AC  oxyCODONE, 10 mg, Oral, Q12H  pantoprazole, 40 mg, Intravenous, BID AC  senna-docusate sodium, 2 tablet, Oral, BID   And  polyethylene glycol, 17 g, Oral, BID  sodium chloride, 10 mL, Intravenous, Q12H  traMADol, 100 mg, Per PEG Tube, TID  vitamin B-12, 1,000 mcg, Per PEG Tube, Daily    heparin, 26 Units/kg/hr (Dosing Weight), Last Rate: 26 Units/kg/hr (25 0345)  Pharmacy to Dose Heparin,         Current Antimicrobial Therapy:  Anti-Infectives (From admission, onward)      Ordered     Dose/Rate Route Frequency Start Stop    25 1442  ceFAZolin 2000 mg IVPB in 100 mL NS (VTB)  Status:  Discontinued        Ordering Provider: Matilde Monzon MD    2,000 mg  over 30 Minutes Intravenous On Call to O.R. 25 0600 25 1718    25 1146  cefepime 1000 mg IVPB in 100 mL NS (VTB)  Status:  Discontinued        Ordering  Provider: Matilde Monzon MD    1,000 mg  over 4 Hours Intravenous Every 12 Hours 03/29/25 2100 03/31/25 2027 03/29/25 1146  cefepime 1000 mg IVPB in 100 mL NS (VTB)        Ordering Provider: Jayden Barney MD    1,000 mg  over 30 Minutes Intravenous Once 03/29/25 1245 03/29/25 1402    03/29/25 0613  cefepime 2000 mg IVPB in 100 mL NS (VTB)        Ordering Provider: Óscar Rodriguez MD    2,000 mg  over 30 Minutes Intravenous Once 03/29/25 0629 03/29/25 0700    03/29/25 0613  vancomycin (VANCOCIN) 1,000 mg in sodium chloride 0.9 % 250 mL IVPB-VTB        Ordering Provider: Óscar Rodriguez MD    20 mg/kg × 49.9 kg  250 mL/hr over 60 Minutes Intravenous Once 03/29/25 0629 03/29/25 0802          Current Diuretic Therapy:  Diuretics (From admission, onward)      None          ----------------------------------------------------------------------------------------------------------------------  Vital Signs:  Temp:  [97.8 °F (36.6 °C)-98.2 °F (36.8 °C)] 97.8 °F (36.6 °C)  Resp:  [16-20] 20  BP: ()/(48-52) 98/50     on  Flow (L/min) (Oxygen Therapy):  [2] 2;   Device (Oxygen Therapy): nasal cannula  Body mass index is 15.78 kg/m².    Wt Readings from Last 3 Encounters:   03/29/25 57.3 kg (126 lb 4 oz)   06/27/22 79.4 kg (175 lb)   01/07/18 83.9 kg (185 lb)     Intake & Output (last 3 days)         03/29 0701  03/30 0700 03/30 0701  03/31 0700 03/31 0701  04/01 0700    P.O. 60 1680 360    I.V. (mL/kg) 1000 (17.5)  1084 (18.9)    Blood   315.8    Other  110 80    NG/GT  83 130    IV Piggyback   200    Total Intake(mL/kg) 1060 (18.5) 1873 (32.7) 2169.8 (37.9)    Urine (mL/kg/hr) 90 (0.1) 2050 (1.5) 750 (1)    Total Output 90 2050 750    Net +970 -177 +1419.8           Urine Unmeasured Occurrence 1 x            Diet: Regular/House; Texture: Soft to Chew (NDD 3); Soft to Chew: Chopped Meat; Fluid Consistency: Thin (IDDSI  0)  ----------------------------------------------------------------------------------------------------------------------  Physical exam:  Constitutional: Elderly male, appears cachectic, resting comfortably in bed, no acute distress.      HENT:  Head:  Normocephalic and atraumatic.  Mouth:  Moist mucous membranes.  Eyes:  Conjunctivae and EOM are normal. No scleral icterus.   Cardiovascular:  Normal rate, regular rhythm and normal heart sounds with no murmur. No JVD.   Pulmonary/Chest:  No respiratory distress, no wheezes, no crackles, with normal breath sounds and good air movement. Unlabored. No accessory muscle use.  Abdominal:  Soft. No distension and no tenderness.  Bowel sounds present. No rebound or guarding.  PEG tube with tube feeds.  Incision sites well-healed.  Musculoskeletal: Cachectic.  No tenderness, and no deformity.  No red or swollen joints anywhere.    Neurological:  Alert and oriented to person, place, and time.  No cranial nerve deficit.   Nonfocal.   Skin:  Skin is warm and dry. No rash noted. No pallor.   Peripheral vascular:  No clubbing, no cyanosis, no edema. Pedal and tibial pulses 2 out of 4 bilaterally.     ----------------------------------------------------------------------------------------------------------------------  Results from last 7 days   Lab Units 04/02/25  0936 04/01/25  2316 04/01/25  0104 03/31/25  1221 03/31/25  0904 03/31/25  0204 03/30/25  0150 03/29/25  1944 03/29/25  0800 03/29/25  0434   LACTATE mmol/L  --   --   --   --   --   --   --   --  1.8 2.1*   WBC 10*3/mm3  --  11.93* 12.02*  --   --  11.84*   < > 12.56*  --  12.85*   HEMOGLOBIN g/dL 8.0* 7.1* 7.2*   < >  --  6.8*   < > 7.9*  --  8.5*   HEMATOCRIT % 27.6* 24.4* 25.3*   < >  --  23.8*   < > 29.2*  --  29.5*   MCV fL  --  83.8 85.2  --   --  84.7   < > 88.2  --  82.9   MCHC g/dL  --  29.1* 28.5*  --   --  28.6*   < > 27.1*  --  28.8*   PLATELETS 10*3/mm3  --  264 299  --   --  343   < > 333  --  309   INR   " --   --  1.33*  --  1.26*  --   --  1.24*  --   --     < > = values in this interval not displayed.         Results from last 7 days   Lab Units 04/02/25  0936 04/01/25  0104 03/31/25  0118 03/30/25  0150 03/29/25  1944 03/29/25 0434   SODIUM mmol/L 130* 133*  --   --   --  133*   POTASSIUM mmol/L 4.6 4.6  --   --   --  4.1   MAGNESIUM mg/dL  --   --  2.1 2.1 2.0 2.2   CHLORIDE mmol/L 100 105  --   --   --  101   CO2 mmol/L 26.4 24.7  --   --   --  22.2   BUN mg/dL 18 20  --   --   --  21   CREATININE mg/dL 1.02 1.12  --   --   --  1.14   CALCIUM mg/dL 7.9* 7.8*  --   --   --  8.7   PHOSPHORUS mg/dL  --   --  4.5 4.0 3.3  --    GLUCOSE mg/dL 122* 88  --   --   --  106*   ALBUMIN g/dL 1.6* 1.5*  --   --   --  2.2*   BILIRUBIN mg/dL 0.2 0.2  --   --   --  0.4   ALK PHOS U/L 325* 270*  --   --   --  316*   AST (SGOT) U/L 26 20  --   --   --  20   ALT (SGPT) U/L 12 7  --   --   --  11   Estimated Creatinine Clearance: 49.9 mL/min (by C-G formula based on SCr of 1.02 mg/dL).  No results found for: \"AMMONIA\"  Results from last 7 days   Lab Units 03/29/25  0636 03/29/25 0434   CK TOTAL U/L  --  25   HSTROP T ng/L 41* 44*     Results from last 7 days   Lab Units 03/29/25 0434   PROBNP pg/mL 975.2         Glucose   Date/Time Value Ref Range Status   04/02/2025 1201 288 (H) 70 - 130 mg/dL Final   04/02/2025 0620 92 70 - 130 mg/dL Final   04/02/2025 0057 118 70 - 130 mg/dL Final   04/01/2025 1828 189 (H) 70 - 130 mg/dL Final   04/01/2025 1219 99 70 - 130 mg/dL Final   04/01/2025 0639 85 70 - 130 mg/dL Final   04/01/2025 0015 103 70 - 130 mg/dL Final   03/31/2025 1626 112 70 - 130 mg/dL Final     Lab Results   Component Value Date    TSH 5.270 (H) 03/29/2025    FREET4 1.02 03/29/2025     No results found for: \"PREGTESTUR\", \"PREGSERUM\", \"HCG\", \"HCGQUANT\"  Pain Management Panel           No data to display              Brief Urine Lab Results       None          No results found for: \"BLOODCX\"  No results found for: " "\"URINECX\"  No results found for: \"WOUNDCX\"  No results found for: \"STOOLCX\"  No results found for: \"RESPCX\"  No results found for: \"AFBCX\"  Results from last 7 days   Lab Units 03/29/25  0800 03/29/25  0434   LACTATE mmol/L 1.8 2.1*       I have personally looked at the labs and they are summarized above.  ----------------------------------------------------------------------------------------------------------------------  Detailed radiology reports for the last 24 hours:  Imaging Results (Last 24 Hours)       ** No results found for the last 24 hours. **          Assessment & Plan      Patient is a 74-year-old male with no known significant medical problems who presented to the ER with reports of bright red blood per rectum, weight loss and poor appetite.    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  --CT abdomen/pelvis with contrast noted rectal mass with extensive local disease into the peritoneum measuring 3.3 x 3.8 cm with local invasion into the posterior aspect of the prostate gland with pelvic and RP adenopathy and possible metastatic disease involving the liver  --Venous Dopplers noted DVT in the left common/superficial femoral veins  --Status post biopsy of rectal mass/colostomy/PEG  --Pathology still pending  --Patient's overall functional status and inability to care for himself at home will proved to be a major barrier and treatment plan moving forward.  He require short-term if not long-term placement and will likely be unable to undergo chemotherapy/radiation if he is unable to return home with family.  Will have full discussions with patient, palliative care and family once path results finalized  --continue scheduled Tylenol, tramadol, new MS Contin " twice daily  --continue p.o. midodrine 3 times daily  --Continue heparin GTT for DVT  --Radiation oncology following, undergoing inpatient radiation  --Heme/onc following, appreciate recommendation  --Surgery following appreciate assistance    #Acute on chronic blood loss anemia  #Possible gastritis/UGIB  --Hemoglobin repeat today was 8, has received 1 unit PRBC thus far  --PPI twice daily  --Transfuse to hemoglobin greater than 7 or symptomatic  --IV iron daily x 3 days  --Surgery following per above    #Oral candidiasis  --nystatin swish and spit    #Goals of care  #Severe malnutrition  #Failure to thrive  --PEG placed with supplemental tube feeds  --Palliative care following, appreciate assistance    Copied portions of this report have been reviewed and are accurate as of 04/02/25     CHECKLIST:  Abx: None  VTE: Heparin GTT  GI ppx: PPI twice daily  Diet: Modified, tube feeds  Code: CPR, full  Dispo: Patient is medically stable, still pending path results.  Undergoing inpatient radiation at this time as well.  Further dispo plans pending path results.    Quintin Jon DO  [facility] Hospitalist  04/02/25  13:08 EDT

## 2025-04-02 NOTE — PROGRESS NOTES
[facility] conversation at bedside with palliative care, patient and patient's sister. After that discussion, the patient acknowledged he did not want to do chemotherapy (do not think he would tolerate it if ever deemed to be a candidate). He says radiation the past two days has worse him out. After a long discussion, he did say he just wanted to be comfortable. Comfort measures was discussed with patient and sister and all questions answered. Everyone seemed to be on the same page. Patient wanted to stay here, sister agreed. Orders changed and then notified by surgery that patient was confused regarding the above and wanted more time to think.     Will dc comfort measures for now-will discuss further tomorrow with patient and sister.    Electronically signed by Quintin Jon DO, 04/02/25, 5:37 PM EDT.

## 2025-04-02 NOTE — PLAN OF CARE
Goal Outcome Evaluation:           Progress: no change  Outcome Evaluation: Pts VSS on 2L NC. PRN and scheduled pain medication given. Tube feed infusing per order. Heparin infusing at 26units/kg/hr. Wound care completed per order. Will continue with poc.

## 2025-04-02 NOTE — PROGRESS NOTES
Surgery follow-up    Patient had his palliative radiation today.  His sister said he was very tired after this.  According to the nurse the patient has started to have some colostomy output.  He is taking in some p.o. but not a lot as well as getting supplemental tube feeds.      When the patient first came in he stated he wanted to be a full code.  It has been explained to the patient from the get go that any treatment is palliative.  When I arrived this afternoon the sister was in the room and explained that they were going to comfort measures.  I explained to the sister exactly what this entailed and asked the patient if that is what he wanted.  I explained that instituting comfort measures while the patient was an inpatient would have the anticipated outcome of him not surviving more than a few days.    In speaking with the sister and patient, it is not.  That they understood that Mr. Donald would die within the next few days.I directly asked the patient if he were ready to die. He teared up and said he didn't know.    I believe that the patient and his sister are overwhelmed with everything that has happened since he has been in the hospital and trying to make a decision.  I do not feel that they fully understood what comfort measures meant.    I discussed this with Dr. Jon who stated that they will pause their plan to start comfort measures.  I recommended that myself, Dr. Jon, and palliative care reconvene together at the patient's bedside tomorrow to make sure that the patient understood his options and that he was not receiving conflicting information.  I explained to the patient our only goal was to carry out his wishes and not persuade him of a certain course of treatment.

## 2025-04-02 NOTE — THERAPY TREATMENT NOTE
Acute Care - Occupational Therapy Treatment Note   [facility]     Patient Name: Pradeep Donald  : 1950  MRN: [id number]  Today's Date: 2025  Onset of Illness/Injury or Date of Surgery: 25     Referring Physician: Dr. Monzon    Admit Date: 3/29/2025       ICD-10-CM ICD-9-CM   1. Failure to thrive in adult  R62.7 783.7   2. Diarrhea, unspecified type  R19.7 787.91   3. Lymphadenopathy  R59.1 785.6   4. Acute deep vein thrombosis (DVT) of femoral vein of left lower extremity  I82.412 453.41   5. Metastatic colon cancer to liver  C18.9 153.9    C78.7 197.7     Patient Active Problem List   Diagnosis    Metastatic colon cancer to liver    Severe protein-calorie malnutrition     Past Medical History:   Diagnosis Date    Kidney stones      Past Surgical History:   Procedure Laterality Date    COLOSTOMY N/A 3/29/2025    Procedure: COLOSTOMY LAPAROSCOPIC;  Surgeon: Matilde Monzon MD;  Location: [facility];  Service: General;  Laterality: N/A;    PEG TUBE INSERTION N/A 3/29/2025    Procedure: PERCUTANEOUS ENDOSCOPIC GASTROSTOMY TUBE INSERTION;  Surgeon: Matilde Monzon MD;  Location: [facility];  Service: General;  Laterality: N/A;    RECTAL MASS EXCISION N/A 3/29/2025    Procedure: RECTAL MASS EXCISION - biopsy of rectal mass;  Surgeon: Matilde Monzon MD;  Location: [facility];  Service: General;  Laterality: N/A;         OT ASSESSMENT FLOWSHEET (Last 12 Hours)       OT Evaluation and Treatment       Row Name 25 1149                   OT Time and Intention    Document Type therapy note (daily note)  -KR        Mode of Treatment occupational therapy  -KR        Patient Effort adequate  -KR        Symptoms Noted During/After Treatment fatigue  -KR           General Information    General Observations of Patient alert/cooperative  -KR           Bed Mobility    Bed Mobility bed mobility (all) activities  -KR        All Activities, [place] (Bed Mobility) maximum assist (25% patient effort)   -KR           Sit-Stand Transfer    Sit-Stand [place] (Transfers) moderate assist (50% patient effort)  -KR           Motor Skills    Therapeutic Exercise shoulder;elbow/forearm;hand  -KR           Shoulder (Therapeutic Exercise)    Shoulder (Therapeutic Exercise) AROM (active range of motion)  -KR        Shoulder AROM (Therapeutic Exercise) bilateral;flexion;extension  -KR           Elbow/Forearm (Therapeutic Exercise)    Elbow/Forearm (Therapeutic Exercise) AROM (active range of motion)  -KR        Elbow/Forearm AROM (Therapeutic Exercise) bilateral;flexion;extension  -KR           Hand (Therapeutic Exercise)    Hand (Therapeutic Exercise) AROM (active range of motion)  -KR        Hand AROM/AAROM (Therapeutic Exercise) bilateral;finger flexion;finger extension  -KR           Wound 03/29/25 1533 abdomen Surgical    Wound - Properties Group Placement Date: 03/29/25  -LT Placement Time: 1533  -LT Location: abdomen  -LT Primary Wound Type: Surgical  -LT, x2 trochar sites     Retired Wound - Properties Group Placement Date: 03/29/25  -LT Placement Time: 1533  -LT Location: abdomen  -LT    Retired Wound - Properties Group Placement Date: 03/29/25  -LT Placement Time: 1533  -LT Location: abdomen  -LT    Retired Wound - Properties Group Date first assessed: 03/29/25  -LT Time first assessed: 1533  -LT Location: abdomen  -LT       Wound 03/30/25 0955 Right posterior elbow Pressure Injury    Wound - Properties Group Placement Date: 03/30/25  -CF Placement Time: 0955  -CF Present on Original Admission: N  -CF Side: Right  -CF Orientation: posterior  -CF Location: elbow  -CF Primary Wound Type: Pressure Inj  -TW    Retired Wound - Properties Group Placement Date: 03/30/25  -CF Placement Time: 0955  -CF Present on Original Admission: N  -CF Side: Right  -CF Orientation: posterior  -CF Location: elbow  -CF    Retired Wound - Properties Group Placement Date: 03/30/25  -CF Placement Time: 0955  -CF Present on Original  Admission: N  -CF Side: Right  -CF Orientation: posterior  -CF Location: elbow  -CF    Retired Wound - Properties Group Date first assessed: 03/30/25  -CF Time first assessed: 0955  -CF Present on Original Admission: N  -CF Side: Right  -CF Location: elbow  -CF       Wound 03/30/25 1201 Right lower arm Infiltration/Extravasation    Wound - Properties Group Placement Date: 03/30/25  -CF Placement Time: 1201  -CF Side: Right  -CF Orientation: lower  -CF Location: arm  -CF Primary Wound Type: Infilt/Extra  -CF    Retired Wound - Properties Group Placement Date: 03/30/25  -CF Placement Time: 1201  -CF Side: Right  -CF Orientation: lower  -CF Location: arm  -CF    Retired Wound - Properties Group Placement Date: 03/30/25  -CF Placement Time: 1201  -CF Side: Right  -CF Orientation: lower  -CF Location: arm  -CF    Retired Wound - Properties Group Date first assessed: 03/30/25  -CF Time first assessed: 1201  -CF Side: Right  -CF Location: arm  -CF       Wound 03/30/25 1900 Right posterior greater trochanter Pressure Injury    Wound - Properties Group Placement Date: 03/30/25  -SL Placement Time: 1900  -SL Present on Original Admission: N  -SL Side: Right  -SL Orientation: posterior  -SL Location: greater trochanter  -SL Primary Wound Type: Pressure Inj  -SL, blister     Retired Wound - Properties Group Placement Date: 03/30/25  -SL Placement Time: 1900  -SL Present on Original Admission: N  -SL Side: Right  -SL Orientation: posterior  -SL Location: greater trochanter  -SL    Retired Wound - Properties Group Placement Date: 03/30/25  -SL Placement Time: 1900  -SL Present on Original Admission: N  -SL Side: Right  -SL Orientation: posterior  -SL Location: greater trochanter  -SL    Retired Wound - Properties Group Date first assessed: 03/30/25  -SL Time first assessed: 1900  -SL Present on Original Admission: N  -SL Side: Right  -SL Location: greater trochanter  -SL       Wound 03/30/25 1900 scrotum    Wound - Properties  Group Placement Date: 03/30/25  -SL Placement Time: 1900  -SL Present on Original Admission: Y  -SL Location: scrotum  -SL    Retired Wound - Properties Group Placement Date: 03/30/25  -SL Placement Time: 1900  -SL Present on Original Admission: Y  -SL Location: scrotum  -SL    Retired Wound - Properties Group Placement Date: 03/30/25  -SL Placement Time: 1900  -SL Present on Original Admission: Y  -SL Location: scrotum  -SL    Retired Wound - Properties Group Date first assessed: 03/30/25  -SL Time first assessed: 1900  -SL Present on Original Admission: Y  -SL Location: scrotum  -SL       Wound 03/31/25 1553 medial perirectal Atypical Malignant Ulcer    Wound - Properties Group Placement Date: 03/31/25  -TW Placement Time: 1553  -TW Present on Original Admission: Y  -TW Orientation: medial  -TW Location: perirectal  -TW Primary Wound Type: Atypical  -TW Secondary Wound Type - Atypical: Malignant Ul  -TW    Retired Wound - Properties Group Placement Date: 03/31/25  -TW Placement Time: 1553  -TW Present on Original Admission: Y  -TW Orientation: medial  -TW Location: perirectal  -TW    Retired Wound - Properties Group Placement Date: 03/31/25  -TW Placement Time: 1553  -TW Present on Original Admission: Y  -TW Orientation: medial  -TW Location: perirectal  -TW    Retired Wound - Properties Group Date first assessed: 03/31/25  -TW Time first assessed: 1553  -TW Present on Original Admission: Y  -TW Location: perirectal  -TW       Plan of Care Review    Plan of Care Reviewed With patient;family  -KR           Dressing Goal 1 (OT)    Progress/Outcome (Dressing Goal 1, OT) continuing progress toward goal  -KR            Activity Tolerance Goal 1 (OT)    Progress/Outcome (Activity Tolerance Goal 1, OT) continuing progress toward goal  -KR                  User Key  (r) = Recorded By, (t) = Taken By, (c) = Cosigned By      Initials Name Effective Dates    Erica Villa RN 06/16/21 -     KR Lonnie Cho, OT  06/16/21 -     LT Narcisa Baron, RN 06/16/21 -     SL Ofelia Hester RN 09/16/24 -     CF Naima Lemus RN 06/25/24 -                            OT Recommendation and Plan  Planned Therapy Interventions (OT): activity tolerance training, adaptive equipment training, BADL retraining  Plan of Care Review  Plan of Care Reviewed With: patient, family  Plan of Care Reviewed With: patient, family        Time Calculation:     Therapy Charges for Today       Code Description Service Date Service Provider Modifiers Qty    [id number]  OT EVAL HIGH COMPLEXITY 4 4/1/2025 Lonnie Cho, OT GO 1    [id number]  OT THERAPEUTIC ACT EA 15 MIN 4/2/2025 Lonnie Cho OT GO 1    [id number]  OT SELF CARE/MGMT/TRAIN EA 15 MIN 4/2/2025 Lonnie Cho OT GO 1                 Lonnie Cho OT  4/2/2025

## 2025-04-02 NOTE — PROGRESS NOTES
"Palliative Care Daily Progress Note     S: Medical record reviewed, followed up with Primary RN Nataliia and Dr Jon regarding patient's condition. When I saw Pradeep this morning he was awake alert and oriented. He denied pain at that time, he also denied nausea anxiety or shortness of breath.    When I saw him later this afternoon after radiation, he was still awake, however was drowsy, very weak, stating he was uncomfortable, his sister Kisha at bedside.      O:   Palliative Performance Scale Score:     /50 (BP Location: Right arm, Patient Position: Lying)   Pulse 64   Temp 97.4 °F (36.3 °C) (Oral)   Resp 20   Ht 190.5 cm (75\")   Wt 57.3 kg (126 lb 4 oz)   SpO2 98%   BMI 15.78 kg/m²     Intake/Output Summary (Last 24 hours) at 4/2/2025 1655  Last data filed at 4/2/2025 1430  Gross per 24 hour   Intake 1321 ml   Output 1300 ml   Net 21 ml       PE:  General Appearance:    Chronically ill appearing, alert, frail, cooperative, NAD   HEENT:    NC/AT, without obvious abnormality, EOMI, anicteric , dry MM    Neck:   supple, trachea midline, no JVD   Lungs:     Unlabored respirations, no wheezing, rhonchi or rales    Heart:    RRR, normal S1 and S2, no M/R/G   Abdomen:     Soft, lt lower quad tenderness, ND, NABS , abd concave   Extremities:   Moves all extremities with generalized weakness, no edema, cachetic   Pulses:   Pulses palpable and equal bilaterally   Skin:   Warm, dry and pale   Neurologic:   A/Ox3, cooperative   Psych:   Calm, pleasant         Meds: Reviewed and changes noted    Labs:   Results from last 7 days   Lab Units 04/02/25  0936 04/01/25  2316   WBC 10*3/mm3  --  11.93*   HEMOGLOBIN g/dL 8.0* 7.1*   HEMATOCRIT % 27.6* 24.4*   PLATELETS 10*3/mm3  --  264     Results from last 7 days   Lab Units 04/02/25  0936   SODIUM mmol/L 130*   POTASSIUM mmol/L 4.6   CHLORIDE mmol/L 100   CO2 mmol/L 26.4   BUN mg/dL 18   CREATININE mg/dL 1.02   GLUCOSE mg/dL 122*   CALCIUM mg/dL 7.9*     Results " "from last 7 days   Lab Units 04/02/25  0936   SODIUM mmol/L 130*   POTASSIUM mmol/L 4.6   CHLORIDE mmol/L 100   CO2 mmol/L 26.4   BUN mg/dL 18   CREATININE mg/dL 1.02   CALCIUM mg/dL 7.9*   BILIRUBIN mg/dL 0.2   ALK PHOS U/L 325*   ALT (SGPT) U/L 12   AST (SGOT) U/L 26   GLUCOSE mg/dL 122*     Imaging Results (Last 72 Hours)       ** No results found for the last 72 hours. **              Diagnostics: Reviewed    A: Pradeep Donald is a 74 y.o. male  admitted on 3/29/2025 for metastatic colon cancer to liver. The only history that he reports is kidney stones in the past. He hasn't seeked medical care in nearly a decade or longer. He took no medications at home other than his yearly flu shot. His girlfriend reports that he had had a \"bad fall\" a few weeks ago. He has been having rectal pain and bleeding for over a year now. He reports having to use a pedro-pad to contain the blood but never told his family. He reports weight loss, weakness, fatigue, and bloody diarrhea. He reports that upon arriving to the ED, he was having left sided abd pain/left groin pain. CT of abdomen with contrast showed rectal mass with extensive local disease involving the perineum.  Abscess or necrotic mass in the right perineum measured 3.3 x 3.8 cm.  There is also local invasion into the posterior aspect of the prostate gland, pelvic and retroperitoneal adenopathy.  Metastatic disease involving the left hepatic lobe and possibly the lung bases, Scrotal ultrasound showed large left hydrocele and lower extremity Doppler was positive for DVT in the left common and superficial femoral veins.       P:  I was able to speak with both Pradeep at bedside and his sister Kisha via phone to update and provide support this morning. At that time we were still awaiting path report.    I received a chat message from Nataliia OLIVEIRA this afternoon that family wanted me to come talk to them about comfort measures, I then called Dr Jon to ask him if he got " this message and if he wanted me to go discuss this with them. Dr Jon and I went to bedside together and spoke to Kisha separately as well as with Pradeep. 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 After lengthy discussion both Pradeep and his sister Kisha felt that he would not be able to tolerate withstand further radiation or initiation of chemotherapy. Dr Jon explained in detail what comfort measures was and that it was not our goal to just sedate him, however it was just to allow him to have prn medications as needed for pain, nausea, shortness of air, etc. We did suggest a very low dose of a morphine PCA 0.2 as Kisha states that Pradeep would never complain.  We also discussed that we would no longer poke him for labs, take him for procedures or further diagnostics. At that time both Pradeep and Kisha understood what comfort measures was and Pradeep acknowledged that comfort is what he wanted to do.  A short time later I discussed with Dr Jon that we were going to wait to initiate comfort measures until tomorrow when Surgery, hospitalist and palliative could all speak with Pradeep and his sister. I did let Haley in  know of this plan.      We will continue to follow along. Please do not hesitate to contact us regarding further sx mgmt or GOC needs, including after hours or on weekends via our on call provider at [phone].     Loly Gutierrez, APRN    4/2/2025

## 2025-04-02 NOTE — CASE MANAGEMENT/SOCIAL WORK
Discharge Planning Assessment    [facility]     Patient Name: Pradeep Donald  MRN: [id number]  Today's Date: 4/2/2025    Admit Date: 3/29/2025            Discharge Plan       Row Name 04/02/25 1512       Plan    Plan SS [facility] who states facility continues to review referral. SS to follow.                                     Continued Care and Services - Admitted Since 3/29/2025       Destination       Service Provider Request Status Services Address Phone Fax Patient Preferred    [facility] Pending - Request Sent -- [address] [place] [id number] [phone] --                  TOI Najera

## 2025-04-02 NOTE — PLAN OF CARE
Goal Outcome Evaluation:         Pt A/Ox4, pain meds given, refused scheduled oxycodone stating he just wanted to rest. Heparin confirmed to be therapeutic by patricia in pharmacy. Tube feed bag and tubing changed at 0120. Wound care completed per wound care nurse notes. Pt tolerated well, pain medication offered several time pt refused. Pt is resting in bed at this time without complaint. Stable on 2L, VSS, Poc ongoing

## 2025-04-02 NOTE — THERAPY TREATMENT NOTE
"Acute Care - Physical Therapy Treatment Note   [facility]     Patient Name: Pradeep Donald  : 1950  MRN: [id number]  Today's Date: 2025   Onset of Illness/Injury or Date of Surgery: 25  Visit Dx:     ICD-10-CM ICD-9-CM   1. Failure to thrive in adult  R62.7 783.7   2. Diarrhea, unspecified type  R19.7 787.91   3. Lymphadenopathy  R59.1 785.6   4. Acute deep vein thrombosis (DVT) of femoral vein of left lower extremity  I82.412 453.41   5. Metastatic colon cancer to liver  C18.9 153.9    C78.7 197.7     Patient Active Problem List   Diagnosis    Metastatic colon cancer to liver    Severe protein-calorie malnutrition     Past Medical History:   Diagnosis Date    Kidney stones      Past Surgical History:   Procedure Laterality Date    COLOSTOMY N/A 3/29/2025    Procedure: COLOSTOMY LAPAROSCOPIC;  Surgeon: Matilde Monzon MD;  Location: [facility];  Service: General;  Laterality: N/A;    PEG TUBE INSERTION N/A 3/29/2025    Procedure: PERCUTANEOUS ENDOSCOPIC GASTROSTOMY TUBE INSERTION;  Surgeon: Matilde Monzon MD;  Location: [facility];  Service: General;  Laterality: N/A;    RECTAL MASS EXCISION N/A 3/29/2025    Procedure: RECTAL MASS EXCISION - biopsy of rectal mass;  Surgeon: Matilde Monzon MD;  Location: [facility];  Service: General;  Laterality: N/A;     PT Assessment (Last 12 Hours)       PT Evaluation and Treatment       Row Name 25 1146          Physical Therapy Time and Intention    Subjective Information complains of;weakness;dizziness  -HC     Document Type therapy note (daily note)  -HC     Mode of Treatment physical therapy  -HC     Patient Effort good  -HC     Comment Pt and RN in agreement for PT. Pt was able to stand and take 3 side steps to HOB with RW and min A x 2. EOB exercises to tolerance. Bed mobility mod/max A.  -HC       Row Name 25 1146          General Information    Patient Profile Reviewed yes  -HC     Equipment Currently Used at Home --  use of \"tobacco " "stick\"/ cane or use of quad cane for mobility; states recent functional decline after falling.  Able to still live alone until this hospitalization.  -     Existing Precautions/Restrictions fall;oxygen therapy device and L/min  -       Row Name 04/02/25 1146          Living Environment    Primary Care Provided by self  -       Row Name 04/02/25 1146          Home Use of Assistive/Adaptive Equipment    Equipment Currently Used at Home cane, quad;cane, straight  -       Row Name 04/02/25 1146          Pain    Pain Side/Orientation generalized  abdominal, rectal  -       Row Name 04/02/25 1146          Cognition    Affect/Mental Status (Cognition) [facility]     Orientation Status (Cognition) oriented x 3  -     Follows Commands (Cognition) [facility]     Personal Safety Interventions fall prevention program maintained;gait belt;supervised activity  -       Row Name 04/02/25 1146          Bed Mobility    Bed Mobility rolling left;rolling right;supine-sit;scooting/bridging;sit-supine  -     Scooting/Bridging [place] (Bed Mobility) verbal cues;nonverbal cues (demo/gesture);maximum assist (25% patient effort);2 person assist  -     Supine-Sit [place] (Bed Mobility) verbal cues;nonverbal cues (demo/gesture);maximum assist (25% patient effort);moderate assist (50% patient effort)  -     Sit-Supine [place] (Bed Mobility) verbal cues;nonverbal cues (demo/gesture);maximum assist (25% patient effort)  -     Bed Mobility, Safety Issues decreased use of legs for bridging/pushing;decreased use of arms for pushing/pulling  -     Assistive Device (Bed Mobility) bed rails;repositioning sheet  -       Row Name 04/02/25 1146          Transfers    Transfers sit-stand transfer;stand-sit transfer  -       Row Name 04/02/25 1146          Sit-Stand Transfer    Sit-Stand [place] (Transfers) verbal cues;nonverbal cues (demo/gesture);2 person assist;minimum assist (75% patient effort)  -     " Assistive Device (Sit-Stand Transfers) walker, front-wheeled  -HC     Comment, (Sit-Stand Transfer) bed raised  -HC       Row Name 04/02/25 1146          Stand-Sit Transfer    Stand-Sit [place] (Transfers) verbal cues;nonverbal cues (demo/gesture);minimum assist (75% patient effort);2 person assist  -HC     Assistive Device (Stand-Sit Transfers) walker, front-wheeled  -HC       Row Name 04/02/25 1146          Gait/Stairs (Locomotion)    [place] Level (Gait) verbal cues;nonverbal cues (demo/gesture);minimum assist (75% patient effort);2 person assist  -HC     Assistive Device (Gait) walker, front-wheeled  -HC     Distance in Feet (Gait) 3  side steps  -HC     Pattern (Gait) step-to  -HC       Row Name 04/02/25 1146          Safety Issues/Impairments Affecting Functional Mobility    Impairments Affecting Function (Mobility) endurance/activity tolerance;balance;range of motion (ROM);strength;pain  -HC       Row Name 04/02/25 1146          Motor Skills    Therapeutic Exercise other (see comments)  Sitting: JANIS, March  -       Row Name             Wound 03/29/25 1533 abdomen Surgical    Wound - Properties Group Placement Date: 03/29/25  -LT Placement Time: 1533  -LT Location: abdomen  -LT Primary Wound Type: Surgical  -LT, x2 trochar sites     Retired Wound - Properties Group Placement Date: 03/29/25  -LT Placement Time: 1533  -LT Location: abdomen  -LT    Retired Wound - Properties Group Placement Date: 03/29/25  -LT Placement Time: 1533  -LT Location: abdomen  -LT    Retired Wound - Properties Group Date first assessed: 03/29/25  -LT Time first assessed: 1533  -LT Location: abdomen  -LT      Row Name             Wound 03/30/25 0955 Right posterior elbow Pressure Injury    Wound - Properties Group Placement Date: 03/30/25  -CF Placement Time: 0955  -CF Present on Original Admission: N  -CF Side: Right  -CF Orientation: posterior  -CF Location: elbow  -CF Primary Wound Type: Pressure Inj  -TW    Retired Wound  - Properties Group Placement Date: 03/30/25  -CF Placement Time: 0955  -CF Present on Original Admission: N  -CF Side: Right  -CF Orientation: posterior  -CF Location: elbow  -CF    Retired Wound - Properties Group Placement Date: 03/30/25  -CF Placement Time: 0955  -CF Present on Original Admission: N  -CF Side: Right  -CF Orientation: posterior  -CF Location: elbow  -CF    Retired Wound - Properties Group Date first assessed: 03/30/25  -CF Time first assessed: 0955  -CF Present on Original Admission: N  -CF Side: Right  -CF Location: elbow  -CF      Row Name             Wound 03/30/25 1201 Right lower arm Infiltration/Extravasation    Wound - Properties Group Placement Date: 03/30/25  -CF Placement Time: 1201  -CF Side: Right  -CF Orientation: lower  -CF Location: arm  -CF Primary Wound Type: Infilt/Extra  -CF    Retired Wound - Properties Group Placement Date: 03/30/25  -CF Placement Time: 1201  -CF Side: Right  -CF Orientation: lower  -CF Location: arm  -CF    Retired Wound - Properties Group Placement Date: 03/30/25  -CF Placement Time: 1201  -CF Side: Right  -CF Orientation: lower  -CF Location: arm  -CF    Retired Wound - Properties Group Date first assessed: 03/30/25  -CF Time first assessed: 1201  -CF Side: Right  -CF Location: arm  -CF      Row Name             Wound 03/30/25 1900 Right posterior greater trochanter Pressure Injury    Wound - Properties Group Placement Date: 03/30/25  -SL Placement Time: 1900  -SL Present on Original Admission: N  -SL Side: Right  -SL Orientation: posterior  -SL Location: greater trochanter  -SL Primary Wound Type: Pressure Inj  -SL, blister     Retired Wound - Properties Group Placement Date: 03/30/25  -SL Placement Time: 1900  -SL Present on Original Admission: N  -SL Side: Right  -SL Orientation: posterior  -SL Location: greater trochanter  -SL    Retired Wound - Properties Group Placement Date: 03/30/25  -SL Placement Time: 1900  -SL Present on Original Admission: N   -SL Side: Right  -SL Orientation: posterior  -SL Location: greater trochanter  -SL    Retired Wound - Properties Group Date first assessed: 03/30/25  -SL Time first assessed: 1900  -SL Present on Original Admission: N  -SL Side: Right  -SL Location: greater trochanter  -SL      Row Name             Wound 03/30/25 1900 scrotum    Wound - Properties Group Placement Date: 03/30/25  -SL Placement Time: 1900  -SL Present on Original Admission: Y  -SL Location: scrotum  -SL    Retired Wound - Properties Group Placement Date: 03/30/25  -SL Placement Time: 1900  -SL Present on Original Admission: Y  -SL Location: scrotum  -SL    Retired Wound - Properties Group Placement Date: 03/30/25  -SL Placement Time: 1900  -SL Present on Original Admission: Y  -SL Location: scrotum  -SL    Retired Wound - Properties Group Date first assessed: 03/30/25  -SL Time first assessed: 1900  -SL Present on Original Admission: Y  -SL Location: scrotum  -SL      Row Name             Wound 03/31/25 1553 medial perirectal Atypical Malignant Ulcer    Wound - Properties Group Placement Date: 03/31/25  -TW Placement Time: 1553  -TW Present on Original Admission: Y  -TW Orientation: medial  -TW Location: perirectal  -TW Primary Wound Type: Atypical  -TW Secondary Wound Type - Atypical: Malignant Ul  -TW    Retired Wound - Properties Group Placement Date: 03/31/25  -TW Placement Time: 1553  -TW Present on Original Admission: Y  -TW Orientation: medial  -TW Location: perirectal  -TW    Retired Wound - Properties Group Placement Date: 03/31/25  -TW Placement Time: 1553  -TW Present on Original Admission: Y  -TW Orientation: medial  -TW Location: perirectal  -TW    Retired Wound - Properties Group Date first assessed: 03/31/25  -TW Time first assessed: 1553  -TW Present on Original Admission: Y  -TW Location: perirectal  -TW      Row Name 04/02/25 1146          Coping    Observed Emotional State anxious;cooperative  -HC     Verbalized Emotional State  acceptance  -     Family/Support Persons family  -     Involvement in Care at bedside  -       Row Name 04/02/25 1146          Positioning and Restraints    Pre-Treatment Position in bed  -     Post Treatment Position bed  -HC     In Bed with nsg  -       Row Name 04/02/25 1146          Therapy Assessment/Plan (PT)    Rehab Potential (PT) good  -     Criteria for Skilled Interventions Met (PT) yes;meets criteria  -     Therapy Frequency (PT) 2 times/wk  1-5 times/ week per priority  -     Problem List (PT) problems related to;balance;mobility;strength  -     Activity Limitations Related to Problem List (PT) unable to ambulate safely;unable to transfer safely;BADLs not performed adequately or safely  -       Row Name 04/02/25 1146          Physical Therapy Goals    Bed Mobility Goal Selection (PT) bed mobility, PT goal 1  -     Transfer Goal Selection (PT) transfer, PT goal 1  -     Gait Training Goal Selection (PT) gait training, PT goal 1  -       Row Name 04/02/25 1146          Bed Mobility Goal 1 (PT)    Activity/Assistive Device (Bed Mobility Goal 1, PT) scooting;sit to supine;supine to sit  -     [place] Level/Cues Needed (Bed Mobility Goal 1, PT) minimum assist (75% or more patient effort)  -     Time Frame (Bed Mobility Goal 1, PT) by discharge  -       Row Name 04/02/25 1146          Transfer Goal 1 (PT)    Activity/Assistive Device (Transfer Goal 1, PT) sit-to-stand/stand-to-sit;bed-to-chair/chair-to-bed;toilet  -     [place] Level/Cues Needed (Transfer Goal 1, PT) contact guard required  -     Time Frame (Transfer Goal 1, PT) by discharge  -       Row Name 04/02/25 1146          Gait Training Goal 1 (PT)    Activity/Assistive Device (Gait Training Goal 1, PT) gait (walking locomotion);assistive device use;decrease fall risk;diminish gait deviation;improve balance and speed;increase endurance/gait distance;walker, rolling  -     [place] Level (Gait  Training Goal 1, PT) minimum assist (75% or more patient effort)  -HC     Distance (Gait Training Goal 1, PT) 30  -HC     Time Frame (Gait Training Goal 1, PT) by discharge  -HC               User Key  (r) = Recorded By, (t) = Taken By, (c) = Cosigned By      Initials Name Provider Type    TW Erica Jurado, RN Registered Nurse    Narcisa Hahn RN Registered Nurse    Ofelia Juan RN Registered Nurse    Cathi Duenas PTA Physical Therapist Assistant    Naima Rollins RN Registered Nurse                    Physical Therapy Education       Title: PT OT SLP Therapies (Done)       Topic: Physical Therapy (Done)       Point: Mobility training (Done)       Learning Progress Summary            Patient Acceptance, E,D, VU,NR by  at 4/1/2025 1129   Family Acceptance, E,D, VU,NR by  at 4/1/2025 1129                      Point: Home exercise program (Done)       Learning Progress Summary            Patient Acceptance, E,D, VU,NR by  at 4/1/2025 1129   Family Acceptance, E,D, VU,NR by  at 4/1/2025 1129                      Point: Body mechanics (Done)       Learning Progress Summary            Patient Acceptance, E,D, VU,NR by  at 4/1/2025 1129   Family Acceptance, E,D, VU,NR by  at 4/1/2025 1129                      Point: Precautions (Done)       Learning Progress Summary            Patient Acceptance, E,D, VU,NR by  at 4/1/2025 1129   Family Acceptance, E,D, VU,NR by  at 4/1/2025 1129                                      User Key       Initials Effective Dates Name Provider Type Replaced by [facility] 06/16/21 -  Haley Armstrong, PT Physical Therapist PT                  PT Recommendation and Plan  Anticipated Discharge Disposition (PT): inpatient rehabilitation facility (possible IRF candidate when medically stable)  Therapy Frequency (PT): 2 times/wk (1-5 times/ week per priority)          Time Calculation:    PT Charges       Row Name 04/02/25 1151             Time Calculation     PT Received On 04/02/25  -HC         Time Calculation- PT    Total Timed Code Minutes- PT 23 minute(s)  -                User Key  (r) = Recorded By, (t) = Taken By, (c) = Cosigned By      Initials Name Provider Type     Cathi Barrera, HEATHER Physical Therapist Assistant                  Therapy Charges for Today       Code Description Service Date Service Provider Modifiers Qty    [id number]  PT THER PROC EA 15 MIN 4/2/2025 Cathi Barrera, HEATHER GP, CQ 1    [id number] HC PT THERAPEUTIC ACT EA 15 MIN 4/2/2025 Cathi Barrera PTA GP, CQ 1            PT G-Codes  AM-PAC 6 Clicks Score (PT): 13    Cathi Barrera PTA  4/2/2025

## 2025-04-03 LAB
GLUCOSE BLDC GLUCOMTR-MCNC: 85 MG/DL (ref 70–130)
RAD ONC ARIA COURSE ID: NORMAL
RAD ONC ARIA COURSE INTENT: NORMAL
RAD ONC ARIA COURSE LAST TREATMENT DATE: NORMAL
RAD ONC ARIA COURSE START DATE: NORMAL
RAD ONC ARIA COURSE TREATMENT ELAPSED DAYS: 1
RAD ONC ARIA FIRST TREATMENT DATE: NORMAL
RAD ONC ARIA PLAN FRACTIONS TREATED TO DATE: 2
RAD ONC ARIA PLAN ID: NORMAL
RAD ONC ARIA PLAN PRESCRIBED DOSE PER FRACTION: 3 GY
RAD ONC ARIA PLAN PRIMARY REFERENCE POINT: NORMAL
RAD ONC ARIA PLAN TOTAL FRACTIONS PRESCRIBED: 10
RAD ONC ARIA PLAN TOTAL PRESCRIBED DOSE: 3000 CGY
RAD ONC ARIA REFERENCE POINT DOSAGE GIVEN TO DATE: 6 GY
RAD ONC ARIA REFERENCE POINT ID: NORMAL
RAD ONC ARIA REFERENCE POINT SESSION DOSAGE GIVEN: 3 GY

## 2025-04-03 PROCEDURE — 25010000002 MORPHINE PER 10 MG: Performed by: STUDENT IN AN ORGANIZED HEALTH CARE EDUCATION/TRAINING PROGRAM

## 2025-04-03 PROCEDURE — 25810000003 SODIUM CHLORIDE 0.9 % SOLUTION: Performed by: NURSE PRACTITIONER

## 2025-04-03 PROCEDURE — 77387 GUIDANCE FOR RADJ TX DLVR: CPT | Performed by: RADIOLOGY

## 2025-04-03 PROCEDURE — 99233 SBSQ HOSP IP/OBS HIGH 50: CPT | Performed by: STUDENT IN AN ORGANIZED HEALTH CARE EDUCATION/TRAINING PROGRAM

## 2025-04-03 PROCEDURE — 94799 UNLISTED PULMONARY SVC/PX: CPT

## 2025-04-03 PROCEDURE — G6002 STEREOSCOPIC X-RAY GUIDANCE: HCPCS | Performed by: RADIOLOGY

## 2025-04-03 PROCEDURE — 82948 REAGENT STRIP/BLOOD GLUCOSE: CPT

## 2025-04-03 PROCEDURE — 99233 SBSQ HOSP IP/OBS HIGH 50: CPT | Performed by: INTERNAL MEDICINE

## 2025-04-03 PROCEDURE — 77412 RADIATION TX DELIVERY LVL 3: CPT | Performed by: RADIOLOGY

## 2025-04-03 RX ORDER — FOLIC ACID 1 MG/1
1 TABLET ORAL DAILY
Status: DISCONTINUED | OUTPATIENT
Start: 2025-04-03 | End: 2025-04-21 | Stop reason: HOSPADM

## 2025-04-03 RX ORDER — MIDODRINE HYDROCHLORIDE 2.5 MG/1
5 TABLET ORAL
Status: DISCONTINUED | OUTPATIENT
Start: 2025-04-03 | End: 2025-04-15

## 2025-04-03 RX ADMIN — ACETAMINOPHEN 1000 MG: 500 TABLET ORAL at 15:37

## 2025-04-03 RX ADMIN — HYDROCORTISONE 2.5%: 25 CREAM TOPICAL at 08:58

## 2025-04-03 RX ADMIN — PANTOPRAZOLE SODIUM 40 MG: 40 INJECTION, POWDER, FOR SOLUTION INTRAVENOUS at 08:56

## 2025-04-03 RX ADMIN — HYDROCORTISONE 2.5%: 25 CREAM TOPICAL at 21:32

## 2025-04-03 RX ADMIN — APIXABAN 10 MG: 5 TABLET, FILM COATED ORAL at 21:30

## 2025-04-03 RX ADMIN — ACETAMINOPHEN 1000 MG: 500 TABLET ORAL at 08:57

## 2025-04-03 RX ADMIN — TRAMADOL HYDROCHLORIDE 50 MG: 50 TABLET, COATED ORAL at 15:37

## 2025-04-03 RX ADMIN — OXYCODONE HYDROCHLORIDE 10 MG: 10 TABLET, FILM COATED, EXTENDED RELEASE ORAL at 08:57

## 2025-04-03 RX ADMIN — HYDROXYZINE HYDROCHLORIDE 10 MG: 10 TABLET, FILM COATED ORAL at 21:30

## 2025-04-03 RX ADMIN — Medication 10 ML: at 21:32

## 2025-04-03 RX ADMIN — TRAMADOL HYDROCHLORIDE 50 MG: 50 TABLET, COATED ORAL at 08:57

## 2025-04-03 RX ADMIN — OXYCODONE HYDROCHLORIDE 10 MG: 10 TABLET, FILM COATED, EXTENDED RELEASE ORAL at 21:30

## 2025-04-03 RX ADMIN — MIDODRINE HYDROCHLORIDE 5 MG: 2.5 TABLET ORAL at 18:53

## 2025-04-03 RX ADMIN — SODIUM CHLORIDE 10 ML/HR: 9 INJECTION, SOLUTION INTRAVENOUS at 00:07

## 2025-04-03 RX ADMIN — SENNOSIDES AND DOCUSATE SODIUM 2 TABLET: 50; 8.6 TABLET ORAL at 08:57

## 2025-04-03 RX ADMIN — SENNOSIDES AND DOCUSATE SODIUM 2 TABLET: 50; 8.6 TABLET ORAL at 21:30

## 2025-04-03 RX ADMIN — PANTOPRAZOLE SODIUM 40 MG: 40 INJECTION, POWDER, FOR SOLUTION INTRAVENOUS at 17:37

## 2025-04-03 RX ADMIN — MORPHINE SULFATE 4 MG: 4 INJECTION, SOLUTION INTRAMUSCULAR; INTRAVENOUS at 14:31

## 2025-04-03 RX ADMIN — FOLIC ACID 1 MG: 1 TABLET ORAL at 09:02

## 2025-04-03 RX ADMIN — TRAMADOL HYDROCHLORIDE 50 MG: 50 TABLET, COATED ORAL at 21:30

## 2025-04-03 RX ADMIN — APIXABAN 10 MG: 5 TABLET, FILM COATED ORAL at 08:58

## 2025-04-03 NOTE — PLAN OF CARE
Goal Outcome Evaluation:            Pt VSS, wound care completed pt tolerated well. Did not ask for prn pain medicine when prompted. On 2L, heparin d/c, NS @10ml/hr, changed tube feed. No acute changes noted. Pt resting in bed without complaint att POC ongoing

## 2025-04-03 NOTE — PLAN OF CARE
Goal Outcome Evaluation:  Plan of Care Reviewed With: patient        Progress: no change  Outcome Evaluation: Patient resting in bed at this time with family at bedside. A&O. VSS on 2L NC, hypotension noted this AM, currently 102/52. Continous TF @20mL/hr via PEG. Colostomy bag is place LLQ, appliance changed this AM d/t leakage. Patient had a bath this shift, wound care completed per orders. Scheduled pain meds given per orders. Patient went down for radiation this shift, tolerated well with one time dose morphine prior to start of treatment. No requests or complaints at this time. Will continue with POC.

## 2025-04-03 NOTE — PROGRESS NOTES
Surgery follow-up    Patient seen at bedside with Dr. Jon, palliative care and family to outline treatment options.  Patient did not go to radiation today due to issues with discomfort.    Dr. Jon addressed all options, making it clear that it was the patient's choice and he is practitioners would try to carry out what ever he decides.    The point was made that radiation is his only palliative option and that if he is unable to tolerate this then comfort measures seems to be the obvious choice so as to not prolong suffering.  Patient did appear to understand this and wanted to try radiation again but with receiving more pain medication just prior so it would be tolerable.

## 2025-04-03 NOTE — PROGRESS NOTES
"Palliative Care Daily Progress Note     S: Medical record reviewed, followed up with Primary RN Sarah and Dr Jon regarding patient's condition. When I spoke with Pradeep this morning he was more awake than yesterday afternoon, did not appear as weak. He did say that he was having mild pain in his back, he denied nausea, or shortness of breath at this time. He was in calm pleasant mood with his sister and other family at bedside.      O:   Palliative Performance Scale Score:     /52   Pulse 79   Temp 98.1 °F (36.7 °C) (Oral)   Resp 20   Ht 190.5 cm (75\")   Wt 57.3 kg (126 lb 4 oz)   SpO2 96%   BMI 15.78 kg/m²     Intake/Output Summary (Last 24 hours) at 4/3/2025 1729  Last data filed at 4/3/2025 1300  Gross per 24 hour   Intake 1342 ml   Output 450 ml   Net 892 ml       PE:  General Appearance:    Chronically ill appearing, alert, frail, cooperative, NAD   HEENT:    NC/AT, without obvious abnormality, EOMI, anicteric , dry MM    Neck:   supple, trachea midline, no JVD   Lungs:     Unlabored respirations, no wheezing, rhonchi or rales    Heart:    RRR, normal S1 and S2, no M/R/G   Abdomen:     Soft, lt lower quad tenderness, ND, NABS , abd concave   Extremities:   Moves all extremities with generalized weakness, no edema, cachetic   Pulses:   Pulses palpable and equal bilaterally   Skin:   Warm, dry and pale   Neurologic:   A/Ox3, cooperative   Psych:   Calm, pleasant, tearful         Meds: Reviewed and changes noted    Labs:   Results from last 7 days   Lab Units 04/02/25  0936 04/01/25  2316   WBC 10*3/mm3  --  11.93*   HEMOGLOBIN g/dL 8.0* 7.1*   HEMATOCRIT % 27.6* 24.4*   PLATELETS 10*3/mm3  --  264     Results from last 7 days   Lab Units 04/02/25  0936   SODIUM mmol/L 130*   POTASSIUM mmol/L 4.6   CHLORIDE mmol/L 100   CO2 mmol/L 26.4   BUN mg/dL 18   CREATININE mg/dL 1.02   GLUCOSE mg/dL 122*   CALCIUM mg/dL 7.9*     Results from last 7 days   Lab Units 04/02/25  0936   SODIUM mmol/L 130* " "  POTASSIUM mmol/L 4.6   CHLORIDE mmol/L 100   CO2 mmol/L 26.4   BUN mg/dL 18   CREATININE mg/dL 1.02   CALCIUM mg/dL 7.9*   BILIRUBIN mg/dL 0.2   ALK PHOS U/L 325*   ALT (SGPT) U/L 12   AST (SGOT) U/L 26   GLUCOSE mg/dL 122*     Imaging Results (Last 72 Hours)       ** No results found for the last 72 hours. **              Diagnostics: Reviewed    A: Pradeep Donald is a 74 y.o. male admitted on 3/29/2025 for metastatic colon cancer to liver. The only history that he reports is kidney stones in the past. He hasn't seeked medical care in nearly a decade or longer. He took no medications at home other than his yearly flu shot. His girlfriend reports that he had had a \"bad fall\" a few weeks ago. He has been having rectal pain and bleeding for over a year now. He reports having to use a pedro-pad to contain the blood but never told his family. He reports weight loss, weakness, fatigue, and bloody diarrhea. He reports that upon arriving to the ED, he was having left sided abd pain/left groin pain. CT of abdomen with contrast showed rectal mass with extensive local disease involving the perineum. Abscess or necrotic mass in the right perineum measured 3.3 x 3.8 cm. There is also local invasion into the posterior aspect of the prostate gland, pelvic and retroperitoneal adenopathy. Metastatic disease involving the left hepatic lobe and possibly the lung bases, Scrotal ultrasound showed large left hydrocele and lower extremity Doppler was positive for DVT in the left common and superficial femoral veins.       P:  I was able to be present with Dr Jon and Na with patient, his sister Kisha and other family members to have a [facility] discussion. Again Dr Jon discussed options with Pradeep and stating that it his decision what he chooses. Also it was made clear that radiation was palliative/not curative and if he could not tolerate it we understood this and would recommend comfort measures. In the end Gurindermartha stated he " would be willing to try radiation again and Dr Jon stated he would order him some pain medication for just prior to going to radiation to help his discomfort. We will see how Pradeep does with next round of radiation and regroup to discuss with Pradeep and lucho how he wants to proceed, with continued radiation for 10 cycles or does he want to focus on comfort.       We will continue to follow along. Please do not hesitate to contact us regarding further sx mgmt or GOC needs, including after hours or on weekends via our on call provider at [phone].     Loly Gutierrez, APRN    4/3/2025

## 2025-04-03 NOTE — PROGRESS NOTES
Pradeep Donald  [id number]  1950  4/3/2025      Reason for Followup:   Anorectal mass  Weight loss    Patient Care Team:  Carmen Pretty APRN as PCP - General (Family Medicine)    Chief Complaint:  Anorectal mass        Subjective:    Patient is sitting up in bed in no acute distress. His sister and girlfriend are currently at bedside. He denies of any pain and reports having receiving radiation treatment which was difficult for him increasing his fatigue. Pathology returned and significant for squamous cell carcinoma.         Allergies:    Penicillins        Outpatient Medications:  No medications prior to admission.         Inpatient Medications:  Scheduled Meds:  acetaminophen, 1,000 mg, Per PEG Tube, TID  apixaban, 10 mg, Oral, Q12H   Followed by  [START ON 4/9/2025] apixaban, 5 mg, Oral, Q12H  Hydrocortisone (Perianal), , Rectal, BID  hydrOXYzine, 10 mg, Oral, Nightly  oxyCODONE, 10 mg, Oral, Q12H  pantoprazole, 40 mg, Intravenous, BID AC  senna-docusate sodium, 2 tablet, Oral, BID  sodium chloride, 10 mL, Intravenous, Q12H  traMADol, 50 mg, Oral, TID        Continuous Infusions:  sodium chloride, 10 mL/hr, Last Rate: 10 mL/hr (04/03/25 0007)        PRN Meds:    Lidocaine Viscous HCl    Morphine    ondansetron    oxyCODONE    senna-docusate sodium **AND** polyethylene glycol **AND** [DISCONTINUED] bisacodyl **AND** [DISCONTINUED] bisacodyl    sodium chloride    witch hazel-glycerin      Review of Systems:  A comprehensive 14-point review of systems performed.  Significant findings as mentioned above.  All other systems reviewed and are negative.      Physical Exam:  Vital Signs: These were reviewed and listed as per patient’s electronic medical chart  Vitals:    04/03/25 0700   BP: (!) 85/47   Pulse: 69   Resp: 20   Temp: 98.1 °F (36.7 °C)   SpO2: 95%     General: Awake, alert and oriented, chronically ill appearing, cachectic  HEENT: Head is atraumatic, normocephalic, extraocular movements full, oropharynx  clear, no scleral icterus, pink moist mucous membranes  Neck: supple, no jvd, lymphadenopathy or masses  Cardiovascular: regular rate and rhythm without murmurs, rubs or gallops  Pulmonary: non-labored, clear to auscultation bilaterally, no wheezing  Abdomen: soft, non-tender, non-distended, normal active bowel sounds present, no organomegaly  Extremities: No clubbing or cyanosis  Lymph: No cervical, supraclavicular adenopathy  Neurologic: Mental status as above, alert, awake and oriented, grossly non-focal exam  Skin: warm, dry, intact          Labs / Studies:  Lab Results (last 72 hours)       Procedure Component Value Units Date/Time    POC Glucose Once [[id number]]  (Normal) Collected: 25    Specimen: Blood Updated: 25     Glucose 85 mg/dL     TISSUE EXAM, [facility]) [[id number]] Collected: 25 165    Specimen: Tissue from Large Intestine, Rectum Updated: 25 1526     Reference Lab Report --     [facility] [id number] [place], [place]  [address]Pbiuw: [phone] or [phone]: [phone] Melgar M.D., Medical DirectorPATIENT NAME                           LABORATORY NO.[id number]  JACLYN DEL REAL                      [id number]                         AGE              SEX  SSN           CLIENT REF #[facility]              74      1950      [id number]   [id number] [facility] [address]                     REQUESTING M.D.     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  The neoplastic cells are positive for CK5/6,p40, EMA and p16.  The tumor cells are negative for EpCAM andsynaptophysin.  The immunohistochemical results support squamous cellcarcinoma.  MSI stains are not routinely performed on squamous cell carcinomainvolving rectum but can be performed on request, if clinically desired.Representative tumor blocks: A1, A2.CLINICAL HISTORY:Metastatic colon cancer to liverSPECIMENS RECEIVED:RECTAL BIOPSY , MASSMICROSCOPIC DESCRIPTION:Tissue blocks are prepared and slides are examined microscopically. Seediagnosis for details.The internal and external (both positive and negative) controls reactedappropriately. Some of our immunohistochemical and in situ hybridizationstudies are performed as analyte specific reagents. The following statementapplies to those tests: This test was   developed, and its performancecharacteristics determined by [facility]. It has not beencleared or approved by the US Food and Drug Administration. However, theA has determined that approval and clearance are not necessary.Professional interpretation rendered by Mary Santamaria M.D., F.C.A.P. at[facility], [facility], [address] [place], [place] [address].GROSS DESCRIPTION:Labeled \"rectal mass biopsy\".  Consists of 2 pieces of tan soft tissue ranging insize from 1.5 x 0.6 to 0.4 cm to 1.8 x 1.1 x 0.5 cm, submitted entirely in 3blocks, with the larger piece serially sectioned and submitted sequentially inblocks A1 and A2 and the smaller piece serially sectioned and submitted inblock A3.  JPREVIEWED, DIAGNOSED AND ELECTRONICALLYSIGNED BY:Mary Santamaria M.D., F.C.A.P.CPT CODES:  70920, 88341x5, 75560    POC Glucose Once [[id number]]  (Abnormal) Collected: 04/02/25 1201    Specimen: Blood Updated: 04/02/25 1207     Glucose 288 mg/dL     Comprehensive Metabolic Panel [[id number]]  (Abnormal) Collected: 04/02/25 0936    " Specimen: Blood Updated: 04/02/25 1014     Glucose 122 mg/dL      BUN 18 mg/dL      Creatinine 1.02 mg/dL      Sodium 130 mmol/L      Potassium 4.6 mmol/L      Chloride 100 mmol/L      CO2 26.4 mmol/L      Calcium 7.9 mg/dL      Total Protein 5.3 g/dL      Albumin 1.6 g/dL      ALT (SGPT) 12 U/L      AST (SGOT) 26 U/L      Alkaline Phosphatase 325 U/L      Total Bilirubin 0.2 mg/dL      Globulin 3.7 gm/dL      A/G Ratio 0.4 g/dL      BUN/Creatinine Ratio 17.6     Anion Gap 3.6 mmol/L      eGFR 76.2 mL/min/1.73     Narrative:      GFR Categories in Chronic Kidney Disease (CKD)      GFR Category          GFR (mL/min/1.73)    Interpretation  G1                     90 or greater         Normal or high (1)  G2                      60-89                Mild decrease (1)  G3a                   45-59                Mild to moderate decrease  G3b                   30-44                Moderate to severe decrease  G4                    15-29                Severe decrease  G5                    14 or less           Kidney failure          (1)In the absence of evidence of kidney disease, neither GFR category G1 or G2 fulfill the criteria for CKD.    eGFR calculation 2021 CKD-EPI creatinine equation, which does not include race as a factor    Hemoglobin & Hematocrit, Blood [[id number]]  (Abnormal) Collected: 04/02/25 0936    Specimen: Blood Updated: 04/02/25 0942     Hemoglobin 8.0 g/dL      Hematocrit 27.6 %     Heparin Anti-Xa [[id number]]  (Normal) Collected: 04/02/25 0736    Specimen: Blood Updated: 04/02/25 0811     Heparin Anti-Xa (UFH) 0.31 IU/ml     POC Glucose Once [[id number]]  (Normal) Collected: 04/02/25 0620    Specimen: Blood Updated: 04/02/25 0626     Glucose 92 mg/dL     POC Glucose Once [[id number]]  (Normal) Collected: 04/02/25 0057    Specimen: Blood Updated: 04/02/25 0103     Glucose 118 mg/dL     Heparin Anti-Xa [[id number]]  (Normal) Collected: 04/01/25 2316    Specimen: Blood Updated: 04/01/25 2331     Heparin  Anti-Xa (UFH) 0.34 IU/ml     CBC & Differential [[id number]]  (Abnormal) Collected: 04/01/25 2316    Specimen: Blood Updated: 04/01/25 2321    Narrative:      The following orders were created for panel order CBC & Differential.  Procedure                               Abnormality         Status                     ---------                               -----------         ------                     CBC Auto Differential[[id number]]        Abnormal            Final result                 Please view results for these tests on the individual orders.    CBC Auto Differential [[id number]]  (Abnormal) Collected: 04/01/25 2316    Specimen: Blood Updated: 04/01/25 2321     WBC 11.93 10*3/mm3      RBC 2.91 10*6/mm3      Hemoglobin 7.1 g/dL      Hematocrit 24.4 %      MCV 83.8 fL      MCH 24.4 pg      MCHC 29.1 g/dL      RDW 15.3 %      RDW-SD 46.7 fl      MPV 8.7 fL      Platelets 264 10*3/mm3      Neutrophil % 64.1 %      Lymphocyte % 18.7 %      Monocyte % 11.1 %      Eosinophil % 4.2 %      Basophil % 0.7 %      Immature Grans % 1.2 %      Neutrophils, Absolute 7.66 10*3/mm3      Lymphocytes, Absolute 2.23 10*3/mm3      Monocytes, Absolute 1.32 10*3/mm3      Eosinophils, Absolute 0.50 10*3/mm3      Basophils, Absolute 0.08 10*3/mm3      Immature Grans, Absolute 0.14 10*3/mm3      nRBC 0.0 /100 WBC     POC Glucose Once [[id number]]  (Abnormal) Collected: 04/01/25 1828    Specimen: Blood Updated: 04/01/25 1833     Glucose 189 mg/dL     Heparin Anti-Xa [[id number]]  (Abnormal) Collected: 04/01/25 1614    Specimen: Blood Updated: 04/01/25 1659     Heparin Anti-Xa (UFH) 0.20 IU/ml     POC Glucose Once [[id number]]  (Normal) Collected: 04/01/25 1219    Specimen: Blood Updated: 04/01/25 1225     Glucose 99 mg/dL     Heparin Anti-Xa [[id number]]  (Normal) Collected: 04/01/25 0914    Specimen: Blood Updated: 04/01/25 0929     Heparin Anti-Xa (UFH) 0.38 IU/ml     POC Glucose Once [[id number]]  (Normal) Collected: 04/01/25 0639     Specimen: Blood Updated: 04/01/25 0645     Glucose 85 mg/dL     Heparin Anti-Xa [[id number]]  (Abnormal) Collected: 04/01/25 0202    Specimen: Blood Updated: 04/01/25 0246     Heparin Anti-Xa (UFH) 0.25 IU/ml     Comprehensive Metabolic Panel [[id number]]  (Abnormal) Collected: 04/01/25 0104    Specimen: Blood Updated: 04/01/25 0141     Glucose 88 mg/dL      BUN 20 mg/dL      Creatinine 1.12 mg/dL      Sodium 133 mmol/L      Potassium 4.6 mmol/L      Chloride 105 mmol/L      CO2 24.7 mmol/L      Calcium 7.8 mg/dL      Total Protein 5.1 g/dL      Albumin 1.5 g/dL      ALT (SGPT) 7 U/L      AST (SGOT) 20 U/L      Alkaline Phosphatase 270 U/L      Total Bilirubin 0.2 mg/dL      Globulin 3.6 gm/dL      A/G Ratio 0.4 g/dL      BUN/Creatinine Ratio 17.9     Anion Gap 3.3 mmol/L      eGFR 68.1 mL/min/1.73     Narrative:      GFR Categories in Chronic Kidney Disease (CKD)      GFR Category          GFR (mL/min/1.73)    Interpretation  G1                     90 or greater         Normal or high (1)  G2                      60-89                Mild decrease (1)  G3a                   45-59                Mild to moderate decrease  G3b                   30-44                Moderate to severe decrease  G4                    15-29                Severe decrease  G5                    14 or less           Kidney failure          (1)In the absence of evidence of kidney disease, neither GFR category G1 or G2 fulfill the criteria for CKD.    eGFR calculation 2021 CKD-EPI creatinine equation, which does not include race as a factor    Protime-INR [[id number]]  (Abnormal) Collected: 04/01/25 0104    Specimen: Blood Updated: 04/01/25 0126     Protime 16.6 Seconds      INR 1.33    Narrative:      Suggested INR therapeutic range for stable oral anticoagulant therapy:    Low Intensity therapy:   1.5-2.0  Moderate Intensity therapy:   2.0-3.0  High Intensity therapy:   2.5-4.0    CBC & Differential [[id number]]  (Abnormal) Collected:  04/01/25 0104    Specimen: Blood Updated: 04/01/25 0125    Narrative:      The following orders were created for panel order CBC & Differential.  Procedure                               Abnormality         Status                     ---------                               -----------         ------                     CBC Auto Differential[[id number]]        Abnormal            Final result                 Please view results for these tests on the individual orders.    CBC Auto Differential [[id number]]  (Abnormal) Collected: 04/01/25 0104    Specimen: Blood Updated: 04/01/25 0125     WBC 12.02 10*3/mm3      RBC 2.97 10*6/mm3      Hemoglobin 7.2 g/dL      Hematocrit 25.3 %      MCV 85.2 fL      MCH 24.2 pg      MCHC 28.5 g/dL      RDW 15.6 %      RDW-SD 48.4 fl      MPV 8.5 fL      Platelets 299 10*3/mm3      Neutrophil % 64.1 %      Lymphocyte % 19.9 %      Monocyte % 11.1 %      Eosinophil % 3.3 %      Basophil % 0.7 %      Immature Grans % 0.9 %      Neutrophils, Absolute 7.70 10*3/mm3      Lymphocytes, Absolute 2.39 10*3/mm3      Monocytes, Absolute 1.33 10*3/mm3      Eosinophils, Absolute 0.40 10*3/mm3      Basophils, Absolute 0.09 10*3/mm3      Immature Grans, Absolute 0.11 10*3/mm3      nRBC 0.0 /100 WBC     POC Glucose Once [[id number]]  (Normal) Collected: 04/01/25 0015    Specimen: Blood Updated: 04/01/25 0021     Glucose 103 mg/dL     Heparin Anti-Xa [[id number]]  (Abnormal) Collected: 03/31/25 1543    Specimen: Blood Updated: 03/31/25 1636     Heparin Anti-Xa (UFH) <0.10 IU/ml     POC Glucose Once [[id number]]  (Normal) Collected: 03/31/25 1626    Specimen: Blood Updated: 03/31/25 1635     Glucose 112 mg/dL     Hemoglobin & Hematocrit, Blood [[id number]]  (Abnormal) Collected: 03/31/25 1221    Specimen: Blood Updated: 03/31/25 1223     Hemoglobin 7.6 g/dL      Hematocrit 25.9 %     POC Glucose Once [[id number]]  (Normal) Collected: 03/31/25 1114    Specimen: Blood Updated: 03/31/25 1121     Glucose 101 mg/dL      Heparin Anti-Xa [[id number]]  (Abnormal) Collected: 03/31/25 0904    Specimen: Blood Updated: 03/31/25 0928     Heparin Anti-Xa (UFH) <0.10 IU/ml     Protime-INR [[id number]]  (Abnormal) Collected: 03/31/25 0904    Specimen: Blood Updated: 03/31/25 0927     Protime 16.0 Seconds      INR 1.26    Narrative:      Suggested INR therapeutic range for stable oral anticoagulant therapy:    Low Intensity therapy:   1.5-2.0  Moderate Intensity therapy:   2.0-3.0  High Intensity therapy:   2.5-4.0    aPTT [[id number]]  (Abnormal) Collected: 03/31/25 0904    Specimen: Blood Updated: 03/31/25 0927     PTT 36.0 seconds     Narrative:      PTT Heparin Therapeutic Range:  45 - 116 seconds                Imaging Results (Last 72 Hours)       ** No results found for the last 72 hours. **                  Assessment & Plan:  Pradeep Donald is a 74 y.o. year-old male presenting with rectal bleeding and obstructing rectal mass currently consulted for     Metastatic squamous anal cancer  -   Given locally advanced obstructive cancer patient had to undergo surgical diversion and diverting colostomy was performed by Dr. Monzon on 3/29/25.  -  He has invasion locally into the prostate and perineum as well as evidence of retroperitoneal LAD, bulky L inguinal LAD, and liver metastasis along with tiny lung nodules which may also represent metastatic disease versus granulomatous disease.    -  Pathology significant for an invasive squamous cell carcinoma.  -  Given metastatic disease, he understands that any treatment would not be curative and would be for palliation only.   - Dr. Poon with radiation medicine was consulted for consideration of palliative radiation to reduce tumor bulk at the primary rectal/anal canal site, to reduce inguinal adenopathy that was felt to be causing lower extremity swelling, and to help relieve pain. Unfortunately patient reports that he had difficulty tolerating his first treatment of radiation and did not go  down to radiation today.  - Patient continues to be quite debilitated and unfortunately the risks of chemotherapy would likely outweigh any potential benefit and would cause more harm. I had a long goals of care discussion with the patient and his sister and family who were at bedside and recommended focusing on symptom management ideally with hospice unless patient should significantly improve.      Left lower extremity DVT  - Patient currently on Eliquis. Likely related to bulky adenopathy     Normocytic anemia   - B12 level is normal, folate level is borderline therefore he was started on folic acid 1 mg daily. If patient does want to proceed with treatment would continue. However this was discontinued as patient had chosen comfort measure.   - Patient also likely has a iron deficiency anemia along with anemia of chronic disease and was started on Ferrlecit and was given four doses prior to it being discontinued.  - Would recommend PRBC transfusion for Hb <7 or if symptomatic <8.     Difficulty voiding  - Invasion of the prostate from the anorectal mass which is likely contributing to obstructive uropathy.   - Walsh catheter was placed by Dr. Monzon under anesthesia.      Thank you for allowing us to participate in Mr. Donald's care, please do not hesitate to call should any questions arise and will continue to follow along. Patient's care was discussed with Dr. Jon.     I spent 50 minutes caring for patient on this date of service. This time includes time spent by me in the following activities: preparing for the visit, reviewing tests, obtaining and/or reviewing a separately obtained history, performing a medically appropriate examination and/or evaluation, counseling and educating the patient/family/caregiver, ordering medications, tests, or procedures, documenting information in the medical record, independently interpreting results and communicating that information with the patient/family/caregiver,  and care coordination as well as answering any questions.        Fouzia Weems MD  04/03/25  08:24 EDT

## 2025-04-03 NOTE — PROGRESS NOTES
[facility] HOSPITALIST PROGRESS NOTE     Patient Identification:  Name:  Pradeep Donald  Age:  74 y.o.  Sex:  male  :  1950  MRN:  [id number]  Visit Number:  [id number]  ROOM: [address] [place] [address]     Primary Care Provider:  Carmen Pretty APRN    Length of stay in inpatient status:  5    Subjective     Chief Compliant:    Chief Complaint   Patient presents with    Leg Swelling    Hemorrhoids       History of Presenting Illness:    Patient seen in follow-up for large rectal mass status post resection, ostomy and DVT.  Patient is awake, alert and oriented x 3.  Sister and family at bedside.  He initially declined radiation today however on further discussion, he is willing to try 1 more time, will give IV pain medicines prior.  A long discussion regarding goals of care again at bedside today.    Objective     Current Hospital Meds:acetaminophen, 1,000 mg, Per PEG Tube, TID  apixaban, 10 mg, Oral, Q12H   Followed by  [START ON 2025] apixaban, 5 mg, Oral, Q12H  folic acid, 1 mg, Oral, Daily  Hydrocortisone (Perianal), , Rectal, BID  hydrOXYzine, 10 mg, Oral, Nightly  oxyCODONE, 10 mg, Oral, Q12H  pantoprazole, 40 mg, Intravenous, BID AC  senna-docusate sodium, 2 tablet, Oral, BID  sodium chloride, 10 mL, Intravenous, Q12H  traMADol, 50 mg, Oral, TID    sodium chloride, 10 mL/hr, Last Rate: 10 mL/hr (25 0007)        Current Antimicrobial Therapy:  Anti-Infectives (From admission, onward)      Ordered     Dose/Rate Route Frequency Start Stop    25 1442  ceFAZolin 2000 mg IVPB in 100 mL NS (VTB)  Status:  Discontinued        Ordering Provider: Matilde Monzon MD    2,000 mg  over 30 Minutes Intravenous On Call to O.R. 25 0600 25 1718    25 1146  cefepime 1000 mg IVPB in 100 mL NS (VTB)  Status:  Discontinued        Ordering Provider: Matilde Monzon MD    1,000 mg  over 4 Hours Intravenous Every 12 Hours 25 2100 25 20225 1146  cefepime 1000 mg IVPB  in 100 mL NS (VTB)        Ordering Provider: Jayden Barney MD    1,000 mg  over 30 Minutes Intravenous Once 03/29/25 1245 03/29/25 1402    03/29/25 0613  cefepime 2000 mg IVPB in 100 mL NS (VTB)        Ordering Provider: Óscar Rodriguez MD    2,000 mg  over 30 Minutes Intravenous Once 03/29/25 0629 03/29/25 0700    03/29/25 0613  vancomycin (VANCOCIN) 1,000 mg in sodium chloride 0.9 % 250 mL IVPB-VTB        Ordering Provider: Óscar Rodriguez MD    20 mg/kg × 49.9 kg  250 mL/hr over 60 Minutes Intravenous Once 03/29/25 0629 03/29/25 0802          Current Diuretic Therapy:  Diuretics (From admission, onward)      None          ----------------------------------------------------------------------------------------------------------------------  Vital Signs:  Temp:  [98.1 °F (36.7 °C)-98.2 °F (36.8 °C)] 98.1 °F (36.7 °C)  Heart Rate:  [69-79] 79  Resp:  [18-20] 20  BP: ()/(47-52) 102/52  SpO2:  [95 %-96 %] 96 %  on  Flow (L/min) (Oxygen Therapy):  [2] 2;   Device (Oxygen Therapy): nasal cannula  Body mass index is 15.78 kg/m².    Wt Readings from Last 3 Encounters:   03/29/25 57.3 kg (126 lb 4 oz)   06/27/22 79.4 kg (175 lb)   01/07/18 83.9 kg (185 lb)     Intake & Output (last 3 days)         03/29 0701 03/30 0700 03/30 0701 03/31 0700 03/31 0701  04/01 0700    P.O. 60 1680 360    I.V. (mL/kg) 1000 (17.5)  1084 (18.9)    Blood   315.8    Other  110 80    NG/GT  83 130    IV Piggyback   200    Total Intake(mL/kg) 1060 (18.5) 1873 (32.7) 2169.8 (37.9)    Urine (mL/kg/hr) 90 (0.1) 2050 (1.5) 750 (1)    Total Output 90 2050 750    Net +970 -177 +1419.8           Urine Unmeasured Occurrence 1 x            Diet: Regular/House; Texture: Soft to Chew (NDD 3); Soft to Chew: Chopped Meat; Fluid Consistency: Thin (IDDSI 0)  ----------------------------------------------------------------------------------------------------------------------  Physical exam:  Constitutional: Elderly male, appears cachectic,  resting comfortably in bed, no acute distress.      HENT:  Head:  Normocephalic and atraumatic.  Mouth:  Moist mucous membranes.  Eyes:  Conjunctivae and EOM are normal. No scleral icterus.   Cardiovascular:  Normal rate, regular rhythm and normal heart sounds with no murmur. No JVD.   Pulmonary/Chest:  No respiratory distress, no wheezes, no crackles, with normal breath sounds and good air movement. Unlabored. No accessory muscle use.  Abdominal:  Soft. No distension and no tenderness.  Bowel sounds present. No rebound or guarding.  PEG tube with tube feeds.  Incision sites well-healed.  Musculoskeletal: Cachectic.  No tenderness, and no deformity.  No red or swollen joints anywhere.    Neurological:  Alert and oriented to person, place, and time.  No cranial nerve deficit.   Nonfocal.   Skin:  Skin is warm and dry. No rash noted. No pallor.   Peripheral vascular:  No clubbing, no cyanosis, no edema. Pedal and tibial pulses 2 out of 4 bilaterally.     ----------------------------------------------------------------------------------------------------------------------  Results from last 7 days   Lab Units 04/02/25  0936 04/01/25  2316 04/01/25  0104 03/31/25  1221 03/31/25  0904 03/31/25  0204 03/30/25  0150 03/29/25  1944 03/29/25  0800 03/29/25  0434   LACTATE mmol/L  --   --   --   --   --   --   --   --  1.8 2.1*   WBC 10*3/mm3  --  11.93* 12.02*  --   --  11.84*   < > 12.56*  --  12.85*   HEMOGLOBIN g/dL 8.0* 7.1* 7.2*   < >  --  6.8*   < > 7.9*  --  8.5*   HEMATOCRIT % 27.6* 24.4* 25.3*   < >  --  23.8*   < > 29.2*  --  29.5*   MCV fL  --  83.8 85.2  --   --  84.7   < > 88.2  --  82.9   MCHC g/dL  --  29.1* 28.5*  --   --  28.6*   < > 27.1*  --  28.8*   PLATELETS 10*3/mm3  --  264 299  --   --  343   < > 333  --  309   INR   --   --  1.33*  --  1.26*  --   --  1.24*  --   --     < > = values in this interval not displayed.         Results from last 7 days   Lab Units 04/02/25  0936 04/01/25  0104  "03/31/25  0118 03/30/25  0150 03/29/25  1944 03/29/25 0434   SODIUM mmol/L 130* 133*  --   --   --  133*   POTASSIUM mmol/L 4.6 4.6  --   --   --  4.1   MAGNESIUM mg/dL  --   --  2.1 2.1 2.0 2.2   CHLORIDE mmol/L 100 105  --   --   --  101   CO2 mmol/L 26.4 24.7  --   --   --  22.2   BUN mg/dL 18 20  --   --   --  21   CREATININE mg/dL 1.02 1.12  --   --   --  1.14   CALCIUM mg/dL 7.9* 7.8*  --   --   --  8.7   PHOSPHORUS mg/dL  --   --  4.5 4.0 3.3  --    GLUCOSE mg/dL 122* 88  --   --   --  106*   ALBUMIN g/dL 1.6* 1.5*  --   --   --  2.2*   BILIRUBIN mg/dL 0.2 0.2  --   --   --  0.4   ALK PHOS U/L 325* 270*  --   --   --  316*   AST (SGOT) U/L 26 20  --   --   --  20   ALT (SGPT) U/L 12 7  --   --   --  11   Estimated Creatinine Clearance: 49.9 mL/min (by C-G formula based on SCr of 1.02 mg/dL).  No results found for: \"AMMONIA\"  Results from last 7 days   Lab Units 03/29/25  0636 03/29/25 0434   CK TOTAL U/L  --  25   HSTROP T ng/L 41* 44*     Results from last 7 days   Lab Units 03/29/25 0434   PROBNP pg/mL 975.2         Glucose   Date/Time Value Ref Range Status   04/03/2025 0618 85 70 - 130 mg/dL Final   04/02/2025 1201 288 (H) 70 - 130 mg/dL Final   04/02/2025 0620 92 70 - 130 mg/dL Final   04/02/2025 0057 118 70 - 130 mg/dL Final   04/01/2025 1828 189 (H) 70 - 130 mg/dL Final   04/01/2025 1219 99 70 - 130 mg/dL Final   04/01/2025 0639 85 70 - 130 mg/dL Final   04/01/2025 0015 103 70 - 130 mg/dL Final     Lab Results   Component Value Date    TSH 5.270 (H) 03/29/2025    FREET4 1.02 03/29/2025     No results found for: \"PREGTESTUR\", \"PREGSERUM\", \"HCG\", \"HCGQUANT\"  Pain Management Panel           No data to display              Brief Urine Lab Results       None          No results found for: \"BLOODCX\"  No results found for: \"URINECX\"  No results found for: \"WOUNDCX\"  No results found for: \"STOOLCX\"  No results found for: \"RESPCX\"  No results found for: \"AFBCX\"  Results from last 7 days   Lab Units " 03/29/25  0800 03/29/25  0434   LACTATE mmol/L 1.8 2.1*       I have personally looked at the labs and they are summarized above.  ----------------------------------------------------------------------------------------------------------------------  Detailed radiology reports for the last 24 hours:  Imaging Results (Last 24 Hours)       ** No results found for the last 24 hours. **          Assessment & Plan      Patient is a 74-year-old male with no known significant medical problems who presented to the ER with reports of bright red blood per rectum, weight loss and poor appetite.    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  --CT abdomen/pelvis with contrast noted rectal mass with extensive local disease into the peritoneum measuring 3.3 x 3.8 cm with local invasion into the posterior aspect of the prostate gland with pelvic and RP adenopathy and possible metastatic disease involving the liver  --Venous Dopplers noted DVT in the left common/superficial femoral veins  --Status post biopsy of rectal mass/colostomy/PEG  --Pathology noted invasive squamous cell carcinoma  --After much discussion today at the bedside, patient has decided to try radiation again, will give morphine pretreatment for pain tolerance.  Will reevaluate as needed and continue goals of care, he understands that radiation is only palliative.  If he is unable to tolerate XRT, then would discuss further regarding comfort measures as he is not a candidate for chemotherapy  --continue scheduled Tylenol, tramadol, new MS Contin twice daily  --continue p.o. midodrine 3 times daily  --Continue Eliquis starter pack  --Radiation oncology following, undergoing inpatient radiation  --Heme/onc following, appreciate  recommendation  --Surgery following appreciate assistance    #Acute on chronic blood loss anemia  #Possible gastritis/UGIB  --received 1 unit PRBC thus far  --PPI twice daily  --Transfuse to hemoglobin greater than 7 or symptomatic  --IV iron daily x 3 days  --Surgery following per above    #Oral candidiasis  --nystatin swish and spit    #Goals of care  #Severe malnutrition  #Failure to thrive  --PEG placed with supplemental tube feeds  --Palliative care following, appreciate assistance    Copied portions of this report have been reviewed and are accurate as of 04/03/25     CHECKLIST:  Abx: None  VTE: Eliquis  GI ppx: PPI twice daily  Diet: Modified, tube feeds  Code: CPR, full  Dispo: Patient is medically stable, was able to tolerate XRT today.  Will reevaluate on a day by day basis and revisit plan of care accordingly.    Quintin oJn DO  [facility] Hospitalist  04/03/25  17:51 EDT

## 2025-04-04 ENCOUNTER — APPOINTMENT (OUTPATIENT)
Dept: GENERAL RADIOLOGY | Facility: HOSPITAL | Age: 75
DRG: 329 | End: 2025-04-04
Payer: MEDICARE

## 2025-04-04 LAB
ANION GAP SERPL CALCULATED.3IONS-SCNC: 3.5 MMOL/L (ref 5–15)
ANISOCYTOSIS BLD QL: ABNORMAL
BUN SERPL-MCNC: 17 MG/DL (ref 8–23)
BUN/CREAT SERPL: 22.4 (ref 7–25)
CALCIUM SPEC-SCNC: 7.7 MG/DL (ref 8.6–10.5)
CHLORIDE SERPL-SCNC: 98 MMOL/L (ref 98–107)
CO2 SERPL-SCNC: 25.5 MMOL/L (ref 22–29)
CREAT SERPL-MCNC: 0.76 MG/DL (ref 0.76–1.27)
DEPRECATED RDW RBC AUTO: 49.3 FL (ref 37–54)
EGFRCR SERPLBLD CKD-EPI 2021: 93.2 ML/MIN/1.73
ELLIPTOCYTES BLD QL SMEAR: ABNORMAL
EOSINOPHIL # BLD MANUAL: 0.17 10*3/MM3 (ref 0–0.4)
EOSINOPHIL NFR BLD MANUAL: 1 % (ref 0.3–6.2)
ERYTHROCYTE [DISTWIDTH] IN BLOOD BY AUTOMATED COUNT: 15.9 % (ref 12.3–15.4)
GLUCOSE BLDC GLUCOMTR-MCNC: 90 MG/DL (ref 70–130)
GLUCOSE SERPL-MCNC: 92 MG/DL (ref 65–99)
HCT VFR BLD AUTO: 27.1 % (ref 37.5–51)
HGB BLD-MCNC: 7.8 G/DL (ref 13–17.7)
HYPOCHROMIA BLD QL: ABNORMAL
LYMPHOCYTES # BLD MANUAL: 1.04 10*3/MM3 (ref 0.7–3.1)
LYMPHOCYTES NFR BLD MANUAL: 11 % (ref 5–12)
MCH RBC QN AUTO: 25 PG (ref 26.6–33)
MCHC RBC AUTO-ENTMCNC: 28.8 G/DL (ref 31.5–35.7)
MCV RBC AUTO: 86.9 FL (ref 79–97)
MONOCYTES # BLD: 1.9 10*3/MM3 (ref 0.1–0.9)
NEUTROPHILS # BLD AUTO: 14.16 10*3/MM3 (ref 1.7–7)
NEUTROPHILS NFR BLD MANUAL: 82 % (ref 42.7–76)
PLATELET # BLD AUTO: 366 10*3/MM3 (ref 140–450)
PMV BLD AUTO: 8.9 FL (ref 6–12)
POTASSIUM SERPL-SCNC: 4.7 MMOL/L (ref 3.5–5.2)
RAD ONC ARIA COURSE ID: NORMAL
RAD ONC ARIA COURSE INTENT: NORMAL
RAD ONC ARIA COURSE LAST TREATMENT DATE: NORMAL
RAD ONC ARIA COURSE START DATE: NORMAL
RAD ONC ARIA COURSE TREATMENT ELAPSED DAYS: 2
RAD ONC ARIA FIRST TREATMENT DATE: NORMAL
RAD ONC ARIA PLAN FRACTIONS TREATED TO DATE: 3
RAD ONC ARIA PLAN ID: NORMAL
RAD ONC ARIA PLAN PRESCRIBED DOSE PER FRACTION: 3 GY
RAD ONC ARIA PLAN PRIMARY REFERENCE POINT: NORMAL
RAD ONC ARIA PLAN TOTAL FRACTIONS PRESCRIBED: 10
RAD ONC ARIA PLAN TOTAL PRESCRIBED DOSE: 3000 CGY
RAD ONC ARIA REFERENCE POINT DOSAGE GIVEN TO DATE: 9 GY
RAD ONC ARIA REFERENCE POINT ID: NORMAL
RAD ONC ARIA REFERENCE POINT SESSION DOSAGE GIVEN: 3 GY
RBC # BLD AUTO: 3.12 10*6/MM3 (ref 4.14–5.8)
SMALL PLATELETS BLD QL SMEAR: ADEQUATE
SODIUM SERPL-SCNC: 127 MMOL/L (ref 136–145)
VARIANT LYMPHS NFR BLD MANUAL: 6 % (ref 19.6–45.3)
WBC NRBC COR # BLD AUTO: 17.27 10*3/MM3 (ref 3.4–10.8)

## 2025-04-04 PROCEDURE — 85007 BL SMEAR W/DIFF WBC COUNT: CPT | Performed by: STUDENT IN AN ORGANIZED HEALTH CARE EDUCATION/TRAINING PROGRAM

## 2025-04-04 PROCEDURE — G6002 STEREOSCOPIC X-RAY GUIDANCE: HCPCS | Performed by: RADIOLOGY

## 2025-04-04 PROCEDURE — 80048 BASIC METABOLIC PNL TOTAL CA: CPT | Performed by: STUDENT IN AN ORGANIZED HEALTH CARE EDUCATION/TRAINING PROGRAM

## 2025-04-04 PROCEDURE — 85025 COMPLETE CBC W/AUTO DIFF WBC: CPT | Performed by: STUDENT IN AN ORGANIZED HEALTH CARE EDUCATION/TRAINING PROGRAM

## 2025-04-04 PROCEDURE — 99232 SBSQ HOSP IP/OBS MODERATE 35: CPT

## 2025-04-04 PROCEDURE — 82948 REAGENT STRIP/BLOOD GLUCOSE: CPT

## 2025-04-04 PROCEDURE — 77412 RADIATION TX DELIVERY LVL 3: CPT | Performed by: RADIOLOGY

## 2025-04-04 PROCEDURE — 99232 SBSQ HOSP IP/OBS MODERATE 35: CPT | Performed by: STUDENT IN AN ORGANIZED HEALTH CARE EDUCATION/TRAINING PROGRAM

## 2025-04-04 PROCEDURE — 25010000002 MORPHINE PER 10 MG: Performed by: STUDENT IN AN ORGANIZED HEALTH CARE EDUCATION/TRAINING PROGRAM

## 2025-04-04 PROCEDURE — 74018 RADEX ABDOMEN 1 VIEW: CPT | Performed by: RADIOLOGY

## 2025-04-04 PROCEDURE — 77387 GUIDANCE FOR RADJ TX DLVR: CPT | Performed by: RADIOLOGY

## 2025-04-04 PROCEDURE — 74018 RADEX ABDOMEN 1 VIEW: CPT

## 2025-04-04 RX ORDER — NYSTATIN 100000 [USP'U]/G
POWDER TOPICAL EVERY 12 HOURS SCHEDULED
Status: DISCONTINUED | OUTPATIENT
Start: 2025-04-04 | End: 2025-04-21 | Stop reason: HOSPADM

## 2025-04-04 RX ORDER — CASTOR OIL AND BALSAM, PERU 788; 87 MG/G; MG/G
1 OINTMENT TOPICAL DAILY
Status: DISCONTINUED | OUTPATIENT
Start: 2025-04-04 | End: 2025-04-21 | Stop reason: HOSPADM

## 2025-04-04 RX ADMIN — Medication 10 ML: at 21:30

## 2025-04-04 RX ADMIN — Medication 10 ML: at 08:34

## 2025-04-04 RX ADMIN — SENNOSIDES AND DOCUSATE SODIUM 2 TABLET: 50; 8.6 TABLET ORAL at 08:30

## 2025-04-04 RX ADMIN — TRAMADOL HYDROCHLORIDE 50 MG: 50 TABLET, COATED ORAL at 15:27

## 2025-04-04 RX ADMIN — HYDROXYZINE HYDROCHLORIDE 10 MG: 10 TABLET, FILM COATED ORAL at 21:29

## 2025-04-04 RX ADMIN — MORPHINE SULFATE 2 MG: 2 INJECTION, SOLUTION INTRAMUSCULAR; INTRAVENOUS at 18:37

## 2025-04-04 RX ADMIN — MIDODRINE HYDROCHLORIDE 5 MG: 2.5 TABLET ORAL at 11:19

## 2025-04-04 RX ADMIN — APIXABAN 10 MG: 5 TABLET, FILM COATED ORAL at 21:29

## 2025-04-04 RX ADMIN — PANTOPRAZOLE SODIUM 40 MG: 40 INJECTION, POWDER, FOR SOLUTION INTRAVENOUS at 08:32

## 2025-04-04 RX ADMIN — MIDODRINE HYDROCHLORIDE 5 MG: 2.5 TABLET ORAL at 08:31

## 2025-04-04 RX ADMIN — NYSTATIN: 100000 POWDER TOPICAL at 17:20

## 2025-04-04 RX ADMIN — ACETAMINOPHEN 1000 MG: 500 TABLET ORAL at 08:30

## 2025-04-04 RX ADMIN — SENNOSIDES AND DOCUSATE SODIUM 2 TABLET: 50; 8.6 TABLET ORAL at 21:29

## 2025-04-04 RX ADMIN — TRAMADOL HYDROCHLORIDE 50 MG: 50 TABLET, COATED ORAL at 21:29

## 2025-04-04 RX ADMIN — MIDODRINE HYDROCHLORIDE 5 MG: 2.5 TABLET ORAL at 17:19

## 2025-04-04 RX ADMIN — Medication 1 APPLICATION: at 17:19

## 2025-04-04 RX ADMIN — OXYCODONE HYDROCHLORIDE 10 MG: 10 TABLET, FILM COATED, EXTENDED RELEASE ORAL at 08:29

## 2025-04-04 RX ADMIN — FOLIC ACID 1 MG: 1 TABLET ORAL at 08:29

## 2025-04-04 RX ADMIN — PANTOPRAZOLE SODIUM 40 MG: 40 INJECTION, POWDER, FOR SOLUTION INTRAVENOUS at 17:18

## 2025-04-04 RX ADMIN — TRAMADOL HYDROCHLORIDE 50 MG: 50 TABLET, COATED ORAL at 08:31

## 2025-04-04 RX ADMIN — Medication 10 ML: at 08:33

## 2025-04-04 RX ADMIN — OXYCODONE HYDROCHLORIDE 10 MG: 10 TABLET, FILM COATED, EXTENDED RELEASE ORAL at 21:29

## 2025-04-04 RX ADMIN — MORPHINE SULFATE 4 MG: 4 INJECTION, SOLUTION INTRAMUSCULAR; INTRAVENOUS at 12:40

## 2025-04-04 RX ADMIN — ACETAMINOPHEN 1000 MG: 500 TABLET ORAL at 15:26

## 2025-04-04 RX ADMIN — APIXABAN 10 MG: 5 TABLET, FILM COATED ORAL at 08:30

## 2025-04-04 NOTE — PHARMACY PATIENT ASSISTANCE
Pharmacy evaluated the cost of Eliquis for patient. Eliquis is covered on patient's insurance, but because of a deductible, patient's copay will be $230.62. Will add a free trial card for patient to use if discharged home.    Thank you,    Cinthia Tracey, PharmD  04/04/25  15:13 EDT

## 2025-04-04 NOTE — PROGRESS NOTES
Pradeep Donald  [id number]  1950  4/4/2025      Reason for Followup:   Anorectal mass  Weight loss    Patient Care Team:  Carmen Pretty APRN as PCP - General (Family Medicine)    Chief Complaint:  Anorectal mass      Subjective:    Patient is sitting up in bed in no acute distress.  Reports doing okay today other than some pain in the anal area.  He has received a total of 2 treatments of radiation so far.  He states that  on 1 day he was unable to tolerate due to increased pain and weakness, but is stating that he is planning for treatment today.  RN was at bedside during assessment and was getting ready to give him his pain medication, so hopefully this will help him to tolerate XRT better.  Otherwise no other new or specific complaints today.    Allergies:    Penicillins        Outpatient Medications:  No medications prior to admission.         Inpatient Medications:  Scheduled Meds:  acetaminophen, 1,000 mg, Per PEG Tube, TID  apixaban, 10 mg, Oral, Q12H   Followed by  [START ON 4/9/2025] apixaban, 5 mg, Oral, Q12H  folic acid, 1 mg, Oral, Daily  Hydrocortisone (Perianal), , Rectal, BID  hydrOXYzine, 10 mg, Oral, Nightly  midodrine, 5 mg, Per PEG Tube, TID AC  oxyCODONE, 10 mg, Oral, Q12H  pantoprazole, 40 mg, Intravenous, BID AC  senna-docusate sodium, 2 tablet, Oral, BID  sodium chloride, 10 mL, Intravenous, Q12H  traMADol, 50 mg, Oral, TID        Continuous Infusions:         PRN Meds:    Lidocaine Viscous HCl    Morphine    ondansetron    oxyCODONE    senna-docusate sodium **AND** polyethylene glycol **AND** [DISCONTINUED] bisacodyl **AND** [DISCONTINUED] bisacodyl    sodium chloride    witch hazel-glycerin      Review of Systems:  A comprehensive 14-point review of systems performed.  Significant findings as mentioned above.  All other systems reviewed and are negative.      Physical Exam:  Vital Signs: These were reviewed and listed as per patient’s electronic medical chart  Vitals:    04/04/25 0700    BP: 99/57   Pulse: 77   Resp: 20   Temp: 99.2 °F (37.3 °C)   SpO2:      General: Awake, alert and oriented, chronically ill appearing, cachectic  HEENT: Head is atraumatic, normocephalic, extraocular movements full, oropharynx clear, no scleral icterus, pink moist mucous membranes  Neck: supple, no jvd, lymphadenopathy or masses  Cardiovascular: regular rate and rhythm without murmurs, rubs or gallops  Pulmonary: non-labored, clear to auscultation bilaterally, no rhonchi  Abdomen: soft, non-tender, non-distended, normal active bowel sounds present, no organomegaly  Extremities: No clubbing or cyanosis  Lymph: No cervical, supraclavicular adenopathy  Neurologic: Mental status as above, alert, awake and oriented, grossly non-focal exam  Skin: warm, dry, intact          Labs / Studies:  Lab Results (last 72 hours)       Procedure Component Value Units Date/Time    POC Glucose Once [[id number]]  (Normal) Collected: 25 0618    Specimen: Blood Updated: 25     Glucose 85 mg/dL     TISSUE EXAM, [facility]) [[id number]] Collected: 25 165    Specimen: Tissue from Large Intestine, Rectum Updated: 25 1526     Reference Lab Report --     [id number] [place] [address]  [id number]: [phone] or [phone]: [phone] Melgar M.D., Medical DirectorPATIENT NAME                           LABORATORY NO.[id number]  MARCOSTATI MARTÍNEZDANYELLE BENAVIDES                      [id number]                         AGE              SEX  N           CLIENT REF #[facility]              74      1950      [id number]   [id number] SILVIA ALONZO                     REQUESTING M.D.     ATTENDING MBERNA.     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  The neoplastic cells are positive for CK5/6,p40, EMA and p16.  The tumor cells are negative for EpCAM andsynaptophysin.  The immunohistochemical results support squamous cellcarcinoma.  MSI stains are not routinely performed on squamous cell carcinomainvolving rectum but can be performed on request, if clinically desired.Representative tumor blocks: A1, A2.CLINICAL HISTORY:Metastatic colon cancer to liverSPECIMENS RECEIVED:RECTAL BIOPSY , MASSMICROSCOPIC DESCRIPTION:Tissue blocks are prepared and slides are examined microscopically. Seediagnosis for details.The internal and external (both positive and negative) controls reactedappropriately. Some of our immunohistochemical and in situ hybridizationstudies are performed as analyte specific reagents. The following statementapplies to those tests: This test was   developed, and its performancecharacteristics determined by [facility]. It has not beencleared or approved by the US Food and Drug Administration. However, theA has determined that approval and clearance are not necessary.Professional interpretation rendered by Mary Santamaria M.D., BELLA.A.P. at[facility], [facility], [address] [place] [id number].GROSS DESCRIPTION:Labeled \"rectal mass biopsy\".  Consists of 2 pieces of tan soft tissue ranging insize from 1.5 x 0.6 to 0.4 cm to 1.8 x 1.1 x 0.5 cm, submitted entirely in 3blocks, with the larger piece serially sectioned and submitted sequentially inblocks A1 and A2 and the smaller piece serially sectioned and submitted inblock A3.  JPREVIEWED, DIAGNOSED AND ELECTRONICALLYSIGNED BY:Mary Santamaria M.D., KARLEE.C.A.P.CPT CODES:  30677, 88341x5, " 40985    POC Glucose Once [[id number]]  (Abnormal) Collected: 04/02/25 1201    Specimen: Blood Updated: 04/02/25 1207     Glucose 288 mg/dL     Comprehensive Metabolic Panel [[id number]]  (Abnormal) Collected: 04/02/25 0936    Specimen: Blood Updated: 04/02/25 1014     Glucose 122 mg/dL      BUN 18 mg/dL      Creatinine 1.02 mg/dL      Sodium 130 mmol/L      Potassium 4.6 mmol/L      Chloride 100 mmol/L      CO2 26.4 mmol/L      Calcium 7.9 mg/dL      Total Protein 5.3 g/dL      Albumin 1.6 g/dL      ALT (SGPT) 12 U/L      AST (SGOT) 26 U/L      Alkaline Phosphatase 325 U/L      Total Bilirubin 0.2 mg/dL      Globulin 3.7 gm/dL      A/G Ratio 0.4 g/dL      BUN/Creatinine Ratio 17.6     Anion Gap 3.6 mmol/L      eGFR 76.2 mL/min/1.73     Narrative:      GFR Categories in Chronic Kidney Disease (CKD)      GFR Category          GFR (mL/min/1.73)    Interpretation  G1                     90 or greater         Normal or high (1)  G2                      60-89                Mild decrease (1)  G3a                   45-59                Mild to moderate decrease  G3b                   30-44                Moderate to severe decrease  G4                    15-29                Severe decrease  G5                    14 or less           Kidney failure          (1)In the absence of evidence of kidney disease, neither GFR category G1 or G2 fulfill the criteria for CKD.    eGFR calculation 2021 CKD-EPI creatinine equation, which does not include race as a factor    Hemoglobin & Hematocrit, Blood [[id number]]  (Abnormal) Collected: 04/02/25 0936    Specimen: Blood Updated: 04/02/25 0942     Hemoglobin 8.0 g/dL      Hematocrit 27.6 %     Heparin Anti-Xa [[id number]]  (Normal) Collected: 04/02/25 0736    Specimen: Blood Updated: 04/02/25 0811     Heparin Anti-Xa (UFH) 0.31 IU/ml     POC Glucose Once [[id number]]  (Normal) Collected: 04/02/25 0620    Specimen: Blood Updated: 04/02/25 0626     Glucose 92 mg/dL     POC Glucose Once  [[id number]]  (Normal) Collected: 04/02/25 0057    Specimen: Blood Updated: 04/02/25 0103     Glucose 118 mg/dL     Heparin Anti-Xa [[id number]]  (Normal) Collected: 04/01/25 2316    Specimen: Blood Updated: 04/01/25 2331     Heparin Anti-Xa (UFH) 0.34 IU/ml     CBC & Differential [[id number]]  (Abnormal) Collected: 04/01/25 2316    Specimen: Blood Updated: 04/01/25 2321    Narrative:      The following orders were created for panel order CBC & Differential.  Procedure                               Abnormality         Status                     ---------                               -----------         ------                     CBC Auto Differential[[id number]]        Abnormal            Final result                 Please view results for these tests on the individual orders.    CBC Auto Differential [[id number]]  (Abnormal) Collected: 04/01/25 2316    Specimen: Blood Updated: 04/01/25 2321     WBC 11.93 10*3/mm3      RBC 2.91 10*6/mm3      Hemoglobin 7.1 g/dL      Hematocrit 24.4 %      MCV 83.8 fL      MCH 24.4 pg      MCHC 29.1 g/dL      RDW 15.3 %      RDW-SD 46.7 fl      MPV 8.7 fL      Platelets 264 10*3/mm3      Neutrophil % 64.1 %      Lymphocyte % 18.7 %      Monocyte % 11.1 %      Eosinophil % 4.2 %      Basophil % 0.7 %      Immature Grans % 1.2 %      Neutrophils, Absolute 7.66 10*3/mm3      Lymphocytes, Absolute 2.23 10*3/mm3      Monocytes, Absolute 1.32 10*3/mm3      Eosinophils, Absolute 0.50 10*3/mm3      Basophils, Absolute 0.08 10*3/mm3      Immature Grans, Absolute 0.14 10*3/mm3      nRBC 0.0 /100 WBC     POC Glucose Once [[id number]]  (Abnormal) Collected: 04/01/25 1828    Specimen: Blood Updated: 04/01/25 1833     Glucose 189 mg/dL     Heparin Anti-Xa [[id number]]  (Abnormal) Collected: 04/01/25 1614    Specimen: Blood Updated: 04/01/25 1659     Heparin Anti-Xa (UFH) 0.20 IU/ml     POC Glucose Once [[id number]]  (Normal) Collected: 04/01/25 1219    Specimen: Blood Updated: 04/01/25 1225     Glucose  99 mg/dL     Heparin Anti-Xa [[id number]]  (Normal) Collected: 04/01/25 0914    Specimen: Blood Updated: 04/01/25 0929     Heparin Anti-Xa (UFH) 0.38 IU/ml     POC Glucose Once [[id number]]  (Normal) Collected: 04/01/25 0639    Specimen: Blood Updated: 04/01/25 0645     Glucose 85 mg/dL     Heparin Anti-Xa [[id number]]  (Abnormal) Collected: 04/01/25 0202    Specimen: Blood Updated: 04/01/25 0246     Heparin Anti-Xa (UFH) 0.25 IU/ml     Comprehensive Metabolic Panel [[id number]]  (Abnormal) Collected: 04/01/25 0104    Specimen: Blood Updated: 04/01/25 0141     Glucose 88 mg/dL      BUN 20 mg/dL      Creatinine 1.12 mg/dL      Sodium 133 mmol/L      Potassium 4.6 mmol/L      Chloride 105 mmol/L      CO2 24.7 mmol/L      Calcium 7.8 mg/dL      Total Protein 5.1 g/dL      Albumin 1.5 g/dL      ALT (SGPT) 7 U/L      AST (SGOT) 20 U/L      Alkaline Phosphatase 270 U/L      Total Bilirubin 0.2 mg/dL      Globulin 3.6 gm/dL      A/G Ratio 0.4 g/dL      BUN/Creatinine Ratio 17.9     Anion Gap 3.3 mmol/L      eGFR 68.1 mL/min/1.73     Narrative:      GFR Categories in Chronic Kidney Disease (CKD)      GFR Category          GFR (mL/min/1.73)    Interpretation  G1                     90 or greater         Normal or high (1)  G2                      60-89                Mild decrease (1)  G3a                   45-59                Mild to moderate decrease  G3b                   30-44                Moderate to severe decrease  G4                    15-29                Severe decrease  G5                    14 or less           Kidney failure          (1)In the absence of evidence of kidney disease, neither GFR category G1 or G2 fulfill the criteria for CKD.    eGFR calculation 2021 CKD-EPI creatinine equation, which does not include race as a factor    Protime-INR [[id number]]  (Abnormal) Collected: 04/01/25 0104    Specimen: Blood Updated: 04/01/25 0126     Protime 16.6 Seconds      INR 1.33    Narrative:      Suggested INR  therapeutic range for stable oral anticoagulant therapy:    Low Intensity therapy:   1.5-2.0  Moderate Intensity therapy:   2.0-3.0  High Intensity therapy:   2.5-4.0    CBC & Differential [[id number]]  (Abnormal) Collected: 04/01/25 0104    Specimen: Blood Updated: 04/01/25 0125    Narrative:      The following orders were created for panel order CBC & Differential.  Procedure                               Abnormality         Status                     ---------                               -----------         ------                     CBC Auto Differential[[id number]]        Abnormal            Final result                 Please view results for these tests on the individual orders.    CBC Auto Differential [[id number]]  (Abnormal) Collected: 04/01/25 0104    Specimen: Blood Updated: 04/01/25 0125     WBC 12.02 10*3/mm3      RBC 2.97 10*6/mm3      Hemoglobin 7.2 g/dL      Hematocrit 25.3 %      MCV 85.2 fL      MCH 24.2 pg      MCHC 28.5 g/dL      RDW 15.6 %      RDW-SD 48.4 fl      MPV 8.5 fL      Platelets 299 10*3/mm3      Neutrophil % 64.1 %      Lymphocyte % 19.9 %      Monocyte % 11.1 %      Eosinophil % 3.3 %      Basophil % 0.7 %      Immature Grans % 0.9 %      Neutrophils, Absolute 7.70 10*3/mm3      Lymphocytes, Absolute 2.39 10*3/mm3      Monocytes, Absolute 1.33 10*3/mm3      Eosinophils, Absolute 0.40 10*3/mm3      Basophils, Absolute 0.09 10*3/mm3      Immature Grans, Absolute 0.11 10*3/mm3      nRBC 0.0 /100 WBC     POC Glucose Once [[id number]]  (Normal) Collected: 04/01/25 0015    Specimen: Blood Updated: 04/01/25 0021     Glucose 103 mg/dL     Heparin Anti-Xa [[id number]]  (Abnormal) Collected: 03/31/25 1543    Specimen: Blood Updated: 03/31/25 1636     Heparin Anti-Xa (UFH) <0.10 IU/ml     POC Glucose Once [[id number]]  (Normal) Collected: 03/31/25 1626    Specimen: Blood Updated: 03/31/25 1635     Glucose 112 mg/dL     Hemoglobin & Hematocrit, Blood [[id number]]  (Abnormal) Collected: 03/31/25  1221    Specimen: Blood Updated: 03/31/25 1223     Hemoglobin 7.6 g/dL      Hematocrit 25.9 %     POC Glucose Once [[id number]]  (Normal) Collected: 03/31/25 1114    Specimen: Blood Updated: 03/31/25 1121     Glucose 101 mg/dL     Heparin Anti-Xa [[id number]]  (Abnormal) Collected: 03/31/25 0904    Specimen: Blood Updated: 03/31/25 0928     Heparin Anti-Xa (UFH) <0.10 IU/ml     Protime-INR [[id number]]  (Abnormal) Collected: 03/31/25 0904    Specimen: Blood Updated: 03/31/25 0927     Protime 16.0 Seconds      INR 1.26    Narrative:      Suggested INR therapeutic range for stable oral anticoagulant therapy:    Low Intensity therapy:   1.5-2.0  Moderate Intensity therapy:   2.0-3.0  High Intensity therapy:   2.5-4.0    aPTT [[id number]]  (Abnormal) Collected: 03/31/25 0904    Specimen: Blood Updated: 03/31/25 0927     PTT 36.0 seconds     Narrative:      PTT Heparin Therapeutic Range:  45 - 116 seconds                Imaging Results (Last 72 Hours)       ** No results found for the last 72 hours. **                  Assessment & Plan:  Pradeep Donald is a 74 y.o. year-old male presenting with rectal bleeding and obstructing rectal mass currently consulted for     Metastatic squamous anal cancer  -   Given locally advanced obstructive cancer patient had to undergo surgical diversion and diverting colostomy was performed by Dr. Monzon on 3/29/25.  -  He has invasion locally into the prostate and perineum as well as evidence of retroperitoneal LAD, bulky L inguinal LAD, and liver metastasis along with tiny lung nodules which may also represent metastatic disease versus granulomatous disease.    -  Pathology significant for an invasive squamous cell carcinoma.  -  Given metastatic disease, he understands that any treatment would not be curative and would be for palliation only.   - Dr. Poon with radiation medicine was consulted for consideration of palliative radiation to reduce tumor bulk at the primary rectal/anal canal  site, to reduce inguinal adenopathy that was felt to be causing lower extremity swelling, and to help relieve pain. Unfortunately patient reports that he had difficulty tolerating his first treatment of radiation and did not go down to radiation the second day.   - He has received 2 treatments so far, and today he does state that he is hurting but he will go down for his radiation treatment.  RN was at bedside giving the patient his pain medication this a.m.  - Patient continues to be quite debilitated and unfortunately the risks of chemotherapy would likely outweigh any potential benefit and would cause more harm.  Dr. Weems had a long goals of care discussion with the patient and his sister and family who were at bedside yesterday and recommended focusing on symptom management ideally with hospice unless patient should significantly improve.      Left lower extremity DVT  - Patient currently on Eliquis. Likely related to bulky adenopathy     Normocytic anemia   - B12 level is normal, folate level is borderline therefore he was started on folic acid 1 mg daily. If patient does want to proceed with treatment would continue. However this was discontinued as patient had chosen comfort measure.   - Patient also likely has a iron deficiency anemia along with anemia of chronic disease and was started on Ferrlecit and was given four doses prior to it being discontinued.  - Would recommend PRBC transfusion for Hb <7 or if symptomatic <8.     Difficulty voiding  - Invasion of the prostate from the anorectal mass which is likely contributing to obstructive uropathy.   - Walsh catheter was placed by Dr. Monzon under anesthesia.      Thank you for allowing us to participate in Mr. Donald's care, please do not hesitate to call should any questions arise and will continue to follow along.  Dr. Weems discussed patient's care with Dr. Jon.     I spent 30 minutes caring for patient on this date of service. This time  includes time spent by me in the following activities: preparing for the visit, reviewing tests, obtaining and/or reviewing a separately obtained history, performing a medically appropriate examination and/or evaluation, counseling and educating the patient/family/caregiver, ordering medications, tests, or procedures, documenting information in the medical record, independently interpreting results and communicating that information with the patient/family/caregiver, and care coordination as well as answering any questions.        Kimberly Vega, TODD  04/04/25  09:25 EDT

## 2025-04-04 NOTE — PROGRESS NOTES
[facility] HOSPITALIST PROGRESS NOTE     Patient Identification:  Name:  Pradeep Donald  Age:  74 y.o.  Sex:  male  :  1950  MRN:  [id number]  Visit Number:  [id number]  ROOM: [address] [place] [address]     Primary Care Provider:  Carmen Pretty APRN    Length of stay in inpatient status:  6    Subjective     Chief Compliant:    Chief Complaint   Patient presents with    Leg Swelling    Hemorrhoids       History of Presenting Illness:    Patient seen in follow-up for large rectal mass status post resection, ostomy and DVT.  Patient is awake, alert and oriented x 3.  Sister and family at bedside.  He was able to tolerate radiation with IV morphine prior to treatment.  I have asked him to let me know if he has any further questions regarding goals of care or treatment plan moving forward.  No adverse events noted overnight.    Objective     Current Hospital Meds:acetaminophen, 1,000 mg, Per PEG Tube, TID  apixaban, 10 mg, Oral, Q12H   Followed by  [START ON 2025] apixaban, 5 mg, Oral, Q12H  castor oil-balsam peru, 1 Application, Topical, Daily  folic acid, 1 mg, Oral, Daily  Hydrocortisone (Perianal), , Rectal, BID  hydrOXYzine, 10 mg, Oral, Nightly  midodrine, 5 mg, Per PEG Tube, TID AC  nystatin, , Topical, Q12H  oxyCODONE, 10 mg, Oral, Q12H  pantoprazole, 40 mg, Intravenous, BID AC  senna-docusate sodium, 2 tablet, Oral, BID  sodium chloride, 10 mL, Intravenous, Q12H  traMADol, 50 mg, Oral, TID           Current Antimicrobial Therapy:  Anti-Infectives (From admission, onward)      Ordered     Dose/Rate Route Frequency Start Stop    25 1442  ceFAZolin 2000 mg IVPB in 100 mL NS (VTB)  Status:  Discontinued        Ordering Provider: Matilde Monzon MD    2,000 mg  over 30 Minutes Intravenous On Call to O.R. 25 0600 25 1718    25 1146  cefepime 1000 mg IVPB in 100 mL NS (VTB)  Status:  Discontinued        Ordering Provider: Matilde Monzon MD    1,000 mg  over 4 Hours Intravenous  Every 12 Hours 03/29/25 2100 03/31/25 2027 03/29/25 1146  cefepime 1000 mg IVPB in 100 mL NS (VTB)        Ordering Provider: Jayden Barney MD    1,000 mg  over 30 Minutes Intravenous Once 03/29/25 1245 03/29/25 1402    03/29/25 0613  cefepime 2000 mg IVPB in 100 mL NS (VTB)        Ordering Provider: Óscar Rodriguez MD    2,000 mg  over 30 Minutes Intravenous Once 03/29/25 0629 03/29/25 0700    03/29/25 0613  vancomycin (VANCOCIN) 1,000 mg in sodium chloride 0.9 % 250 mL IVPB-VTB        Ordering Provider: Óscar Rodriguez MD    20 mg/kg × 49.9 kg  250 mL/hr over 60 Minutes Intravenous Once 03/29/25 0629 03/29/25 0802          Current Diuretic Therapy:  Diuretics (From admission, onward)      None          ----------------------------------------------------------------------------------------------------------------------  Vital Signs:  Temp:  [98.5 °F (36.9 °C)-99.2 °F (37.3 °C)] 99.2 °F (37.3 °C)  Heart Rate:  [77-79] 77  Resp:  [18-20] 20  BP: ()/(46-57) 99/57  SpO2:  [96 %] 96 %  on  Flow (L/min) (Oxygen Therapy):  [2] 2;   Device (Oxygen Therapy): nasal cannula  Body mass index is 15.78 kg/m².    Wt Readings from Last 3 Encounters:   03/29/25 57.3 kg (126 lb 4 oz)   06/27/22 79.4 kg (175 lb)   01/07/18 83.9 kg (185 lb)     Intake & Output (last 3 days)         03/29 0701  03/30 0700 03/30 0701  03/31 0700 03/31 0701  04/01 0700    P.O. 60 1680 360    I.V. (mL/kg) 1000 (17.5)  1084 (18.9)    Blood   315.8    Other  110 80    NG/GT  83 130    IV Piggyback   200    Total Intake(mL/kg) 1060 (18.5) 1873 (32.7) 2169.8 (37.9)    Urine (mL/kg/hr) 90 (0.1) 2050 (1.5) 750 (1)    Total Output 90 2050 750    Net +970 -177 +1419.8           Urine Unmeasured Occurrence 1 x            Diet: Regular/House; Texture: Soft to Chew (NDD 3); Soft to Chew: Chopped Meat; Fluid Consistency: Thin (IDDSI  0)  ----------------------------------------------------------------------------------------------------------------------  Physical exam:  Constitutional: Elderly male, appears cachectic, resting comfortably in bed, no acute distress.      HENT:  Head:  Normocephalic and atraumatic.  Mouth:  Moist mucous membranes.  Eyes:  Conjunctivae and EOM are normal. No scleral icterus.   Cardiovascular:  Normal rate, regular rhythm and normal heart sounds with no murmur. No JVD.   Pulmonary/Chest:  No respiratory distress, no wheezes, no crackles, with normal breath sounds and good air movement. Unlabored. No accessory muscle use.  Abdominal:  Soft. No distension and no tenderness.  Bowel sounds present. No rebound or guarding.  PEG tube with tube feeds.  Incision sites well-healed.  Musculoskeletal: Cachectic.  No tenderness, and no deformity.  No red or swollen joints anywhere.    Neurological:  Alert and oriented to person, place, and time.  No cranial nerve deficit.   Nonfocal.   Skin:  Skin is warm and dry. No rash noted. No pallor.   Peripheral vascular:  No clubbing, no cyanosis, no edema. Pedal and tibial pulses 2 out of 4 bilaterally.     ----------------------------------------------------------------------------------------------------------------------  Results from last 7 days   Lab Units 04/04/25  0144 04/02/25  0936 04/01/25  2316 04/01/25  0104 03/31/25  1221 03/31/25  0904 03/30/25  0150 03/29/25  1944 03/29/25  0800 03/29/25  0434   LACTATE mmol/L  --   --   --   --   --   --   --   --  1.8 2.1*   WBC 10*3/mm3 17.27*  --  11.93* 12.02*  --   --    < > 12.56*  --  12.85*   HEMOGLOBIN g/dL 7.8* 8.0* 7.1* 7.2*   < >  --    < > 7.9*  --  8.5*   HEMATOCRIT % 27.1* 27.6* 24.4* 25.3*   < >  --    < > 29.2*  --  29.5*   MCV fL 86.9  --  83.8 85.2  --   --    < > 88.2  --  82.9   MCHC g/dL 28.8*  --  29.1* 28.5*  --   --    < > 27.1*  --  28.8*   PLATELETS 10*3/mm3 366  --  264 299  --   --    < > 333  --  309   INR   " --   --   --  1.33*  --  1.26*  --  1.24*  --   --     < > = values in this interval not displayed.         Results from last 7 days   Lab Units 04/04/25  0144 04/02/25  0936 04/01/25  0104 03/31/25  0118 03/30/25  0150 03/29/25  1944 03/29/25 0434   SODIUM mmol/L 127* 130* 133*  --   --   --  133*   POTASSIUM mmol/L 4.7 4.6 4.6  --   --   --  4.1   MAGNESIUM mg/dL  --   --   --  2.1 2.1 2.0 2.2   CHLORIDE mmol/L 98 100 105  --   --   --  101   CO2 mmol/L 25.5 26.4 24.7  --   --   --  22.2   BUN mg/dL 17 18 20  --   --   --  21   CREATININE mg/dL 0.76 1.02 1.12  --   --   --  1.14   CALCIUM mg/dL 7.7* 7.9* 7.8*  --   --   --  8.7   PHOSPHORUS mg/dL  --   --   --  4.5 4.0 3.3  --    GLUCOSE mg/dL 92 122* 88  --   --   --  106*   ALBUMIN g/dL  --  1.6* 1.5*  --   --   --  2.2*   BILIRUBIN mg/dL  --  0.2 0.2  --   --   --  0.4   ALK PHOS U/L  --  325* 270*  --   --   --  316*   AST (SGOT) U/L  --  26 20  --   --   --  20   ALT (SGPT) U/L  --  12 7  --   --   --  11   Estimated Creatinine Clearance: 67 mL/min (by C-G formula based on SCr of 0.76 mg/dL).  No results found for: \"AMMONIA\"  Results from last 7 days   Lab Units 03/29/25  0636 03/29/25 0434   CK TOTAL U/L  --  25   HSTROP T ng/L 41* 44*     Results from last 7 days   Lab Units 03/29/25 0434   PROBNP pg/mL 975.2         Glucose   Date/Time Value Ref Range Status   04/04/2025 0626 90 70 - 130 mg/dL Final   04/03/2025 0618 85 70 - 130 mg/dL Final   04/02/2025 1201 288 (H) 70 - 130 mg/dL Final   04/02/2025 0620 92 70 - 130 mg/dL Final   04/02/2025 0057 118 70 - 130 mg/dL Final   04/01/2025 1828 189 (H) 70 - 130 mg/dL Final     Lab Results   Component Value Date    TSH 5.270 (H) 03/29/2025    FREET4 1.02 03/29/2025     No results found for: \"PREGTESTUR\", \"PREGSERUM\", \"HCG\", \"HCGQUANT\"  Pain Management Panel           No data to display              Brief Urine Lab Results       None          No results found for: \"BLOODCX\"  No results found for: \"URINECX\"  No " "results found for: \"WOUNDCX\"  No results found for: \"STOOLCX\"  No results found for: \"RESPCX\"  No results found for: \"AFBCX\"  Results from last 7 days   Lab Units 03/29/25  0800 03/29/25  0434   LACTATE mmol/L 1.8 2.1*       I have personally looked at the labs and they are summarized above.  ----------------------------------------------------------------------------------------------------------------------  Detailed radiology reports for the last 24 hours:  Imaging Results (Last 24 Hours)       ** No results found for the last 24 hours. **          Assessment & Plan      Patient is a 74-year-old male with no known significant medical problems who presented to the ER with reports of bright red blood per rectum, weight loss and poor appetite.    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  --CT abdomen/pelvis with contrast noted rectal mass with extensive local disease into the peritoneum measuring 3.3 x 3.8 cm with local invasion into the posterior aspect of the prostate gland with pelvic and RP adenopathy and possible metastatic disease involving the liver  --Venous Dopplers noted DVT in the left common/superficial femoral veins  --Status post biopsy of rectal mass/colostomy/PEG  --Pathology noted invasive squamous cell carcinoma  --Patient has been able to tolerate radiation better with IV dose of morphine prior, will continue this prior to XRT.  Has underwent 4/10 scheduled treatments.  Given social situation and debility, ultimate plan is most likely nursing facility (niece works at [employer]), unable to discharge home due to social situation therefore palliative XRT will be continued inpatient  --continue scheduled Tylenol, tramadol, new MS Contin twice daily  --continue " p.o. midodrine 3 times daily  --Continue Eliquis starter pack  --Radiation oncology following, undergoing inpatient radiation  --Heme/onc following, appreciate recommendation  --Surgery following appreciate assistance    #Acute on chronic blood loss anemia  #Possible gastritis/UGIB  --received 1 unit PRBC thus far  --PPI twice daily  --Transfuse to hemoglobin greater than 7 or symptomatic  --IV iron daily x 3 days  --Surgery following per above    #Oral candidiasis  --nystatin swish and spit    #Goals of care  #Severe malnutrition  #Failure to thrive  --PEG placed with supplemental tube feeds  --Palliative care following, appreciate assistance    Copied portions of this report have been reviewed and are accurate as of 04/04/25     CHECKLIST:  Abx: None  VTE: Eliquis  GI ppx: PPI twice daily  Diet: Modified, tube feeds  Code: No CPR, limited  Dispo: Patient is medically stable.  Plans to continue XRT inpatient to complete course.  Case management following for potential placement at SNF after completion of radiation.    Quintin Jon DO  [facility]  04/04/25  15:19 EDT

## 2025-04-04 NOTE — PROGRESS NOTES
"Palliative Care Daily Progress Note     S: Medical record reviewed, followed up with Primary RN Sarah and Dr Jon regarding patient's condition. When I saw Pradeep today he was awake and alert, sitting up in bed eating his lunch. I asked him if he was in any pain and he stated he was is a lot of pain earlier, however now it is tolerable. He is not having labored breathing, he was calm and says his appetite was better today. His sister Kisha and girlfriend are at bedside.       O:   Palliative Performance Scale Score:     BP 99/57 (BP Location: Left arm, Patient Position: Lying)   Pulse 77   Temp 99.2 °F (37.3 °C) (Oral)   Resp 20   Ht 190.5 cm (75\")   Wt 57.3 kg (126 lb 4 oz)   SpO2 96%   BMI 15.78 kg/m²     Intake/Output Summary (Last 24 hours) at 4/4/2025 1257  Last data filed at 4/4/2025 0247  Gross per 24 hour   Intake 720 ml   Output 1150 ml   Net -430 ml       PE:  General Appearance:    Chronically ill appearing, alert, frail, cooperative, NAD   HEENT:    NC/AT, without obvious abnormality, EOMI, anicteric , dry MM    Neck:   supple, trachea midline, no JVD   Lungs:     Unlabored respirations, no wheezing, rhonchi or rales    Heart:    RRR, normal S1 and S2, no M/R/G   Abdomen:     Soft, lt lower quad tenderness, ND, NABS , abd concave   Extremities:   Moves all extremities with generalized weakness, no edema, cachetic   Pulses:   Pulses palpable and equal bilaterally   Skin:   Warm, dry and pale   Neurologic:   A/Ox3, cooperative   Psych:   Calm, pleasant          Meds: Reviewed and changes noted    Labs:   Results from last 7 days   Lab Units 04/04/25  0144   WBC 10*3/mm3 17.27*   HEMOGLOBIN g/dL 7.8*   HEMATOCRIT % 27.1*   PLATELETS 10*3/mm3 366     Results from last 7 days   Lab Units 04/04/25  0144   SODIUM mmol/L 127*   POTASSIUM mmol/L 4.7   CHLORIDE mmol/L 98   CO2 mmol/L 25.5   BUN mg/dL 17   CREATININE mg/dL 0.76   GLUCOSE mg/dL 92   CALCIUM mg/dL 7.7*     Results from last 7 days   Lab " "Units 04/04/25  0144 04/02/25  0936   SODIUM mmol/L 127* 130*   POTASSIUM mmol/L 4.7 4.6   CHLORIDE mmol/L 98 100   CO2 mmol/L 25.5 26.4   BUN mg/dL 17 18   CREATININE mg/dL 0.76 1.02   CALCIUM mg/dL 7.7* 7.9*   BILIRUBIN mg/dL  --  0.2   ALK PHOS U/L  --  325*   ALT (SGPT) U/L  --  12   AST (SGOT) U/L  --  26   GLUCOSE mg/dL 92 122*     Imaging Results (Last 72 Hours)       ** No results found for the last 72 hours. **              Diagnostics: Reviewed    A: Pradeep Donald is a 74 y.o. male admitted on 3/29/2025 for metastatic colon cancer to liver. The only history that he reports is kidney stones in the past. He hasn't seeked medical care in nearly a decade or longer. He took no medications at home other than his yearly flu shot. His girlfriend reports that he had had a \"bad fall\" a few weeks ago. He has been having rectal pain and bleeding for over a year now. He reports having to use a pedro-pad to contain the blood but never told his family. He reports weight loss, weakness, fatigue, and bloody diarrhea. He reports that upon arriving to the ED, he was having left sided abd pain/left groin pain. CT of abdomen with contrast showed rectal mass with extensive local disease involving the perineum. Abscess or necrotic mass in the right perineum measured 3.3 x 3.8 cm. There is also local invasion into the posterior aspect of the prostate gland, pelvic and retroperitoneal adenopathy. Metastatic disease involving the left hepatic lobe and possibly the lung bases, Scrotal ultrasound showed large left hydrocele and lower extremity Doppler was positive for DVT in the left common and superficial femoral veins.          P:   I discussed pt plan with Dr Jon, plan is to continue radiation treatment with pre-medication as long as Pradeep can tolerate and wants to do treatment. Plan is for him to remain hospitalized through completion of radiation and will continue ongoing GOC with Pradeep and his sister Kisha.      We will " continue to follow along. Please do not hesitate to contact us regarding further sx mgmt or GOC needs, including after hours or on weekends via our on call provider at [phone].     Loly Gutierrez, APRN    4/4/2025

## 2025-04-04 NOTE — CASE MANAGEMENT/SOCIAL WORK
Discharge Planning Assessment  [facility]     Patient Name: Pradeep Donald  MRN: [id number]  Today's Date: 4/4/2025    Admit Date: 3/29/2025       Discharge Plan       Row Name 04/04/25 1702       Plan    Plan Pt discussed with Provider. Pt plans to receive six more raditaion treatments as inpatient at [facility]. SS spoke with Pt at bedside. Pt remains interested in going to [facility] once radiation is completed. SS confirmed with [facility] per Jessica that nursing home will continue to follow patient. Pt will need a SNF pre-auth closer to discharge. SS to follow.                  Continued Care and Services - Admitted Since 3/29/2025       Destination       Service Provider Request Status Services Address Phone Fax Patient Preferred    [facility] Pending - Request Sent -- [address] [place] [id number] [phone] --                    TOI Najera

## 2025-04-04 NOTE — NURSING NOTE
WOCN consulted for pressure injuries to the left posterior greater trochanter, right medial thoracic spine, scrotum and lower abdomen. Assessed patient with TEE Smith - the left posterior greater trochanter has a stage 2 pressure injury; wound base is moist, pink/red blanchable/nonblanchable with a scab noted; jessica wound is moist, pink/red and blanching. Scant amount of drainage noted.    The right posterior greater trochanter continues with a stage 2 pressure injury; wound base is moist, pink/red, blanching/non-blanching with a jessica wound that is moist, pink/red and blanching. Scant amount of drainage noted.    The right medial thoracic spine has a stage 2 pressure injury; wound base is moist, pink/red blanchable/nonblanchable with scant amount of drainage; jessica wound is blanching, intact and red.    Order to assess and cleanse the left and right greater trochanter pressure injuries and the right medial thoracic pressure injury with foam cleanser, pat dry and apply venelex daily; cover with a silicone bordered dressing.     The scrotum has a stage 2 pressure injury with a wound base that is red, moist and non-blanching. Jessica wound is moist, red and edematous/swollen. Bilateral groin/inner thighs are edematous and moist with MASD. Order to assess and cleanse scrotum and bilateral groin/inner thighs with foam cleanser, pat dry and apply nystatin powder BID, leave open to air; place ultra sorb dryer sheets in between folds for moisture control.    The left lower abdomen around the colostomy flange is moist, pink/red with blisters vs skin tears. RN reports that colostomy does leak and is changed once a shift. Order to cleanse skin with foam cleanser, pat dry and apply Z guard as needed. Follow ostomy care orders per protocol.     Karri score 12. PI prevention orders in place.     04/04/25 1350   Wound 03/30/25 1900 Right posterior greater trochanter Pressure Injury   Placement Date/Time: 03/30/25 1900   Present on  Original Admission: No  Side: Right  Orientation: posterior  Location: greater trochanter  Primary Wound Type: (c) Pressure Injury   Pressure Injury Stage 2   Dressing Appearance moist drainage;intact   Closure None   Base blanchable;nonblanchable;moist;pink;red   Periwound dry;pink;redness   Periwound Temperature warm   Periwound Skin Turgor soft   Edges irregular;open   Drainage Characteristics/Odor clear   Drainage Amount scant   Wound 03/30/25 1900 scrotum   Placement Date/Time: 03/30/25 1900   Present on Original Admission: Yes  Location: scrotum   Pressure Injury Stage 2   Dressing Appearance open to air   Closure None   Base moist;nonblanchable;red   Periwound moist;redness;swelling;edematous   Periwound Temperature warm   Periwound Skin Turgor soft   Edges irregular;open   Drainage Characteristics/Odor serous   Drainage Amount scant   Wound 04/04/25 0959 Left posterior greater trochanter Pressure Injury   Placement Date/Time: 04/04/25 0959   Present on Original Admission: No  Side: Left  Orientation: posterior  Location: greater trochanter  Primary Wound Type: Pressure Injury   Pressure Injury Stage 2   Dressing Appearance intact;moist drainage   Closure None   Base blanchable;nonblanchable;moist;pink;red;scab   Periwound moist;redness;pink   Periwound Temperature warm   Periwound Skin Turgor soft   Edges irregular;open   Drainage Characteristics/Odor clear   Drainage Amount scant   Wound 04/04/25 1000 Right medial thoracic spine Pressure Injury   Placement Date/Time: 04/04/25 1000   Present on Original Admission: No  Side: Right  Orientation: medial  Location: thoracic spine  Primary Wound Type: Pressure Injury   Pressure Injury Stage 2   Dressing Appearance moist drainage;intact   Closure None   Base blanchable;nonblanchable;moist;pink;red   Periwound intact;dry;redness   Periwound Temperature warm   Periwound Skin Turgor soft   Edges open;irregular   Drainage Characteristics/Odor clear   Drainage Amount  scant   Wound 04/04/25 1001 Left lower abdomen Other (Comments)   Placement Date/Time: 04/04/25 1001   Present on Original Admission: No  Side: Left  Orientation: lower  Location: abdomen  Primary Wound Type: (c) Other (Comments)   Pressure Injury Stage OTH  (not pressure injury; blisters vs skin tears)   Dressing Appearance open to air   Closure None   Base moist;pink;red   Periwound blistered;moist;redness;warm   Periwound Temperature warm   Periwound Skin Turgor soft   Edges irregular;open   Drainage Characteristics/Odor serosanguineous   Drainage Amount scant

## 2025-04-04 NOTE — PLAN OF CARE
Goal Outcome Evaluation:  Plan of Care Reviewed With: patient        Progress: no change  Outcome Evaluation: Patient resting in bed at this time with family at bedside. A&O. VSS on 2L NC, mild hypotension noted, scheduled midodrine given per order. Scheduled pain meds given per orders. Patient went down today for radiation and tolerated procedure well. One time dose morphine given prior to tranporting down. Patient had bath this shift. Wound care completed per orders. Wound care nurse consulted this shift for new skin issues, see orders. Continues with continous TF@ 20 mL/hr. Colostomy appliance changed this shift d/t drainage. Patient does have noticed blistering on skin surrounding colostomy, zguard applied. No other issues noted at this time. Will continue with POC.

## 2025-04-04 NOTE — PLAN OF CARE
Goal Outcome Evaluation:  Plan of Care Reviewed With: patient        Progress: no change  Outcome Evaluation: Patient resting in bed. Scheduled pain medication given per MAR. Wound care completed per orders. Patient voices no concerns at this time, will continue with POC.

## 2025-04-05 LAB
ALBUMIN SERPL-MCNC: 1.6 G/DL (ref 3.5–5.2)
ALBUMIN/GLOB SERPL: 0.4 G/DL
ALP SERPL-CCNC: 562 U/L (ref 39–117)
ALT SERPL W P-5'-P-CCNC: 15 U/L (ref 1–41)
ANION GAP SERPL CALCULATED.3IONS-SCNC: 6 MMOL/L (ref 5–15)
ANISOCYTOSIS BLD QL: ABNORMAL
AST SERPL-CCNC: 29 U/L (ref 1–40)
BASOPHILS # BLD MANUAL: 0.14 10*3/MM3 (ref 0–0.2)
BASOPHILS NFR BLD MANUAL: 1 % (ref 0–1.5)
BILIRUB SERPL-MCNC: 0.3 MG/DL (ref 0–1.2)
BUN SERPL-MCNC: 21 MG/DL (ref 8–23)
BUN/CREAT SERPL: 27.6 (ref 7–25)
CALCIUM SPEC-SCNC: 7.9 MG/DL (ref 8.6–10.5)
CHLORIDE SERPL-SCNC: 100 MMOL/L (ref 98–107)
CO2 SERPL-SCNC: 25 MMOL/L (ref 22–29)
CREAT SERPL-MCNC: 0.76 MG/DL (ref 0.76–1.27)
CRP SERPL-MCNC: 13.83 MG/DL (ref 0–0.5)
DEPRECATED RDW RBC AUTO: 49.1 FL (ref 37–54)
EGFRCR SERPLBLD CKD-EPI 2021: 93.2 ML/MIN/1.73
EOSINOPHIL # BLD MANUAL: 0.55 10*3/MM3 (ref 0–0.4)
EOSINOPHIL NFR BLD MANUAL: 4 % (ref 0.3–6.2)
ERYTHROCYTE [DISTWIDTH] IN BLOOD BY AUTOMATED COUNT: 16.4 % (ref 12.3–15.4)
GLOBULIN UR ELPH-MCNC: 3.7 GM/DL
GLUCOSE BLDC GLUCOMTR-MCNC: 98 MG/DL (ref 70–130)
GLUCOSE SERPL-MCNC: 132 MG/DL (ref 65–99)
HCT VFR BLD AUTO: 25.6 % (ref 37.5–51)
HGB BLD-MCNC: 7.4 G/DL (ref 13–17.7)
HYPOCHROMIA BLD QL: ABNORMAL
INR PPP: 3.06 (ref 0.9–1.1)
LYMPHOCYTES # BLD MANUAL: 0.83 10*3/MM3 (ref 0.7–3.1)
LYMPHOCYTES NFR BLD MANUAL: 3 % (ref 5–12)
MCH RBC QN AUTO: 24.6 PG (ref 26.6–33)
MCHC RBC AUTO-ENTMCNC: 28.9 G/DL (ref 31.5–35.7)
MCV RBC AUTO: 85 FL (ref 79–97)
MONOCYTES # BLD: 0.41 10*3/MM3 (ref 0.1–0.9)
NEUTROPHILS # BLD AUTO: 11.83 10*3/MM3 (ref 1.7–7)
NEUTROPHILS NFR BLD MANUAL: 86 % (ref 42.7–76)
OVALOCYTES BLD QL SMEAR: ABNORMAL
PLAT MORPH BLD: NORMAL
PLATELET # BLD AUTO: 368 10*3/MM3 (ref 140–450)
PMV BLD AUTO: 8.6 FL (ref 6–12)
POTASSIUM SERPL-SCNC: 4.7 MMOL/L (ref 3.5–5.2)
PROCALCITONIN SERPL-MCNC: 0.55 NG/ML (ref 0–0.25)
PROT SERPL-MCNC: 5.3 G/DL (ref 6–8.5)
PROTHROMBIN TIME: 32.1 SECONDS (ref 11.6–15.1)
RBC # BLD AUTO: 3.01 10*6/MM3 (ref 4.14–5.8)
SODIUM SERPL-SCNC: 131 MMOL/L (ref 136–145)
VARIANT LYMPHS NFR BLD MANUAL: 6 % (ref 19.6–45.3)
WBC NRBC COR # BLD AUTO: 13.75 10*3/MM3 (ref 3.4–10.8)

## 2025-04-05 PROCEDURE — 85025 COMPLETE CBC W/AUTO DIFF WBC: CPT | Performed by: STUDENT IN AN ORGANIZED HEALTH CARE EDUCATION/TRAINING PROGRAM

## 2025-04-05 PROCEDURE — 86140 C-REACTIVE PROTEIN: CPT | Performed by: STUDENT IN AN ORGANIZED HEALTH CARE EDUCATION/TRAINING PROGRAM

## 2025-04-05 PROCEDURE — 85007 BL SMEAR W/DIFF WBC COUNT: CPT | Performed by: STUDENT IN AN ORGANIZED HEALTH CARE EDUCATION/TRAINING PROGRAM

## 2025-04-05 PROCEDURE — 99024 POSTOP FOLLOW-UP VISIT: CPT | Performed by: SURGERY

## 2025-04-05 PROCEDURE — 80053 COMPREHEN METABOLIC PANEL: CPT | Performed by: STUDENT IN AN ORGANIZED HEALTH CARE EDUCATION/TRAINING PROGRAM

## 2025-04-05 PROCEDURE — 85610 PROTHROMBIN TIME: CPT | Performed by: STUDENT IN AN ORGANIZED HEALTH CARE EDUCATION/TRAINING PROGRAM

## 2025-04-05 PROCEDURE — 99232 SBSQ HOSP IP/OBS MODERATE 35: CPT | Performed by: STUDENT IN AN ORGANIZED HEALTH CARE EDUCATION/TRAINING PROGRAM

## 2025-04-05 PROCEDURE — 82948 REAGENT STRIP/BLOOD GLUCOSE: CPT

## 2025-04-05 PROCEDURE — 84145 PROCALCITONIN (PCT): CPT | Performed by: STUDENT IN AN ORGANIZED HEALTH CARE EDUCATION/TRAINING PROGRAM

## 2025-04-05 RX ADMIN — NYSTATIN: 100000 POWDER TOPICAL at 08:08

## 2025-04-05 RX ADMIN — OXYCODONE 5 MG: 5 TABLET ORAL at 12:42

## 2025-04-05 RX ADMIN — MIDODRINE HYDROCHLORIDE 5 MG: 2.5 TABLET ORAL at 16:46

## 2025-04-05 RX ADMIN — SENNOSIDES AND DOCUSATE SODIUM 2 TABLET: 50; 8.6 TABLET ORAL at 21:10

## 2025-04-05 RX ADMIN — OXYCODONE HYDROCHLORIDE 10 MG: 10 TABLET, FILM COATED, EXTENDED RELEASE ORAL at 08:08

## 2025-04-05 RX ADMIN — TRAMADOL HYDROCHLORIDE 50 MG: 50 TABLET, COATED ORAL at 08:08

## 2025-04-05 RX ADMIN — Medication 1 APPLICATION: at 08:08

## 2025-04-05 RX ADMIN — Medication 10 ML: at 21:11

## 2025-04-05 RX ADMIN — HYDROCORTISONE 2.5%: 25 CREAM TOPICAL at 21:11

## 2025-04-05 RX ADMIN — PANTOPRAZOLE SODIUM 40 MG: 40 INJECTION, POWDER, FOR SOLUTION INTRAVENOUS at 16:46

## 2025-04-05 RX ADMIN — OXYCODONE 5 MG: 5 TABLET ORAL at 00:33

## 2025-04-05 RX ADMIN — MIDODRINE HYDROCHLORIDE 5 MG: 2.5 TABLET ORAL at 12:42

## 2025-04-05 RX ADMIN — APIXABAN 10 MG: 5 TABLET, FILM COATED ORAL at 08:08

## 2025-04-05 RX ADMIN — NYSTATIN: 100000 POWDER TOPICAL at 21:11

## 2025-04-05 RX ADMIN — ACETAMINOPHEN 1000 MG: 500 TABLET ORAL at 16:46

## 2025-04-05 RX ADMIN — FOLIC ACID 1 MG: 1 TABLET ORAL at 08:08

## 2025-04-05 RX ADMIN — HYDROCORTISONE 2.5%: 25 CREAM TOPICAL at 08:08

## 2025-04-05 RX ADMIN — MIDODRINE HYDROCHLORIDE 5 MG: 2.5 TABLET ORAL at 08:07

## 2025-04-05 RX ADMIN — SENNOSIDES AND DOCUSATE SODIUM 2 TABLET: 50; 8.6 TABLET ORAL at 08:08

## 2025-04-05 RX ADMIN — APIXABAN 10 MG: 5 TABLET, FILM COATED ORAL at 21:10

## 2025-04-05 RX ADMIN — OXYCODONE HYDROCHLORIDE 10 MG: 10 TABLET, FILM COATED, EXTENDED RELEASE ORAL at 21:10

## 2025-04-05 RX ADMIN — HYDROXYZINE HYDROCHLORIDE 10 MG: 10 TABLET, FILM COATED ORAL at 21:09

## 2025-04-05 RX ADMIN — PANTOPRAZOLE SODIUM 40 MG: 40 INJECTION, POWDER, FOR SOLUTION INTRAVENOUS at 08:07

## 2025-04-05 RX ADMIN — ACETAMINOPHEN 1000 MG: 500 TABLET ORAL at 21:09

## 2025-04-05 RX ADMIN — TRAMADOL HYDROCHLORIDE 50 MG: 50 TABLET, COATED ORAL at 21:10

## 2025-04-05 RX ADMIN — TRAMADOL HYDROCHLORIDE 50 MG: 50 TABLET, COATED ORAL at 16:46

## 2025-04-05 RX ADMIN — ACETAMINOPHEN 1000 MG: 500 TABLET ORAL at 08:08

## 2025-04-05 NOTE — PLAN OF CARE
Goal Outcome Evaluation:  Plan of Care Reviewed With: patient        Progress: no change  Outcome Evaluation: Patient resting in bed. Wound care completed per orders. TF infusing @ 20 mL/hr per order. Patient voices no concerns at this time, will continue with POC.

## 2025-04-05 NOTE — PROGRESS NOTES
[facility] HOSPITALIST PROGRESS NOTE     Patient Identification:  Name:  Pradeep Donald  Age:  74 y.o.  Sex:  male  :  1950  MRN:  [id number]  Visit Number:  [id number]  ROOM: [address] [place] [address]     Primary Care Provider:  Carmen Pretty APRN    Length of stay in inpatient status:  7    Subjective     Chief Compliant:    Chief Complaint   Patient presents with    Leg Swelling    Hemorrhoids       History of Presenting Illness:    Patient seen in follow-up for large rectal mass status post resection, ostomy and DVT.  Patient is awake, alert and oriented x 3.  Sister and family at bedside. No adverse events overnight. Pain is well controlled.    Objective     Current Hospital Meds:acetaminophen, 1,000 mg, Per PEG Tube, TID  apixaban, 10 mg, Oral, Q12H   Followed by  [START ON 2025] apixaban, 5 mg, Oral, Q12H  castor oil-balsam peru, 1 Application, Topical, Daily  folic acid, 1 mg, Oral, Daily  Hydrocortisone (Perianal), , Rectal, BID  hydrOXYzine, 10 mg, Oral, Nightly  midodrine, 5 mg, Per PEG Tube, TID AC  nystatin, , Topical, Q12H  oxyCODONE, 10 mg, Oral, Q12H  pantoprazole, 40 mg, Intravenous, BID AC  senna-docusate sodium, 2 tablet, Oral, BID  sodium chloride, 10 mL, Intravenous, Q12H  traMADol, 50 mg, Oral, TID           Current Antimicrobial Therapy:  Anti-Infectives (From admission, onward)      Ordered     Dose/Rate Route Frequency Start Stop    25 1442  ceFAZolin 2000 mg IVPB in 100 mL NS (VTB)  Status:  Discontinued        Ordering Provider: Matilde Monzon MD    2,000 mg  over 30 Minutes Intravenous On Call to O.R. 25 0600 25 1718    25 1146  cefepime 1000 mg IVPB in 100 mL NS (VTB)  Status:  Discontinued        Ordering Provider: Matilde Monzon MD    1,000 mg  over 4 Hours Intravenous Every 12 Hours 25 2100 25 2027    25 1146  cefepime 1000 mg IVPB in 100 mL NS (VTB)        Ordering Provider: Jayden Barney MD    1,000 mg  over 30  Minutes Intravenous Once 03/29/25 1245 03/29/25 1402    03/29/25 0613  cefepime 2000 mg IVPB in 100 mL NS (VTB)        Ordering Provider: Óscar Rodriguez MD    2,000 mg  over 30 Minutes Intravenous Once 03/29/25 0629 03/29/25 0700    03/29/25 0613  vancomycin (VANCOCIN) 1,000 mg in sodium chloride 0.9 % 250 mL IVPB-VTB        Ordering Provider: Óscar Rodriguez MD    20 mg/kg × 49.9 kg  250 mL/hr over 60 Minutes Intravenous Once 03/29/25 0629 03/29/25 0802          Current Diuretic Therapy:  Diuretics (From admission, onward)      None          ----------------------------------------------------------------------------------------------------------------------  Vital Signs:  Temp:  [98.7 °F (37.1 °C)-98.8 °F (37.1 °C)] 98.7 °F (37.1 °C)  Heart Rate:  [69-72] 69  Resp:  [18-20] 20  BP: ()/(49-54) 96/54  SpO2:  [99 %] 99 %  on  Flow (L/min) (Oxygen Therapy):  [2] 2;   Device (Oxygen Therapy): nasal cannula  Body mass index is 15.78 kg/m².    Wt Readings from Last 3 Encounters:   03/29/25 57.3 kg (126 lb 4 oz)   06/27/22 79.4 kg (175 lb)   01/07/18 83.9 kg (185 lb)     Intake & Output (last 3 days)         03/29 0701 03/30 0700 03/30 0701 03/31 0700 03/31 0701 04/01 0700    P.O. 60 1680 360    I.V. (mL/kg) 1000 (17.5)  1084 (18.9)    Blood   315.8    Other  110 80    NG/GT  83 130    IV Piggyback   200    Total Intake(mL/kg) 1060 (18.5) 1873 (32.7) 2169.8 (37.9)    Urine (mL/kg/hr) 90 (0.1) 2050 (1.5) 750 (1)    Total Output 90 2050 750    Net +970 -177 +1419.8           Urine Unmeasured Occurrence 1 x            Diet: Regular/House; Texture: Soft to Chew (NDD 3); Soft to Chew: Chopped Meat; Fluid Consistency: Thin (IDDSI 0)  ----------------------------------------------------------------------------------------------------------------------  Physical exam:  Constitutional: Elderly male, appears cachectic, resting comfortably in bed, no acute distress.      HENT:  Head:  Normocephalic and atraumatic.   Mouth:  Moist mucous membranes.  Eyes:  Conjunctivae and EOM are normal. No scleral icterus.   Cardiovascular:  Normal rate, regular rhythm and normal heart sounds with no murmur. No JVD.   Pulmonary/Chest:  No respiratory distress, no wheezes, no crackles, with normal breath sounds and good air movement. Unlabored. No accessory muscle use.  Abdominal:  Soft. No distension and no tenderness.  Bowel sounds present. No rebound or guarding.  PEG tube with tube feeds.  Incision sites well-healed.  Musculoskeletal: Cachectic.  No tenderness, and no deformity.  No red or swollen joints anywhere.    Neurological:  Alert and oriented to person, place, and time.  No cranial nerve deficit.   Nonfocal.   Skin:  Skin is warm and dry. No rash noted. No pallor.   Peripheral vascular:  No clubbing, no cyanosis, no edema. Pedal and tibial pulses 2 out of 4 bilaterally.     ----------------------------------------------------------------------------------------------------------------------  Results from last 7 days   Lab Units 04/05/25  0103 04/04/25  0144 04/02/25  0936 04/01/25  2316 04/01/25  0104 03/31/25  1221 03/31/25  0904 03/30/25  0150 03/29/25  1944   CRP mg/dL 13.83*  --   --   --   --   --   --   --   --    WBC 10*3/mm3 13.75* 17.27*  --  11.93* 12.02*  --   --    < > 12.56*   HEMOGLOBIN g/dL 7.4* 7.8* 8.0* 7.1* 7.2*   < >  --    < > 7.9*   HEMATOCRIT % 25.6* 27.1* 27.6* 24.4* 25.3*   < >  --    < > 29.2*   MCV fL 85.0 86.9  --  83.8 85.2  --   --    < > 88.2   MCHC g/dL 28.9* 28.8*  --  29.1* 28.5*  --   --    < > 27.1*   PLATELETS 10*3/mm3 368 366  --  264 299  --   --    < > 333   INR  3.06*  --   --   --  1.33*  --  1.26*  --  1.24*    < > = values in this interval not displayed.         Results from last 7 days   Lab Units 04/05/25  0103 04/04/25  0144 04/02/25  0936 04/01/25  0104 04/01/25  0104 03/31/25  0118 03/30/25  0150 03/29/25  1944   SODIUM mmol/L 131* 127* 130*   < > 133*  --   --   --    POTASSIUM mmol/L  "4.7 4.7 4.6   < > 4.6  --   --   --    MAGNESIUM mg/dL  --   --   --   --   --  2.1 2.1 2.0   CHLORIDE mmol/L 100 98 100   < > 105  --   --   --    CO2 mmol/L 25.0 25.5 26.4   < > 24.7  --   --   --    BUN mg/dL 21 17 18   < > 20  --   --   --    CREATININE mg/dL 0.76 0.76 1.02   < > 1.12  --   --   --    CALCIUM mg/dL 7.9* 7.7* 7.9*   < > 7.8*  --   --   --    PHOSPHORUS mg/dL  --   --   --   --   --  4.5 4.0 3.3   GLUCOSE mg/dL 132* 92 122*   < > 88  --   --   --    ALBUMIN g/dL 1.6*  --  1.6*  --  1.5*  --   --   --    BILIRUBIN mg/dL 0.3  --  0.2  --  0.2  --   --   --    ALK PHOS U/L 562*  --  325*  --  270*  --   --   --    AST (SGOT) U/L 29  --  26  --  20  --   --   --    ALT (SGPT) U/L 15  --  12  --  7  --   --   --     < > = values in this interval not displayed.   Estimated Creatinine Clearance: 67 mL/min (by C-G formula based on SCr of 0.76 mg/dL).  No results found for: \"AMMONIA\"                  Glucose   Date/Time Value Ref Range Status   04/05/2025 0647 98 70 - 130 mg/dL Final   04/04/2025 0626 90 70 - 130 mg/dL Final   04/03/2025 0618 85 70 - 130 mg/dL Final     Lab Results   Component Value Date    TSH 5.270 (H) 03/29/2025    FREET4 1.02 03/29/2025     No results found for: \"PREGTESTUR\", \"PREGSERUM\", \"HCG\", \"HCGQUANT\"  Pain Management Panel           No data to display              Brief Urine Lab Results       None          No results found for: \"BLOODCX\"  No results found for: \"URINECX\"  No results found for: \"WOUNDCX\"  No results found for: \"STOOLCX\"  No results found for: \"RESPCX\"  No results found for: \"AFBCX\"  Results from last 7 days   Lab Units 04/05/25  0103   PROCALCITONIN ng/mL 0.55*   CRP mg/dL 13.83*       I have personally looked at the labs and they are summarized above.  ----------------------------------------------------------------------------------------------------------------------  Detailed radiology reports for the last 24 hours:  Imaging Results (Last 24 Hours)       " Procedure Component Value Units Date/Time    XR Abdomen KUB [[id number]] Collected: 04/04/25 1750     Updated: 04/04/25 1752    Narrative:      INDICATION: Abdominal pain and constipation.     TECHNIQUE: 3 views of the abdomen.     COMPARISON: No relevant priors.     FINDINGS:   Large amount retained colonic stool. A cystostomy tube in the stomach.  Left lower quadrant colostomy.     No dilated loops of bowel or free air. No pathologic calcifications.       Impression:      Large amount retained colonic stool.     This report was finalized on 4/4/2025 5:50 PM by Alex Pallas, DO.             Assessment & Plan      Patient is a 74-year-old male with no known significant medical problems who presented to the ER with reports of bright red blood per rectum, weight loss and poor appetite.    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  --CT abdomen/pelvis with contrast noted rectal mass with extensive local disease into the peritoneum measuring 3.3 x 3.8 cm with local invasion into the posterior aspect of the prostate gland with pelvic and RP adenopathy and possible metastatic disease involving the liver  --Venous Dopplers noted DVT in the left common/superficial femoral veins  --Status post biopsy of rectal mass/colostomy/PEG  --Pathology noted invasive squamous cell carcinoma  --Patient has been able to tolerate radiation better with IV dose of morphine prior, will continue this prior to XRT.  Has underwent 4/10 scheduled treatments.  Given social situation and debility, ultimate plan is most likely nursing facility (niece works at [employer]), unable to discharge home due to social situation therefore palliative XRT will be continued inpatient  --continue scheduled Tylenol,  tramadol, new MS Contin twice daily  --continue p.o. midodrine 3 times daily  --Continue Eliquis starter pack  --Radiation oncology following, undergoing inpatient radiation  --Heme/onc following, appreciate recommendation  --Surgery following appreciate assistance    #Acute on chronic blood loss anemia  #Possible gastritis/UGIB  --received 1 unit PRBC thus far  --PPI twice daily  --Transfuse to hemoglobin greater than 7 or symptomatic  --IV iron daily x 3 days  --Surgery following per above    #Oral candidiasis  --nystatin swish and spit    #Goals of care  #Severe malnutrition  #Failure to thrive  --PEG placed with supplemental tube feeds  --Palliative care following, appreciate assistance    Copied portions of this report have been reviewed and are accurate as of 04/05/25     CHECKLIST:  Abx: None  VTE: Eliquis  GI ppx: PPI twice daily  Diet: Modified, tube feeds  Code: No CPR, limited  Dispo: Patient is medically stable.  Plans to continue XRT inpatient to complete course.  Case management following for potential placement at SNF after completion of radiation.    Quintin Jon DO  [facility]  04/05/25  16:54 EDT

## 2025-04-05 NOTE — PLAN OF CARE
Goal Outcome Evaluation:         Pt resting in bed at this time. Family at bedside. Prn pain meds given this shift. Wound care performed this shift. No other complaints at this time.

## 2025-04-05 NOTE — PROGRESS NOTES
Post colostomy for anal cancer    He is doing ok. About the same. His ostomy is working and he is eating. Afeb and vss. Ostomy site is fine.

## 2025-04-06 LAB
ABO GROUP BLD: NORMAL
BASOPHILS # BLD AUTO: 0.06 10*3/MM3 (ref 0–0.2)
BASOPHILS NFR BLD AUTO: 0.5 % (ref 0–1.5)
BLD GP AB SCN SERPL QL: NEGATIVE
DEPRECATED RDW RBC AUTO: 50 FL (ref 37–54)
EOSINOPHIL # BLD AUTO: 0.43 10*3/MM3 (ref 0–0.4)
EOSINOPHIL NFR BLD AUTO: 3.2 % (ref 0.3–6.2)
ERYTHROCYTE [DISTWIDTH] IN BLOOD BY AUTOMATED COUNT: 16.9 % (ref 12.3–15.4)
GLUCOSE BLDC GLUCOMTR-MCNC: 102 MG/DL (ref 70–130)
HCT VFR BLD AUTO: 24.1 % (ref 37.5–51)
HGB BLD-MCNC: 7 G/DL (ref 13–17.7)
IMM GRANULOCYTES # BLD AUTO: 0.2 10*3/MM3 (ref 0–0.05)
IMM GRANULOCYTES NFR BLD AUTO: 1.5 % (ref 0–0.5)
LYMPHOCYTES # BLD AUTO: 1.29 10*3/MM3 (ref 0.7–3.1)
LYMPHOCYTES NFR BLD AUTO: 9.7 % (ref 19.6–45.3)
MCH RBC QN AUTO: 24.8 PG (ref 26.6–33)
MCHC RBC AUTO-ENTMCNC: 29 G/DL (ref 31.5–35.7)
MCV RBC AUTO: 85.5 FL (ref 79–97)
MONOCYTES # BLD AUTO: 1.09 10*3/MM3 (ref 0.1–0.9)
MONOCYTES NFR BLD AUTO: 8.2 % (ref 5–12)
NEUTROPHILS NFR BLD AUTO: 10.26 10*3/MM3 (ref 1.7–7)
NEUTROPHILS NFR BLD AUTO: 76.9 % (ref 42.7–76)
NRBC BLD AUTO-RTO: 0 /100 WBC (ref 0–0.2)
PLATELET # BLD AUTO: 325 10*3/MM3 (ref 140–450)
PMV BLD AUTO: 8.6 FL (ref 6–12)
RBC # BLD AUTO: 2.82 10*6/MM3 (ref 4.14–5.8)
RH BLD: POSITIVE
T&S EXPIRATION DATE: NORMAL
WBC NRBC COR # BLD AUTO: 13.33 10*3/MM3 (ref 3.4–10.8)

## 2025-04-06 PROCEDURE — 86900 BLOOD TYPING SEROLOGIC ABO: CPT

## 2025-04-06 PROCEDURE — 82948 REAGENT STRIP/BLOOD GLUCOSE: CPT

## 2025-04-06 PROCEDURE — 36430 TRANSFUSION BLD/BLD COMPNT: CPT

## 2025-04-06 PROCEDURE — 86900 BLOOD TYPING SEROLOGIC ABO: CPT | Performed by: STUDENT IN AN ORGANIZED HEALTH CARE EDUCATION/TRAINING PROGRAM

## 2025-04-06 PROCEDURE — 86923 COMPATIBILITY TEST ELECTRIC: CPT

## 2025-04-06 PROCEDURE — 25010000002 MORPHINE PER 10 MG: Performed by: STUDENT IN AN ORGANIZED HEALTH CARE EDUCATION/TRAINING PROGRAM

## 2025-04-06 PROCEDURE — 80048 BASIC METABOLIC PNL TOTAL CA: CPT | Performed by: STUDENT IN AN ORGANIZED HEALTH CARE EDUCATION/TRAINING PROGRAM

## 2025-04-06 PROCEDURE — P9016 RBC LEUKOCYTES REDUCED: HCPCS

## 2025-04-06 PROCEDURE — 85018 HEMOGLOBIN: CPT

## 2025-04-06 PROCEDURE — 25010000002 PROCHLORPERAZINE 10 MG/2ML SOLUTION

## 2025-04-06 PROCEDURE — 86850 RBC ANTIBODY SCREEN: CPT | Performed by: STUDENT IN AN ORGANIZED HEALTH CARE EDUCATION/TRAINING PROGRAM

## 2025-04-06 PROCEDURE — 85025 COMPLETE CBC W/AUTO DIFF WBC: CPT | Performed by: STUDENT IN AN ORGANIZED HEALTH CARE EDUCATION/TRAINING PROGRAM

## 2025-04-06 PROCEDURE — 86901 BLOOD TYPING SEROLOGIC RH(D): CPT | Performed by: STUDENT IN AN ORGANIZED HEALTH CARE EDUCATION/TRAINING PROGRAM

## 2025-04-06 PROCEDURE — 85014 HEMATOCRIT: CPT

## 2025-04-06 PROCEDURE — 99233 SBSQ HOSP IP/OBS HIGH 50: CPT | Performed by: STUDENT IN AN ORGANIZED HEALTH CARE EDUCATION/TRAINING PROGRAM

## 2025-04-06 PROCEDURE — 25010000002 ONDANSETRON PER 1 MG: Performed by: NURSE PRACTITIONER

## 2025-04-06 PROCEDURE — 99024 POSTOP FOLLOW-UP VISIT: CPT | Performed by: SURGERY

## 2025-04-06 RX ORDER — LACTULOSE 10 G/15ML
30 SOLUTION ORAL ONCE
Status: COMPLETED | OUTPATIENT
Start: 2025-04-06 | End: 2025-04-06

## 2025-04-06 RX ORDER — PROCHLORPERAZINE EDISYLATE 5 MG/ML
5 INJECTION INTRAMUSCULAR; INTRAVENOUS ONCE
Status: COMPLETED | OUTPATIENT
Start: 2025-04-06 | End: 2025-04-06

## 2025-04-06 RX ORDER — POLYETHYLENE GLYCOL 3350 17 G/17G
17 POWDER, FOR SOLUTION ORAL 2 TIMES DAILY
Status: DISCONTINUED | OUTPATIENT
Start: 2025-04-06 | End: 2025-04-18

## 2025-04-06 RX ORDER — AMOXICILLIN 250 MG
2 CAPSULE ORAL 2 TIMES DAILY
Status: DISCONTINUED | OUTPATIENT
Start: 2025-04-06 | End: 2025-04-18

## 2025-04-06 RX ORDER — LACTULOSE 10 G/15ML
10 SOLUTION ORAL 3 TIMES DAILY PRN
Status: DISCONTINUED | OUTPATIENT
Start: 2025-04-06 | End: 2025-04-07

## 2025-04-06 RX ADMIN — NYSTATIN: 100000 POWDER TOPICAL at 08:15

## 2025-04-06 RX ADMIN — ACETAMINOPHEN 1000 MG: 500 TABLET ORAL at 21:42

## 2025-04-06 RX ADMIN — TRAMADOL HYDROCHLORIDE 50 MG: 50 TABLET, COATED ORAL at 21:42

## 2025-04-06 RX ADMIN — POLYETHYLENE GLYCOL (3350) 17 G: 17 POWDER, FOR SOLUTION ORAL at 12:54

## 2025-04-06 RX ADMIN — APIXABAN 10 MG: 5 TABLET, FILM COATED ORAL at 08:14

## 2025-04-06 RX ADMIN — ACETAMINOPHEN 1000 MG: 500 TABLET ORAL at 08:14

## 2025-04-06 RX ADMIN — TRAMADOL HYDROCHLORIDE 50 MG: 50 TABLET, COATED ORAL at 14:35

## 2025-04-06 RX ADMIN — OXYCODONE HYDROCHLORIDE 10 MG: 10 TABLET, FILM COATED, EXTENDED RELEASE ORAL at 21:44

## 2025-04-06 RX ADMIN — SENNOSIDES AND DOCUSATE SODIUM 2 TABLET: 50; 8.6 TABLET ORAL at 08:14

## 2025-04-06 RX ADMIN — OXYCODONE 5 MG: 5 TABLET ORAL at 18:07

## 2025-04-06 RX ADMIN — PROCHLORPERAZINE EDISYLATE 5 MG: 5 INJECTION INTRAMUSCULAR; INTRAVENOUS at 21:11

## 2025-04-06 RX ADMIN — SENNOSIDES AND DOCUSATE SODIUM 2 TABLET: 50; 8.6 TABLET ORAL at 21:42

## 2025-04-06 RX ADMIN — ACETAMINOPHEN 1000 MG: 500 TABLET ORAL at 14:35

## 2025-04-06 RX ADMIN — PANTOPRAZOLE SODIUM 40 MG: 40 INJECTION, POWDER, FOR SOLUTION INTRAVENOUS at 17:05

## 2025-04-06 RX ADMIN — ONDANSETRON 4 MG: 2 INJECTION INTRAMUSCULAR; INTRAVENOUS at 18:04

## 2025-04-06 RX ADMIN — POLYETHYLENE GLYCOL (3350) 17 G: 17 POWDER, FOR SOLUTION ORAL at 21:42

## 2025-04-06 RX ADMIN — MIDODRINE HYDROCHLORIDE 5 MG: 2.5 TABLET ORAL at 06:32

## 2025-04-06 RX ADMIN — OXYCODONE HYDROCHLORIDE 10 MG: 10 TABLET, FILM COATED, EXTENDED RELEASE ORAL at 08:13

## 2025-04-06 RX ADMIN — FOLIC ACID 1 MG: 1 TABLET ORAL at 08:14

## 2025-04-06 RX ADMIN — NYSTATIN: 100000 POWDER TOPICAL at 21:44

## 2025-04-06 RX ADMIN — LACTULOSE 30 G: 20 SOLUTION ORAL at 12:54

## 2025-04-06 RX ADMIN — MIDODRINE HYDROCHLORIDE 5 MG: 2.5 TABLET ORAL at 17:03

## 2025-04-06 RX ADMIN — HYDROXYZINE HYDROCHLORIDE 10 MG: 10 TABLET, FILM COATED ORAL at 21:43

## 2025-04-06 RX ADMIN — HYDROCORTISONE 2.5%: 25 CREAM TOPICAL at 21:43

## 2025-04-06 RX ADMIN — MORPHINE SULFATE 4 MG: 4 INJECTION, SOLUTION INTRAMUSCULAR; INTRAVENOUS at 14:35

## 2025-04-06 RX ADMIN — APIXABAN 10 MG: 5 TABLET, FILM COATED ORAL at 21:42

## 2025-04-06 RX ADMIN — Medication 1 APPLICATION: at 08:16

## 2025-04-06 RX ADMIN — MIDODRINE HYDROCHLORIDE 5 MG: 2.5 TABLET ORAL at 10:59

## 2025-04-06 RX ADMIN — HYDROCORTISONE 2.5%: 25 CREAM TOPICAL at 08:15

## 2025-04-06 RX ADMIN — TRAMADOL HYDROCHLORIDE 50 MG: 50 TABLET, COATED ORAL at 08:13

## 2025-04-06 RX ADMIN — Medication 10 ML: at 21:11

## 2025-04-06 RX ADMIN — PANTOPRAZOLE SODIUM 40 MG: 40 INJECTION, POWDER, FOR SOLUTION INTRAVENOUS at 06:32

## 2025-04-06 RX ADMIN — Medication 10 ML: at 08:15

## 2025-04-06 NOTE — PLAN OF CARE
Goal Outcome Evaluation:  Pt resting in bed. Prn pain medication given per mar. Wound care completed. No acute changes. VSS. Will continue plan of care.

## 2025-04-06 NOTE — PROGRESS NOTES
[facility] HOSPITALIST PROGRESS NOTE     Patient Identification:  Name:  Pradeep Donald  Age:  74 y.o.  Sex:  male  :  1950  MRN:  [id number]  Visit Number:  [id number]  ROOM: [address] [place] [address]     Primary Care Provider:  Carmen Pretty APRN    Length of stay in inpatient status:  8    Subjective     Chief Compliant:    Chief Complaint   Patient presents with    Leg Swelling    Hemorrhoids       History of Presenting Illness:    Patient seen in follow-up for large rectal mass status post resection, ostomy and DVT.  Patient is awake, alert and oriented x 3.  Sister and family at bedside.  He says his pain is well-controlled aside from hurting during wound changes.  Will schedule morphine prior to dressing changes.  Otherwise he says he feels a little bit better.  Denies any other complaints this morning.    Objective     Current Hospital Meds:acetaminophen, 1,000 mg, Per PEG Tube, TID  apixaban, 10 mg, Oral, Q12H   Followed by  [START ON 2025] apixaban, 5 mg, Oral, Q12H  castor oil-balsam peru, 1 Application, Topical, Daily  folic acid, 1 mg, Oral, Daily  Hydrocortisone (Perianal), , Rectal, BID  hydrOXYzine, 10 mg, Oral, Nightly  midodrine, 5 mg, Per PEG Tube, TID AC  nystatin, , Topical, Q12H  oxyCODONE, 10 mg, Oral, Q12H  pantoprazole, 40 mg, Intravenous, BID AC  senna-docusate sodium, 2 tablet, Oral, BID  sodium chloride, 10 mL, Intravenous, Q12H  traMADol, 50 mg, Oral, TID           Current Antimicrobial Therapy:  Anti-Infectives (From admission, onward)      Ordered     Dose/Rate Route Frequency Start Stop    25 1442  ceFAZolin 2000 mg IVPB in 100 mL NS (VTB)  Status:  Discontinued        Ordering Provider: Matilde Monzon MD    2,000 mg  over 30 Minutes Intravenous On Call to O.R. 25 0600 25 1718    25 1146  cefepime 1000 mg IVPB in 100 mL NS (VTB)  Status:  Discontinued        Ordering Provider: Matilde Monzon MD    1,000 mg  over 4 Hours Intravenous Every 12  Hours 03/29/25 2100 03/31/25 2027 03/29/25 1146  cefepime 1000 mg IVPB in 100 mL NS (VTB)        Ordering Provider: Jayden Barney MD    1,000 mg  over 30 Minutes Intravenous Once 03/29/25 1245 03/29/25 1402    03/29/25 0613  cefepime 2000 mg IVPB in 100 mL NS (VTB)        Ordering Provider: Óscar Rodriguez MD    2,000 mg  over 30 Minutes Intravenous Once 03/29/25 0629 03/29/25 0700    03/29/25 0613  vancomycin (VANCOCIN) 1,000 mg in sodium chloride 0.9 % 250 mL IVPB-VTB        Ordering Provider: Óscar Rodriguez MD    20 mg/kg × 49.9 kg  250 mL/hr over 60 Minutes Intravenous Once 03/29/25 0629 03/29/25 0802          Current Diuretic Therapy:  Diuretics (From admission, onward)      None          ----------------------------------------------------------------------------------------------------------------------  Vital Signs:  Temp:  [98.4 °F (36.9 °C)-99 °F (37.2 °C)] 98.4 °F (36.9 °C)  Resp:  [18-20] 20  BP: ()/(50-52) 98/50     on  Flow (L/min) (Oxygen Therapy):  [2] 2;   Device (Oxygen Therapy): nasal cannula  Body mass index is 15.78 kg/m².    Wt Readings from Last 3 Encounters:   03/29/25 57.3 kg (126 lb 4 oz)   06/27/22 79.4 kg (175 lb)   01/07/18 83.9 kg (185 lb)     Intake & Output (last 3 days)         03/29 0701 03/30 0700 03/30 0701 03/31 0700 03/31 0701 04/01 0700    P.O. 60 1680 360    I.V. (mL/kg) 1000 (17.5)  1084 (18.9)    Blood   315.8    Other  110 80    NG/GT  83 130    IV Piggyback   200    Total Intake(mL/kg) 1060 (18.5) 1873 (32.7) 2169.8 (37.9)    Urine (mL/kg/hr) 90 (0.1) 2050 (1.5) 750 (1)    Total Output 90 2050 750    Net +970 -177 +1419.8           Urine Unmeasured Occurrence 1 x            Diet: Regular/House; Texture: Soft to Chew (NDD 3); Soft to Chew: Chopped Meat; Fluid Consistency: Thin (IDDSI 0)  ----------------------------------------------------------------------------------------------------------------------  Physical exam:  Constitutional: Elderly  male, appears cachectic, resting comfortably in bed, no acute distress.      HENT:  Head:  Normocephalic and atraumatic.  Mouth:  Moist mucous membranes.  Eyes:  Conjunctivae and EOM are normal. No scleral icterus.   Cardiovascular:  Normal rate, regular rhythm and normal heart sounds with no murmur. No JVD.   Pulmonary/Chest:  No respiratory distress, no wheezes, no crackles, with normal breath sounds and good air movement. Unlabored. No accessory muscle use.  Abdominal:  Soft. No distension and no tenderness.  Bowel sounds present. No rebound or guarding.  PEG tube with tube feeds.  Incision sites well-healed.  Ostomy with stool output.  Musculoskeletal: Cachectic.  No tenderness, and no deformity.  No red or swollen joints anywhere.    Neurological:  Alert and oriented to person, place, and time.  No cranial nerve deficit.   Nonfocal.   Skin:  Skin is warm and dry. No rash noted. No pallor.   Peripheral vascular:  No clubbing, no cyanosis, no edema. Pedal and tibial pulses 2 out of 4 bilaterally.     ----------------------------------------------------------------------------------------------------------------------  Results from last 7 days   Lab Units 04/06/25  0132 04/05/25  0103 04/04/25  0144 04/01/25  2316 04/01/25  0104 03/31/25  1221 03/31/25  0904   CRP mg/dL  --  13.83*  --   --   --   --   --    WBC 10*3/mm3 13.33* 13.75* 17.27*   < > 12.02*  --   --    HEMOGLOBIN g/dL 7.0* 7.4* 7.8*   < > 7.2*   < >  --    HEMATOCRIT % 24.1* 25.6* 27.1*   < > 25.3*   < >  --    MCV fL 85.5 85.0 86.9   < > 85.2  --   --    MCHC g/dL 29.0* 28.9* 28.8*   < > 28.5*  --   --    PLATELETS 10*3/mm3 325 368 366   < > 299  --   --    INR   --  3.06*  --   --  1.33*  --  1.26*    < > = values in this interval not displayed.         Results from last 7 days   Lab Units 04/05/25  0103 04/04/25  0144 04/02/25  0936 04/01/25  0104 04/01/25  0104 03/31/25  0118   SODIUM mmol/L 131* 127* 130*   < > 133*  --    POTASSIUM mmol/L 4.7  "4.7 4.6   < > 4.6  --    MAGNESIUM mg/dL  --   --   --   --   --  2.1   CHLORIDE mmol/L 100 98 100   < > 105  --    CO2 mmol/L 25.0 25.5 26.4   < > 24.7  --    BUN mg/dL 21 17 18   < > 20  --    CREATININE mg/dL 0.76 0.76 1.02   < > 1.12  --    CALCIUM mg/dL 7.9* 7.7* 7.9*   < > 7.8*  --    PHOSPHORUS mg/dL  --   --   --   --   --  4.5   GLUCOSE mg/dL 132* 92 122*   < > 88  --    ALBUMIN g/dL 1.6*  --  1.6*  --  1.5*  --    BILIRUBIN mg/dL 0.3  --  0.2  --  0.2  --    ALK PHOS U/L 562*  --  325*  --  270*  --    AST (SGOT) U/L 29  --  26  --  20  --    ALT (SGPT) U/L 15  --  12  --  7  --     < > = values in this interval not displayed.   Estimated Creatinine Clearance: 67 mL/min (by C-G formula based on SCr of 0.76 mg/dL).  No results found for: \"AMMONIA\"                  Glucose   Date/Time Value Ref Range Status   04/06/2025 0617 102 70 - 130 mg/dL Final   04/05/2025 0647 98 70 - 130 mg/dL Final   04/04/2025 0626 90 70 - 130 mg/dL Final     Lab Results   Component Value Date    TSH 5.270 (H) 03/29/2025    FREET4 1.02 03/29/2025     No results found for: \"PREGTESTUR\", \"PREGSERUM\", \"HCG\", \"HCGQUANT\"  Pain Management Panel           No data to display              Brief Urine Lab Results       None          No results found for: \"BLOODCX\"  No results found for: \"URINECX\"  No results found for: \"WOUNDCX\"  No results found for: \"STOOLCX\"  No results found for: \"RESPCX\"  No results found for: \"AFBCX\"  Results from last 7 days   Lab Units 04/05/25  0103   PROCALCITONIN ng/mL 0.55*   CRP mg/dL 13.83*       I have personally looked at the labs and they are summarized above.  ----------------------------------------------------------------------------------------------------------------------  Detailed radiology reports for the last 24 hours:  Imaging Results (Last 24 Hours)       ** No results found for the last 24 hours. **          Assessment & Plan      Patient is a 74-year-old male with no known significant medical " problems who presented to the ER with reports of bright red blood per rectum, weight loss and poor appetite.    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  --CT abdomen/pelvis with contrast noted rectal mass with extensive local disease into the peritoneum measuring 3.3 x 3.8 cm with local invasion into the posterior aspect of the prostate gland with pelvic and RP adenopathy and possible metastatic disease involving the liver  --Venous Dopplers noted DVT in the left common/superficial femoral veins  --Status post biopsy of rectal mass/colostomy/PEG  --Pathology noted invasive squamous cell carcinoma  --Patient has been able to tolerate radiation better with IV dose of morphine prior, will continue this prior to XRT.  Has underwent 4/10 scheduled treatments.  Given social situation and debility, ultimate plan is most likely nursing facility (mackenzie works at [employer]), unable to discharge home due to social situation therefore palliative XRT will be continued inpatient  --continue scheduled Tylenol, tramadol, new MS Contin twice daily  --continue p.o. midodrine 3 times daily  --Continue Eliquis starter pack  --Daily prn morphine prior to wound/dressing changes and prior to XRT  --Radiation oncology following, undergoing inpatient radiation  --Heme/onc following, appreciate recommendation  --Surgery following appreciate assistance    #Acute on chronic blood loss anemia  #Possible gastritis/UGIB  --received 1 unit PRBC earlier in the week, will give additional 1 unit today  --Received 3 days of IV iron, continue PPI twice daily  --Surgery following per above    #Goals of care  #Severe malnutrition  #Failure to thrive  --PEG placed with supplemental tube feeds  --Palliative care  following, appreciate assistance    #Constipation, KUB noted retained stool, schedule bowel regimen, lactulose  #Oral candidiasis completed nystatin swish and spit    Copied portions of this report have been reviewed and are accurate as of 04/06/25     CHECKLIST:  Abx: None  VTE: Eliquis  GI ppx: PPI twice daily  Diet: Modified, tube feeds  Code: No CPR, limited  Dispo: Patient is medically stable.  Plans to continue XRT inpatient to complete course.  Case management following for potential placement at SNF after completion of radiation.    Quintin Jon DO  [facility]  04/06/25  11:19 EDT

## 2025-04-06 NOTE — PROGRESS NOTES
Post colostomy     He is eating and ostomy is putting out stool. Afeb and vss. Abdomen is soft and not distended. He is to get further radiation treatments before going to a nursing home.

## 2025-04-06 NOTE — PLAN OF CARE
Goal Outcome Evaluation:  Plan of Care Reviewed With: patient        Progress: no change  Outcome Evaluation: Patient resting in bed. VSS. Wound care completed per orders. Tube feed bag changed this shift. Patient voices no concerns at this time, will continue with POC.

## 2025-04-07 ENCOUNTER — APPOINTMENT (OUTPATIENT)
Dept: GENERAL RADIOLOGY | Facility: HOSPITAL | Age: 75
DRG: 329 | End: 2025-04-07
Payer: MEDICARE

## 2025-04-07 LAB
ANION GAP SERPL CALCULATED.3IONS-SCNC: 6.8 MMOL/L (ref 5–15)
BASOPHILS # BLD AUTO: 0.13 10*3/MM3 (ref 0–0.2)
BASOPHILS NFR BLD AUTO: 0.9 % (ref 0–1.5)
BH BB BLOOD EXPIRATION DATE: NORMAL
BH BB BLOOD TYPE BARCODE: 7300
BH BB DISPENSE STATUS: NORMAL
BH BB PRODUCT CODE: NORMAL
BH BB UNIT NUMBER: NORMAL
BUN SERPL-MCNC: 21 MG/DL (ref 8–23)
BUN/CREAT SERPL: 29.6 (ref 7–25)
CALCIUM SPEC-SCNC: 7.8 MG/DL (ref 8.6–10.5)
CHLORIDE SERPL-SCNC: 101 MMOL/L (ref 98–107)
CO2 SERPL-SCNC: 24.2 MMOL/L (ref 22–29)
CREAT SERPL-MCNC: 0.71 MG/DL (ref 0.76–1.27)
CROSSMATCH INTERPRETATION: NORMAL
DEPRECATED RDW RBC AUTO: 52.4 FL (ref 37–54)
EGFRCR SERPLBLD CKD-EPI 2021: 95.1 ML/MIN/1.73
EOSINOPHIL # BLD AUTO: 0.13 10*3/MM3 (ref 0–0.4)
EOSINOPHIL NFR BLD AUTO: 0.9 % (ref 0.3–6.2)
ERYTHROCYTE [DISTWIDTH] IN BLOOD BY AUTOMATED COUNT: 17.4 % (ref 12.3–15.4)
GLUCOSE BLDC GLUCOMTR-MCNC: 89 MG/DL (ref 70–130)
GLUCOSE SERPL-MCNC: 112 MG/DL (ref 65–99)
HCT VFR BLD AUTO: 29.5 % (ref 37.5–51)
HCT VFR BLD AUTO: 29.5 % (ref 37.5–51)
HGB BLD-MCNC: 8.7 G/DL (ref 13–17.7)
HGB BLD-MCNC: 8.7 G/DL (ref 13–17.7)
IMM GRANULOCYTES # BLD AUTO: 0.36 10*3/MM3 (ref 0–0.05)
IMM GRANULOCYTES NFR BLD AUTO: 2.4 % (ref 0–0.5)
LYMPHOCYTES # BLD AUTO: 1.47 10*3/MM3 (ref 0.7–3.1)
LYMPHOCYTES NFR BLD AUTO: 10 % (ref 19.6–45.3)
MCH RBC QN AUTO: 25 PG (ref 26.6–33)
MCHC RBC AUTO-ENTMCNC: 29.5 G/DL (ref 31.5–35.7)
MCV RBC AUTO: 84.8 FL (ref 79–97)
MONOCYTES # BLD AUTO: 1.29 10*3/MM3 (ref 0.1–0.9)
MONOCYTES NFR BLD AUTO: 8.8 % (ref 5–12)
NEUTROPHILS NFR BLD AUTO: 11.36 10*3/MM3 (ref 1.7–7)
NEUTROPHILS NFR BLD AUTO: 77 % (ref 42.7–76)
NRBC BLD AUTO-RTO: 0 /100 WBC (ref 0–0.2)
PLATELET # BLD AUTO: 390 10*3/MM3 (ref 140–450)
PMV BLD AUTO: 8.3 FL (ref 6–12)
POTASSIUM SERPL-SCNC: 4.9 MMOL/L (ref 3.5–5.2)
RAD ONC ARIA COURSE ID: NORMAL
RAD ONC ARIA COURSE INTENT: NORMAL
RAD ONC ARIA COURSE LAST TREATMENT DATE: NORMAL
RAD ONC ARIA COURSE START DATE: NORMAL
RAD ONC ARIA COURSE TREATMENT ELAPSED DAYS: 5
RAD ONC ARIA FIRST TREATMENT DATE: NORMAL
RAD ONC ARIA PLAN FRACTIONS TREATED TO DATE: 4
RAD ONC ARIA PLAN ID: NORMAL
RAD ONC ARIA PLAN PRESCRIBED DOSE PER FRACTION: 3 GY
RAD ONC ARIA PLAN PRIMARY REFERENCE POINT: NORMAL
RAD ONC ARIA PLAN TOTAL FRACTIONS PRESCRIBED: 10
RAD ONC ARIA PLAN TOTAL PRESCRIBED DOSE: 3000 CGY
RAD ONC ARIA REFERENCE POINT DOSAGE GIVEN TO DATE: 12 GY
RAD ONC ARIA REFERENCE POINT ID: NORMAL
RAD ONC ARIA REFERENCE POINT SESSION DOSAGE GIVEN: 3 GY
RBC # BLD AUTO: 3.48 10*6/MM3 (ref 4.14–5.8)
SODIUM SERPL-SCNC: 132 MMOL/L (ref 136–145)
UNIT  ABO: NORMAL
UNIT  RH: NORMAL
WBC NRBC COR # BLD AUTO: 14.74 10*3/MM3 (ref 3.4–10.8)

## 2025-04-07 PROCEDURE — 77412 RADIATION TX DELIVERY LVL 3: CPT | Performed by: RADIOLOGY

## 2025-04-07 PROCEDURE — 77387 GUIDANCE FOR RADJ TX DLVR: CPT | Performed by: RADIOLOGY

## 2025-04-07 PROCEDURE — 82948 REAGENT STRIP/BLOOD GLUCOSE: CPT

## 2025-04-07 PROCEDURE — G6002 STEREOSCOPIC X-RAY GUIDANCE: HCPCS | Performed by: RADIOLOGY

## 2025-04-07 PROCEDURE — 74018 RADEX ABDOMEN 1 VIEW: CPT

## 2025-04-07 PROCEDURE — 25010000002 MORPHINE PER 10 MG

## 2025-04-07 PROCEDURE — 99232 SBSQ HOSP IP/OBS MODERATE 35: CPT | Performed by: INTERNAL MEDICINE

## 2025-04-07 PROCEDURE — 74018 RADEX ABDOMEN 1 VIEW: CPT | Performed by: RADIOLOGY

## 2025-04-07 PROCEDURE — 99232 SBSQ HOSP IP/OBS MODERATE 35: CPT | Performed by: STUDENT IN AN ORGANIZED HEALTH CARE EDUCATION/TRAINING PROGRAM

## 2025-04-07 PROCEDURE — 25010000002 MORPHINE PER 10 MG: Performed by: STUDENT IN AN ORGANIZED HEALTH CARE EDUCATION/TRAINING PROGRAM

## 2025-04-07 RX ORDER — MORPHINE SULFATE 2 MG/ML
2 INJECTION, SOLUTION INTRAMUSCULAR; INTRAVENOUS ONCE
Status: COMPLETED | OUTPATIENT
Start: 2025-04-07 | End: 2025-04-07

## 2025-04-07 RX ORDER — LACTULOSE 10 G/15ML
20 SOLUTION ORAL 3 TIMES DAILY
Status: DISCONTINUED | OUTPATIENT
Start: 2025-04-07 | End: 2025-04-21 | Stop reason: HOSPADM

## 2025-04-07 RX ADMIN — HYDROCORTISONE 2.5%: 25 CREAM TOPICAL at 20:26

## 2025-04-07 RX ADMIN — HYDROXYZINE HYDROCHLORIDE 10 MG: 10 TABLET, FILM COATED ORAL at 20:25

## 2025-04-07 RX ADMIN — OXYCODONE HYDROCHLORIDE 10 MG: 10 TABLET, FILM COATED, EXTENDED RELEASE ORAL at 08:03

## 2025-04-07 RX ADMIN — Medication 10 ML: at 20:26

## 2025-04-07 RX ADMIN — LACTULOSE 20 G: 20 SOLUTION ORAL at 20:24

## 2025-04-07 RX ADMIN — Medication 1 APPLICATION: at 08:03

## 2025-04-07 RX ADMIN — NYSTATIN: 100000 POWDER TOPICAL at 08:05

## 2025-04-07 RX ADMIN — SENNOSIDES AND DOCUSATE SODIUM 2 TABLET: 50; 8.6 TABLET ORAL at 08:03

## 2025-04-07 RX ADMIN — FOLIC ACID 1 MG: 1 TABLET ORAL at 08:03

## 2025-04-07 RX ADMIN — ACETAMINOPHEN 1000 MG: 500 TABLET ORAL at 08:03

## 2025-04-07 RX ADMIN — MIDODRINE HYDROCHLORIDE 5 MG: 2.5 TABLET ORAL at 16:50

## 2025-04-07 RX ADMIN — TRAMADOL HYDROCHLORIDE 50 MG: 50 TABLET, COATED ORAL at 14:42

## 2025-04-07 RX ADMIN — SENNOSIDES AND DOCUSATE SODIUM 2 TABLET: 50; 8.6 TABLET ORAL at 20:25

## 2025-04-07 RX ADMIN — PANTOPRAZOLE SODIUM 40 MG: 40 INJECTION, POWDER, FOR SOLUTION INTRAVENOUS at 08:03

## 2025-04-07 RX ADMIN — HYDROCORTISONE 2.5%: 25 CREAM TOPICAL at 08:05

## 2025-04-07 RX ADMIN — ACETAMINOPHEN 1000 MG: 500 TABLET ORAL at 14:42

## 2025-04-07 RX ADMIN — MORPHINE SULFATE 4 MG: 4 INJECTION, SOLUTION INTRAMUSCULAR; INTRAVENOUS at 13:15

## 2025-04-07 RX ADMIN — PANTOPRAZOLE SODIUM 40 MG: 40 INJECTION, POWDER, FOR SOLUTION INTRAVENOUS at 16:49

## 2025-04-07 RX ADMIN — POLYETHYLENE GLYCOL (3350) 17 G: 17 POWDER, FOR SOLUTION ORAL at 08:02

## 2025-04-07 RX ADMIN — APIXABAN 10 MG: 5 TABLET, FILM COATED ORAL at 20:25

## 2025-04-07 RX ADMIN — TRAMADOL HYDROCHLORIDE 50 MG: 50 TABLET, COATED ORAL at 08:03

## 2025-04-07 RX ADMIN — LACTULOSE 20 G: 20 SOLUTION ORAL at 16:50

## 2025-04-07 RX ADMIN — MIDODRINE HYDROCHLORIDE 5 MG: 2.5 TABLET ORAL at 08:04

## 2025-04-07 RX ADMIN — ACETAMINOPHEN 1000 MG: 500 TABLET ORAL at 20:25

## 2025-04-07 RX ADMIN — MORPHINE SULFATE 2 MG: 2 INJECTION, SOLUTION INTRAMUSCULAR; INTRAVENOUS at 01:39

## 2025-04-07 RX ADMIN — MIDODRINE HYDROCHLORIDE 5 MG: 2.5 TABLET ORAL at 11:04

## 2025-04-07 RX ADMIN — Medication 10 ML: at 08:04

## 2025-04-07 RX ADMIN — POLYETHYLENE GLYCOL (3350) 17 G: 17 POWDER, FOR SOLUTION ORAL at 20:24

## 2025-04-07 RX ADMIN — TRAMADOL HYDROCHLORIDE 50 MG: 50 TABLET, COATED ORAL at 20:25

## 2025-04-07 RX ADMIN — NYSTATIN 1 APPLICATION: 100000 POWDER TOPICAL at 20:27

## 2025-04-07 RX ADMIN — APIXABAN 10 MG: 5 TABLET, FILM COATED ORAL at 08:03

## 2025-04-07 NOTE — PLAN OF CARE
Goal Outcome Evaluation:   Pt resting in bed. Radiation completed this shift. Wound care completed per orders. Prn pain medication given per mar. VSS. Will continue plan of care.

## 2025-04-07 NOTE — PROGRESS NOTES
"Palliative Care Daily Progress Note     S: Medical record reviewed, followed up with Primary RN Ed and Dr Jeong regarding patient's condition. When I saw Pradeep today he was awake and alert and did not appear as weak as he has in previous days. He denied pain, shortness of breath, anxiety or nausea at this time and states he ate a good breakfast this morning. He was not in distress at this time. His sister Kisha is at bedside.      O:   Palliative Performance Scale Score:     /53 (BP Location: Right arm, Patient Position: Lying)   Pulse 71   Temp 98.7 °F (37.1 °C) (Oral)   Resp 16   Ht 190.5 cm (75\")   Wt 57.3 kg (126 lb 4 oz)   SpO2 97%   BMI 15.78 kg/m²     Intake/Output Summary (Last 24 hours) at 4/7/2025 1616  Last data filed at 4/7/2025 1500  Gross per 24 hour   Intake 1645 ml   Output 1650 ml   Net -5 ml       PE:  General Appearance:    Chronically ill appearing, alert, thin, frail, cooperative, NAD   HEENT:    NC/AT, without obvious abnormality, EOMI, anicteric , dry MM    Neck:   supple, trachea midline, no JVD   Lungs:     Unlabored respirations, no wheezing, rhonchi or rales    Heart:    RRR, normal S1 and S2, no M/R/G   Abdomen:     Soft, no distension no tenderness, NABS , abd concave, PEG tube noted. Ostomy noted   Extremities:   Moves all extremities with generalized weakness, no edema, cachetic   Pulses:   Pulses palpable and equal bilaterally   Skin:   Warm, dry and pale   Neurologic:   A/Ox3, cooperative   Psych:   Calm, pleasant          Meds: Reviewed and changes noted    Labs:   Results from last 7 days   Lab Units 04/06/25  2358   WBC 10*3/mm3 14.74*   HEMOGLOBIN g/dL 8.7*  8.7*   HEMATOCRIT % 29.5*  29.5*   PLATELETS 10*3/mm3 390     Results from last 7 days   Lab Units 04/06/25  2358   SODIUM mmol/L 132*   POTASSIUM mmol/L 4.9   CHLORIDE mmol/L 101   CO2 mmol/L 24.2   BUN mg/dL 21   CREATININE mg/dL 0.71*   GLUCOSE mg/dL 112*   CALCIUM mg/dL 7.8*     Results from last 7 days "   Lab Units 04/06/25  2358 04/05/25  0103   SODIUM mmol/L 132* 131*   POTASSIUM mmol/L 4.9 4.7   CHLORIDE mmol/L 101 100   CO2 mmol/L 24.2 25.0   BUN mg/dL 21 21   CREATININE mg/dL 0.71* 0.76   CALCIUM mg/dL 7.8* 7.9*   BILIRUBIN mg/dL  --  0.3   ALK PHOS U/L  --  562*   ALT (SGPT) U/L  --  15   AST (SGOT) U/L  --  29   GLUCOSE mg/dL 112* 132*     Imaging Results (Last 72 Hours)       Procedure Component Value Units Date/Time    XR Abdomen KUB [[id number]] Collected: 04/07/25 1414     Updated: 04/07/25 1416    Narrative:      EXAM:    XR Abdomen, 1 View     EXAM DATE:    4/7/2025 2:14 PM     CLINICAL HISTORY:    Follow-up constipation; R62.7-Adult failure to thrive; R19.7-Diarrhea,  unspecified; R59.1-Generalized enlarged lymph nodes; I82.412-Acute  embolism and thrombosis of left femoral vein; C18.9-Malignant neoplasm  of colon, unspecified; C78.7-Secondary malignant neoplasm of liver and  intrahepatic bile duct     TECHNIQUE:    Frontal supine view of the abdomen/pelvis.     COMPARISON:    4/4/2025     FINDINGS:    Gastrointestinal tract:  Extensive colonic stool.  No dilation.    Bones/joints:  Unremarkable as visualized.  No acute fracture.       Impression:        Extensive colonic stool.     This report was finalized on 4/7/2025 2:14 PM by Dr. Chava Munguia MD.       XR Abdomen KUB [[id number]] Collected: 04/04/25 1750     Updated: 04/04/25 1752    Narrative:      INDICATION: Abdominal pain and constipation.     TECHNIQUE: 3 views of the abdomen.     COMPARISON: No relevant priors.     FINDINGS:   Large amount retained colonic stool. A cystostomy tube in the stomach.  Left lower quadrant colostomy.     No dilated loops of bowel or free air. No pathologic calcifications.       Impression:      Large amount retained colonic stool.     This report was finalized on 4/4/2025 5:50 PM by Alex Pallas, DO.                 Diagnostics: Reviewed    A: Pradeep Donald is a 74 y.o. male  admitted on 3/29/2025 for metastatic  "colon cancer to liver. The only history that he reports is kidney stones in the past. He hasn't seeked medical care in nearly a decade or longer. He took no medications at home other than his yearly flu shot. His girlfriend reports that he had had a \"bad fall\" a few weeks ago. He has been having rectal pain and bleeding for over a year now. He reports having to use a pdero-pad to contain the blood but never told his family. He reports weight loss, weakness, fatigue, and bloody diarrhea. He reports that upon arriving to the ED, he was having left sided abd pain/left groin pain. CT of abdomen with contrast showed rectal mass with extensive local disease involving the perineum. Abscess or necrotic mass in the right perineum measured 3.3 x 3.8 cm. There is also local invasion into the posterior aspect of the prostate gland, pelvic and retroperitoneal adenopathy. Metastatic disease involving the left hepatic lobe and possibly the lung bases, Scrotal ultrasound showed large left hydrocele and lower extremity Doppler was positive for DVT in the left common and superficial femoral veins.       P:  I was able to speak with both Pradeep and sister Kisha this morning and plan at this time is to continue with radiation as long as he can tolerate it for a total of 10 treatments.  is working with [facility] for once radiation is completed. I discussed Gurindermartha with Barbara in  and Dr Jeong.      We will continue to follow along. Please do not hesitate to contact us regarding further sx mgmt or GOC needs, including after hours or on weekends via our on call provider at [phone].     Loly Gutierrez, APRN    4/7/2025  "

## 2025-04-07 NOTE — CASE MANAGEMENT/SOCIAL WORK
Discharge Planning Assessment  [facility]     Patient Name: Pradeep Donald  MRN: [id number]  Today's Date: 4/7/2025    Admit Date: 3/29/2025    Plan: Pt plans to complete raditaion treatments as inpatient at [facility]. Pt remains interested in going to [facility] once radiation is completed. [facility] per Jessica that nursing home will continue to follow patient. Pt will need a SNF pre-auth closer to discharge. SS to follow.       Discharge Plan       Row Name 04/07/25 1304       Plan    Plan Pt plans to complete raditaion treatments as inpatient at [facility]. Pt remains interested in [facility] once radiation is completed. [facility] per Jessica that nursing home will continue to follow patient. Pt will need a SNF pre-auth closer to discharge. SS to follow.        Continued Care and Services - Admitted Since 3/29/2025       Destination       Service Provider Request Status Services Address Phone Fax Patient Preferred    [facility] Pending - Request Sent -- [facility] [address] [place] [id number] [phone] --        Expected Discharge Date and Time       Expected Discharge Date Expected Discharge Time    Apr 11, 2025        TOI Laura     Detail Level: Detailed

## 2025-04-07 NOTE — PLAN OF CARE
Goal Outcome Evaluation:  Plan of Care Reviewed With: patient        Progress: no change          Pt is resting well no acute changes at this time will continue to monitor and follow current plan of care. Wound care done per orders.

## 2025-04-07 NOTE — PROGRESS NOTES
Surgery follow-up    Patient was down in radiation but his KUB from Friday was reviewed which demonstrated a large stool burden.    Repeat KUB ordered today which also showed a large stool burden.    Plan: Patient is on scheduled MiraLAX.  His lactulose was as needed and this was doubled and placed on a every 8 hour schedule.    Will obtain follow-up KUB tomorrow.    Once the stool burden has been removed would like to start the patient on bolus tube feeds.

## 2025-04-07 NOTE — PROGRESS NOTES
[facility]     Patient Identification:  Name:  Pradeep Donald  Age:  74 y.o.  Sex:  male  :  1950  MRN:  [id number]  Visit Number:  [id number]  ROOM: [address] [place] [address]     Primary Care Provider:  Carmen Pretty APRN     Date of Admission: 3/29/2025    Length of stay in inpatient status:  9    Subjective     Chief Compliant:    Chief Complaint   Patient presents with    Leg Swelling    Hemorrhoids       No abd pain reported this am, pain better controlled with analgesic escalation and appears to be resting comfortably in bed currently.        Objective       Vital Signs:  Temp:  [98.4 °F (36.9 °C)-98.7 °F (37.1 °C)] 98.7 °F (37.1 °C)  Heart Rate:  [70-72] 71  Resp:  [14-16] 16  BP: ()/(50-61) 101/53  SpO2:  [94 %-97 %] 97 %  on  Flow (L/min) (Oxygen Therapy):  [2] 2;   Device (Oxygen Therapy): nasal cannula  Body mass index is 15.78 kg/m².      ----------------------------------------------------------------------------------------------------------------------  Physical Exam  Vitals and nursing note reviewed.   Constitutional:       Comments: Ill appearing cachetic male   HENT:      Mouth/Throat:      Mouth: Mucous membranes are dry.      Pharynx: Oropharynx is clear.   Eyes:      General: No scleral icterus.     Extraocular Movements: Extraocular movements intact.   Cardiovascular:      Pulses: Normal pulses.   Pulmonary:      Effort: Pulmonary effort is normal.   Abdominal:      Palpations: Abdomen is soft.      Tenderness: There is no abdominal tenderness.      Comments: PEG and ostomy appear well healed without erythema or swelling locally   Musculoskeletal:      Right lower leg: Edema present.      Left lower leg: Edema present.   Neurological:      Mental Status: He is alert. Mental status is at baseline.   Psychiatric:         Mood and Affect: Mood normal.         Behavior: Behavior normal.        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 Underwent colostomy and currently working to relieve large stool burden  -General surgery following, bowel regimen escalated in attempt to tolerate bolus feed before DC  -Oncology following, planning for 10 total sessions of palliative radiation for tumor burden improvement, too frail to tolerate systemic chemotherapy.   -GOC ongoing with palliative following. Agree patient care needs likely best served by hospice post-discharge. Planning [facility] to [facility]& currently, discussed with family and feel he is unable to care for himself at home or participate with TF, prefer SNF placement. Will seek more clarity regarding comfort measures after palliative chemo completed    #Severe protein malnutrition with adult failure to thrive  -PEG placed and TF being initiated as tolerated   -anasarca noted likely secondary to hypoalbuminemia severely decrease ~1.6    #Acute on chronic anemia, suspect gastritis  -transfuse prn to goal >7.0, given GOC would avoid EGD unless brisk UGIB develops    #Oral candidiasis completed nystatin swish and spit, monitor for recurrence      Code Status and Medical Interventions: No CPR (Do Not Attempt to Resuscitate); Limited Support; No cardioversion; Sister Kisha   Ordered at: 04/02/25 4216     Code Status (Patient has no pulse and is not breathing):    No CPR (Do Not Attempt to Resuscitate)     Medical Interventions (Patient has pulse or is breathing):    Limited Support     Medical Intervention Limits:    No cardioversion     Comments:     Sister Kisah     Level Of Support Discussed With:    Patient       Health Care Surrogate         Disposition: SNF pending XRT completion     I have reviewed any copied/forwarded text or data, verified its accuracy, and updated as necessary above.    Óscar Jeong MD  [facility]  04/07/25  17:38 EDT

## 2025-04-07 NOTE — PROGRESS NOTES
Pradeep Donald  [id number]  1950  4/7/2025      Reason for Followup:   Metastatic squamous cell carcinoma  Weight loss    Patient Care Team:  Carmen Pretty APRN as PCP - General (Family Medicine)    Chief Complaint:  Anorectal mass        Subjective:    Patient is sitting up right in bed. He reports worsening dysphagia in comparison to last week and notes that pain has been better controlled but continues to experience pain when he goes to radiation.       Allergies:    Penicillins        Outpatient Medications:  No medications prior to admission.         Inpatient Medications:  Scheduled Meds:  acetaminophen, 1,000 mg, Per PEG Tube, TID  apixaban, 10 mg, Oral, Q12H   Followed by  [START ON 4/9/2025] apixaban, 5 mg, Oral, Q12H  castor oil-balsam peru, 1 Application, Topical, Daily  folic acid, 1 mg, Oral, Daily  Hydrocortisone (Perianal), , Rectal, BID  hydrOXYzine, 10 mg, Oral, Nightly  midodrine, 5 mg, Per PEG Tube, TID AC  nystatin, , Topical, Q12H  oxyCODONE, 10 mg, Oral, Q12H  pantoprazole, 40 mg, Intravenous, BID AC  senna-docusate sodium, 2 tablet, Per PEG Tube, BID   And  polyethylene glycol, 17 g, Per PEG Tube, BID  sodium chloride, 10 mL, Intravenous, Q12H  traMADol, 50 mg, Oral, TID        Continuous Infusions:         PRN Meds:    lactulose    Lidocaine Viscous HCl    Morphine    Morphine    ondansetron    oxyCODONE    sodium chloride    witch hazel-glycerin      Review of Systems:  A comprehensive 14-point review of systems performed.  Significant findings as mentioned above.  All other systems reviewed and are negative.       Physical Exam:  Vital Signs: These were reviewed and listed as per patient’s electronic medical chart  Vitals:    04/07/25 0700   BP: (!) 88/50   Pulse: 72   Resp: 16   Temp: 98.5 °F (36.9 °C)   SpO2: 95%     General: Awake, alert and oriented, chronically ill appearing, cachectic  HEENT: Head is atraumatic, normocephalic, extraocular movements full, oropharynx clear, no  scleral icterus, pink moist mucous membranes  Neck: supple, no jvd, lymphadenopathy or masses  Cardiovascular: regular rate and rhythm without murmurs, rubs or gallops  Pulmonary: non-labored, clear to auscultation bilaterally, no wheezing  Abdomen: soft, non-tender, non-distended, normal active bowel sounds present, no organomegaly  Extremities: No clubbing or cyanosis, positive LE pitting edema  Lymph: No cervical, supraclavicular adenopathy  Neurologic: Mental status as above, alert, awake and oriented, grossly non-focal exam  Skin: warm, dry, intact          Labs / Studies:  Lab Results (last 72 hours)       Procedure Component Value Units Date/Time    POC Glucose Once [[id number]]  (Normal) Collected: 2518    Specimen: Blood Updated: 25     Glucose 85 mg/dL     TISSUE EXAM, [facility]) [[id number]] Collected: 25 165    Specimen: Tissue from Large Intestine, Rectum Updated: 25 1526     Reference Lab Report --     [facility] [id number] [place] [address]  [address]Dbunc: [phone] or [phone]: [phone] Melgar M.D., Medical DirectorPATIENT NAME                           LABORATORY NO.[id number]  JACLYN DEL REAL                      [id number]                         AGE              SEX  SSN           CLIENT REF #[facility] [place]              74      1950      [id number]   [id number] SILVIA ALONZO                     REQUESTING M.D.     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  The neoplastic cells are positive for CK5/6,p40, EMA and p16.  The tumor cells are negative for EpCAM andsynaptophysin.  The immunohistochemical results support squamous cellcarcinoma.  MSI stains are not routinely performed on squamous cell carcinomainvolving rectum but can be performed on request, if clinically desired.Representative tumor blocks: A1, A2.CLINICAL HISTORY:Metastatic colon cancer to liverSPECIMENS RECEIVED:RECTAL BIOPSY , MASSMICROSCOPIC DESCRIPTION:Tissue blocks are prepared and slides are examined microscopically. Seediagnosis for details.The internal and external (both positive and negative) controls reactedappropriately. Some of our immunohistochemical and in situ hybridizationstudies are performed as analyte specific reagents. The following statementapplies to those tests: This test was   developed, and its performancecharacteristics determined by [facility]. It has not beencleared or approved by the US Food and Drug Administration. However, theA has determined that approval and clearance are not necessary.Professional interpretation rendered by Mary Santamaria M.D., F.C.A.P. at[facility], [facility], [address] [place] [id number].GROSS DESCRIPTION:Labeled \"rectal mass biopsy\".  Consists of 2 pieces of tan soft tissue ranging insize from 1.5 x 0.6 to 0.4 cm to 1.8 x 1.1 x 0.5 cm, submitted entirely in 3blocks, with the larger piece serially sectioned and submitted sequentially inblocks A1 and A2 and the smaller piece serially sectioned and submitted inblock A3.  JPREVIEWED, DIAGNOSED AND ELECTRONICALLYSIGNED BY:Mary Santamaria M.D., F.C.A.P.CPT CODES:  69850, 88341x5, 00832    POC Glucose Once [[id number]]  (Abnormal) Collected: 04/02/25 1201    Specimen: Blood Updated: 04/02/25 1207     Glucose 288 mg/dL     Comprehensive Metabolic Panel [[id number]]  " (Abnormal) Collected: 04/02/25 0936    Specimen: Blood Updated: 04/02/25 1014     Glucose 122 mg/dL      BUN 18 mg/dL      Creatinine 1.02 mg/dL      Sodium 130 mmol/L      Potassium 4.6 mmol/L      Chloride 100 mmol/L      CO2 26.4 mmol/L      Calcium 7.9 mg/dL      Total Protein 5.3 g/dL      Albumin 1.6 g/dL      ALT (SGPT) 12 U/L      AST (SGOT) 26 U/L      Alkaline Phosphatase 325 U/L      Total Bilirubin 0.2 mg/dL      Globulin 3.7 gm/dL      A/G Ratio 0.4 g/dL      BUN/Creatinine Ratio 17.6     Anion Gap 3.6 mmol/L      eGFR 76.2 mL/min/1.73     Narrative:      GFR Categories in Chronic Kidney Disease (CKD)      GFR Category          GFR (mL/min/1.73)    Interpretation  G1                     90 or greater         Normal or high (1)  G2                      60-89                Mild decrease (1)  G3a                   45-59                Mild to moderate decrease  G3b                   30-44                Moderate to severe decrease  G4                    15-29                Severe decrease  G5                    14 or less           Kidney failure          (1)In the absence of evidence of kidney disease, neither GFR category G1 or G2 fulfill the criteria for CKD.    eGFR calculation 2021 CKD-EPI creatinine equation, which does not include race as a factor    Hemoglobin & Hematocrit, Blood [[id number]]  (Abnormal) Collected: 04/02/25 0936    Specimen: Blood Updated: 04/02/25 0942     Hemoglobin 8.0 g/dL      Hematocrit 27.6 %     Heparin Anti-Xa [[id number]]  (Normal) Collected: 04/02/25 0736    Specimen: Blood Updated: 04/02/25 0811     Heparin Anti-Xa (UFH) 0.31 IU/ml     POC Glucose Once [[id number]]  (Normal) Collected: 04/02/25 0620    Specimen: Blood Updated: 04/02/25 0626     Glucose 92 mg/dL     POC Glucose Once [[id number]]  (Normal) Collected: 04/02/25 0057    Specimen: Blood Updated: 04/02/25 0103     Glucose 118 mg/dL     Heparin Anti-Xa [[id number]]  (Normal) Collected: 04/01/25 2316    Specimen:  Blood Updated: 04/01/25 2331     Heparin Anti-Xa (UFH) 0.34 IU/ml     CBC & Differential [[id number]]  (Abnormal) Collected: 04/01/25 2316    Specimen: Blood Updated: 04/01/25 2321    Narrative:      The following orders were created for panel order CBC & Differential.  Procedure                               Abnormality         Status                     ---------                               -----------         ------                     CBC Auto Differential[[id number]]        Abnormal            Final result                 Please view results for these tests on the individual orders.    CBC Auto Differential [[id number]]  (Abnormal) Collected: 04/01/25 2316    Specimen: Blood Updated: 04/01/25 2321     WBC 11.93 10*3/mm3      RBC 2.91 10*6/mm3      Hemoglobin 7.1 g/dL      Hematocrit 24.4 %      MCV 83.8 fL      MCH 24.4 pg      MCHC 29.1 g/dL      RDW 15.3 %      RDW-SD 46.7 fl      MPV 8.7 fL      Platelets 264 10*3/mm3      Neutrophil % 64.1 %      Lymphocyte % 18.7 %      Monocyte % 11.1 %      Eosinophil % 4.2 %      Basophil % 0.7 %      Immature Grans % 1.2 %      Neutrophils, Absolute 7.66 10*3/mm3      Lymphocytes, Absolute 2.23 10*3/mm3      Monocytes, Absolute 1.32 10*3/mm3      Eosinophils, Absolute 0.50 10*3/mm3      Basophils, Absolute 0.08 10*3/mm3      Immature Grans, Absolute 0.14 10*3/mm3      nRBC 0.0 /100 WBC     POC Glucose Once [[id number]]  (Abnormal) Collected: 04/01/25 1828    Specimen: Blood Updated: 04/01/25 1833     Glucose 189 mg/dL     Heparin Anti-Xa [[id number]]  (Abnormal) Collected: 04/01/25 1614    Specimen: Blood Updated: 04/01/25 1659     Heparin Anti-Xa (UFH) 0.20 IU/ml     POC Glucose Once [[id number]]  (Normal) Collected: 04/01/25 1219    Specimen: Blood Updated: 04/01/25 1225     Glucose 99 mg/dL     Heparin Anti-Xa [[id number]]  (Normal) Collected: 04/01/25 0914    Specimen: Blood Updated: 04/01/25 0929     Heparin Anti-Xa (UFH) 0.38 IU/ml     POC Glucose Once [[id number]]   (Normal) Collected: 04/01/25 0639    Specimen: Blood Updated: 04/01/25 0645     Glucose 85 mg/dL     Heparin Anti-Xa [[id number]]  (Abnormal) Collected: 04/01/25 0202    Specimen: Blood Updated: 04/01/25 0246     Heparin Anti-Xa (UFH) 0.25 IU/ml     Comprehensive Metabolic Panel [[id number]]  (Abnormal) Collected: 04/01/25 0104    Specimen: Blood Updated: 04/01/25 0141     Glucose 88 mg/dL      BUN 20 mg/dL      Creatinine 1.12 mg/dL      Sodium 133 mmol/L      Potassium 4.6 mmol/L      Chloride 105 mmol/L      CO2 24.7 mmol/L      Calcium 7.8 mg/dL      Total Protein 5.1 g/dL      Albumin 1.5 g/dL      ALT (SGPT) 7 U/L      AST (SGOT) 20 U/L      Alkaline Phosphatase 270 U/L      Total Bilirubin 0.2 mg/dL      Globulin 3.6 gm/dL      A/G Ratio 0.4 g/dL      BUN/Creatinine Ratio 17.9     Anion Gap 3.3 mmol/L      eGFR 68.1 mL/min/1.73     Narrative:      GFR Categories in Chronic Kidney Disease (CKD)      GFR Category          GFR (mL/min/1.73)    Interpretation  G1                     90 or greater         Normal or high (1)  G2                      60-89                Mild decrease (1)  G3a                   45-59                Mild to moderate decrease  G3b                   30-44                Moderate to severe decrease  G4                    15-29                Severe decrease  G5                    14 or less           Kidney failure          (1)In the absence of evidence of kidney disease, neither GFR category G1 or G2 fulfill the criteria for CKD.    eGFR calculation 2021 CKD-EPI creatinine equation, which does not include race as a factor    Protime-INR [[id number]]  (Abnormal) Collected: 04/01/25 0104    Specimen: Blood Updated: 04/01/25 0126     Protime 16.6 Seconds      INR 1.33    Narrative:      Suggested INR therapeutic range for stable oral anticoagulant therapy:    Low Intensity therapy:   1.5-2.0  Moderate Intensity therapy:   2.0-3.0  High Intensity therapy:   2.5-4.0    CBC & Differential  [[id number]]  (Abnormal) Collected: 04/01/25 0104    Specimen: Blood Updated: 04/01/25 0125    Narrative:      The following orders were created for panel order CBC & Differential.  Procedure                               Abnormality         Status                     ---------                               -----------         ------                     CBC Auto Differential[[id number]]        Abnormal            Final result                 Please view results for these tests on the individual orders.    CBC Auto Differential [[id number]]  (Abnormal) Collected: 04/01/25 0104    Specimen: Blood Updated: 04/01/25 0125     WBC 12.02 10*3/mm3      RBC 2.97 10*6/mm3      Hemoglobin 7.2 g/dL      Hematocrit 25.3 %      MCV 85.2 fL      MCH 24.2 pg      MCHC 28.5 g/dL      RDW 15.6 %      RDW-SD 48.4 fl      MPV 8.5 fL      Platelets 299 10*3/mm3      Neutrophil % 64.1 %      Lymphocyte % 19.9 %      Monocyte % 11.1 %      Eosinophil % 3.3 %      Basophil % 0.7 %      Immature Grans % 0.9 %      Neutrophils, Absolute 7.70 10*3/mm3      Lymphocytes, Absolute 2.39 10*3/mm3      Monocytes, Absolute 1.33 10*3/mm3      Eosinophils, Absolute 0.40 10*3/mm3      Basophils, Absolute 0.09 10*3/mm3      Immature Grans, Absolute 0.11 10*3/mm3      nRBC 0.0 /100 WBC     POC Glucose Once [[id number]]  (Normal) Collected: 04/01/25 0015    Specimen: Blood Updated: 04/01/25 0021     Glucose 103 mg/dL     Heparin Anti-Xa [[id number]]  (Abnormal) Collected: 03/31/25 1543    Specimen: Blood Updated: 03/31/25 1636     Heparin Anti-Xa (UFH) <0.10 IU/ml     POC Glucose Once [[id number]]  (Normal) Collected: 03/31/25 1626    Specimen: Blood Updated: 03/31/25 1635     Glucose 112 mg/dL     Hemoglobin & Hematocrit, Blood [[id number]]  (Abnormal) Collected: 03/31/25 1221    Specimen: Blood Updated: 03/31/25 1223     Hemoglobin 7.6 g/dL      Hematocrit 25.9 %     POC Glucose Once [[id number]]  (Normal) Collected: 03/31/25 1114    Specimen: Blood Updated:  03/31/25 1121     Glucose 101 mg/dL     Heparin Anti-Xa [[id number]]  (Abnormal) Collected: 03/31/25 0904    Specimen: Blood Updated: 03/31/25 0928     Heparin Anti-Xa (UFH) <0.10 IU/ml     Protime-INR [[id number]]  (Abnormal) Collected: 03/31/25 0904    Specimen: Blood Updated: 03/31/25 0927     Protime 16.0 Seconds      INR 1.26    Narrative:      Suggested INR therapeutic range for stable oral anticoagulant therapy:    Low Intensity therapy:   1.5-2.0  Moderate Intensity therapy:   2.0-3.0  High Intensity therapy:   2.5-4.0    aPTT [[id number]]  (Abnormal) Collected: 03/31/25 0904    Specimen: Blood Updated: 03/31/25 0927     PTT 36.0 seconds     Narrative:      PTT Heparin Therapeutic Range:  45 - 116 seconds                Imaging Results (Last 72 Hours)       Procedure Component Value Units Date/Time    XR Abdomen KUB [[id number]] Collected: 04/04/25 1750     Updated: 04/04/25 1752    Narrative:      INDICATION: Abdominal pain and constipation.     TECHNIQUE: 3 views of the abdomen.     COMPARISON: No relevant priors.     FINDINGS:   Large amount retained colonic stool. A cystostomy tube in the stomach.  Left lower quadrant colostomy.     No dilated loops of bowel or free air. No pathologic calcifications.       Impression:      Large amount retained colonic stool.     This report was finalized on 4/4/2025 5:50 PM by Alex Pallas, DO.                     Assessment & Plan:  Pradeep Donald is a 74 y.o. year-old male presenting with rectal bleeding and obstructing rectal mass currently consulted for     Metastatic squamous anal cancer  -   Given locally advanced obstructive cancer patient had to undergo surgical diversion and diverting colostomy was performed by Dr. Monzon on 3/29/25.  -  He has invasion locally into the prostate and perineum as well as evidence of retroperitoneal LAD, bulky L inguinal LAD, and liver metastasis along with tiny lung nodules which may also represent metastatic disease versus  granulomatous disease.    -  Pathology significant for an invasive squamous cell carcinoma.  -  Given metastatic disease, he understands that any treatment would not be curative and would be for palliation only.   - Dr. Poon with radiation medicine was consulted for consideration of palliative radiation to reduce tumor bulk at the primary rectal/anal canal site, to reduce inguinal adenopathy that was felt to be causing lower extremity swelling, and to help relieve pain. Patient has been tolerating radiation better with pain medications.   - However, patient continues to be quite debilitated and unfortunately the risks of chemotherapy would likely outweigh any potential benefit and would cause more harm. I've had a long goals of care discussion with the patient and his sister and family who have been at bedside (sister not present today) and I have recommended focusing on symptom management ideally with hospice unless patient should significantly improve.      Left lower extremity DVT  - Patient currently on Eliquis. Likely related to bulky adenopathy.     Dysphagia  - Would likely benefit from swallow study as patient reports this is worsening.     Normocytic anemia   - B12 level is normal, folate level is borderline therefore he was started on folic acid 1 mg daily.   - Patient also likely has a iron deficiency anemia along with anemia of chronic disease and was started on Ferrlecit and was given four doses prior to it being discontinued due to possible comfort measures.   - Would recommend PRBC transfusion for Hb <7 or if symptomatic <8.     Difficulty voiding  - Invasion of the prostate from the anorectal mass which is likely contributing to obstructive uropathy.   - Walsh catheter was placed by Dr. Monzon under anesthesia.      Thank you for allowing us to participate in Mr. Donald's care, please do not hesitate to call should any questions arise and will continue to follow along.        Fouzia Weems  MD  04/07/25  09:25 EDT

## 2025-04-08 ENCOUNTER — APPOINTMENT (OUTPATIENT)
Dept: GENERAL RADIOLOGY | Facility: HOSPITAL | Age: 75
DRG: 329 | End: 2025-04-08
Payer: MEDICARE

## 2025-04-08 LAB
ANION GAP SERPL CALCULATED.3IONS-SCNC: 7.6 MMOL/L (ref 5–15)
BUN SERPL-MCNC: 21 MG/DL (ref 8–23)
BUN/CREAT SERPL: 32.3 (ref 7–25)
CALCIUM SPEC-SCNC: 7.7 MG/DL (ref 8.6–10.5)
CHLORIDE SERPL-SCNC: 101 MMOL/L (ref 98–107)
CO2 SERPL-SCNC: 23.4 MMOL/L (ref 22–29)
CREAT SERPL-MCNC: 0.65 MG/DL (ref 0.76–1.27)
DEPRECATED RDW RBC AUTO: 55.7 FL (ref 37–54)
EGFRCR SERPLBLD CKD-EPI 2021: 97.7 ML/MIN/1.73
ERYTHROCYTE [DISTWIDTH] IN BLOOD BY AUTOMATED COUNT: 17.6 % (ref 12.3–15.4)
GLUCOSE SERPL-MCNC: 90 MG/DL (ref 65–99)
HCT VFR BLD AUTO: 28.5 % (ref 37.5–51)
HGB BLD-MCNC: 8.3 G/DL (ref 13–17.7)
MCH RBC QN AUTO: 25.6 PG (ref 26.6–33)
MCHC RBC AUTO-ENTMCNC: 29.1 G/DL (ref 31.5–35.7)
MCV RBC AUTO: 88 FL (ref 79–97)
PLATELET # BLD AUTO: 396 10*3/MM3 (ref 140–450)
PMV BLD AUTO: 8.8 FL (ref 6–12)
POTASSIUM SERPL-SCNC: 5.2 MMOL/L (ref 3.5–5.2)
RBC # BLD AUTO: 3.24 10*6/MM3 (ref 4.14–5.8)
SODIUM SERPL-SCNC: 132 MMOL/L (ref 136–145)
WBC NRBC COR # BLD AUTO: 9.71 10*3/MM3 (ref 3.4–10.8)

## 2025-04-08 PROCEDURE — 99024 POSTOP FOLLOW-UP VISIT: CPT

## 2025-04-08 PROCEDURE — 25010000002 MORPHINE PER 10 MG: Performed by: STUDENT IN AN ORGANIZED HEALTH CARE EDUCATION/TRAINING PROGRAM

## 2025-04-08 PROCEDURE — 74018 RADEX ABDOMEN 1 VIEW: CPT

## 2025-04-08 PROCEDURE — 25010000002 MORPHINE PER 10 MG

## 2025-04-08 PROCEDURE — 80048 BASIC METABOLIC PNL TOTAL CA: CPT | Performed by: STUDENT IN AN ORGANIZED HEALTH CARE EDUCATION/TRAINING PROGRAM

## 2025-04-08 PROCEDURE — 74018 RADEX ABDOMEN 1 VIEW: CPT | Performed by: RADIOLOGY

## 2025-04-08 PROCEDURE — 85027 COMPLETE CBC AUTOMATED: CPT | Performed by: STUDENT IN AN ORGANIZED HEALTH CARE EDUCATION/TRAINING PROGRAM

## 2025-04-08 PROCEDURE — 99232 SBSQ HOSP IP/OBS MODERATE 35: CPT | Performed by: STUDENT IN AN ORGANIZED HEALTH CARE EDUCATION/TRAINING PROGRAM

## 2025-04-08 RX ORDER — MORPHINE SULFATE 2 MG/ML
2 INJECTION, SOLUTION INTRAMUSCULAR; INTRAVENOUS ONCE
Status: COMPLETED | OUTPATIENT
Start: 2025-04-08 | End: 2025-04-08

## 2025-04-08 RX ADMIN — NYSTATIN: 100000 POWDER TOPICAL at 20:42

## 2025-04-08 RX ADMIN — APIXABAN 10 MG: 5 TABLET, FILM COATED ORAL at 20:39

## 2025-04-08 RX ADMIN — TRAMADOL HYDROCHLORIDE 50 MG: 50 TABLET, COATED ORAL at 20:39

## 2025-04-08 RX ADMIN — Medication 1 APPLICATION: at 09:26

## 2025-04-08 RX ADMIN — Medication 10 ML: at 09:27

## 2025-04-08 RX ADMIN — SENNOSIDES AND DOCUSATE SODIUM 2 TABLET: 50; 8.6 TABLET ORAL at 09:25

## 2025-04-08 RX ADMIN — TRAMADOL HYDROCHLORIDE 50 MG: 50 TABLET, COATED ORAL at 09:25

## 2025-04-08 RX ADMIN — MIDODRINE HYDROCHLORIDE 5 MG: 2.5 TABLET ORAL at 09:25

## 2025-04-08 RX ADMIN — ACETAMINOPHEN 1000 MG: 500 TABLET ORAL at 15:50

## 2025-04-08 RX ADMIN — LACTULOSE 20 G: 20 SOLUTION ORAL at 09:24

## 2025-04-08 RX ADMIN — HYDROCORTISONE 2.5%: 25 CREAM TOPICAL at 20:42

## 2025-04-08 RX ADMIN — OXYCODONE HYDROCHLORIDE 10 MG: 10 TABLET, FILM COATED, EXTENDED RELEASE ORAL at 21:37

## 2025-04-08 RX ADMIN — OXYCODONE HYDROCHLORIDE 10 MG: 10 TABLET, FILM COATED, EXTENDED RELEASE ORAL at 09:25

## 2025-04-08 RX ADMIN — POLYETHYLENE GLYCOL (3350) 17 G: 17 POWDER, FOR SOLUTION ORAL at 09:24

## 2025-04-08 RX ADMIN — APIXABAN 10 MG: 5 TABLET, FILM COATED ORAL at 09:25

## 2025-04-08 RX ADMIN — MORPHINE SULFATE 4 MG: 4 INJECTION, SOLUTION INTRAMUSCULAR; INTRAVENOUS at 11:42

## 2025-04-08 RX ADMIN — PANTOPRAZOLE SODIUM 40 MG: 40 INJECTION, POWDER, FOR SOLUTION INTRAVENOUS at 09:25

## 2025-04-08 RX ADMIN — Medication 10 ML: at 20:42

## 2025-04-08 RX ADMIN — NYSTATIN: 100000 POWDER TOPICAL at 09:27

## 2025-04-08 RX ADMIN — MORPHINE SULFATE 2 MG: 2 INJECTION, SOLUTION INTRAMUSCULAR; INTRAVENOUS at 04:56

## 2025-04-08 RX ADMIN — ACETAMINOPHEN 1000 MG: 500 TABLET ORAL at 20:39

## 2025-04-08 RX ADMIN — MIDODRINE HYDROCHLORIDE 5 MG: 2.5 TABLET ORAL at 11:42

## 2025-04-08 RX ADMIN — LACTULOSE 20 G: 20 SOLUTION ORAL at 15:50

## 2025-04-08 RX ADMIN — ACETAMINOPHEN 1000 MG: 500 TABLET ORAL at 09:25

## 2025-04-08 RX ADMIN — HYDROXYZINE HYDROCHLORIDE 10 MG: 10 TABLET, FILM COATED ORAL at 20:39

## 2025-04-08 RX ADMIN — MIDODRINE HYDROCHLORIDE 5 MG: 2.5 TABLET ORAL at 17:17

## 2025-04-08 RX ADMIN — TRAMADOL HYDROCHLORIDE 50 MG: 50 TABLET, COATED ORAL at 15:50

## 2025-04-08 RX ADMIN — PANTOPRAZOLE SODIUM 40 MG: 40 INJECTION, POWDER, FOR SOLUTION INTRAVENOUS at 17:18

## 2025-04-08 RX ADMIN — FOLIC ACID 1 MG: 1 TABLET ORAL at 09:25

## 2025-04-08 RX ADMIN — HYDROCORTISONE 2.5%: 25 CREAM TOPICAL at 09:26

## 2025-04-08 NOTE — PROGRESS NOTES
[facility]     Patient Identification:  Name:  Pradeep Donald  Age:  74 y.o.  Sex:  male  :  1950  MRN:  [id number]  Visit Number:  [id number]  ROOM: [address] [place] [address]     Primary Care Provider:  Carmen Pretty APRN     Date of Admission: 3/29/2025    Length of stay in inpatient status:  10    Subjective     Chief Compliant:    Chief Complaint   Patient presents with    Leg Swelling    Hemorrhoids       Patient with KUB this morning personally reviewed showing ongoing large stool burden however patient reports no significant abdominal distention or discomfort at this time.  No noted change in ostomy output and no flatus per rectum.  Patient tolerating some p.o. nutritional intake and without evidence of nausea or vomiting from ongoing tube feeds        Objective       Vital Signs:  Temp:  [98.5 °F (36.9 °C)-98.8 °F (37.1 °C)] 98.5 °F (36.9 °C)  Heart Rate:  [85] 85  Resp:  [16-18] 18  BP: ()/(50-60) 95/50  SpO2:  [92 %] 92 %  on  Flow (L/min) (Oxygen Therapy):  [2] 2;   Device (Oxygen Therapy): nasal cannula  Body mass index is 15.78 kg/m².      ----------------------------------------------------------------------------------------------------------------------  Physical Exam  Vitals and nursing note reviewed.   Constitutional:       Comments: Ill appearing cachetic male   HENT:      Mouth/Throat:      Mouth: Mucous membranes are dry.      Pharynx: Oropharynx is clear.   Eyes:      General: No scleral icterus.     Extraocular Movements: Extraocular movements intact.   Cardiovascular:      Pulses: Normal pulses.   Pulmonary:      Effort: Pulmonary effort is normal.   Abdominal:      Palpations: Abdomen is soft.      Tenderness: There is no abdominal tenderness.      Comments: PEG and ostomy appear well healed without erythema or swelling locally   Musculoskeletal:      Right lower leg: Edema (stable compared to yesterday) present.      Left lower leg: Edema present.    Neurological:      Mental Status: He is alert. Mental status is at baseline.   Psychiatric:         Mood and Affect: Mood normal.         Behavior: Behavior normal.       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 Underwent colostomy and currently working to relieve large stool burden  -General surgery following, bowel regimen escalated in attempt to tolerate bolus feed before DC  -Oncology following, planning for 10 total sessions of palliative radiation for tumor burden improvement, too frail to tolerate systemic chemotherapy.   -GOC ongoing with palliative following. Agree patient care needs likely best served by hospice post-discharge. Planning [facility]& currently, discussed with family and feel he is unable to care for himself at home or participate with PT, prefer SNF placement.   - For discharge as DNR/DNI but family not ready for comfort measures or hospice services at this time.  Appreciate palliative care input and following for any acute sx that arise during treatment    #Severe protein malnutrition with adult failure to thrive  -PEG placed and TF being initiated as tolerated for PO supplement   -anasarca noted likely secondary to hypoalbuminemia severely decrease ~1.6    #Acute on chronic anemia, suspect gastritis  -transfuse prn to goal >7.0, given GOC would avoid EGD unless brisk UGIB develops    #Oral candidiasis completed nystatin swish and spit, monitor for recurrence      Code Status and Medical Interventions: No CPR (Do Not Attempt to Resuscitate); Limited Support; No  cardioversion; Sister Kisha   Ordered at: 04/02/25 1651     Code Status (Patient has no pulse and is not breathing):    No CPR (Do Not Attempt to Resuscitate)     Medical Interventions (Patient has pulse or is breathing):    Limited Support     Medical Intervention Limits:    No cardioversion     Comments:    Sister Kisha     Level Of Support Discussed With:    Patient       Health Care Surrogate         Disposition: SNF pending XRT completion     I have reviewed any copied/forwarded text or data, verified its accuracy, and updated as necessary above.    Óscar Jeong MD  [facility] Hospitalist  04/08/25  17:54 EDT

## 2025-04-08 NOTE — PLAN OF CARE
Goal Outcome Evaluation:  Plan of Care Reviewed With: patient        Progress: no change  Outcome Evaluation: Pt resting in bed during assessment, VSS, Pt C/O pain, Meds given per order. Wound care done per oders. Pt has no other complaints or concerns at this time. Will cont POC.

## 2025-04-08 NOTE — PROGRESS NOTES
"Palliative Care Daily Progress Note     S: Medical record reviewed, followed up with Primary Ed and Dr Jeong regarding patient's condition. When I saw Pradeep today he was sitting up in bed, he was taking his pills with pudding, he was in good spirits today. He had miralax on the table that he was drinking also. When I asked him how his pain was today, he said it was gone for now with the medicine. He denied shortness of breath, nausea, vomiting or anxiety and was smiling this morning.      O:   Palliative Performance Scale Score:     BP 95/50 (BP Location: Right arm, Patient Position: Lying)   Pulse 85   Temp 98.5 °F (36.9 °C) (Oral)   Resp 18   Ht 190.5 cm (75\")   Wt 57.3 kg (126 lb 4 oz)   SpO2 92%   BMI 15.78 kg/m²     Intake/Output Summary (Last 24 hours) at 4/8/2025 1507  Last data filed at 4/8/2025 1100  Gross per 24 hour   Intake 1208 ml   Output 990 ml   Net 218 ml       PE:  General Appearance:    Chronically ill appearing, alert, thin, frail, cooperative, NAD   HEENT:    NC/AT, without obvious abnormality, EOMI, anicteric , dry MM    Neck:   supple, trachea midline, no JVD   Lungs:     Unlabored respirations, no wheezing, rhonchi or rales    Heart:    RRR, normal S1 and S2, no M/R/G   Abdomen:     Soft, no distension no tenderness, NABS , abd concave, PEG tube noted. Ostomy noted   Extremities:   Moves all extremities with generalized weakness, no edema, cachetic   Pulses:   Pulses palpable and equal bilaterally   Skin:   Warm, dry and pale   Neurologic:   A/Ox3, cooperative   Psych:   Calm, pleasant           Meds: Reviewed and changes noted    Labs:   Results from last 7 days   Lab Units 04/08/25  0106   WBC 10*3/mm3 9.71   HEMOGLOBIN g/dL 8.3*   HEMATOCRIT % 28.5*   PLATELETS 10*3/mm3 396     Results from last 7 days   Lab Units 04/08/25  0106   SODIUM mmol/L 132*   POTASSIUM mmol/L 5.2   CHLORIDE mmol/L 101   CO2 mmol/L 23.4   BUN mg/dL 21   CREATININE mg/dL 0.65*   GLUCOSE mg/dL 90   CALCIUM " mg/dL 7.7*     Results from last 7 days   Lab Units 04/08/25  0106 04/06/25  2358 04/05/25  0103   SODIUM mmol/L 132*   < > 131*   POTASSIUM mmol/L 5.2   < > 4.7   CHLORIDE mmol/L 101   < > 100   CO2 mmol/L 23.4   < > 25.0   BUN mg/dL 21   < > 21   CREATININE mg/dL 0.65*   < > 0.76   CALCIUM mg/dL 7.7*   < > 7.9*   BILIRUBIN mg/dL  --   --  0.3   ALK PHOS U/L  --   --  562*   ALT (SGPT) U/L  --   --  15   AST (SGOT) U/L  --   --  29   GLUCOSE mg/dL 90   < > 132*    < > = values in this interval not displayed.     Imaging Results (Last 72 Hours)       Procedure Component Value Units Date/Time    XR Abdomen KUB [[id number]] Collected: 04/08/25 0922     Updated: 04/08/25 0925    Narrative:      EXAM:    XR Abdomen, 1 View     EXAM DATE:    4/8/2025 9:22 AM     CLINICAL HISTORY:    Follow-up constipation; R62.7-Adult failure to thrive; R19.7-Diarrhea,  unspecified; R59.1-Generalized enlarged lymph nodes; I82.412-Acute  embolism and thrombosis of left femoral vein; C18.9-Malignant neoplasm  of colon, unspecified; C78.7-Secondary malignant neoplasm of liver and  intrahepatic bile duct     TECHNIQUE:    Frontal supine view of the abdomen/pelvis.     COMPARISON:    4/7/2025     FINDINGS:    Gastrointestinal tract:  Large amount of colonic stool noted.    Bones/joints:  Unremarkable as visualized.  No acute fracture.    Tubes, lines and devices:  Gastric tube balloon noted.       Impression:        Large amount of colonic stool noted.     This report was finalized on 4/8/2025 9:23 AM by Dr. Chava Munguia MD.       XR Abdomen KUB [[id number]] Collected: 04/07/25 1414     Updated: 04/07/25 1416    Narrative:      EXAM:    XR Abdomen, 1 View     EXAM DATE:    4/7/2025 2:14 PM     CLINICAL HISTORY:    Follow-up constipation; R62.7-Adult failure to thrive; R19.7-Diarrhea,  unspecified; R59.1-Generalized enlarged lymph nodes; I82.412-Acute  embolism and thrombosis of left femoral vein; C18.9-Malignant neoplasm  of colon,  "unspecified; C78.7-Secondary malignant neoplasm of liver and  intrahepatic bile duct     TECHNIQUE:    Frontal supine view of the abdomen/pelvis.     COMPARISON:    4/4/2025     FINDINGS:    Gastrointestinal tract:  Extensive colonic stool.  No dilation.    Bones/joints:  Unremarkable as visualized.  No acute fracture.       Impression:        Extensive colonic stool.     This report was finalized on 4/7/2025 2:14 PM by Dr. Chava Munguia MD.                 Diagnostics: Reviewed    A: Pradeep Donald is a 74 y.o. male admitted on 3/29/2025 for metastatic colon cancer to liver. The only history that he reports is kidney stones in the past. He hasn't seeked medical care in nearly a decade or longer. He took no medications at home other than his yearly flu shot. His girlfriend reports that he had had a \"bad fall\" a few weeks ago. He has been having rectal pain and bleeding for over a year now. He reports having to use a pedro-pad to contain the blood but never told his family. He reports weight loss, weakness, fatigue, and bloody diarrhea. He reports that upon arriving to the ED, he was having left sided abd pain/left groin pain. CT of abdomen with contrast showed rectal mass with extensive local disease involving the perineum. Abscess or necrotic mass in the right perineum measured 3.3 x 3.8 cm. There is also local invasion into the posterior aspect of the prostate gland, pelvic and retroperitoneal adenopathy. Metastatic disease involving the left hepatic lobe and possibly the lung bases, Scrotal ultrasound showed large left hydrocele and lower extremity Doppler was positive for DVT in the left common and superficial femoral veins.          P:  Plan is to continue with radiation. Pradeep on scheduled miralax and lactulose. KUB still noting large stool burden. Pradeep's pain is under control at this time, palliative will continue to monitor symptom control and be support for Pradeep and sister Kisha,      We will continue " to follow along. Please do not hesitate to contact us regarding further sx mgmt or GOC needs, including after hours or on weekends via our on call provider at [phone].     Loly Gutierrez, APRN    4/8/2025

## 2025-04-08 NOTE — PLAN OF CARE
Goal Outcome Evaluation:  Pt resting in bed. Wound care completed per orders. Prn pain medication given per mar. No acute changes. VSS. Will continue plan of care.

## 2025-04-08 NOTE — CASE MANAGEMENT/SOCIAL WORK
Discharge Planning Assessment   [place]     Patient Name: Pradeep Donald  MRN: [id number]  Today's Date: 4/8/2025    Admit Date: 3/29/2025    Plan: Pt was discussed on MD rounds on this date. SS followed up with [facility] per [facility] who states they are still interested in taking pt once radiation treatments are complete. Pt will require a pre-auth to be submitted closer to time of discharge. SS to follow.       Discharge Plan       Row Name 04/08/25 1553       Plan    Plan Pt was discussed on MD rounds on this date. SS followed up with [facility] and [facility] per Jessica who states they are still interested in taking pt once radiation treatments are complete. Pt will require a pre-auth to be submitted closer to time of discharge. SS to follow.        Continued Care and Services - Admitted Since 3/29/2025       Destination       Service Provider Request Status Services Address Phone Fax Patient Preferred    [facility] Pending - Request Sent -- [address] [place] [id number] [phone] --        Expected Discharge Date and Time       Expected Discharge Date Expected Discharge Time    Apr 11, 2025        TOI Laura

## 2025-04-08 NOTE — PROGRESS NOTES
Pradeep reports that he is doing a little better today.  He reports he is not sure if he has had a bowel movement.    KUB from Friday and from yesterday showed a large stool burden.  Again, today's x-ray reveals large amount of colonic stool.    He was placed on scheduled MiraLAX as well as scheduled lactulose.  Continue this as scheduled.

## 2025-04-09 LAB
RAD ONC ARIA COURSE ID: NORMAL
RAD ONC ARIA COURSE INTENT: NORMAL
RAD ONC ARIA COURSE LAST TREATMENT DATE: NORMAL
RAD ONC ARIA COURSE START DATE: NORMAL
RAD ONC ARIA COURSE TREATMENT ELAPSED DAYS: 7
RAD ONC ARIA FIRST TREATMENT DATE: NORMAL
RAD ONC ARIA PLAN FRACTIONS TREATED TO DATE: 5
RAD ONC ARIA PLAN ID: NORMAL
RAD ONC ARIA PLAN PRESCRIBED DOSE PER FRACTION: 3 GY
RAD ONC ARIA PLAN PRIMARY REFERENCE POINT: NORMAL
RAD ONC ARIA PLAN TOTAL FRACTIONS PRESCRIBED: 10
RAD ONC ARIA PLAN TOTAL PRESCRIBED DOSE: 3000 CGY
RAD ONC ARIA REFERENCE POINT DOSAGE GIVEN TO DATE: 15 GY
RAD ONC ARIA REFERENCE POINT ID: NORMAL
RAD ONC ARIA REFERENCE POINT SESSION DOSAGE GIVEN: 3 GY

## 2025-04-09 PROCEDURE — 77412 RADIATION TX DELIVERY LVL 3: CPT | Performed by: RADIOLOGY

## 2025-04-09 PROCEDURE — 25010000002 MORPHINE PER 10 MG: Performed by: STUDENT IN AN ORGANIZED HEALTH CARE EDUCATION/TRAINING PROGRAM

## 2025-04-09 PROCEDURE — G6002 STEREOSCOPIC X-RAY GUIDANCE: HCPCS | Performed by: RADIOLOGY

## 2025-04-09 PROCEDURE — 25010000002 ONDANSETRON PER 1 MG: Performed by: NURSE PRACTITIONER

## 2025-04-09 PROCEDURE — 77387 GUIDANCE FOR RADJ TX DLVR: CPT | Performed by: RADIOLOGY

## 2025-04-09 PROCEDURE — 99232 SBSQ HOSP IP/OBS MODERATE 35: CPT | Performed by: STUDENT IN AN ORGANIZED HEALTH CARE EDUCATION/TRAINING PROGRAM

## 2025-04-09 RX ORDER — POLYETHYLENE GLYCOL 3350 17 G/17G
1 POWDER, FOR SOLUTION ORAL ONCE
Status: DISCONTINUED | OUTPATIENT
Start: 2025-04-09 | End: 2025-04-09

## 2025-04-09 RX ADMIN — LACTULOSE 20 G: 20 SOLUTION ORAL at 16:04

## 2025-04-09 RX ADMIN — APIXABAN 10 MG: 5 TABLET, FILM COATED ORAL at 09:48

## 2025-04-09 RX ADMIN — MIDODRINE HYDROCHLORIDE 5 MG: 2.5 TABLET ORAL at 12:56

## 2025-04-09 RX ADMIN — TRAMADOL HYDROCHLORIDE 50 MG: 50 TABLET, COATED ORAL at 16:05

## 2025-04-09 RX ADMIN — OXYCODONE 5 MG: 5 TABLET ORAL at 21:44

## 2025-04-09 RX ADMIN — MORPHINE SULFATE 4 MG: 4 INJECTION, SOLUTION INTRAMUSCULAR; INTRAVENOUS at 05:17

## 2025-04-09 RX ADMIN — POLYETHYLENE GLYCOL (3350) 17 G: 17 POWDER, FOR SOLUTION ORAL at 09:48

## 2025-04-09 RX ADMIN — MIDODRINE HYDROCHLORIDE 5 MG: 2.5 TABLET ORAL at 09:48

## 2025-04-09 RX ADMIN — HYDROCORTISONE 2.5%: 25 CREAM TOPICAL at 09:49

## 2025-04-09 RX ADMIN — PANTOPRAZOLE SODIUM 40 MG: 40 INJECTION, POWDER, FOR SOLUTION INTRAVENOUS at 16:04

## 2025-04-09 RX ADMIN — MIDODRINE HYDROCHLORIDE 5 MG: 2.5 TABLET ORAL at 16:05

## 2025-04-09 RX ADMIN — SENNOSIDES AND DOCUSATE SODIUM 2 TABLET: 50; 8.6 TABLET ORAL at 09:48

## 2025-04-09 RX ADMIN — NYSTATIN: 100000 POWDER TOPICAL at 09:49

## 2025-04-09 RX ADMIN — ACETAMINOPHEN 1000 MG: 500 TABLET ORAL at 21:44

## 2025-04-09 RX ADMIN — FOLIC ACID 1 MG: 1 TABLET ORAL at 09:48

## 2025-04-09 RX ADMIN — ACETAMINOPHEN 1000 MG: 500 TABLET ORAL at 09:48

## 2025-04-09 RX ADMIN — HYDROCORTISONE 2.5%: 25 CREAM TOPICAL at 20:51

## 2025-04-09 RX ADMIN — POLYETHYLENE GLYCOL-3350 AND ELECTROLYTES 200 ML: 236; 6.74; 5.86; 2.97; 22.74 POWDER, FOR SOLUTION ORAL at 12:56

## 2025-04-09 RX ADMIN — ACETAMINOPHEN 1000 MG: 500 TABLET ORAL at 16:05

## 2025-04-09 RX ADMIN — APIXABAN 5 MG: 5 TABLET, FILM COATED ORAL at 20:36

## 2025-04-09 RX ADMIN — PANTOPRAZOLE SODIUM 40 MG: 40 INJECTION, POWDER, FOR SOLUTION INTRAVENOUS at 09:51

## 2025-04-09 RX ADMIN — OXYCODONE HYDROCHLORIDE 10 MG: 10 TABLET, FILM COATED, EXTENDED RELEASE ORAL at 09:48

## 2025-04-09 RX ADMIN — ONDANSETRON 4 MG: 2 INJECTION INTRAMUSCULAR; INTRAVENOUS at 23:56

## 2025-04-09 RX ADMIN — SENNOSIDES AND DOCUSATE SODIUM 2 TABLET: 50; 8.6 TABLET ORAL at 20:36

## 2025-04-09 RX ADMIN — LACTULOSE 20 G: 20 SOLUTION ORAL at 20:36

## 2025-04-09 RX ADMIN — HYDROXYZINE HYDROCHLORIDE 10 MG: 10 TABLET, FILM COATED ORAL at 20:36

## 2025-04-09 RX ADMIN — TRAMADOL HYDROCHLORIDE 50 MG: 50 TABLET, COATED ORAL at 20:36

## 2025-04-09 RX ADMIN — LACTULOSE 20 G: 20 SOLUTION ORAL at 09:48

## 2025-04-09 RX ADMIN — TRAMADOL HYDROCHLORIDE 50 MG: 50 TABLET, COATED ORAL at 09:48

## 2025-04-09 RX ADMIN — NYSTATIN: 100000 POWDER TOPICAL at 20:35

## 2025-04-09 RX ADMIN — Medication 10 ML: at 20:51

## 2025-04-09 RX ADMIN — MORPHINE SULFATE 4 MG: 4 INJECTION, SOLUTION INTRAMUSCULAR; INTRAVENOUS at 13:31

## 2025-04-09 RX ADMIN — Medication 1 APPLICATION: at 09:49

## 2025-04-09 NOTE — PLAN OF CARE
Goal Outcome Evaluation:         Pt resting in bed at this time. Family at bedside. Pt to have radiation this shift. Bowel regimen started for stool burden. Wound care performed this shift. No other complaints at this time.

## 2025-04-09 NOTE — PROGRESS NOTES
Surgery follow-up  Patient is now 10 days post diverting colostomy and has not had much output from his colostomy.  KUBs obtained recently have showed large stool burden.  2 days ago the patient was switched from as needed lactulose to scheduled lactulose in addition to his scheduled MiraLAX and Colace.  There has been very little result from this.    Exam: Thin male in no acute distress  Abdomen: The abdomen is firm.  There are no peritoneal signs.  There is an ostomy in the left lower quadrant.  There is some flatus in the bag.  No stool in the bag.  Bowel sounds are active    Large stool burden secondary to rectal obstruction which has not resolved since colonic diversion    Will start giving MiraLAX per PEG tube every 3 hours.

## 2025-04-09 NOTE — PLAN OF CARE
Goal Outcome Evaluation:           Progress: no change  Outcome Evaluation: pt resting in bed with no complaints or concerns. no acute changes this shift. VSS on 2L NC. wound care completed as ordered. plan of care on going

## 2025-04-09 NOTE — PROGRESS NOTES
[facility]     Patient Identification:  Name:  Pradeep Donald  Age:  74 y.o.  Sex:  male  :  1950  MRN:  [id number]  Visit Number:  [id number]  ROOM: [address] [place] [address]     Primary Care Provider:  Carmen Pretty APRN     Date of Admission: 3/29/2025    Length of stay in inpatient status:  11    Subjective     Chief Compliant:    Chief Complaint   Patient presents with    Leg Swelling    Hemorrhoids       Patient with ongoing lack of significant ostomy output however reports his ostomy has been passing liquid and gas frequently overnight.  Still without abdominal pain        Objective       Vital Signs:  Temp:  [98.3 °F (36.8 °C)-98.5 °F (36.9 °C)] 98.5 °F (36.9 °C)  Heart Rate:  [61-71] 61  Resp:  [16-18] 18  BP: ()/(44-54) 90/44  SpO2:  [99 %] 99 %  on  Flow (L/min) (Oxygen Therapy):  [2] 2;   Device (Oxygen Therapy): nasal cannula  Body mass index is 15.78 kg/m².      ----------------------------------------------------------------------------------------------------------------------  Physical Exam  Vitals and nursing note reviewed.   Constitutional:       Comments: Ill appearing cachetic male   HENT:      Mouth/Throat:      Mouth: Mucous membranes are dry.      Pharynx: Oropharynx is clear.   Eyes:      General: No scleral icterus.     Extraocular Movements: Extraocular movements intact.   Cardiovascular:      Pulses: Normal pulses.   Pulmonary:      Effort: Pulmonary effort is normal.   Abdominal:      Palpations: Abdomen is soft.      Tenderness: There is no abdominal tenderness.      Comments: PEG and ostomy appear well healed without erythema or swelling locally   Musculoskeletal:         General: Swelling (b/l LE edema improving) present.   Neurological:      Mental Status: He is alert. Mental status is at baseline.   Psychiatric:         Mood and Affect: Mood normal.         Behavior: Behavior normal.        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 Underwent colostomy and currently working to relieve large stool burden  -General surgery following, bowel regimen escalated in attempt to tolerate bolus feed before DC  -Oncology following, planning for 10 total sessions of palliative radiation for tumor burden improvement, too frail to tolerate systemic chemotherapy.   -GOC ongoing with palliative following. Agree patient care needs likely best served by hospice post-discharge. Planning [facility] currently, discussed with family and feel he is unable to care for himself at home or participate with PT, prefer SNF placement.   - For discharge as DNR/DNI but family not ready for comfort measures or hospice services at this time.  Appreciate palliative care input and following for any acute sx that arise during treatment  -Confirming today radiation plan with oncology, no treatment given yesterday    #Severe protein malnutrition with adult failure to thrive  -PEG placed and TF being initiated as tolerated for PO supplement   -anasarca noted likely secondary to hypoalbuminemia severely decreased at ~1.6    #Acute on chronic anemia, suspect gastritis  -transfuse prn to goal >7.0, given GOC would avoid EGD unless brisk UGIB develops    #Oral candidiasis completed nystatin swish and spit, monitor for recurrence    #Hypotension  -on 5mg TID midodrine with low normal BP, may need dose increase. Will cont to monitor      Code Status and Medical Interventions: No CPR (Do Not Attempt to Resuscitate); Limited  Support; No cardioversion; Sister Kisha   Ordered at: 04/02/25 1652     Code Status (Patient has no pulse and is not breathing):    No CPR (Do Not Attempt to Resuscitate)     Medical Interventions (Patient has pulse or is breathing):    Limited Support     Medical Intervention Limits:    No cardioversion     Comments:    Sister Kisha     Level Of Support Discussed With:    Patient       Health Care Surrogate         Disposition: SNF pending XRT completion     I have reviewed any copied/forwarded text or data, verified its accuracy, and updated as necessary above.    Óscar Jeong MD  [facility]  04/09/25  15:03 EDT

## 2025-04-09 NOTE — CASE MANAGEMENT/SOCIAL WORK
Discharge Planning Assessment   [place]     Patient Name: Pradeep Donald  MRN: [id number]  Today's Date: 4/9/2025    Admit Date: 3/29/2025     Discharge Plan       Row Name 04/09/25 1247       Plan    Plan [facility] continues to follow. SS to follow.             Continued Care and Services - Admitted Since 3/29/2025       Destination       Service Provider Request Status Services Address Phone Fax Patient Preferred    [facility] Pending - Request Sent -- [address] [place] [id number] [phone] --                  Expected Discharge Date and Time       Expected Discharge Date Expected Discharge Time    Apr 11, 2025         GIFTY Mccormick

## 2025-04-09 NOTE — NURSING NOTE
Patient with blisters to left thigh; likely tape dermatitis and not pressure related.  Order placed to apply Z-Guard BID and leave open to air.

## 2025-04-10 ENCOUNTER — APPOINTMENT (OUTPATIENT)
Dept: GENERAL RADIOLOGY | Facility: HOSPITAL | Age: 75
DRG: 329 | End: 2025-04-10
Payer: MEDICARE

## 2025-04-10 PROCEDURE — 25010000002 MORPHINE PER 10 MG: Performed by: STUDENT IN AN ORGANIZED HEALTH CARE EDUCATION/TRAINING PROGRAM

## 2025-04-10 PROCEDURE — 74018 RADEX ABDOMEN 1 VIEW: CPT

## 2025-04-10 PROCEDURE — 25010000002 ONDANSETRON PER 1 MG: Performed by: NURSE PRACTITIONER

## 2025-04-10 PROCEDURE — 99232 SBSQ HOSP IP/OBS MODERATE 35: CPT | Performed by: STUDENT IN AN ORGANIZED HEALTH CARE EDUCATION/TRAINING PROGRAM

## 2025-04-10 PROCEDURE — 74018 RADEX ABDOMEN 1 VIEW: CPT | Performed by: RADIOLOGY

## 2025-04-10 RX ADMIN — HYDROCORTISONE 2.5%: 25 CREAM TOPICAL at 09:11

## 2025-04-10 RX ADMIN — HYDROXYZINE HYDROCHLORIDE 10 MG: 10 TABLET, FILM COATED ORAL at 21:19

## 2025-04-10 RX ADMIN — SENNOSIDES AND DOCUSATE SODIUM 2 TABLET: 50; 8.6 TABLET ORAL at 09:08

## 2025-04-10 RX ADMIN — APIXABAN 5 MG: 5 TABLET, FILM COATED ORAL at 09:08

## 2025-04-10 RX ADMIN — Medication 10 ML: at 09:11

## 2025-04-10 RX ADMIN — MIDODRINE HYDROCHLORIDE 5 MG: 2.5 TABLET ORAL at 11:27

## 2025-04-10 RX ADMIN — HYDROCORTISONE 2.5%: 25 CREAM TOPICAL at 21:21

## 2025-04-10 RX ADMIN — ACETAMINOPHEN 1000 MG: 500 TABLET ORAL at 21:19

## 2025-04-10 RX ADMIN — PANTOPRAZOLE SODIUM 40 MG: 40 INJECTION, POWDER, FOR SOLUTION INTRAVENOUS at 17:06

## 2025-04-10 RX ADMIN — ACETAMINOPHEN 1000 MG: 500 TABLET ORAL at 15:35

## 2025-04-10 RX ADMIN — NYSTATIN: 100000 POWDER TOPICAL at 09:11

## 2025-04-10 RX ADMIN — NYSTATIN: 100000 POWDER TOPICAL at 21:21

## 2025-04-10 RX ADMIN — TRAMADOL HYDROCHLORIDE 50 MG: 50 TABLET, COATED ORAL at 15:35

## 2025-04-10 RX ADMIN — APIXABAN 5 MG: 5 TABLET, FILM COATED ORAL at 21:20

## 2025-04-10 RX ADMIN — TRAMADOL HYDROCHLORIDE 50 MG: 50 TABLET, COATED ORAL at 21:20

## 2025-04-10 RX ADMIN — TRAMADOL HYDROCHLORIDE 50 MG: 50 TABLET, COATED ORAL at 09:08

## 2025-04-10 RX ADMIN — MIDODRINE HYDROCHLORIDE 5 MG: 2.5 TABLET ORAL at 09:08

## 2025-04-10 RX ADMIN — ONDANSETRON 4 MG: 2 INJECTION INTRAMUSCULAR; INTRAVENOUS at 15:24

## 2025-04-10 RX ADMIN — FOLIC ACID 1 MG: 1 TABLET ORAL at 09:08

## 2025-04-10 RX ADMIN — MIDODRINE HYDROCHLORIDE 5 MG: 2.5 TABLET ORAL at 17:06

## 2025-04-10 RX ADMIN — MORPHINE SULFATE 4 MG: 4 INJECTION, SOLUTION INTRAMUSCULAR; INTRAVENOUS at 14:09

## 2025-04-10 RX ADMIN — Medication 10 ML: at 21:20

## 2025-04-10 RX ADMIN — ACETAMINOPHEN 1000 MG: 500 TABLET ORAL at 09:08

## 2025-04-10 RX ADMIN — Medication 1 APPLICATION: at 09:11

## 2025-04-10 RX ADMIN — MORPHINE SULFATE 4 MG: 4 INJECTION, SOLUTION INTRAMUSCULAR; INTRAVENOUS at 04:33

## 2025-04-10 RX ADMIN — PANTOPRAZOLE SODIUM 40 MG: 40 INJECTION, POWDER, FOR SOLUTION INTRAVENOUS at 09:08

## 2025-04-10 RX ADMIN — ONDANSETRON 4 MG: 2 INJECTION INTRAMUSCULAR; INTRAVENOUS at 10:06

## 2025-04-10 RX ADMIN — LACTULOSE 20 G: 20 SOLUTION ORAL at 09:07

## 2025-04-10 NOTE — PLAN OF CARE
Goal Outcome Evaluation:  Plan of Care Reviewed With: patient        Progress: no change  Outcome Evaluation: Pt resting in bed. Wound care completed per orders. PRN nausea medication provided, see MAR. VSS on 2L NC. Tube feed and golytely held per MD, see orders. Liquid stool in colostomy bag this shift. No acute changes. Will continue POC.

## 2025-04-10 NOTE — CASE MANAGEMENT/SOCIAL WORK
Discharge Planning Assessment   [place]     Patient Name: Pradeep Donald  MRN: [id number]  Today's Date: 4/10/2025    Admit Date: 3/29/2025     Discharge Plan       Row Name 04/10/25 1045       Plan    Plan Pt is scheduled to get his sixth radiation treatment today. [facility] is following and considering accepting pt after radiation treatments are completed. SS to follow.    Addendum @ 16:04: SS discussed pt with Palliative Care. SS spoke to Kisha duran and discussed discharge plan. Pt was unable to receive radiation treatment today. SS discussed nursing home placement and hospice care center per request. Sister notified that [facility] continues to follow. Palliative Care will continue to follow. SS to follow.                   Continued Care and Services - Admitted Since 3/29/2025       Destination       Service Provider Request Status Services Address Phone Fax Patient Preferred    [facility] Pending - Request Sent -- [address] [place] [id number] [phone] --                  Expected Discharge Date and Time       Expected Discharge Date Expected Discharge Time    Apr 14, 2025         GIFTY Mccormick

## 2025-04-10 NOTE — PROGRESS NOTES
[facility]     Patient Identification:  Name:  Pradeep Donald  Age:  74 y.o.  Sex:  male  :  1950  MRN:  [id number]  Visit Number:  [id number]  ROOM: [address] [place] [address]     Primary Care Provider:  Carmen Pretty APRN     Date of Admission: 3/29/2025    Length of stay in inpatient status:  12    Subjective     Chief Compliant:    Chief Complaint   Patient presents with    Leg Swelling    Hemorrhoids       Patient uncomfortable and nauseated today.  Ostomy having gas and liquid stool output however patient reports worsening nausea.  Abdominal discomfort appears stable        Objective       Vital Signs:  Temp:  [97.9 °F (36.6 °C)] 97.9 °F (36.6 °C)  Heart Rate:  [75-88] 75  Resp:  [17] 17  BP: ()/(47-93) 108/59  SpO2:  [94 %-97 %] 94 %  on  Flow (L/min) (Oxygen Therapy):  [2] 2;   Device (Oxygen Therapy): nasal cannula  Body mass index is 15.78 kg/m².      ----------------------------------------------------------------------------------------------------------------------  Physical Exam  Vitals and nursing note reviewed.   Constitutional:       Comments: Ill appearing cachetic male   HENT:      Mouth/Throat:      Mouth: Mucous membranes are dry.      Pharynx: Oropharynx is clear.   Eyes:      General: No scleral icterus.     Extraocular Movements: Extraocular movements intact.   Cardiovascular:      Pulses: Normal pulses.   Pulmonary:      Effort: Pulmonary effort is normal.   Abdominal:      Palpations: Abdomen is soft.      Tenderness: There is no abdominal tenderness.      Comments: PEG and ostomy appear well healed without erythema or swelling locally   Musculoskeletal:         General: Swelling (b/l LE edema improving) present.   Neurological:      Mental Status: He is alert. Mental status is at baseline.   Psychiatric:         Mood and Affect: Mood normal.         Behavior: Behavior normal.        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 Underwent colostomy and currently working to relieve large stool burden  -General surgery following, bowel regimen escalated in attempt to tolerate bolus feed before DC  -Oncology following, planning for 10 total sessions of palliative radiation for tumor burden improvement, too frail to tolerate systemic chemotherapy.   -GOC ongoing with palliative following. Agree patient care needs likely best served by hospice post-discharge. Planning for DC to [facility]& currently, discussed with family and feel he is unable to care for himself at home or participate with PT, prefer SNF placement.   -Patient more uncomfortable appearing today and after discussion with patient and sister at bedside we will revisit transition to comfort measure discussion with assistance of palliative care.  In interim we will continue palliative chemotherapy as tolerated with plan to discharge to skilled nursing facility for comfort measures  - [place] appears improving with liquid stool passage today, given nausea and intolerance agree with general surgery decision to stop po laxative    #Severe protein malnutrition with adult failure to thrive  -PEG placed and TF being initiated as tolerated for PO supplement   -anasarca noted likely secondary to hypoalbuminemia severely decreased at ~1.6    #Acute on chronic anemia, suspect gastritis  -transfuse prn to goal >7.0, given GOC would avoid EGD unless brisk UGIB develops    #Oral candidiasis completed nystatin swish and spit, monitor for  recurrence    #Hypotension  -on 5mg TID midodrine with low normal BP, may need dose increase. Will cont to monitor      Code Status and Medical Interventions: No CPR (Do Not Attempt to Resuscitate); Limited Support; No cardioversion; Sister Kisha   Ordered at: 04/02/25 3137     Code Status (Patient has no pulse and is not breathing):    No CPR (Do Not Attempt to Resuscitate)     Medical Interventions (Patient has pulse or is breathing):    Limited Support     Medical Intervention Limits:    No cardioversion     Comments:    Sister Kisha     Level Of Support Discussed With:    Patient       Health Care Surrogate         Disposition: SNF pending XRT completion, GOC ongoing    I have reviewed any copied/forwarded text or data, verified its accuracy, and updated as necessary above.    Óscar Jeong MD  [facility] Hospitalist  04/10/25  19:13 EDT

## 2025-04-10 NOTE — PROGRESS NOTES
Surgery follow-up  Patient is now 11 days post diverting colostomy.   He has had little output from his colostomy and yesterday was given GoLytely 200 cc every 3 hours.  Overnight he did put out a large amount of stool from his colostomy.  Today he is uncomfortable and nauseous.  Repeat KUB reports a large burden of stool but on my review there is a significant difference between the x-ray today and the x-ray yesterday.  There is now a lot more gas in the colon than there had been prior.    On exam the abdomen is distended.  Minimal output in the ostomy bag currently.    Plan: Will discontinue GoLytely via PEG tube and hold tube feeds for today.

## 2025-04-10 NOTE — PROGRESS NOTES
"Palliative Care Daily Progress Note     S: Medical record reviewed, followed up with Primary RN Deandra and Dr Jeong regarding patient's condition. When I saw Pradeep this morning he did not appear to be comfortable, he had an emesis bag at bedside, he stated he had not vomited however he felt like it and was gassy. I asked RN to get him minerva zofran. He did c/o pain today, however he stated it was tolerable. Appeared weak/fatigued. His sister Kisha was at bedside.      O:   Palliative Performance Scale Score:     /59 (BP Location: Left arm)   Pulse 75   Temp 97.9 °F (36.6 °C) (Oral)   Resp 17   Ht 190.5 cm (75\")   Wt 57.3 kg (126 lb 4 oz)   SpO2 94%   BMI 15.78 kg/m²     Intake/Output Summary (Last 24 hours) at 4/10/2025 1533  Last data filed at 4/10/2025 1053  Gross per 24 hour   Intake 1391 ml   Output 2050 ml   Net -659 ml       PE:  General Appearance:    Chronically ill appearing, alert, thin, frail, cooperative, NAD   HEENT:    NC/AT, without obvious abnormality, EOMI, anicteric , dry MM    Neck:   supple, trachea midline, no JVD   Lungs:     Unlabored respirations, no wheezing, rhonchi or rales NC on 2l on his forehead    Heart:    RRR, normal S1 and S2, no M/R/G   Abdomen:     Soft, no distension no tenderness, NABS , abd concave, PEG tube noted. Ostomy noted   Extremities:   Moves all extremities with generalized weakness, no edema, cachetic   Pulses:   Pulses palpable and equal bilaterally   Skin:   Warm, dry and pale   Neurologic:   A/Ox3, cooperative   Psych:   Calm, flat         Meds: Reviewed and changes noted    Labs:   Results from last 7 days   Lab Units 04/08/25  0106   WBC 10*3/mm3 9.71   HEMOGLOBIN g/dL 8.3*   HEMATOCRIT % 28.5*   PLATELETS 10*3/mm3 396     Results from last 7 days   Lab Units 04/08/25  0106   SODIUM mmol/L 132*   POTASSIUM mmol/L 5.2   CHLORIDE mmol/L 101   CO2 mmol/L 23.4   BUN mg/dL 21   CREATININE mg/dL 0.65*   GLUCOSE mg/dL 90   CALCIUM mg/dL 7.7*     Results from " last 7 days   Lab Units 04/08/25  0106 04/06/25  2358 04/05/25  0103   SODIUM mmol/L 132*   < > 131*   POTASSIUM mmol/L 5.2   < > 4.7   CHLORIDE mmol/L 101   < > 100   CO2 mmol/L 23.4   < > 25.0   BUN mg/dL 21   < > 21   CREATININE mg/dL 0.65*   < > 0.76   CALCIUM mg/dL 7.7*   < > 7.9*   BILIRUBIN mg/dL  --   --  0.3   ALK PHOS U/L  --   --  562*   ALT (SGPT) U/L  --   --  15   AST (SGOT) U/L  --   --  29   GLUCOSE mg/dL 90   < > 132*    < > = values in this interval not displayed.     Imaging Results (Last 72 Hours)       Procedure Component Value Units Date/Time    XR Abdomen KUB [[id number]] Collected: 04/10/25 1331     Updated: 04/10/25 1333    Narrative:      EXAM:    XR Abdomen, 1 View     EXAM DATE:    4/10/2025 1:31 PM     CLINICAL HISTORY:    stool burden fu; R62.7-Adult failure to thrive; R19.7-Diarrhea,  unspecified; R59.1-Generalized enlarged lymph nodes; I82.412-Acute  embolism and thrombosis of left femoral vein; C18.9-Malignant neoplasm  of colon, unspecified; C78.7-Secondary malignant neoplasm of liver and  intrahepatic bile duct     TECHNIQUE:    Frontal supine view of the abdomen/pelvis.     COMPARISON:    No relevant prior studies available.     FINDINGS:    Gastrointestinal tract:  Gaseous distention of the colon and fairly  large stool burden.    Bones/joints:  Unremarkable as visualized.  No acute fracture.    Tubes, lines and devices:  G-tube noted.       Impression:        Gaseous distention of the colon and fairly large stool burden.     This report was finalized on 4/10/2025 1:31 PM by Dr. Chava Munguia MD.       XR Abdomen KUB [[id number]] Collected: 04/08/25 0922     Updated: 04/08/25 0925    Narrative:      EXAM:    XR Abdomen, 1 View     EXAM DATE:    4/8/2025 9:22 AM     CLINICAL HISTORY:    Follow-up constipation; R62.7-Adult failure to thrive; R19.7-Diarrhea,  unspecified; R59.1-Generalized enlarged lymph nodes; I82.412-Acute  embolism and thrombosis of left femoral vein;  "C18.9-Malignant neoplasm  of colon, unspecified; C78.7-Secondary malignant neoplasm of liver and  intrahepatic bile duct     TECHNIQUE:    Frontal supine view of the abdomen/pelvis.     COMPARISON:    4/7/2025     FINDINGS:    Gastrointestinal tract:  Large amount of colonic stool noted.    Bones/joints:  Unremarkable as visualized.  No acute fracture.    Tubes, lines and devices:  Gastric tube balloon noted.       Impression:        Large amount of colonic stool noted.     This report was finalized on 4/8/2025 9:23 AM by Dr. Chava Munguia MD.                 Diagnostics: Reviewed    A: Pradeep Donald is a 74 y.o. male admitted on 3/29/2025 for metastatic colon cancer to liver. The only history that he reports is kidney stones in the past. He hasn't seeked medical care in nearly a decade or longer. He took no medications at home other than his yearly flu shot. His girlfriend reports that he had had a \"bad fall\" a few weeks ago. He has been having rectal pain and bleeding for over a year now. He reports having to use a pedro-pad to contain the blood but never told his family. He reports weight loss, weakness, fatigue, and bloody diarrhea. He reports that upon arriving to the ED, he was having left sided abd pain/left groin pain. CT of abdomen with contrast showed rectal mass with extensive local disease involving the perineum. Abscess or necrotic mass in the right perineum measured 3.3 x 3.8 cm. There is also local invasion into the posterior aspect of the prostate gland, pelvic and retroperitoneal adenopathy. Metastatic disease involving the left hepatic lobe and possibly the lung bases, Scrotal ultrasound showed large left hydrocele and lower extremity Doppler was positive for DVT in the left common and superficial femoral veins.     Today 4/10/2025  Afebrile, HR 75, BP of 108/59, RR 16, O2 sat 95% NC at 2L was on his forehead.         P:  I was able to speak with Pradeep's sister/SYLWIA Kisha at bedside, she commented " that she wished they would have stuck with comfort measures, she hates to see her brother uncomfortable. Pradeep declined radiation today due to nausea, generally feeling unwell. I did speak with Dr Monzon regarding today's KUB results, that he has been nauseated and unable to tolerate Miralax/Golytely, she stated she would hold off on Golytely and would discuss this with patient. I also discussed pt with Dr Jeong. Janey in  is working with Kisha and discussing placement options.      We will continue to follow along. Please do not hesitate to contact us regarding further sx mgmt or GOC needs, including after hours or on weekends via our on call provider at [phone].     Loly Gutierrez, APRN    4/10/2025

## 2025-04-10 NOTE — PLAN OF CARE
Goal Outcome Evaluation:           Progress: no change  Outcome Evaluation: pt resting in bed at this time. pt has complained of feeling nausea at times, PA aware. zofran given. VSS on 2L nc. plan of care on going                              Crescentic Advancement Flap Text: The defect edges were debeveled with a #15 scalpel blade.  Given the location of the defect and the proximity to free margins a crescentic advancement flap was deemed most appropriate.  Using a sterile surgical marker, the appropriate advancement flap was drawn incorporating the defect and placing the expected incisions within the relaxed skin tension lines where possible.    The area thus outlined was incised deep to adipose tissue with a #15 scalpel blade.  The skin margins were undermined to an appropriate distance in all directions utilizing iris scissors.

## 2025-04-11 LAB
ALBUMIN SERPL-MCNC: 1.3 G/DL (ref 3.5–5.2)
ALBUMIN/GLOB SERPL: 0.3 G/DL
ALP SERPL-CCNC: 585 U/L (ref 39–117)
ALT SERPL W P-5'-P-CCNC: 15 U/L (ref 1–41)
ANION GAP SERPL CALCULATED.3IONS-SCNC: 9.3 MMOL/L (ref 5–15)
AST SERPL-CCNC: 26 U/L (ref 1–40)
BASOPHILS # BLD AUTO: 0.08 10*3/MM3 (ref 0–0.2)
BASOPHILS NFR BLD AUTO: 0.9 % (ref 0–1.5)
BILIRUB SERPL-MCNC: 0.3 MG/DL (ref 0–1.2)
BUN SERPL-MCNC: 18 MG/DL (ref 8–23)
BUN/CREAT SERPL: 28.1 (ref 7–25)
CALCIUM SPEC-SCNC: 7.5 MG/DL (ref 8.6–10.5)
CHLORIDE SERPL-SCNC: 101 MMOL/L (ref 98–107)
CO2 SERPL-SCNC: 23.7 MMOL/L (ref 22–29)
CREAT SERPL-MCNC: 0.64 MG/DL (ref 0.76–1.27)
D-LACTATE SERPL-SCNC: 1.6 MMOL/L (ref 0.5–2)
DEPRECATED RDW RBC AUTO: 60.6 FL (ref 37–54)
EGFRCR SERPLBLD CKD-EPI 2021: 98.1 ML/MIN/1.73
EOSINOPHIL # BLD AUTO: 0.15 10*3/MM3 (ref 0–0.4)
EOSINOPHIL NFR BLD AUTO: 1.7 % (ref 0.3–6.2)
ERYTHROCYTE [DISTWIDTH] IN BLOOD BY AUTOMATED COUNT: 18.4 % (ref 12.3–15.4)
GLOBULIN UR ELPH-MCNC: 4.1 GM/DL
GLUCOSE SERPL-MCNC: 69 MG/DL (ref 65–99)
HCT VFR BLD AUTO: 29.3 % (ref 37.5–51)
HGB BLD-MCNC: 8.1 G/DL (ref 13–17.7)
IMM GRANULOCYTES # BLD AUTO: 0.1 10*3/MM3 (ref 0–0.05)
IMM GRANULOCYTES NFR BLD AUTO: 1.2 % (ref 0–0.5)
LYMPHOCYTES # BLD AUTO: 1.04 10*3/MM3 (ref 0.7–3.1)
LYMPHOCYTES NFR BLD AUTO: 12 % (ref 19.6–45.3)
MCH RBC QN AUTO: 25.2 PG (ref 26.6–33)
MCHC RBC AUTO-ENTMCNC: 27.6 G/DL (ref 31.5–35.7)
MCV RBC AUTO: 91 FL (ref 79–97)
MONOCYTES # BLD AUTO: 0.94 10*3/MM3 (ref 0.1–0.9)
MONOCYTES NFR BLD AUTO: 10.9 % (ref 5–12)
NEUTROPHILS NFR BLD AUTO: 6.33 10*3/MM3 (ref 1.7–7)
NEUTROPHILS NFR BLD AUTO: 73.3 % (ref 42.7–76)
NRBC BLD AUTO-RTO: 0 /100 WBC (ref 0–0.2)
PLATELET # BLD AUTO: 414 10*3/MM3 (ref 140–450)
PMV BLD AUTO: 8.7 FL (ref 6–12)
POTASSIUM SERPL-SCNC: 4.4 MMOL/L (ref 3.5–5.2)
PROT SERPL-MCNC: 5.4 G/DL (ref 6–8.5)
RBC # BLD AUTO: 3.22 10*6/MM3 (ref 4.14–5.8)
SODIUM SERPL-SCNC: 134 MMOL/L (ref 136–145)
WBC NRBC COR # BLD AUTO: 8.64 10*3/MM3 (ref 3.4–10.8)

## 2025-04-11 PROCEDURE — 99232 SBSQ HOSP IP/OBS MODERATE 35: CPT | Performed by: STUDENT IN AN ORGANIZED HEALTH CARE EDUCATION/TRAINING PROGRAM

## 2025-04-11 PROCEDURE — 25010000002 MORPHINE PER 10 MG: Performed by: STUDENT IN AN ORGANIZED HEALTH CARE EDUCATION/TRAINING PROGRAM

## 2025-04-11 PROCEDURE — 85025 COMPLETE CBC W/AUTO DIFF WBC: CPT | Performed by: STUDENT IN AN ORGANIZED HEALTH CARE EDUCATION/TRAINING PROGRAM

## 2025-04-11 PROCEDURE — 80053 COMPREHEN METABOLIC PANEL: CPT | Performed by: STUDENT IN AN ORGANIZED HEALTH CARE EDUCATION/TRAINING PROGRAM

## 2025-04-11 PROCEDURE — 83605 ASSAY OF LACTIC ACID: CPT | Performed by: STUDENT IN AN ORGANIZED HEALTH CARE EDUCATION/TRAINING PROGRAM

## 2025-04-11 RX ADMIN — NYSTATIN: 100000 POWDER TOPICAL at 21:40

## 2025-04-11 RX ADMIN — MIDODRINE HYDROCHLORIDE 5 MG: 2.5 TABLET ORAL at 09:51

## 2025-04-11 RX ADMIN — PANTOPRAZOLE SODIUM 40 MG: 40 INJECTION, POWDER, FOR SOLUTION INTRAVENOUS at 16:59

## 2025-04-11 RX ADMIN — ACETAMINOPHEN 1000 MG: 500 TABLET ORAL at 16:59

## 2025-04-11 RX ADMIN — OXYCODONE 5 MG: 5 TABLET ORAL at 21:39

## 2025-04-11 RX ADMIN — OXYCODONE 5 MG: 5 TABLET ORAL at 11:08

## 2025-04-11 RX ADMIN — MIDODRINE HYDROCHLORIDE 5 MG: 2.5 TABLET ORAL at 17:02

## 2025-04-11 RX ADMIN — HYDROXYZINE HYDROCHLORIDE 10 MG: 10 TABLET, FILM COATED ORAL at 21:39

## 2025-04-11 RX ADMIN — APIXABAN 5 MG: 5 TABLET, FILM COATED ORAL at 21:39

## 2025-04-11 RX ADMIN — Medication 1 APPLICATION: at 09:52

## 2025-04-11 RX ADMIN — ACETAMINOPHEN 1000 MG: 500 TABLET ORAL at 09:51

## 2025-04-11 RX ADMIN — FOLIC ACID 1 MG: 1 TABLET ORAL at 09:51

## 2025-04-11 RX ADMIN — PANTOPRAZOLE SODIUM 40 MG: 40 INJECTION, POWDER, FOR SOLUTION INTRAVENOUS at 09:51

## 2025-04-11 RX ADMIN — Medication 10 ML: at 21:40

## 2025-04-11 RX ADMIN — SENNOSIDES AND DOCUSATE SODIUM 2 TABLET: 50; 8.6 TABLET ORAL at 21:39

## 2025-04-11 RX ADMIN — APIXABAN 5 MG: 5 TABLET, FILM COATED ORAL at 09:51

## 2025-04-11 RX ADMIN — MORPHINE SULFATE 4 MG: 4 INJECTION, SOLUTION INTRAMUSCULAR; INTRAVENOUS at 17:30

## 2025-04-11 RX ADMIN — HYDROCORTISONE 2.5%: 25 CREAM TOPICAL at 09:52

## 2025-04-11 RX ADMIN — MIDODRINE HYDROCHLORIDE 5 MG: 2.5 TABLET ORAL at 12:58

## 2025-04-11 RX ADMIN — HYDROCORTISONE 2.5%: 25 CREAM TOPICAL at 21:40

## 2025-04-11 RX ADMIN — Medication 10 ML: at 09:52

## 2025-04-11 RX ADMIN — SENNOSIDES AND DOCUSATE SODIUM 2 TABLET: 50; 8.6 TABLET ORAL at 09:51

## 2025-04-11 RX ADMIN — NYSTATIN: 100000 POWDER TOPICAL at 09:52

## 2025-04-11 RX ADMIN — ACETAMINOPHEN 1000 MG: 500 TABLET ORAL at 21:39

## 2025-04-11 NOTE — CASE MANAGEMENT/SOCIAL WORK
Discharge Planning Assessment   [place]     Patient Name: Pradeep Donald  MRN: [id number]  Today's Date: 4/11/2025    Admit Date: 3/29/2025       Discharge Plan       Row Name 04/11/25 1815       Plan    Plan Palliative Care continues to follow. Discharge plan continues to be discussed due to radiation treatments. Pt was unable to receive radiation treatment on this date. SS discussed discharge options with Kisha duran per request yesterday.  voices pt will not be eligilbe for long-term care Medicaid at this time. [facility] is following. Pt will be private pay at a nursing home facility if he does not have a skilled need to utilize his Medicare skilled benefits. [facility] per Jessica informed SS that daily private pay rate is $225. SS provided private pay rate at [facility] to  yesterday. SS to follow.                  Continued Care and Services - Admitted Since 3/29/2025       Destination       Service Provider Request Status Services Address Phone Fax Patient Preferred    [facility] Pending - Request Sent -- [address] [place] [id number] [phone] --                  Expected Discharge Date and Time       Expected Discharge Date Expected Discharge Time    Apr 16, 2025         GIFTY Mccormick

## 2025-04-11 NOTE — PROGRESS NOTES
[facility] PROGRESS NOTE     Patient Identification:  Name:  Pradeep Donald  Age:  74 y.o.  Sex:  male  :  1950  MRN:  [id number]  Visit Number:  [id number]  ROOM: [address] [place] [address]     Primary Care Provider:  Carmen Pretty APRN     Date of Admission: 3/29/2025    Length of stay in inpatient status:  13    Subjective     Chief Compliant:    Chief Complaint   Patient presents with    Leg Swelling    Hemorrhoids       Patient continues to have some nausea and abdominal discomfort with bloating with laxative.  Ostomy output picking up with ongoing stool and flatus.  Discussed goals of care with patient and family at bedside and wish to avoid radiation treatment today given patient's sx. Discussed option to transition to comfort measures inpatient and patient not ready for that transition yet at this point.          Objective       Vital Signs:  Temp:  [97.8 °F (36.6 °C)-98.3 °F (36.8 °C)] 97.8 °F (36.6 °C)  Resp:  [17-18] 17  BP: (92-95)/(49-60) 94/51     on  Flow (L/min) (Oxygen Therapy):  [2] 2;   Device (Oxygen Therapy): nasal cannula  Body mass index is 15.78 kg/m².      ----------------------------------------------------------------------------------------------------------------------  Physical Exam  Vitals and nursing note reviewed.   Constitutional:       Comments: Ill appearing cachetic male   HENT:      Mouth/Throat:      Mouth: Mucous membranes are dry.      Pharynx: Oropharynx is clear.   Eyes:      General: No scleral icterus.     Extraocular Movements: Extraocular movements intact.   Cardiovascular:      Pulses: Normal pulses.   Pulmonary:      Effort: Pulmonary effort is normal.   Abdominal:      Palpations: Abdomen is soft.      Tenderness: There is no abdominal tenderness.      Comments: PEG and ostomy appear well healed without erythema or swelling locally. Stool and gas present   Musculoskeletal:         General: Swelling (b/l LE edema stable) present.   Neurological:       Mental Status: He is alert. Mental status is at baseline.   Psychiatric:         Mood and Affect: Mood normal.         Behavior: Behavior normal.       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 Underwent colostomy and currently working to relieve large stool burden  -Oncology following, planning for 10 total sessions of palliative radiation for tumor burden improvement, too frail to tolerate systemic chemotherapy.   -GOC ongoing with palliative following. Agree patient care needs likely best served by hospice post-discharge. Planning for DC to [facility] currently, discussed with family and feel he is unable to care for himself at home or participate with PT, prefer SNF placement.   - Transition tube feeds to bolus feeds, lactulose p.o. with MiraLAX discontinued to avoid worsening patient bloating and abdominal discomfort  -GOC ongoing as patient having more distressing sx and unable to tolerate radiation last 48hrs    #Severe protein malnutrition with adult failure to thrive  -PEG placed and TF being initiated as tolerated for PO supplement   -anasarca noted likely secondary to hypoalbuminemia severely decreased at ~1.6    #Acute on chronic anemia, suspect gastritis  -transfuse prn to goal >7.0, given GOC would avoid EGD unless brisk UGIB develops    #Oral candidiasis completed nystatin swish and spit, monitor for recurrence    #Hypotension  -on 5mg TID midodrine with low normal BP, may need dose increase. Will cont to monitor      Code Status and Medical Interventions: No CPR (Do Not Attempt to  Resuscitate); Limited Support; No cardioversion; Sister Kisha   Ordered at: 04/02/25 1651     Code Status (Patient has no pulse and is not breathing):    No CPR (Do Not Attempt to Resuscitate)     Medical Interventions (Patient has pulse or is breathing):    Limited Support     Medical Intervention Limits:    No cardioversion     Comments:    Sister Kisha     Level Of Support Discussed With:    Patient       Health Care Surrogate         Disposition: SNF pending XRT completion, GOC ongoing    I have reviewed any copied/forwarded text or data, verified its accuracy, and updated as necessary above.    Óscar Jeong MD  [facility] Hospitalist  04/11/25  16:04 EDT

## 2025-04-11 NOTE — PROGRESS NOTES
"Palliative Care Daily Progress Note     S: Medical record reviewed, followed up with Primary RN Deandra and Dr Jeong regarding patient's condition. When I saw Pradeep today he was awake and alert, he appeared weak and stated that he had not slept due to continued nausea, he stated he had not vomited and felt like it was some improved. He states that he can not go to radiation today d/t feeling unwell. He stated he had some pain in both  his cathter and his bottom. He was not short of breath and was calm at this time His sister Kisha and friend were at bedside.      O:   Palliative Performance Scale Score:     /57   Pulse 75   Temp 97.8 °F (36.6 °C) (Oral)   Resp 17   Ht 190.5 cm (75\")   Wt 57.3 kg (126 lb 4 oz)   SpO2 94%   BMI 15.78 kg/m²     Intake/Output Summary (Last 24 hours) at 4/11/2025 1854  Last data filed at 4/11/2025 1820  Gross per 24 hour   Intake 350 ml   Output 1450 ml   Net -1100 ml       PE:  General Appearance:    Chronically ill appearing, alert, thin, frail, cooperative, NAD   HEENT:    NC/AT, without obvious abnormality, EOMI, anicteric , dry MM    Neck:   supple, trachea midline, no JVD   Lungs:     Unlabored respirations, no wheezing, rhonchi or rales     Heart:    RRR, normal S1 and S2, no M/R/G   Abdomen:     Soft, no distension no tenderness, NABS , abd concave, PEG tube noted. Ostomy noted   Extremities:   Moves all extremities with generalized weakness, no edema, cachetic   Pulses:   Pulses palpable and equal bilaterally   Skin:   Warm, dry and pale   Neurologic:   A/Ox3, cooperative   Psych:   Calm, flat          Meds: Reviewed and changes noted    Labs:   Results from last 7 days   Lab Units 04/11/25  0112   WBC 10*3/mm3 8.64   HEMOGLOBIN g/dL 8.1*   HEMATOCRIT % 29.3*   PLATELETS 10*3/mm3 414     Results from last 7 days   Lab Units 04/11/25  0112   SODIUM mmol/L 134*   POTASSIUM mmol/L 4.4   CHLORIDE mmol/L 101   CO2 mmol/L 23.7   BUN mg/dL 18   CREATININE mg/dL 0.64* " "  GLUCOSE mg/dL 69   CALCIUM mg/dL 7.5*     Results from last 7 days   Lab Units 04/11/25  0112   SODIUM mmol/L 134*   POTASSIUM mmol/L 4.4   CHLORIDE mmol/L 101   CO2 mmol/L 23.7   BUN mg/dL 18   CREATININE mg/dL 0.64*   CALCIUM mg/dL 7.5*   BILIRUBIN mg/dL 0.3   ALK PHOS U/L 585*   ALT (SGPT) U/L 15   AST (SGOT) U/L 26   GLUCOSE mg/dL 69     Imaging Results (Last 72 Hours)       Procedure Component Value Units Date/Time    XR Abdomen KUB [[id number]] Collected: 04/10/25 1331     Updated: 04/10/25 1333    Narrative:      EXAM:    XR Abdomen, 1 View     EXAM DATE:    4/10/2025 1:31 PM     CLINICAL HISTORY:    stool burden fu; R62.7-Adult failure to thrive; R19.7-Diarrhea,  unspecified; R59.1-Generalized enlarged lymph nodes; I82.412-Acute  embolism and thrombosis of left femoral vein; C18.9-Malignant neoplasm  of colon, unspecified; C78.7-Secondary malignant neoplasm of liver and  intrahepatic bile duct     TECHNIQUE:    Frontal supine view of the abdomen/pelvis.     COMPARISON:    No relevant prior studies available.     FINDINGS:    Gastrointestinal tract:  Gaseous distention of the colon and fairly  large stool burden.    Bones/joints:  Unremarkable as visualized.  No acute fracture.    Tubes, lines and devices:  G-tube noted.       Impression:        Gaseous distention of the colon and fairly large stool burden.     This report was finalized on 4/10/2025 1:31 PM by Dr. Chava Munguia MD.                 Diagnostics: Reviewed    A: Pradeep Donald is a 74 y.o. male admitted on 3/29/2025 for metastatic colon cancer to liver. The only history that he reports is kidney stones in the past. He hasn't seeked medical care in nearly a decade or longer. He took no medications at home other than his yearly flu shot. His girlfriend reports that he had had a \"bad fall\" a few weeks ago. He has been having rectal pain and bleeding for over a year now. He reports having to use a pedro-pad to contain the blood but never told his " family. He reports weight loss, weakness, fatigue, and bloody diarrhea. He reports that upon arriving to the ED, he was having left sided abd pain/left groin pain. CT of abdomen with contrast showed rectal mass with extensive local disease involving the perineum. Abscess or necrotic mass in the right perineum measured 3.3 x 3.8 cm. There is also local invasion into the posterior aspect of the prostate gland, pelvic and retroperitoneal adenopathy. Metastatic disease involving the left hepatic lobe and possibly the lung bases, Scrotal ultrasound showed large left hydrocele and lower extremity Doppler was positive for DVT in the left common and superficial femoral veins.     Today 4/11/2025  T 97.8, BP 94/51, RR 18 he was awake alert and oriented, still very nauseated and did not appear to feel well, states he does not think he can do radiation today.    P:  Palliative was able to speak with Pradeep and his sister Kisha at bedside, Dr Jeong also present. Pradeep stated he was still nauseated today, some improved however did not think he could tolerate radiation. Discussed with him that it was his choice if he wanted to continue radiation treatments when he was feeling better or not. Myself and Dr Jeong discussed option to transition to comfort measures, at this time Pradeep was not ready to transition, which palliative supports. I will continue to follow and support Pradeep and his family.      We will continue to follow along. Please do not hesitate to contact us regarding further sx mgmt or GOC needs, including after hours or on weekends via our on call provider at [phone].     Loly Gutierrez, APRN    4/11/2025

## 2025-04-11 NOTE — PLAN OF CARE
Goal Outcome Evaluation:  Plan of Care Reviewed With: patient        Progress: no change  Outcome Evaluation: Pt sitting in bed. PRN pain medication given, see MAR. Wound care completed per orders. VSS on 2L NC. Bolus feeds started this shift. AOx4. No acute changes. Will continue POC.

## 2025-04-11 NOTE — PROGRESS NOTES
Surgery follow-up  Patient is now 12days post diverting colostomy.     Patient is doing a little better today.  He did not sleep well last night and as a result refused to his radiation today.  His colostomy is putting out better.  His nausea is better.    On exam there is some stool but air in the ostomy bag.  Bowel sounds are present.    Plan:   Patient to continue lactulose as scheduled  Rather than continuous tube feeds will do intermittent small-volume bolus feeds via PEG tube between meals and in the evening.

## 2025-04-12 PROCEDURE — 25010000002 MORPHINE PER 10 MG: Performed by: STUDENT IN AN ORGANIZED HEALTH CARE EDUCATION/TRAINING PROGRAM

## 2025-04-12 PROCEDURE — 99232 SBSQ HOSP IP/OBS MODERATE 35: CPT | Performed by: STUDENT IN AN ORGANIZED HEALTH CARE EDUCATION/TRAINING PROGRAM

## 2025-04-12 PROCEDURE — 25010000002 ONDANSETRON PER 1 MG: Performed by: NURSE PRACTITIONER

## 2025-04-12 RX ADMIN — MIDODRINE HYDROCHLORIDE 5 MG: 2.5 TABLET ORAL at 06:34

## 2025-04-12 RX ADMIN — SENNOSIDES AND DOCUSATE SODIUM 2 TABLET: 50; 8.6 TABLET ORAL at 09:02

## 2025-04-12 RX ADMIN — HYDROCORTISONE 2.5%: 25 CREAM TOPICAL at 09:02

## 2025-04-12 RX ADMIN — SENNOSIDES AND DOCUSATE SODIUM 2 TABLET: 50; 8.6 TABLET ORAL at 21:28

## 2025-04-12 RX ADMIN — MIDODRINE HYDROCHLORIDE 5 MG: 2.5 TABLET ORAL at 16:50

## 2025-04-12 RX ADMIN — NYSTATIN: 100000 POWDER TOPICAL at 14:02

## 2025-04-12 RX ADMIN — HYDROCORTISONE 2.5%: 25 CREAM TOPICAL at 21:29

## 2025-04-12 RX ADMIN — PANTOPRAZOLE SODIUM 40 MG: 40 INJECTION, POWDER, FOR SOLUTION INTRAVENOUS at 06:34

## 2025-04-12 RX ADMIN — ACETAMINOPHEN 1000 MG: 500 TABLET ORAL at 21:28

## 2025-04-12 RX ADMIN — LACTULOSE 20 G: 20 SOLUTION ORAL at 09:01

## 2025-04-12 RX ADMIN — Medication 10 ML: at 21:29

## 2025-04-12 RX ADMIN — MIDODRINE HYDROCHLORIDE 5 MG: 2.5 TABLET ORAL at 12:28

## 2025-04-12 RX ADMIN — OXYCODONE 5 MG: 5 TABLET ORAL at 23:27

## 2025-04-12 RX ADMIN — MORPHINE SULFATE 4 MG: 4 INJECTION, SOLUTION INTRAMUSCULAR; INTRAVENOUS at 02:37

## 2025-04-12 RX ADMIN — Medication 1 APPLICATION: at 14:02

## 2025-04-12 RX ADMIN — OXYCODONE 5 MG: 5 TABLET ORAL at 14:22

## 2025-04-12 RX ADMIN — APIXABAN 5 MG: 5 TABLET, FILM COATED ORAL at 09:02

## 2025-04-12 RX ADMIN — ACETAMINOPHEN 1000 MG: 500 TABLET ORAL at 09:02

## 2025-04-12 RX ADMIN — HYDROXYZINE HYDROCHLORIDE 10 MG: 10 TABLET, FILM COATED ORAL at 21:28

## 2025-04-12 RX ADMIN — APIXABAN 5 MG: 5 TABLET, FILM COATED ORAL at 21:28

## 2025-04-12 RX ADMIN — NYSTATIN: 100000 POWDER TOPICAL at 21:29

## 2025-04-12 RX ADMIN — PANTOPRAZOLE SODIUM 40 MG: 40 INJECTION, POWDER, FOR SOLUTION INTRAVENOUS at 16:50

## 2025-04-12 RX ADMIN — FOLIC ACID 1 MG: 1 TABLET ORAL at 09:02

## 2025-04-12 RX ADMIN — MORPHINE SULFATE 4 MG: 4 INJECTION, SOLUTION INTRAMUSCULAR; INTRAVENOUS at 10:30

## 2025-04-12 RX ADMIN — ONDANSETRON 4 MG: 2 INJECTION INTRAMUSCULAR; INTRAVENOUS at 22:27

## 2025-04-12 NOTE — PLAN OF CARE
Goal Outcome Evaluation:  Plan of Care Reviewed With: patient        Progress: no change  Outcome Evaluation: Patient resting in bed. VSS. Wound care completed per orders. PRN pain medication given per MAR. Bolus feeds given per order. Patient voices no concerns at this time, will continue with POC.

## 2025-04-12 NOTE — PROGRESS NOTES
[facility]     Patient Identification:  Name:  Pradeep Donald  Age:  74 y.o.  Sex:  male  :  1950  MRN:  [id number]  Visit Number:  [id number]  ROOM: [address] [place] [address]     Primary Care Provider:  Carmen Pretty APRN     Date of Admission: 3/29/2025    Length of stay in inpatient status:  14    Subjective     Chief Compliant:    Chief Complaint   Patient presents with    Leg Swelling    Hemorrhoids       Nausea improved today and actually tolerating some PO intake at lunch w/o complication, ostomy gas output ongoing.         Objective       Vital Signs:  Temp:  [97.8 °F (36.6 °C)-98 °F (36.7 °C)] 98 °F (36.7 °C)  Heart Rate:  [79] 79  Resp:  [16] 16  BP: ()/(45-57) 97/45  SpO2:  [97 %] 97 %  on  Flow (L/min) (Oxygen Therapy):  [2] 2;   Device (Oxygen Therapy): nasal cannula  Body mass index is 15.78 kg/m².      ----------------------------------------------------------------------------------------------------------------------  Physical Exam  Vitals and nursing note reviewed.   Constitutional:       Comments: Ill appearing cachetic male   HENT:      Mouth/Throat:      Mouth: Mucous membranes are dry.      Pharynx: Oropharynx is clear.   Eyes:      General: No scleral icterus.     Extraocular Movements: Extraocular movements intact.   Cardiovascular:      Pulses: Normal pulses.   Pulmonary:      Effort: Pulmonary effort is normal.   Abdominal:      Palpations: Abdomen is soft.      Tenderness: There is no abdominal tenderness.      Comments: PEG and ostomy appear well healed without erythema or swelling locally. Stool and gas present   Musculoskeletal:      Right lower leg: No edema.      Left lower leg: Edema (Swelling more unilateral, RLE improved) present.   Neurological:      Mental Status: He is alert. Mental status is at baseline.   Psychiatric:         Mood and Affect: Mood normal.         Behavior: Behavior normal.        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 Underwent colostomy and currently working to relieve large stool burden  -Oncology following, planning for 10 total sessions of palliative radiation for tumor burden improvement, too frail to tolerate systemic chemotherapy.   -GOC ongoing with palliative following. Agree patient care needs likely best served by hospice post-discharge. [facility] currently, discussed with family and feel he is unable to care for himself at home or participate with PT, prefer SNF placement.   - Transition tube feeds to bolus feeds, lactulose p.o. with MiraLAX discontinued to avoid worsening patient bloating and abdominal discomfort  -nausea improved today and tolerating bolus feeds better with ongoing ostomy output. No abd pain reported. Willing to attempt repeat radiation therapy Monday if condition remains stable    #Severe protein malnutrition with adult failure to thrive  -PEG placed and TF being initiated as tolerated for PO supplement   -anasarca noted likely secondary to hypoalbuminemia severely decreased at ~1.6    #Acute on chronic anemia, suspect gastritis  -transfuse prn to goal >7.0, given GOC would avoid EGD unless brisk UGIB develops    #Oral candidiasis completed nystatin swish and spit, monitor for recurrence    #Hypotension  -on 5mg TID midodrine with low normal BP, may need dose increase. Will cont to monitor      Code Status and Medical Interventions: No CPR (Do Not Attempt to Resuscitate); Limited Support; No cardioversion; Sister Kisha    Ordered at: 04/02/25 1651     Code Status (Patient has no pulse and is not breathing):    No CPR (Do Not Attempt to Resuscitate)     Medical Interventions (Patient has pulse or is breathing):    Limited Support     Medical Intervention Limits:    No cardioversion     Comments:    Sister Kisha     Level Of Support Discussed With:    Patient       Health Care Surrogate         Disposition: SNF pending XRT completion, GOC ongoing    I have reviewed any copied/forwarded text or data, verified its accuracy, and updated as necessary above.    Óscar Jeong MD  [facility]  04/12/25  14:10 EDT

## 2025-04-12 NOTE — PLAN OF CARE
Goal Outcome Evaluation:      Pt resting in bed at this time. Family at bedside. Wound care performed this shift. Prn pain meds given this shift. No other complaints at this time.

## 2025-04-13 PROCEDURE — 99231 SBSQ HOSP IP/OBS SF/LOW 25: CPT | Performed by: STUDENT IN AN ORGANIZED HEALTH CARE EDUCATION/TRAINING PROGRAM

## 2025-04-13 PROCEDURE — 25010000002 ONDANSETRON PER 1 MG: Performed by: NURSE PRACTITIONER

## 2025-04-13 PROCEDURE — 25010000002 MORPHINE PER 10 MG: Performed by: STUDENT IN AN ORGANIZED HEALTH CARE EDUCATION/TRAINING PROGRAM

## 2025-04-13 RX ADMIN — PANTOPRAZOLE SODIUM 40 MG: 40 INJECTION, POWDER, FOR SOLUTION INTRAVENOUS at 06:34

## 2025-04-13 RX ADMIN — Medication 10 ML: at 22:27

## 2025-04-13 RX ADMIN — MIDODRINE HYDROCHLORIDE 5 MG: 2.5 TABLET ORAL at 11:35

## 2025-04-13 RX ADMIN — NYSTATIN: 100000 POWDER TOPICAL at 22:27

## 2025-04-13 RX ADMIN — HYDROXYZINE HYDROCHLORIDE 10 MG: 10 TABLET, FILM COATED ORAL at 22:26

## 2025-04-13 RX ADMIN — ACETAMINOPHEN 1000 MG: 500 TABLET ORAL at 08:43

## 2025-04-13 RX ADMIN — FOLIC ACID 1 MG: 1 TABLET ORAL at 08:43

## 2025-04-13 RX ADMIN — LACTULOSE 20 G: 20 SOLUTION ORAL at 08:43

## 2025-04-13 RX ADMIN — OXYCODONE 5 MG: 5 TABLET ORAL at 22:26

## 2025-04-13 RX ADMIN — ACETAMINOPHEN 1000 MG: 500 TABLET ORAL at 22:26

## 2025-04-13 RX ADMIN — OXYCODONE 5 MG: 5 TABLET ORAL at 10:23

## 2025-04-13 RX ADMIN — SENNOSIDES AND DOCUSATE SODIUM 2 TABLET: 50; 8.6 TABLET ORAL at 08:43

## 2025-04-13 RX ADMIN — HYDROCORTISONE 2.5%: 25 CREAM TOPICAL at 22:27

## 2025-04-13 RX ADMIN — APIXABAN 5 MG: 5 TABLET, FILM COATED ORAL at 08:43

## 2025-04-13 RX ADMIN — OXYCODONE 5 MG: 5 TABLET ORAL at 05:02

## 2025-04-13 RX ADMIN — PANTOPRAZOLE SODIUM 40 MG: 40 INJECTION, POWDER, FOR SOLUTION INTRAVENOUS at 16:48

## 2025-04-13 RX ADMIN — MORPHINE SULFATE 4 MG: 4 INJECTION, SOLUTION INTRAMUSCULAR; INTRAVENOUS at 01:45

## 2025-04-13 RX ADMIN — NYSTATIN: 100000 POWDER TOPICAL at 08:43

## 2025-04-13 RX ADMIN — MIDODRINE HYDROCHLORIDE 5 MG: 2.5 TABLET ORAL at 06:34

## 2025-04-13 RX ADMIN — APIXABAN 5 MG: 5 TABLET, FILM COATED ORAL at 22:26

## 2025-04-13 RX ADMIN — Medication 1 APPLICATION: at 08:43

## 2025-04-13 RX ADMIN — ONDANSETRON 4 MG: 2 INJECTION INTRAMUSCULAR; INTRAVENOUS at 12:51

## 2025-04-13 RX ADMIN — MIDODRINE HYDROCHLORIDE 5 MG: 2.5 TABLET ORAL at 16:48

## 2025-04-13 RX ADMIN — HYDROCORTISONE 2.5%: 25 CREAM TOPICAL at 08:44

## 2025-04-13 RX ADMIN — Medication 10 ML: at 08:44

## 2025-04-13 RX ADMIN — LACTULOSE 20 G: 20 SOLUTION ORAL at 16:48

## 2025-04-13 RX ADMIN — ACETAMINOPHEN 1000 MG: 500 TABLET ORAL at 16:48

## 2025-04-13 NOTE — PROGRESS NOTES
[facility]     Patient Identification:  Name:  Pradeep Donald  Age:  74 y.o.  Sex:  male  :  1950  MRN:  [id number]  Visit Number:  [id number]  ROOM: [address] [place] [address]     Primary Care Provider:  Carmen Pretty APRN     Date of Admission: 3/29/2025    Length of stay in inpatient status:  15    Subjective     Chief Compliant:    Chief Complaint   Patient presents with    Leg Swelling    Hemorrhoids       Patient again tolerated breakfast this am with nausea or vomitus. No abd pain. Some pain reported on coccyx wound, discussed with bedside nurse during rounds.        Objective       Vital Signs:  Temp:  [98 °F (36.7 °C)-98.3 °F (36.8 °C)] 98.3 °F (36.8 °C)  Heart Rate:  [63] 63  Resp:  [16] 16  BP: (96-98)/(44-52) 96/44  SpO2:  [99 %] 99 %  on  Flow (L/min) (Oxygen Therapy):  [2] 2;   Device (Oxygen Therapy): nasal cannula  Body mass index is 15.78 kg/m².      ----------------------------------------------------------------------------------------------------------------------  Physical Exam  Vitals and nursing note reviewed.   Constitutional:       Comments: Ill appearing cachetic male   HENT:      Mouth/Throat:      Mouth: Mucous membranes are dry.      Pharynx: Oropharynx is clear.   Eyes:      General: No scleral icterus.     Extraocular Movements: Extraocular movements intact.   Cardiovascular:      Pulses: Normal pulses.   Pulmonary:      Effort: Pulmonary effort is normal.   Abdominal:      Palpations: Abdomen is soft.      Tenderness: There is no abdominal tenderness.      Comments: PEG and ostomy appear well healed without erythema or swelling locally. Stool and gas present   Musculoskeletal:      Right lower leg: No edema.      Left lower leg: No edema (LE edema essentially resolved).   Neurological:      Mental Status: He is alert. Mental status is at baseline.   Psychiatric:         Mood and Affect: Mood normal.         Behavior: Behavior normal.        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 Underwent colostomy and currently working to relieve large stool burden  -Oncology following, planning for 10 total sessions of palliative radiation for tumor burden improvement, too frail to tolerate systemic chemotherapy.   -GOC ongoing with palliative following. Agree patient care needs likely best served by hospice post-discharge. Planning [facility] currently, discussed with family and feel he is unable to care for himself at home or participate with PT, prefer SNF placement.   - Transition tube feeds to bolus feeds, lactulose p.o. with MiraLAX discontinued to avoid worsening patient bloating and abdominal discomfort  -N/V improving after aggressive bowel regimen and ongoing ostomy output. Tolerating some PO intake and bolus TF. Planning to attempt repeat radiation cycles Monday given clinical improvement after deferring cycle last week due to nausea, vomiting, and malaise    #Severe protein malnutrition with adult failure to thrive  -PEG placed and TF being initiated as tolerated for PO supplement   -anasarca noted likely secondary to hypoalbuminemia severely decreased at ~1.6, improving as nutrition status improves    #Acute on chronic anemia, suspect gastritis  -transfuse prn to goal >7.0, given GOC would avoid EGD unless brisk UGIB develops    #Oral candidiasis completed nystatin swish and spit, monitor for recurrence    #Hypotension  -on 5mg TID midodrine with low normal BP, may need dose increase. Will cont to monitor      Code Status and  Medical Interventions: No CPR (Do Not Attempt to Resuscitate); Limited Support; No cardioversion; Sister Kisha   Ordered at: 04/02/25 1655     Code Status (Patient has no pulse and is not breathing):    No CPR (Do Not Attempt to Resuscitate)     Medical Interventions (Patient has pulse or is breathing):    Limited Support     Medical Intervention Limits:    No cardioversion     Comments:    Sister Kisha     Level Of Support Discussed With:    Patient       Health Care Surrogate         Disposition: SNF pending XRT completion, GOC ongoing    I have reviewed any copied/forwarded text or data, verified its accuracy, and updated as necessary above.    Óscar Jeong MD  [facility]ist  04/13/25  15:27 EDT

## 2025-04-13 NOTE — PLAN OF CARE
Goal Outcome Evaluation:  Plan of Care Reviewed With: patient        Progress: no change  Outcome Evaluation: Pt restin in bed at this time. Pt is unable to ambulate at baseline. PRN medication given for pain and nausea. WOund care completed per orders and bathed this shift. No acute changes or complaints noted at this time. Plan of care ongoing.

## 2025-04-13 NOTE — PLAN OF CARE
Goal Outcome Evaluation:  Plan of Care Reviewed With: patient        Progress: no change  Outcome Evaluation: Patient resting in bed. VSS. Wound care completed per orders. PRN medication given per MAR. Bolus feeds given per order. Patient voices no concerns at this time, will continue with POC.

## 2025-04-14 LAB
ALBUMIN SERPL-MCNC: 1.5 G/DL (ref 3.5–5.2)
ALBUMIN/GLOB SERPL: 0.4 G/DL
ALP SERPL-CCNC: 563 U/L (ref 39–117)
ALT SERPL W P-5'-P-CCNC: 19 U/L (ref 1–41)
ANION GAP SERPL CALCULATED.3IONS-SCNC: 6 MMOL/L (ref 5–15)
AST SERPL-CCNC: 26 U/L (ref 1–40)
BILIRUB SERPL-MCNC: 0.2 MG/DL (ref 0–1.2)
BUN SERPL-MCNC: 15 MG/DL (ref 8–23)
BUN/CREAT SERPL: 28.8 (ref 7–25)
CALCIUM SPEC-SCNC: 7.2 MG/DL (ref 8.6–10.5)
CHLORIDE SERPL-SCNC: 101 MMOL/L (ref 98–107)
CO2 SERPL-SCNC: 25 MMOL/L (ref 22–29)
CREAT SERPL-MCNC: 0.52 MG/DL (ref 0.76–1.27)
DEPRECATED RDW RBC AUTO: 59.7 FL (ref 37–54)
EGFRCR SERPLBLD CKD-EPI 2021: 104.5 ML/MIN/1.73
ERYTHROCYTE [DISTWIDTH] IN BLOOD BY AUTOMATED COUNT: 18.2 % (ref 12.3–15.4)
GLOBULIN UR ELPH-MCNC: 3.7 GM/DL
GLUCOSE SERPL-MCNC: 118 MG/DL (ref 65–99)
HCT VFR BLD AUTO: 24.9 % (ref 37.5–51)
HGB BLD-MCNC: 7.2 G/DL (ref 13–17.7)
MAGNESIUM SERPL-MCNC: 1.9 MG/DL (ref 1.6–2.4)
MCH RBC QN AUTO: 25.9 PG (ref 26.6–33)
MCHC RBC AUTO-ENTMCNC: 28.9 G/DL (ref 31.5–35.7)
MCV RBC AUTO: 89.6 FL (ref 79–97)
PLATELET # BLD AUTO: 361 10*3/MM3 (ref 140–450)
PMV BLD AUTO: 8.8 FL (ref 6–12)
POTASSIUM SERPL-SCNC: 4.1 MMOL/L (ref 3.5–5.2)
PROT SERPL-MCNC: 5.2 G/DL (ref 6–8.5)
RAD ONC ARIA COURSE ID: NORMAL
RAD ONC ARIA COURSE INTENT: NORMAL
RAD ONC ARIA COURSE LAST TREATMENT DATE: NORMAL
RAD ONC ARIA COURSE START DATE: NORMAL
RAD ONC ARIA COURSE TREATMENT ELAPSED DAYS: 12
RAD ONC ARIA FIRST TREATMENT DATE: NORMAL
RAD ONC ARIA PLAN FRACTIONS TREATED TO DATE: 6
RAD ONC ARIA PLAN ID: NORMAL
RAD ONC ARIA PLAN PRESCRIBED DOSE PER FRACTION: 3 GY
RAD ONC ARIA PLAN PRIMARY REFERENCE POINT: NORMAL
RAD ONC ARIA PLAN TOTAL FRACTIONS PRESCRIBED: 10
RAD ONC ARIA PLAN TOTAL PRESCRIBED DOSE: 3000 CGY
RAD ONC ARIA REFERENCE POINT DOSAGE GIVEN TO DATE: 18 GY
RAD ONC ARIA REFERENCE POINT ID: NORMAL
RAD ONC ARIA REFERENCE POINT SESSION DOSAGE GIVEN: 3 GY
RBC # BLD AUTO: 2.78 10*6/MM3 (ref 4.14–5.8)
SODIUM SERPL-SCNC: 132 MMOL/L (ref 136–145)
WBC NRBC COR # BLD AUTO: 7.48 10*3/MM3 (ref 3.4–10.8)

## 2025-04-14 PROCEDURE — 99232 SBSQ HOSP IP/OBS MODERATE 35: CPT | Performed by: STUDENT IN AN ORGANIZED HEALTH CARE EDUCATION/TRAINING PROGRAM

## 2025-04-14 PROCEDURE — 83735 ASSAY OF MAGNESIUM: CPT | Performed by: STUDENT IN AN ORGANIZED HEALTH CARE EDUCATION/TRAINING PROGRAM

## 2025-04-14 PROCEDURE — 77412 RADIATION TX DELIVERY LVL 3: CPT | Performed by: RADIOLOGY

## 2025-04-14 PROCEDURE — 77387 GUIDANCE FOR RADJ TX DLVR: CPT | Performed by: RADIOLOGY

## 2025-04-14 PROCEDURE — 25010000002 ONDANSETRON PER 1 MG: Performed by: NURSE PRACTITIONER

## 2025-04-14 PROCEDURE — 77336 RADIATION PHYSICS CONSULT: CPT | Performed by: RADIOLOGY

## 2025-04-14 PROCEDURE — 77417 THER RADIOLOGY PORT IMAGE(S): CPT | Performed by: RADIOLOGY

## 2025-04-14 PROCEDURE — 80053 COMPREHEN METABOLIC PANEL: CPT | Performed by: STUDENT IN AN ORGANIZED HEALTH CARE EDUCATION/TRAINING PROGRAM

## 2025-04-14 PROCEDURE — 85027 COMPLETE CBC AUTOMATED: CPT | Performed by: STUDENT IN AN ORGANIZED HEALTH CARE EDUCATION/TRAINING PROGRAM

## 2025-04-14 PROCEDURE — 94799 UNLISTED PULMONARY SVC/PX: CPT

## 2025-04-14 PROCEDURE — G6002 STEREOSCOPIC X-RAY GUIDANCE: HCPCS | Performed by: RADIOLOGY

## 2025-04-14 RX ADMIN — HYDROCORTISONE 2.5%: 25 CREAM TOPICAL at 08:28

## 2025-04-14 RX ADMIN — OXYCODONE 5 MG: 5 TABLET ORAL at 20:02

## 2025-04-14 RX ADMIN — ONDANSETRON 4 MG: 2 INJECTION INTRAMUSCULAR; INTRAVENOUS at 20:00

## 2025-04-14 RX ADMIN — NYSTATIN: 100000 POWDER TOPICAL at 08:31

## 2025-04-14 RX ADMIN — OXYCODONE 5 MG: 5 TABLET ORAL at 08:24

## 2025-04-14 RX ADMIN — FOLIC ACID 1 MG: 1 TABLET ORAL at 08:27

## 2025-04-14 RX ADMIN — SENNOSIDES AND DOCUSATE SODIUM 2 TABLET: 50; 8.6 TABLET ORAL at 08:27

## 2025-04-14 RX ADMIN — NYSTATIN: 100000 POWDER TOPICAL at 20:02

## 2025-04-14 RX ADMIN — PANTOPRAZOLE SODIUM 40 MG: 40 INJECTION, POWDER, FOR SOLUTION INTRAVENOUS at 16:31

## 2025-04-14 RX ADMIN — Medication 10 ML: at 08:28

## 2025-04-14 RX ADMIN — ACETAMINOPHEN 1000 MG: 500 TABLET ORAL at 08:31

## 2025-04-14 RX ADMIN — PANTOPRAZOLE SODIUM 40 MG: 40 INJECTION, POWDER, FOR SOLUTION INTRAVENOUS at 08:27

## 2025-04-14 RX ADMIN — HYDROCORTISONE 2.5%: 25 CREAM TOPICAL at 20:02

## 2025-04-14 RX ADMIN — APIXABAN 5 MG: 5 TABLET, FILM COATED ORAL at 20:02

## 2025-04-14 RX ADMIN — ACETAMINOPHEN 1000 MG: 500 TABLET ORAL at 20:02

## 2025-04-14 RX ADMIN — MIDODRINE HYDROCHLORIDE 5 MG: 2.5 TABLET ORAL at 16:31

## 2025-04-14 RX ADMIN — APIXABAN 5 MG: 5 TABLET, FILM COATED ORAL at 08:27

## 2025-04-14 RX ADMIN — MIDODRINE HYDROCHLORIDE 5 MG: 2.5 TABLET ORAL at 11:13

## 2025-04-14 RX ADMIN — OXYCODONE 5 MG: 5 TABLET ORAL at 03:03

## 2025-04-14 RX ADMIN — Medication 10 ML: at 20:00

## 2025-04-14 RX ADMIN — ACETAMINOPHEN 1000 MG: 500 TABLET ORAL at 14:26

## 2025-04-14 RX ADMIN — HYDROXYZINE HYDROCHLORIDE 10 MG: 10 TABLET, FILM COATED ORAL at 20:02

## 2025-04-14 RX ADMIN — MIDODRINE HYDROCHLORIDE 5 MG: 2.5 TABLET ORAL at 08:27

## 2025-04-14 NOTE — CASE MANAGEMENT/SOCIAL WORK
Discharge Planning Assessment   [facility]     Patient Name: Pradeep Donald  MRN: [id number]  Today's Date: 4/14/2025    Admit Date: 3/29/2025     Discharge Plan       Row Name 04/14/25 1821       Plan    Plan Pt received radiation treatment today per RN. SS to follow.                  Continued Care and Services - Admitted Since 3/29/2025       Destination       Service Provider Request Status Services Address Phone Fax Patient Preferred    [facility] Pending - Request Sent -- [address] [place] [id number] [phone] --                  Expected Discharge Date and Time       Expected Discharge Date Expected Discharge Time    Apr 16, 2025      GIFTY Mccormick

## 2025-04-14 NOTE — PROGRESS NOTES
[facility] HOSPITALIST PROGRESS NOTE     Patient Identification:  Name:  Pradeep Donald  Age:  74 y.o.  Sex:  male  :  1950  MRN:  [id number]  Visit Number:  [id number]  ROOM: [address] [place] [address]     Primary Care Provider:  Carmen Pretty APRN    Length of stay in inpatient status:  16    Subjective     Chief Compliant:    Chief Complaint   Patient presents with    Leg Swelling    Hemorrhoids       History of Presenting Illness:    Patient seen in follow-up for large rectal mass status post resection, ostomy and DVT.  Patient is awake, alert and oriented x 3.  Sister and family at bedside. Denies any complaints.     Objective     Current Hospital Meds:acetaminophen, 1,000 mg, Per PEG Tube, TID  apixaban, 5 mg, Oral, Q12H  castor oil-balsam peru, 1 Application, Topical, Daily  folic acid, 1 mg, Oral, Daily  Hydrocortisone (Perianal), , Rectal, BID  hydrOXYzine, 10 mg, Oral, Nightly  lactulose, 20 g, Per PEG Tube, TID  midodrine, 5 mg, Per PEG Tube, TID AC  nystatin, , Topical, Q12H  pantoprazole, 40 mg, Intravenous, BID AC  senna-docusate sodium, 2 tablet, Per PEG Tube, BID   And  [Held by provider] polyethylene glycol, 17 g, Per PEG Tube, BID  sodium chloride, 10 mL, Intravenous, Q12H           Current Antimicrobial Therapy:  Anti-Infectives (From admission, onward)      Ordered     Dose/Rate Route Frequency Start Stop    25 1442  ceFAZolin 2000 mg IVPB in 100 mL NS (VTB)  Status:  Discontinued        Ordering Provider: Matilde Monzon MD    2,000 mg  over 30 Minutes Intravenous On Call to O.R. 25 0600 25 1718    25 1146  cefepime 1000 mg IVPB in 100 mL NS (VTB)  Status:  Discontinued        Ordering Provider: Matilde Monzon MD    1,000 mg  over 4 Hours Intravenous Every 12 Hours 25 2100 25 2027    25 1146  cefepime 1000 mg IVPB in 100 mL NS (VTB)        Ordering Provider: Jayden Barney MD    1,000 mg  over 30 Minutes Intravenous Once 25  1245 03/29/25 1402    03/29/25 0613  cefepime 2000 mg IVPB in 100 mL NS (VTB)        Ordering Provider: Óscar Rodriguez MD    2,000 mg  over 30 Minutes Intravenous Once 03/29/25 0629 03/29/25 0700    03/29/25 0613  vancomycin (VANCOCIN) 1,000 mg in sodium chloride 0.9 % 250 mL IVPB-VTB        Ordering Provider: Óscar Rodriguez MD    20 mg/kg × 49.9 kg  250 mL/hr over 60 Minutes Intravenous Once 03/29/25 0629 03/29/25 0802          Current Diuretic Therapy:  Diuretics (From admission, onward)      None          ----------------------------------------------------------------------------------------------------------------------  Vital Signs:  Temp:  [98 °F (36.7 °C)-98.4 °F (36.9 °C)] 98.4 °F (36.9 °C)  Heart Rate:  [59-68] 65  Resp:  [16-20] 16  BP: ()/(45-58) 102/58  SpO2:  [91 %-99 %] 99 %  on  Flow (L/min) (Oxygen Therapy):  [2] 2;   Device (Oxygen Therapy): nasal cannula  Body mass index is 15.78 kg/m².    Wt Readings from Last 3 Encounters:   03/29/25 57.3 kg (126 lb 4 oz)   06/27/22 79.4 kg (175 lb)   01/07/18 83.9 kg (185 lb)     Intake & Output (last 3 days)         03/29 0701 03/30 0700 03/30 0701 03/31 0700 03/31 0701 04/01 0700    P.O. 60 1680 360    I.V. (mL/kg) 1000 (17.5)  1084 (18.9)    Blood   315.8    Other  110 80    NG/GT  83 130    IV Piggyback   200    Total Intake(mL/kg) 1060 (18.5) 1873 (32.7) 2169.8 (37.9)    Urine (mL/kg/hr) 90 (0.1) 2050 (1.5) 750 (1)    Total Output 90 2050 750    Net +970 -177 +1419.8           Urine Unmeasured Occurrence 1 x            Diet: Regular/House; Texture: Soft to Chew (NDD 3); Soft to Chew: Chopped Meat; Fluid Consistency: Thin (IDDSI 0)  ----------------------------------------------------------------------------------------------------------------------  Physical exam:  Constitutional: Elderly male, appears cachectic, resting comfortably in bed, no acute distress.      HENT:  Head:  Normocephalic and atraumatic.  Mouth:  Moist mucous  "membranes.  Eyes:  Conjunctivae and EOM are normal. No scleral icterus.   Cardiovascular:  Normal rate, regular rhythm and normal heart sounds with no murmur. No JVD.   Pulmonary/Chest:  No respiratory distress, no wheezes, no crackles, with normal breath sounds and good air movement. Unlabored. No accessory muscle use.  Abdominal:  Soft. No distension and no tenderness.  Bowel sounds present. No rebound or guarding.  PEG tube with tube feeds.  Incision sites well-healed.  Ostomy with stool output.  Musculoskeletal: Cachectic.  No tenderness, and no deformity.  No red or swollen joints anywhere.    Neurological:  Alert and oriented to person, place, and time.  No cranial nerve deficit.   Nonfocal.   Skin:  Skin is warm and dry. No rash noted. No pallor.   Peripheral vascular:  No clubbing, no cyanosis, no edema. Pedal and tibial pulses 2 out of 4 bilaterally.     ----------------------------------------------------------------------------------------------------------------------  Results from last 7 days   Lab Units 04/14/25 0023 04/11/25 0112 04/08/25  0106   LACTATE mmol/L  --  1.6  --    WBC 10*3/mm3 7.48 8.64 9.71   HEMOGLOBIN g/dL 7.2* 8.1* 8.3*   HEMATOCRIT % 24.9* 29.3* 28.5*   MCV fL 89.6 91.0 88.0   MCHC g/dL 28.9* 27.6* 29.1*   PLATELETS 10*3/mm3 361 414 396         Results from last 7 days   Lab Units 04/14/25 0023 04/11/25 0112 04/08/25  0106   SODIUM mmol/L 132* 134* 132*   POTASSIUM mmol/L 4.1 4.4 5.2   MAGNESIUM mg/dL 1.9  --   --    CHLORIDE mmol/L 101 101 101   CO2 mmol/L 25.0 23.7 23.4   BUN mg/dL 15 18 21   CREATININE mg/dL 0.52* 0.64* 0.65*   CALCIUM mg/dL 7.2* 7.5* 7.7*   GLUCOSE mg/dL 118* 69 90   ALBUMIN g/dL 1.5* 1.3*  --    BILIRUBIN mg/dL 0.2 0.3  --    ALK PHOS U/L 563* 585*  --    AST (SGOT) U/L 26 26  --    ALT (SGPT) U/L 19 15  --    Estimated Creatinine Clearance: 97.9 mL/min (A) (by C-G formula based on SCr of 0.52 mg/dL (L)).  No results found for: \"AMMONIA\"                  No " "results found for: \"HGBA1C\", \"POCGLU\"    Lab Results   Component Value Date    TSH 5.270 (H) 03/29/2025    FREET4 1.02 03/29/2025     No results found for: \"PREGTESTUR\", \"PREGSERUM\", \"HCG\", \"HCGQUANT\"  Pain Management Panel           No data to display              Brief Urine Lab Results       None          No results found for: \"BLOODCX\"  No results found for: \"URINECX\"  No results found for: \"WOUNDCX\"  No results found for: \"STOOLCX\"  No results found for: \"RESPCX\"  No results found for: \"AFBCX\"  Results from last 7 days   Lab Units 04/11/25  0112   LACTATE mmol/L 1.6       I have personally looked at the labs and they are summarized above.  ----------------------------------------------------------------------------------------------------------------------  Detailed radiology reports for the last 24 hours:  Imaging Results (Last 24 Hours)       ** No results found for the last 24 hours. **          Assessment & Plan      Patient is a 74-year-old male with no known significant medical problems who presented to the ER with reports of bright red blood per rectum, weight loss and poor appetite.    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  --CT abdomen/pelvis with contrast noted rectal mass with extensive local disease into the peritoneum measuring 3.3 x 3.8 cm with local invasion into the posterior aspect of the prostate gland with pelvic and RP adenopathy and possible metastatic disease involving the liver  --Venous Dopplers noted DVT in the left common/superficial femoral veins  --Status post biopsy of rectal mass/colostomy/PEG  --Pathology noted invasive squamous cell carcinoma  --Patient has been able to tolerate radiation better with IV dose of morphine prior, will " continue this prior to XRT.  Given social situation and debility, ultimate plan is most likely nursing facility (niece works at [employer]), unable to discharge home due to social situation therefore palliative XRT will be continued inpatient  --continue scheduled Tylenol, prn aki  --continue p.o. midodrine 3 times daily  --Continue Eliquis starter pack  --Daily prn morphine prior to wound/dressing changes and prior to XRT  --Radiation oncology following, undergoing inpatient radiation  --Heme/onc following, appreciate recommendation  --Surgery following appreciate assistance    #Acute on chronic blood loss anemia  #Possible gastritis/UGIB  --received 2 unit PRBC total  --Received 3 days of IV iron, continue PPI twice daily  --Surgery following per above    #Goals of care  #Severe malnutrition  #Failure to thrive  --PEG placed with supplemental tube feeds  --Palliative care following, appreciate assistance    #Constipation, KUB noted retained stool, schedule bowel regimen  #Oral candidiasis, completed nystatin swish and spit    Copied portions of this report have been reviewed and are accurate as of 04/14/25     CHECKLIST:  Abx: None  VTE: Eliquis  GI ppx: PPI twice daily  Diet: Modified, tube feeds  Code: No CPR, limited  Dispo: Patient is medically stable.  Plans to continue XRT inpatient to complete course.  Case management following for potential placement at SNF after completion of radiation.    Quintin Jon DO  [facility]  04/14/25  12:39 EDT

## 2025-04-14 NOTE — PLAN OF CARE
Goal Outcome Evaluation:  Plan of Care Reviewed With: patient        Progress: no change  Outcome Evaluation: Patient VSS this shift. Patient refused a bath this shift, education provided. Wound care completed per wound care RN. Bolus feeds given per orders. PRN medications given per orders, Patient reports no further concerns at this time, will continue POC.

## 2025-04-14 NOTE — NURSING NOTE
"In room with TEE Carpenter for wound assessment.    Patient continues with cancerous lesion to the inner gluteal fold running from coccyx to scrotum.  At this time, patient with copious tan grainy drainage noted.  Drainage leaking from dressing onto dri-tammy pad.  Drainage has malodor.  There are several stage II PI's to the gluteals as well as ruptured blisters and tape dermatitis.  I cleansed all areas with cleansing foam and patted dry.  I applied Z-Guard to bilat gluteals including the stage II's but excluding the cancer lesion.  The cancerous lesion was covered with abd pads x's 2.  These were secured with mesh panties.  Order placed for this dressing change 4 times daily.    Patient continue with a stage III PI to his right elbow.  Wound is much smaller.  Will continue treatment as ordered.    Per nursing staff, ostomy bag \"pops\" off due to gas build up and has to be changed.  Recommend frequent burping.             04/14/25 1410   Wound 03/30/25 0955 Right posterior elbow Pressure Injury   Placement Date/Time: 03/30/25 0955   Present on Original Admission: No  Side: Right  Orientation: posterior  Location: elbow  Primary Wound Type: Pressure Injury   Pressure Injury Stage 3   Dressing Appearance open to air   Closure None   Base moist;pink   Periwound intact   Periwound Temperature warm   Periwound Skin Turgor soft   Edges open   Wound Length (cm) 0.3 cm   Wound Width (cm) 0.3 cm   Wound Depth (cm) 0.3 cm   Wound Surface Area (cm^2) 0.07 cm^2   Wound Volume (cm^3) 0.014 cm^3   Drainage Amount none   Wound 03/31/25 1553 medial perirectal Atypical Malignant Ulcer   Placement Date/Time: 03/31/25 1553   Present on Original Admission: Yes  Orientation: medial  Location: perirectal  Primary Wound Type: Atypical  Secondary Wound Type - Atypical: Malignant Ulcer   Dressing Appearance copious drainage   Closure None   Base moist;red;yellow;slough   Periwound intact;moist;redness   Periwound Temperature warm   Periwound " Skin Turgor firm   Edges open   Drainage Characteristics/Odor tan;other (see comments)  (grainy)   Drainage Amount copious   Care, Wound cleansed with;soap and water   Dressing Care abdominal pad;mesh panty   Periwound Care barrier ointment applied

## 2025-04-14 NOTE — PROGRESS NOTES
"Palliative Care Daily Progress Note     S: Medical record reviewed, followed up with Primary TEE Carpenter and Dr Jon regarding patient's condition. When I saw Pradeep today he was more awake, he had better color and he stated he was feeling better with only occasional nausea and at this moment only had mild pain on his bottom. He was not having labored respirations at this time.      O:   Palliative Performance Scale Score:     /58 (BP Location: Left arm, Patient Position: Lying)   Pulse 65   Temp 98.4 °F (36.9 °C) (Oral)   Resp 16   Ht 190.5 cm (75\")   Wt 57.3 kg (126 lb 4 oz)   SpO2 99%   BMI 15.78 kg/m²     Intake/Output Summary (Last 24 hours) at 4/14/2025 1555  Last data filed at 4/14/2025 1430  Gross per 24 hour   Intake 1880 ml   Output 350 ml   Net 1530 ml       PE:  General Appearance:    Chronically ill appearing, alert, thin, frail, cooperative, NAD   HEENT:    NC/AT, without obvious abnormality, EOMI, anicteric , dry MM    Neck:   supple, trachea midline, no JVD   Lungs:     Unlabored respirations, no wheezing, rhonchi or rales     Heart:    RRR, normal S1 and S2, no M/R/G   Abdomen:     Soft, no distension no tenderness, NABS , abd concave, PEG tube noted. Ostomy noted   Extremities:   Moves all extremities with generalized weakness, no edema, cachetic   Pulses:   Pulses palpable and equal bilaterally   Skin:   Warm, dry, better color today   Neurologic:   A/Ox3, cooperative   Psych:   Calm, appropriate         Meds: Reviewed and changes noted    Labs:   Results from last 7 days   Lab Units 04/14/25  0023   WBC 10*3/mm3 7.48   HEMOGLOBIN g/dL 7.2*   HEMATOCRIT % 24.9*   PLATELETS 10*3/mm3 361     Results from last 7 days   Lab Units 04/14/25  0023   SODIUM mmol/L 132*   POTASSIUM mmol/L 4.1   CHLORIDE mmol/L 101   CO2 mmol/L 25.0   BUN mg/dL 15   CREATININE mg/dL 0.52*   GLUCOSE mg/dL 118*   CALCIUM mg/dL 7.2*     Results from last 7 days   Lab Units 04/14/25  0023   SODIUM mmol/L 132* " "  POTASSIUM mmol/L 4.1   CHLORIDE mmol/L 101   CO2 mmol/L 25.0   BUN mg/dL 15   CREATININE mg/dL 0.52*   CALCIUM mg/dL 7.2*   BILIRUBIN mg/dL 0.2   ALK PHOS U/L 563*   ALT (SGPT) U/L 19   AST (SGOT) U/L 26   GLUCOSE mg/dL 118*     Imaging Results (Last 72 Hours)       ** No results found for the last 72 hours. **              Diagnostics: Reviewed    A: Pradeep Donald is a 74 y.o. male admitted on 3/29/2025 for metastatic colon cancer to liver. The only history that he reports is kidney stones in the past. He hasn't seeked medical care in nearly a decade or longer. He took no medications at home other than his yearly flu shot. His girlfriend reports that he had had a \"bad fall\" a few weeks ago. He has been having rectal pain and bleeding for over a year now. He reports having to use a pedro-pad to contain the blood but never told his family. He reports weight loss, weakness, fatigue, and bloody diarrhea. He reports that upon arriving to the ED, he was having left sided abd pain/left groin pain. CT of abdomen with contrast showed rectal mass with extensive local disease involving the perineum. Abscess or necrotic mass in the right perineum measured 3.3 x 3.8 cm. There is also local invasion into the posterior aspect of the prostate gland, pelvic and retroperitoneal adenopathy. Metastatic disease involving the left hepatic lobe and possibly the lung bases, Scrotal ultrasound showed large left hydrocele and lower extremity Doppler was positive for DVT in the left common and superficial femoral veins.      Today 4/14/2025  T 98.4, HR 65, BP of 102/58, RR 18, saturating 98% on 2 L NC    P:  Plan at this time is to continue radiation treatments(total of 10).  following for potential placement. Palliative will continue to follow for GOC and for support of Pradeep and his Sister Kisha.      We will continue to follow along. Please do not hesitate to contact us regarding further sx mgmt or GOC needs, including " after hours or on weekends via our on call provider at [phone].     Loly Gutierrez, APRN    4/14/2025

## 2025-04-14 NOTE — PLAN OF CARE
Goal Outcome Evaluation:  Plan of Care Reviewed With: patient        Progress: no change  Outcome Evaluation: Pt has rested in bed overnight. VSS. Wound care completed per orders. Perirectal dressings changed multiple times due to drainage. Bolus feeding completed per orders. Will continue POC.

## 2025-04-15 ENCOUNTER — DOCUMENTATION (OUTPATIENT)
Dept: RADIATION ONCOLOGY | Facility: HOSPITAL | Age: 75
End: 2025-04-15
Payer: MEDICARE

## 2025-04-15 PROCEDURE — 25010000002 MORPHINE PER 10 MG: Performed by: STUDENT IN AN ORGANIZED HEALTH CARE EDUCATION/TRAINING PROGRAM

## 2025-04-15 PROCEDURE — 99232 SBSQ HOSP IP/OBS MODERATE 35: CPT | Performed by: STUDENT IN AN ORGANIZED HEALTH CARE EDUCATION/TRAINING PROGRAM

## 2025-04-15 PROCEDURE — 25010000002 ONDANSETRON PER 1 MG: Performed by: NURSE PRACTITIONER

## 2025-04-15 RX ORDER — MIDODRINE HYDROCHLORIDE 2.5 MG/1
10 TABLET ORAL
Status: DISCONTINUED | OUTPATIENT
Start: 2025-04-15 | End: 2025-04-21 | Stop reason: HOSPADM

## 2025-04-15 RX ADMIN — PANTOPRAZOLE SODIUM 40 MG: 40 INJECTION, POWDER, FOR SOLUTION INTRAVENOUS at 17:53

## 2025-04-15 RX ADMIN — NYSTATIN: 100000 POWDER TOPICAL at 22:13

## 2025-04-15 RX ADMIN — Medication 10 ML: at 09:01

## 2025-04-15 RX ADMIN — ACETAMINOPHEN 1000 MG: 500 TABLET ORAL at 22:11

## 2025-04-15 RX ADMIN — HYDROXYZINE HYDROCHLORIDE 10 MG: 10 TABLET, FILM COATED ORAL at 22:12

## 2025-04-15 RX ADMIN — SENNOSIDES AND DOCUSATE SODIUM 2 TABLET: 50; 8.6 TABLET ORAL at 22:12

## 2025-04-15 RX ADMIN — HYDROCORTISONE 2.5%: 25 CREAM TOPICAL at 09:03

## 2025-04-15 RX ADMIN — ACETAMINOPHEN 1000 MG: 500 TABLET ORAL at 09:02

## 2025-04-15 RX ADMIN — OXYCODONE 5 MG: 5 TABLET ORAL at 00:09

## 2025-04-15 RX ADMIN — APIXABAN 5 MG: 5 TABLET, FILM COATED ORAL at 09:02

## 2025-04-15 RX ADMIN — ACETAMINOPHEN 1000 MG: 500 TABLET ORAL at 15:50

## 2025-04-15 RX ADMIN — NYSTATIN: 100000 POWDER TOPICAL at 09:03

## 2025-04-15 RX ADMIN — LACTULOSE 20 G: 20 SOLUTION ORAL at 22:12

## 2025-04-15 RX ADMIN — ONDANSETRON 4 MG: 2 INJECTION INTRAMUSCULAR; INTRAVENOUS at 19:56

## 2025-04-15 RX ADMIN — Medication 10 ML: at 22:13

## 2025-04-15 RX ADMIN — MIDODRINE HYDROCHLORIDE 5 MG: 2.5 TABLET ORAL at 09:02

## 2025-04-15 RX ADMIN — FOLIC ACID 1 MG: 1 TABLET ORAL at 09:02

## 2025-04-15 RX ADMIN — PANTOPRAZOLE SODIUM 40 MG: 40 INJECTION, POWDER, FOR SOLUTION INTRAVENOUS at 09:01

## 2025-04-15 RX ADMIN — MIDODRINE HYDROCHLORIDE 10 MG: 2.5 TABLET ORAL at 17:53

## 2025-04-15 RX ADMIN — OXYCODONE 5 MG: 5 TABLET ORAL at 12:35

## 2025-04-15 RX ADMIN — Medication 1 APPLICATION: at 09:02

## 2025-04-15 RX ADMIN — MORPHINE SULFATE 4 MG: 4 INJECTION, SOLUTION INTRAMUSCULAR; INTRAVENOUS at 05:32

## 2025-04-15 RX ADMIN — APIXABAN 5 MG: 5 TABLET, FILM COATED ORAL at 22:12

## 2025-04-15 RX ADMIN — HYDROCORTISONE 2.5%: 25 CREAM TOPICAL at 22:13

## 2025-04-15 NOTE — PROGRESS NOTES
[facility] HOSPITALIST PROGRESS NOTE     Patient Identification:  Name:  Pradeep Donald  Age:  74 y.o.  Sex:  male  :  1950  MRN:  [id number]  Visit Number:  [id number]  ROOM: [address] [place] [address]     Primary Care Provider:  Carmen Pretty APRN    Length of stay in inpatient status:  17    Subjective     Chief Compliant:    Chief Complaint   Patient presents with    Leg Swelling    Hemorrhoids       History of Presenting Illness:    Patient seen in follow-up for large rectal mass status post resection, ostomy and DVT.  Patient is awake, alert and oriented x 3.  No family at bedside.  Denies any complaints.     Objective     Current Hospital Meds:acetaminophen, 1,000 mg, Per PEG Tube, TID  apixaban, 5 mg, Oral, Q12H  castor oil-balsam peru, 1 Application, Topical, Daily  folic acid, 1 mg, Oral, Daily  Hydrocortisone (Perianal), , Rectal, BID  hydrOXYzine, 10 mg, Oral, Nightly  lactulose, 20 g, Per PEG Tube, TID  midodrine, 5 mg, Per PEG Tube, TID AC  nystatin, , Topical, Q12H  pantoprazole, 40 mg, Intravenous, BID AC  senna-docusate sodium, 2 tablet, Per PEG Tube, BID   And  [Held by provider] polyethylene glycol, 17 g, Per PEG Tube, BID  sodium chloride, 10 mL, Intravenous, Q12H           Current Antimicrobial Therapy:  Anti-Infectives (From admission, onward)      Ordered     Dose/Rate Route Frequency Start Stop    25 1442  ceFAZolin 2000 mg IVPB in 100 mL NS (VTB)  Status:  Discontinued        Ordering Provider: Matilde Monzon MD    2,000 mg  over 30 Minutes Intravenous On Call to O.R. 25 0600 25 1718    25 1146  cefepime 1000 mg IVPB in 100 mL NS (VTB)  Status:  Discontinued        Ordering Provider: Matilde Monzon MD    1,000 mg  over 4 Hours Intravenous Every 12 Hours 25 2100 25 2027    25 1146  cefepime 1000 mg IVPB in 100 mL NS (VTB)        Ordering Provider: Jayden Barney MD    1,000 mg  over 30 Minutes Intravenous Once 25 1245  03/29/25 1402    03/29/25 0613  cefepime 2000 mg IVPB in 100 mL NS (VTB)        Ordering Provider: Óscar Rodriguez MD    2,000 mg  over 30 Minutes Intravenous Once 03/29/25 0629 03/29/25 0700    03/29/25 0613  vancomycin (VANCOCIN) 1,000 mg in sodium chloride 0.9 % 250 mL IVPB-VTB        Ordering Provider: Óscar Rodriguez MD    20 mg/kg × 49.9 kg  250 mL/hr over 60 Minutes Intravenous Once 03/29/25 0629 03/29/25 0802          Current Diuretic Therapy:  Diuretics (From admission, onward)      None          ----------------------------------------------------------------------------------------------------------------------  Vital Signs:  Temp:  [98 °F (36.7 °C)] 98 °F (36.7 °C)  Heart Rate:  [61-71] 71  Resp:  [17-20] 20  BP: ()/(48-55) 100/52  SpO2:  [97 %-99 %] 98 %  on  Flow (L/min) (Oxygen Therapy):  [2] 2;   Device (Oxygen Therapy): nasal cannula  Body mass index is 15.78 kg/m².    Wt Readings from Last 3 Encounters:   03/29/25 57.3 kg (126 lb 4 oz)   06/27/22 79.4 kg (175 lb)   01/07/18 83.9 kg (185 lb)     Intake & Output (last 3 days)         03/29 0701 03/30 0700 03/30 0701 03/31 0700 03/31 0701 04/01 0700    P.O. 60 1680 360    I.V. (mL/kg) 1000 (17.5)  1084 (18.9)    Blood   315.8    Other  110 80    NG/GT  83 130    IV Piggyback   200    Total Intake(mL/kg) 1060 (18.5) 1873 (32.7) 2169.8 (37.9)    Urine (mL/kg/hr) 90 (0.1) 2050 (1.5) 750 (1)    Total Output 90 2050 750    Net +970 -177 +1419.8           Urine Unmeasured Occurrence 1 x            Diet: Regular/House; Texture: Soft to Chew (NDD 3); Soft to Chew: Chopped Meat; Fluid Consistency: Thin (IDDSI 0)  ----------------------------------------------------------------------------------------------------------------------  Physical exam:  Constitutional: Elderly male, appears cachectic, resting comfortably in bed, no acute distress.      HENT:  Head:  Normocephalic and atraumatic.  Mouth:  Moist mucous membranes.  Eyes:  Conjunctivae and EOM  "are normal. No scleral icterus.   Cardiovascular:  Normal rate, regular rhythm and normal heart sounds with no murmur. No JVD.   Pulmonary/Chest:  No respiratory distress, no wheezes, no crackles, with normal breath sounds and good air movement. Unlabored. No accessory muscle use.  Abdominal:  Soft. No distension and no tenderness.  Bowel sounds present. No rebound or guarding.  PEG tube with tube feeds.  Incision sites well-healed.  Ostomy with stool output.  Musculoskeletal: Cachectic.  No tenderness, and no deformity.  No red or swollen joints anywhere.    Neurological:  Alert and oriented to person, place, and time.  No cranial nerve deficit.   Nonfocal.   Skin:  Skin is warm and dry. No rash noted. No pallor.   Peripheral vascular:  No clubbing, no cyanosis, no edema. Pedal and tibial pulses 2 out of 4 bilaterally.     ----------------------------------------------------------------------------------------------------------------------  Results from last 7 days   Lab Units 04/14/25  0023 04/11/25  0112   LACTATE mmol/L  --  1.6   WBC 10*3/mm3 7.48 8.64   HEMOGLOBIN g/dL 7.2* 8.1*   HEMATOCRIT % 24.9* 29.3*   MCV fL 89.6 91.0   MCHC g/dL 28.9* 27.6*   PLATELETS 10*3/mm3 361 414         Results from last 7 days   Lab Units 04/14/25  0023 04/11/25  0112   SODIUM mmol/L 132* 134*   POTASSIUM mmol/L 4.1 4.4   MAGNESIUM mg/dL 1.9  --    CHLORIDE mmol/L 101 101   CO2 mmol/L 25.0 23.7   BUN mg/dL 15 18   CREATININE mg/dL 0.52* 0.64*   CALCIUM mg/dL 7.2* 7.5*   GLUCOSE mg/dL 118* 69   ALBUMIN g/dL 1.5* 1.3*   BILIRUBIN mg/dL 0.2 0.3   ALK PHOS U/L 563* 585*   AST (SGOT) U/L 26 26   ALT (SGPT) U/L 19 15   Estimated Creatinine Clearance: 97.9 mL/min (A) (by C-G formula based on SCr of 0.52 mg/dL (L)).  No results found for: \"AMMONIA\"                  No results found for: \"HGBA1C\", \"POCGLU\"    Lab Results   Component Value Date    TSH 5.270 (H) 03/29/2025    FREET4 1.02 03/29/2025     No results found for: \"PREGTESTUR\", " "\"PREGSERUM\", \"HCG\", \"HCGQUANT\"  Pain Management Panel           No data to display              Brief Urine Lab Results       None          No results found for: \"BLOODCX\"  No results found for: \"URINECX\"  No results found for: \"WOUNDCX\"  No results found for: \"STOOLCX\"  No results found for: \"RESPCX\"  No results found for: \"AFBCX\"  Results from last 7 days   Lab Units 04/11/25  0112   LACTATE mmol/L 1.6       I have personally looked at the labs and they are summarized above.  ----------------------------------------------------------------------------------------------------------------------  Detailed radiology reports for the last 24 hours:  Imaging Results (Last 24 Hours)       ** No results found for the last 24 hours. **          Assessment & Plan      Patient is a 74-year-old male with no known significant medical problems who presented to the ER with reports of bright red blood per rectum, weight loss and poor appetite.    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  --CT abdomen/pelvis with contrast noted rectal mass with extensive local disease into the peritoneum measuring 3.3 x 3.8 cm with local invasion into the posterior aspect of the prostate gland with pelvic and RP adenopathy and possible metastatic disease involving the liver  --Venous Dopplers noted DVT in the left common/superficial femoral veins  --Status post biopsy of rectal mass/colostomy/PEG  --Pathology noted invasive squamous cell carcinoma  --Patient has been able to tolerate radiation better with IV dose of morphine prior, will continue this prior to XRT.  Given social situation and debility, ultimate plan is most likely nursing facility (mackenzie works at [facility]), unable to discharge home due to " social situation therefore palliative XRT will be continued inpatient  --continue scheduled Tylenol, prn aki  --Increase p.o. midodrine to 10 mg 3 times daily  --Continue Eliquis starter pack  --Daily prn morphine prior to wound/dressing changes and prior to XRT  --Radiation oncology following, undergoing inpatient radiation  --Heme/onc evaluated and signed off, appreciate recommendation    #Acute on chronic blood loss anemia  #Possible gastritis/UGIB  --received 2 unit PRBC total  --Received 3 days of IV iron, continue PPI twice daily  --Surgery following peripherally    #Goals of care  #Severe malnutrition  #Failure to thrive  --PEG placed with supplemental tube feeds  --Palliative care following, appreciate assistance    #Constipation, KUB noted retained stool, schedule bowel regimen  #Oral candidiasis, completed nystatin swish and spit    Copied portions of this report have been reviewed and are accurate as of 04/15/25     CHECKLIST:  Abx: None  VTE: Eliquis  GI ppx: PPI twice daily  Diet: Modified, tube feeds  Code: No CPR, limited  Dispo: Patient is medically stable.  Plans to continue XRT inpatient to complete course.  Case management following for potential placement at SNF after completion of radiation.    Quintin Jon DO  [facility] Hospitalist  04/15/25  10:42 EDT

## 2025-04-15 NOTE — PLAN OF CARE
Goal Outcome Evaluation:              Outcome Evaluation: Pt resting in bed, VSS on 2L nc. Pts wound care completed per orders. Pt c/o pain, prn medications gave per MAR. Pt didnt want to go to radiation this shift, DO was made aware. Pt voices no concerns or complaints at this time, will continue with POC.

## 2025-04-15 NOTE — PROGRESS NOTES
"Palliative Care Daily Progress Note     S: Medical record reviewed, followed up with Primary RN Cecilia and Dr Jon regarding patient's condition. Per TL Licha RN from nightshift mentioned that Pradeep told her he was over it, did want to do radiation and just wanted to be comfortable. When I spoke to him he said that he had said this in a moment of frustration, when he was being turned and was in pain, I told him that his treatment plan was his decision to make. He did complain of pain in his bottom and said that his doe cathter was uncomfortable. He did say that he was not nauseated today and that his pain was tolerable. He was not having labored respirations and was not in distress.      O:   Palliative Performance Scale Score:     BP 90/52 (BP Location: Left arm, Patient Position: Lying)   Pulse 71   Temp 98 °F (36.7 °C) (Oral)   Resp 20   Ht 190.5 cm (75\")   Wt 57.3 kg (126 lb 4 oz)   SpO2 98%   BMI 15.78 kg/m²     Intake/Output Summary (Last 24 hours) at 4/15/2025 1445  Last data filed at 4/15/2025 1300  Gross per 24 hour   Intake 1680 ml   Output 2450 ml   Net -770 ml       PE:  General Appearance:    Chronically ill appearing, alert, thin, frail, cooperative, NAD   HEENT:    NC/AT, without obvious abnormality, EOMI, anicteric , dry MM    Neck:   supple, trachea midline, no JVD   Lungs:     Unlabored respirations, no wheezing, rhonchi or rales     Heart:    RRR, normal S1 and S2, no M/R/G   Abdomen:     Soft, no distension no tenderness, NABS , abd concave, PEG tube noted. Ostomy noted   Extremities:   Moves all extremities with generalized weakness, no edema, cachetic   Pulses:   Pulses palpable and equal bilaterally   Skin:   Warm, dry, better color today   Neurologic:   A/Ox3, cooperative   Psych:   Calm, appropriate         Meds: Reviewed and changes noted    Labs:   Results from last 7 days   Lab Units 04/14/25  0023   WBC 10*3/mm3 7.48   HEMOGLOBIN g/dL 7.2*   HEMATOCRIT % 24.9*   PLATELETS " "10*3/mm3 361     Results from last 7 days   Lab Units 04/14/25  0023   SODIUM mmol/L 132*   POTASSIUM mmol/L 4.1   CHLORIDE mmol/L 101   CO2 mmol/L 25.0   BUN mg/dL 15   CREATININE mg/dL 0.52*   GLUCOSE mg/dL 118*   CALCIUM mg/dL 7.2*     Results from last 7 days   Lab Units 04/14/25  0023   SODIUM mmol/L 132*   POTASSIUM mmol/L 4.1   CHLORIDE mmol/L 101   CO2 mmol/L 25.0   BUN mg/dL 15   CREATININE mg/dL 0.52*   CALCIUM mg/dL 7.2*   BILIRUBIN mg/dL 0.2   ALK PHOS U/L 563*   ALT (SGPT) U/L 19   AST (SGOT) U/L 26   GLUCOSE mg/dL 118*     Imaging Results (Last 72 Hours)       ** No results found for the last 72 hours. **              Diagnostics: Reviewed    A: Pradeep Donald is a 74 y.o. male admitted  on 3/29/2025 for metastatic colon cancer to liver. The only history that he reports is kidney stones in the past. He hasn't seeked medical care in nearly a decade or longer. He took no medications at home other than his yearly flu shot. His girlfriend reports that he had had a \"bad fall\" a few weeks ago. He has been having rectal pain and bleeding for over a year now. He reports having to use a pedro-pad to contain the blood but never told his family. He reports weight loss, weakness, fatigue, and bloody diarrhea. He reports that upon arriving to the ED, he was having left sided abd pain/left groin pain. CT of abdomen with contrast showed rectal mass with extensive local disease involving the perineum. Abscess or necrotic mass in the right perineum measured 3.3 x 3.8 cm. There is also local invasion into the posterior aspect of the prostate gland, pelvic and retroperitoneal adenopathy. Metastatic disease involving the left hepatic lobe and possibly the lung bases, Scrotal ultrasound showed large left hydrocele and lower extremity Doppler was positive for DVT in the left common and superficial femoral veins.      Today 4/15/2025  T 98. HR 71, BP of 90/52, RR 18 and was saturating 98% on 2 L NC, he was awake alert and " oriented, he did c/o of moderate pain in his bottom and with his doe, he did deny nausea today and states he slept a little better.    P:  At this time plan is to complete course of radiation and case management working on possible placement at [facility].    We will continue to follow along. Please do not hesitate to contact us regarding further sx mgmt or GOC needs, including after hours or on weekends via our on call provider at [phone].     Loly Gutierrez, APRN    4/15/2025

## 2025-04-15 NOTE — PROGRESS NOTES
Spoke with Cecilia (pts floor nurse) regarding his radiation treatment today. States that pt does not feel good and wants to cancel his XRT today. Advised that we will attempt to treat tomorrow. Rad-Onc team aware.

## 2025-04-15 NOTE — PLAN OF CARE
Goal Outcome Evaluation:  Plan of Care Reviewed With: patient        Progress: no change  Outcome Evaluation: Pt has rested in bed overnight. VSS. Wound care completed per orders. Pt still having copious drainage from perirectal wounds. Bolus feeds tolerated well, pt refused all but a few drinks by mouth. PRN pain medication given. No acute changes. Will continue POC.

## 2025-04-15 NOTE — CASE MANAGEMENT/SOCIAL WORK
Discharge Planning Assessment   [facility]     Patient Name: Pradeep Donald  MRN: [id number]  Today's Date: 4/15/2025    Admit Date: 3/29/2025     Discharge Plan       Row Name 04/15/25 1653       Plan    Plan Pt declined radiation treatment on this date. SS discussed pt with provider and [facility] per Jessica. SS will follow up with pt in am to further discuss discharge plan. SS to follow.                  Continued Care and Services - Admitted Since 3/29/2025       Destination       Service Provider Request Status Services Address Phone Fax Patient Preferred    [facility] Pending - Request Sent -- [address] [place] [phone] --                  Expected Discharge Date and Time       Expected Discharge Date Expected Discharge Time    Apr 16, 2025         GIFTY Mccormick

## 2025-04-16 ENCOUNTER — DOCUMENTATION (OUTPATIENT)
Dept: RADIATION ONCOLOGY | Facility: HOSPITAL | Age: 75
End: 2025-04-16
Payer: MEDICARE

## 2025-04-16 PROCEDURE — 25010000002 MORPHINE PER 10 MG: Performed by: STUDENT IN AN ORGANIZED HEALTH CARE EDUCATION/TRAINING PROGRAM

## 2025-04-16 PROCEDURE — 99232 SBSQ HOSP IP/OBS MODERATE 35: CPT | Performed by: STUDENT IN AN ORGANIZED HEALTH CARE EDUCATION/TRAINING PROGRAM

## 2025-04-16 RX ORDER — OXYCODONE HCL 10 MG/1
10 TABLET, FILM COATED, EXTENDED RELEASE ORAL EVERY 12 HOURS SCHEDULED
Refills: 0 | Status: DISCONTINUED | OUTPATIENT
Start: 2025-04-16 | End: 2025-04-18

## 2025-04-16 RX ORDER — MORPHINE SULFATE 2 MG/ML
2 INJECTION, SOLUTION INTRAMUSCULAR; INTRAVENOUS ONCE
Status: COMPLETED | OUTPATIENT
Start: 2025-04-16 | End: 2025-04-16

## 2025-04-16 RX ADMIN — APIXABAN 5 MG: 5 TABLET, FILM COATED ORAL at 21:24

## 2025-04-16 RX ADMIN — HYDROXYZINE HYDROCHLORIDE 10 MG: 10 TABLET, FILM COATED ORAL at 21:24

## 2025-04-16 RX ADMIN — MORPHINE SULFATE 2 MG: 2 INJECTION, SOLUTION INTRAMUSCULAR; INTRAVENOUS at 09:41

## 2025-04-16 RX ADMIN — Medication 10 ML: at 08:34

## 2025-04-16 RX ADMIN — FOLIC ACID 1 MG: 1 TABLET ORAL at 08:32

## 2025-04-16 RX ADMIN — ACETAMINOPHEN 1000 MG: 500 TABLET ORAL at 21:25

## 2025-04-16 RX ADMIN — Medication 10 ML: at 21:26

## 2025-04-16 RX ADMIN — MIDODRINE HYDROCHLORIDE 10 MG: 2.5 TABLET ORAL at 17:42

## 2025-04-16 RX ADMIN — HYDROCORTISONE 2.5%: 25 CREAM TOPICAL at 08:34

## 2025-04-16 RX ADMIN — MIDODRINE HYDROCHLORIDE 10 MG: 2.5 TABLET ORAL at 08:32

## 2025-04-16 RX ADMIN — ACETAMINOPHEN 1000 MG: 500 TABLET ORAL at 16:20

## 2025-04-16 RX ADMIN — ACETAMINOPHEN 1000 MG: 500 TABLET ORAL at 08:31

## 2025-04-16 RX ADMIN — APIXABAN 5 MG: 5 TABLET, FILM COATED ORAL at 08:32

## 2025-04-16 RX ADMIN — SENNOSIDES AND DOCUSATE SODIUM 2 TABLET: 50; 8.6 TABLET ORAL at 08:31

## 2025-04-16 RX ADMIN — SENNOSIDES AND DOCUSATE SODIUM 2 TABLET: 50; 8.6 TABLET ORAL at 21:24

## 2025-04-16 RX ADMIN — OXYCODONE HYDROCHLORIDE 10 MG: 10 TABLET, FILM COATED, EXTENDED RELEASE ORAL at 21:30

## 2025-04-16 RX ADMIN — PANTOPRAZOLE SODIUM 40 MG: 40 INJECTION, POWDER, FOR SOLUTION INTRAVENOUS at 17:42

## 2025-04-16 RX ADMIN — NYSTATIN: 100000 POWDER TOPICAL at 08:34

## 2025-04-16 RX ADMIN — OXYCODONE 5 MG: 5 TABLET ORAL at 08:30

## 2025-04-16 RX ADMIN — PANTOPRAZOLE SODIUM 40 MG: 40 INJECTION, POWDER, FOR SOLUTION INTRAVENOUS at 08:32

## 2025-04-16 RX ADMIN — OXYCODONE HYDROCHLORIDE 10 MG: 10 TABLET, FILM COATED, EXTENDED RELEASE ORAL at 12:08

## 2025-04-16 RX ADMIN — MIDODRINE HYDROCHLORIDE 10 MG: 2.5 TABLET ORAL at 10:55

## 2025-04-16 RX ADMIN — Medication 1 APPLICATION: at 08:33

## 2025-04-16 RX ADMIN — NYSTATIN: 100000 POWDER TOPICAL at 21:26

## 2025-04-16 RX ADMIN — MORPHINE SULFATE 2 MG: 2 INJECTION, SOLUTION INTRAMUSCULAR; INTRAVENOUS at 10:55

## 2025-04-16 RX ADMIN — HYDROCORTISONE 2.5%: 25 CREAM TOPICAL at 21:26

## 2025-04-16 NOTE — PROGRESS NOTES
"Palliative Care Daily Progress Note     S: Medical record reviewed, followed up with Primary RN Cecilia and Dr. Jon regarding patient's condition. When I saw Pradeep today he appeared weaker today and has been having more pain, asking for more pain medications, he denied nausea ans shortness of breath and appeared calm at this time. Pradeep appeared to be depressed today.       O:   Palliative Performance Scale Score:     /47 (BP Location: Left arm, Patient Position: Lying)   Pulse 71   Temp 98.1 °F (36.7 °C) (Oral)   Resp 16   Ht 190.5 cm (75\")   Wt 57.3 kg (126 lb 4 oz)   SpO2 98%   BMI 15.78 kg/m²     Intake/Output Summary (Last 24 hours) at 4/16/2025 1635  Last data filed at 4/16/2025 1500  Gross per 24 hour   Intake 1270 ml   Output 950 ml   Net 320 ml       PE:  General Appearance:    Chronically ill appearing, alert, thin, frail, cooperative, NAD   HEENT:    NC/AT, without obvious abnormality, EOMI, anicteric , dry MM    Neck:   supple, trachea midline, no JVD   Lungs:     Unlabored respirations, no wheezing, rhonchi or rales     Heart:    RRR, normal S1 and S2, no M/R/G   Abdomen:     Soft, no distension no tenderness, NABS , abd concave, PEG tube noted. Ostomy noted   Extremities:   Moves all extremities with generalized weakness, no edema, cachetic   Pulses:   Pulses palpable and equal bilaterally   Skin:   Warm, dry, better color today   Neurologic:   A/Ox3, cooperative   Psych:   Appears depressed                Meds: Reviewed and changes noted    Labs:   Results from last 7 days   Lab Units 04/14/25  0023   WBC 10*3/mm3 7.48   HEMOGLOBIN g/dL 7.2*   HEMATOCRIT % 24.9*   PLATELETS 10*3/mm3 361     Results from last 7 days   Lab Units 04/14/25  0023   SODIUM mmol/L 132*   POTASSIUM mmol/L 4.1   CHLORIDE mmol/L 101   CO2 mmol/L 25.0   BUN mg/dL 15   CREATININE mg/dL 0.52*   GLUCOSE mg/dL 118*   CALCIUM mg/dL 7.2*     Results from last 7 days   Lab Units 04/14/25  0023   SODIUM mmol/L 132* " "  POTASSIUM mmol/L 4.1   CHLORIDE mmol/L 101   CO2 mmol/L 25.0   BUN mg/dL 15   CREATININE mg/dL 0.52*   CALCIUM mg/dL 7.2*   BILIRUBIN mg/dL 0.2   ALK PHOS U/L 563*   ALT (SGPT) U/L 19   AST (SGOT) U/L 26   GLUCOSE mg/dL 118*     Imaging Results (Last 72 Hours)       ** No results found for the last 72 hours. **              Diagnostics: Reviewed    A: Pradeep Donald is a 74 y.o. male admitted  on 3/29/2025 for metastatic colon cancer to liver. The only history that he reports is kidney stones in the past. He hasn't seeked medical care in nearly a decade or longer. He took no medications at home other than his yearly flu shot. His girlfriend reports that he had had a \"bad fall\" a few weeks ago. He has been having rectal pain and bleeding for over a year now. He reports having to use a pedro-pad to contain the blood but never told his family. He reports weight loss, weakness, fatigue, and bloody diarrhea. He reports that upon arriving to the ED, he was having left sided abd pain/left groin pain. CT of abdomen with contrast showed rectal mass with extensive local disease involving the perineum. Abscess or necrotic mass in the right perineum measured 3.3 x 3.8 cm. There is also local invasion into the posterior aspect of the prostate gland, pelvic and retroperitoneal adenopathy. Metastatic disease involving the left hepatic lobe and possibly the lung bases, Scrotal ultrasound showed large left hydrocele and lower extremity Doppler was positive for DVT in the left common and superficial femoral veins.       P:  I was able to speak with Pradeep and his sister Kisha to follow up on goals of care as Pradeep had mentioned again to nurse that he thinks he wants to quit radiation and be comfortable. He stated that he wanted to try radiation again today. I received call from  that he had not been able to tolerate radiation and Radiation MD is recommending comfort/hospice care. I spoke with Kisha Fernández's sister and she put " him on speaker phone and he states that he is done with radiation and does not want to do it anymore. I did discuss with them comfort measures and encouraged them to discuss this together and I would follow up with them in the morning. Kisha also asked for me to contact Janey in  to start pre authorization process for placement at [facility] which I did. I also updated Dr Jon.      We will continue to follow along. Please do not hesitate to contact us regarding further sx mgmt or GOC needs, including after hours or on weekends via our on call provider at [phone].     Loly Gutierrez, APRN    4/16/2025

## 2025-04-16 NOTE — PROGRESS NOTES
Pt here today for XRT, transported via stretcher. Pt unable to lay on radiation table for treatment d/t pain. Dr. Rivas at bedside, will hold treatment today and reevaluate tomorrow. Cecilia, floor nurse made aware. Pt aware and declines any questions. Rad Onc team aware.

## 2025-04-16 NOTE — PLAN OF CARE
Goal Outcome Evaluation:  Plan of Care Reviewed With: patient        Progress: no change  Outcome Evaluation: Patient resting in bed during shift. VSS on 2LNC. Wound care complete per orders. Patient complained of nausea, PRN meds given. No acute changes noted at this time. Will continue with plan of care.

## 2025-04-16 NOTE — PLAN OF CARE
Goal Outcome Evaluation:              Outcome Evaluation: Pt resting in bed, VSS on 2L nc. Pts bolus feeds gave per orders. Wound care completed per orders. Pt c/o pain, prn medications given per orders. Pt was unable to do radiation this shift, DO was made aware. Pt voices no concerns or complaints at this time, will continue with POC.

## 2025-04-16 NOTE — PROGRESS NOTES
Patient was brought down for radiation therapy today while an inpatient.  He has not been down for a radiation treatment for over a week.  When we placed him on the hard radiation table he requested to be taken off because of pain.  I discussed with him enrolling in hospice and he seemed reluctant.  I explained that 1 radiation treatment every week was ineffective and making his cancer worse.  We will address this situation again tomorrow.  He is already spoken with palliative care.  I would encourage any ancillary services to entice him into the hospice program as any meaningful therapeutic intervention is unlikely to be fruitful.  Sincerely, Elian Velez MD radiation oncology

## 2025-04-16 NOTE — PROGRESS NOTES
[facility] HOSPITALIST PROGRESS NOTE     Patient Identification:  Name:  Pradeep Donald  Age:  74 y.o.  Sex:  male  :  1950  MRN:  [id number]  Visit Number:  [id number]  ROOM: [address] [place] [address]     Primary Care Provider:  Carmen Pretty APRN    Length of stay in inpatient status:  18    Subjective     Chief Compliant:    Chief Complaint   Patient presents with    Leg Swelling    Hemorrhoids       History of Presenting Illness:    Patient seen in follow-up for large rectal mass status post resection, ostomy and DVT.  Patient is awake, alert and oriented x 3.  No family at bedside.  He reports increased pain today and requesting pain meds.  Denies any other complaints.  No adverse events noted overnight.    Objective     Current Hospital Meds:acetaminophen, 1,000 mg, Per PEG Tube, TID  apixaban, 5 mg, Oral, Q12H  castor oil-balsam peru, 1 Application, Topical, Daily  folic acid, 1 mg, Oral, Daily  Hydrocortisone (Perianal), , Rectal, BID  hydrOXYzine, 10 mg, Oral, Nightly  lactulose, 20 g, Per PEG Tube, TID  midodrine, 10 mg, Per PEG Tube, TID AC  nystatin, , Topical, Q12H  oxyCODONE, 10 mg, Oral, Q12H  pantoprazole, 40 mg, Intravenous, BID AC  senna-docusate sodium, 2 tablet, Per PEG Tube, BID   And  [Held by provider] polyethylene glycol, 17 g, Per PEG Tube, BID  sodium chloride, 10 mL, Intravenous, Q12H           Current Antimicrobial Therapy:  Anti-Infectives (From admission, onward)      Ordered     Dose/Rate Route Frequency Start Stop    25 1442  ceFAZolin 2000 mg IVPB in 100 mL NS (VTB)  Status:  Discontinued        Ordering Provider: Matilde Monzon MD    2,000 mg  over 30 Minutes Intravenous On Call to O.R. 25 0600 25 1718    25 1146  cefepime 1000 mg IVPB in 100 mL NS (VTB)  Status:  Discontinued        Ordering Provider: Matilde Monzon MD    1,000 mg  over 4 Hours Intravenous Every 12 Hours 25 2100 25 2027    25 1146  cefepime 1000 mg IVPB in 100  mL NS (VTB)        Ordering Provider: Jayden Barney MD    1,000 mg  over 30 Minutes Intravenous Once 03/29/25 1245 03/29/25 1402    03/29/25 0613  cefepime 2000 mg IVPB in 100 mL NS (VTB)        Ordering Provider: Óscar Rodriguez MD    2,000 mg  over 30 Minutes Intravenous Once 03/29/25 0629 03/29/25 0700    03/29/25 0613  vancomycin (VANCOCIN) 1,000 mg in sodium chloride 0.9 % 250 mL IVPB-VTB        Ordering Provider: Óscar Rodriguez MD    20 mg/kg × 49.9 kg  250 mL/hr over 60 Minutes Intravenous Once 03/29/25 0629 03/29/25 0802          Current Diuretic Therapy:  Diuretics (From admission, onward)      None          ----------------------------------------------------------------------------------------------------------------------  Vital Signs:  Temp:  [98.1 °F (36.7 °C)-98.2 °F (36.8 °C)] 98.1 °F (36.7 °C)  Heart Rate:  [70-71] 71  Resp:  [16] 16  BP: ()/(47-52) 127/47     on  Flow (L/min) (Oxygen Therapy):  [2] 2;   Device (Oxygen Therapy): nasal cannula  Body mass index is 15.78 kg/m².    Wt Readings from Last 3 Encounters:   03/29/25 57.3 kg (126 lb 4 oz)   06/27/22 79.4 kg (175 lb)   01/07/18 83.9 kg (185 lb)     Intake & Output (last 3 days)         03/29 0701 03/30 0700 03/30 0701 03/31 0700 03/31 0701 04/01 0700    P.O. 60 1680 360    I.V. (mL/kg) 1000 (17.5)  1084 (18.9)    Blood   315.8    Other  110 80    NG/GT  83 130    IV Piggyback   200    Total Intake(mL/kg) 1060 (18.5) 1873 (32.7) 2169.8 (37.9)    Urine (mL/kg/hr) 90 (0.1) 2050 (1.5) 750 (1)    Total Output 90 2050 750    Net +970 -177 +1419.8           Urine Unmeasured Occurrence 1 x            Diet: Regular/House; Texture: Soft to Chew (NDD 3); Soft to Chew: Chopped Meat; Fluid Consistency: Thin (IDDSI 0)  ----------------------------------------------------------------------------------------------------------------------  Physical exam:  Constitutional: Elderly male, appears cachectic, resting comfortably in bed, no  "acute distress.      HENT:  Head:  Normocephalic and atraumatic.  Mouth:  Moist mucous membranes.  Eyes:  Conjunctivae and EOM are normal. No scleral icterus.   Cardiovascular:  Normal rate, regular rhythm and normal heart sounds with no murmur. No JVD.   Pulmonary/Chest:  No respiratory distress, no wheezes, no crackles, with normal breath sounds and good air movement. Unlabored. No accessory muscle use.  Abdominal:  Soft. No distension and no tenderness.  Bowel sounds present. No rebound or guarding.  PEG tube with tube feeds.  Incision sites well-healed.  Ostomy with stool output.  Musculoskeletal: Cachectic.  No tenderness, and no deformity.  No red or swollen joints anywhere.    Neurological:  Alert and oriented to person, place, and time.  No cranial nerve deficit.   Nonfocal.   Skin:  Skin is warm and dry. No rash noted. No pallor.   Peripheral vascular:  No clubbing, no cyanosis, no edema. Pedal and tibial pulses 2 out of 4 bilaterally.     ----------------------------------------------------------------------------------------------------------------------  Results from last 7 days   Lab Units 04/14/25 0023 04/11/25  0112   LACTATE mmol/L  --  1.6   WBC 10*3/mm3 7.48 8.64   HEMOGLOBIN g/dL 7.2* 8.1*   HEMATOCRIT % 24.9* 29.3*   MCV fL 89.6 91.0   MCHC g/dL 28.9* 27.6*   PLATELETS 10*3/mm3 361 414         Results from last 7 days   Lab Units 04/14/25 0023 04/11/25  0112   SODIUM mmol/L 132* 134*   POTASSIUM mmol/L 4.1 4.4   MAGNESIUM mg/dL 1.9  --    CHLORIDE mmol/L 101 101   CO2 mmol/L 25.0 23.7   BUN mg/dL 15 18   CREATININE mg/dL 0.52* 0.64*   CALCIUM mg/dL 7.2* 7.5*   GLUCOSE mg/dL 118* 69   ALBUMIN g/dL 1.5* 1.3*   BILIRUBIN mg/dL 0.2 0.3   ALK PHOS U/L 563* 585*   AST (SGOT) U/L 26 26   ALT (SGPT) U/L 19 15   Estimated Creatinine Clearance: 97.9 mL/min (A) (by C-G formula based on SCr of 0.52 mg/dL (L)).  No results found for: \"AMMONIA\"                  No results found for: \"HGBA1C\", \"POCGLU\"    Lab " "Results   Component Value Date    TSH 5.270 (H) 03/29/2025    FREET4 1.02 03/29/2025     No results found for: \"PREGTESTUR\", \"PREGSERUM\", \"HCG\", \"HCGQUANT\"  Pain Management Panel           No data to display              Brief Urine Lab Results       None          No results found for: \"BLOODCX\"  No results found for: \"URINECX\"  No results found for: \"WOUNDCX\"  No results found for: \"STOOLCX\"  No results found for: \"RESPCX\"  No results found for: \"AFBCX\"  Results from last 7 days   Lab Units 04/11/25  0112   LACTATE mmol/L 1.6       I have personally looked at the labs and they are summarized above.  ----------------------------------------------------------------------------------------------------------------------  Detailed radiology reports for the last 24 hours:  Imaging Results (Last 24 Hours)       ** No results found for the last 24 hours. **          Assessment & Plan      Patient is a 74-year-old male with no known significant medical problems who presented to the ER with reports of bright red blood per rectum, weight loss and poor appetite.    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  --CT abdomen/pelvis with contrast noted rectal mass with extensive local disease into the peritoneum measuring 3.3 x 3.8 cm with local invasion into the posterior aspect of the prostate gland with pelvic and RP adenopathy and possible metastatic disease involving the liver  --Venous Dopplers noted DVT in the left common/superficial femoral veins  --Status post biopsy of rectal mass/colostomy/PEG  --Pathology noted invasive squamous cell carcinoma  --Patient has been able to tolerate radiation better with IV dose of morphine prior, will continue this prior to XRT.  Given social situation " and debility, ultimate plan is most likely nursing facility (niece works at [employer]), unable to discharge home due to social situation therefore palliative XRT will be continued inpatient  --continue scheduled Tylenol, prn aki  --continue midodrine 10 mg 3 times daily  --Increased pain today, received IV morphine x 2.  Will start back OxyContin 10 mg twice daily XR  --Continue Eliquis starter pack  --Daily prn morphine prior to wound/dressing changes and prior to XRT  --Radiation oncology following, undergoing inpatient radiation  --Heme/onc evaluated and signed off, appreciate recommendation    #Acute on chronic blood loss anemia  #Possible gastritis/UGIB  --received 2 unit PRBC total  --Received 3 days of IV iron, continue PPI twice daily  --Surgery following peripherally    #Goals of care  #Severe malnutrition  #Failure to thrive  --PEG placed with supplemental tube feeds  --Palliative care following, appreciate assistance    #Constipation, KUB noted retained stool, schedule bowel regimen  #Oral candidiasis, completed nystatin swish and spit    Copied portions of this report have been reviewed and are accurate as of 04/16/25     CHECKLIST:  Abx: None  VTE: Eliquis  GI ppx: PPI twice daily  Diet: Modified, tube feeds  Code: No CPR, limited  Dispo: Patient is medically stable.  Plans to continue XRT inpatient to complete course.  Case management following for potential placement at SNF after completion of radiation.    Quintin Jon DO  [facility]  04/16/25  11:29 EDT

## 2025-04-16 NOTE — CASE MANAGEMENT/SOCIAL WORK
Discharge Planning Assessment  [facility]     Patient Name: Pradeep Donald  MRN: [id number]  Today's Date: 4/16/2025    Admit Date: 3/29/2025       Discharge Plan       Row Name 04/16/25 1548       Plan    Plan SS spoke to [facility] per Jessica who states 51% calorie intake must be from tube feeding in order for pt to be admitted for skilled care if he is unable to participate with therapy services. SS request therapy see pt tomorrow. SS visited pt, sister, Kisha, and significant other at bedside. Pt planned to have radiation treatment today, however RN voices he was unable to tolerate it. RN informed SS that sister was planning to visit radiation clinic today and discuss plan of care. Palliative Care team was present during conversation with pt, sister, and significant other. SS provided SNF placement information including, insurance benefits and private pay option. Pt and family are in agreement to SNF placement at [facility]. Pt and sister want to initially attempt SNF pre-authorization. Pt has not voiced plans for radiation treatments to be discontinued at this time. SNF pre-authorization will not be submitted until plan for radiation treatments is determined. SS discussed tube feeding with dietician and pt is receiving 53% kcal via tube feeding. SS to follow.                  Continued Care and Services - Admitted Since 3/29/2025       Destination       Service Provider Request Status Services Address Phone Fax Patient Preferred    [facility] Pending - Request Sent -- [address] [place] [id number] [phone] --                  Expected Discharge Date and Time       Expected Discharge Date Expected Discharge Time    Apr 17, 2025        GIFTY Mccormick

## 2025-04-17 ENCOUNTER — TELEPHONE (OUTPATIENT)
Dept: RADIATION ONCOLOGY | Facility: HOSPITAL | Age: 75
End: 2025-04-17
Payer: MEDICARE

## 2025-04-17 PROCEDURE — 99232 SBSQ HOSP IP/OBS MODERATE 35: CPT | Performed by: STUDENT IN AN ORGANIZED HEALTH CARE EDUCATION/TRAINING PROGRAM

## 2025-04-17 PROCEDURE — 99232 SBSQ HOSP IP/OBS MODERATE 35: CPT | Performed by: NURSE PRACTITIONER

## 2025-04-17 PROCEDURE — 25010000002 MORPHINE PER 10 MG: Performed by: STUDENT IN AN ORGANIZED HEALTH CARE EDUCATION/TRAINING PROGRAM

## 2025-04-17 RX ADMIN — MIDODRINE HYDROCHLORIDE 10 MG: 2.5 TABLET ORAL at 11:46

## 2025-04-17 RX ADMIN — APIXABAN 5 MG: 5 TABLET, FILM COATED ORAL at 08:11

## 2025-04-17 RX ADMIN — MIDODRINE HYDROCHLORIDE 10 MG: 2.5 TABLET ORAL at 08:11

## 2025-04-17 RX ADMIN — APIXABAN 5 MG: 5 TABLET, FILM COATED ORAL at 21:07

## 2025-04-17 RX ADMIN — NYSTATIN: 100000 POWDER TOPICAL at 21:09

## 2025-04-17 RX ADMIN — FOLIC ACID 1 MG: 1 TABLET ORAL at 08:11

## 2025-04-17 RX ADMIN — OXYCODONE HYDROCHLORIDE 10 MG: 10 TABLET, FILM COATED, EXTENDED RELEASE ORAL at 21:07

## 2025-04-17 RX ADMIN — HYDROCORTISONE 2.5%: 25 CREAM TOPICAL at 21:09

## 2025-04-17 RX ADMIN — ACETAMINOPHEN 1000 MG: 500 TABLET ORAL at 08:11

## 2025-04-17 RX ADMIN — OXYCODONE HYDROCHLORIDE 10 MG: 10 TABLET, FILM COATED, EXTENDED RELEASE ORAL at 08:12

## 2025-04-17 RX ADMIN — MORPHINE SULFATE 4 MG: 4 INJECTION, SOLUTION INTRAMUSCULAR; INTRAVENOUS at 01:43

## 2025-04-17 RX ADMIN — ACETAMINOPHEN 1000 MG: 500 TABLET ORAL at 17:04

## 2025-04-17 RX ADMIN — NYSTATIN: 100000 POWDER TOPICAL at 08:16

## 2025-04-17 RX ADMIN — SENNOSIDES AND DOCUSATE SODIUM 2 TABLET: 50; 8.6 TABLET ORAL at 21:07

## 2025-04-17 RX ADMIN — HYDROXYZINE HYDROCHLORIDE 10 MG: 10 TABLET, FILM COATED ORAL at 21:07

## 2025-04-17 RX ADMIN — HYDROCORTISONE 2.5%: 25 CREAM TOPICAL at 08:16

## 2025-04-17 RX ADMIN — PANTOPRAZOLE SODIUM 40 MG: 40 INJECTION, POWDER, FOR SOLUTION INTRAVENOUS at 17:06

## 2025-04-17 RX ADMIN — ACETAMINOPHEN 1000 MG: 500 TABLET ORAL at 21:07

## 2025-04-17 RX ADMIN — PANTOPRAZOLE SODIUM 40 MG: 40 INJECTION, POWDER, FOR SOLUTION INTRAVENOUS at 08:11

## 2025-04-17 RX ADMIN — SENNOSIDES AND DOCUSATE SODIUM 2 TABLET: 50; 8.6 TABLET ORAL at 08:11

## 2025-04-17 RX ADMIN — MORPHINE SULFATE 4 MG: 4 INJECTION, SOLUTION INTRAMUSCULAR; INTRAVENOUS at 17:03

## 2025-04-17 RX ADMIN — Medication 10 ML: at 21:09

## 2025-04-17 RX ADMIN — Medication 10 ML: at 08:15

## 2025-04-17 RX ADMIN — MIDODRINE HYDROCHLORIDE 10 MG: 2.5 TABLET ORAL at 17:06

## 2025-04-17 RX ADMIN — OXYCODONE 5 MG: 5 TABLET ORAL at 13:24

## 2025-04-17 NOTE — PLAN OF CARE
Goal Outcome Evaluation:              Outcome Evaluation: Pt resting in bed, VSS on 2L nc. Pts bolus feeds gave per orders. Wound care completed per orders. Pt c/o pain, prn medications given per MAR. Pt voices no concerns or complaints at this time, will continue with POC.

## 2025-04-17 NOTE — PROGRESS NOTES
[facility] HOSPITALIST PROGRESS NOTE     Patient Identification:  Name:  Pradeep Donald  Age:  74 y.o.  Sex:  male  :  1950  MRN:  [id number]  Visit Number:  [id number]  ROOM: [address] [place] [address]     Primary Care Provider:  Carmen Pretty APRN    Length of stay in inpatient status:  19    Subjective     Chief Compliant:    Chief Complaint   Patient presents with    Leg Swelling    Hemorrhoids       History of Presenting Illness:    Patient seen in follow-up for large rectal mass status post resection, ostomy and DVT.  Patient is awake, alert and oriented x 3.  His sister is present at bedside.  He says his pain is better controlled and he is not hurting like he was yesterday.  No complaints.  All questions answered to their satisfaction.  No adverse events reported overnight.    Objective     Current Hospital Meds:acetaminophen, 1,000 mg, Per PEG Tube, TID  apixaban, 5 mg, Oral, Q12H  castor oil-balsam peru, 1 Application, Topical, Daily  folic acid, 1 mg, Oral, Daily  Hydrocortisone (Perianal), , Rectal, BID  hydrOXYzine, 10 mg, Oral, Nightly  lactulose, 20 g, Per PEG Tube, TID  midodrine, 10 mg, Per PEG Tube, TID AC  nystatin, , Topical, Q12H  oxyCODONE, 10 mg, Oral, Q12H  pantoprazole, 40 mg, Intravenous, BID AC  senna-docusate sodium, 2 tablet, Per PEG Tube, BID   And  [Held by provider] polyethylene glycol, 17 g, Per PEG Tube, BID  sodium chloride, 10 mL, Intravenous, Q12H           Current Antimicrobial Therapy:  Anti-Infectives (From admission, onward)      Ordered     Dose/Rate Route Frequency Start Stop    25 1442  ceFAZolin 2000 mg IVPB in 100 mL NS (VTB)  Status:  Discontinued        Ordering Provider: Matilde Monzon MD    2,000 mg  over 30 Minutes Intravenous On Call to O.R. 25 0600 25 1718    25 1146  cefepime 1000 mg IVPB in 100 mL NS (VTB)  Status:  Discontinued        Ordering Provider: Matilde Monzon MD    1,000 mg  over 4 Hours Intravenous Every 12 Hours  03/29/25 2100 03/31/25 2027 03/29/25 1146  cefepime 1000 mg IVPB in 100 mL NS (VTB)        Ordering Provider: Jayden Barney MD    1,000 mg  over 30 Minutes Intravenous Once 03/29/25 1245 03/29/25 1402    03/29/25 0613  cefepime 2000 mg IVPB in 100 mL NS (VTB)        Ordering Provider: Óscar Rodriguez MD    2,000 mg  over 30 Minutes Intravenous Once 03/29/25 0629 03/29/25 0700    03/29/25 0613  vancomycin (VANCOCIN) 1,000 mg in sodium chloride 0.9 % 250 mL IVPB-VTB        Ordering Provider: Óscar Rodriguez MD    20 mg/kg × 49.9 kg  250 mL/hr over 60 Minutes Intravenous Once 03/29/25 0629 03/29/25 0802          Current Diuretic Therapy:  Diuretics (From admission, onward)      None          ----------------------------------------------------------------------------------------------------------------------  Vital Signs:  Temp:  [98.3 °F (36.8 °C)-99.8 °F (37.7 °C)] 99.8 °F (37.7 °C)  Heart Rate:  [65-73] 73  Resp:  [16-18] 16  BP: (80-93)/(40-51) 90/43  SpO2:  [98 %-99 %] 98 %  on  Flow (L/min) (Oxygen Therapy):  [2] 2;   Device (Oxygen Therapy): nasal cannula  Body mass index is 15.78 kg/m².    Wt Readings from Last 3 Encounters:   03/29/25 57.3 kg (126 lb 4 oz)   06/27/22 79.4 kg (175 lb)   01/07/18 83.9 kg (185 lb)     Intake & Output (last 3 days)         03/29 0701  03/30 0700 03/30 0701  03/31 0700 03/31 0701  04/01 0700    P.O. 60 1680 360    I.V. (mL/kg) 1000 (17.5)  1084 (18.9)    Blood   315.8    Other  110 80    NG/GT  83 130    IV Piggyback   200    Total Intake(mL/kg) 1060 (18.5) 1873 (32.7) 2169.8 (37.9)    Urine (mL/kg/hr) 90 (0.1) 2050 (1.5) 750 (1)    Total Output 90 2050 750    Net +970 -177 +1419.8           Urine Unmeasured Occurrence 1 x            Diet: Regular/House; Texture: Soft to Chew (NDD 3); Soft to Chew: Chopped Meat; Fluid Consistency: Thin (IDDSI  0)  ----------------------------------------------------------------------------------------------------------------------  Physical exam:  Constitutional: Elderly male, appears cachectic, resting comfortably in bed, no acute distress.      HENT:  Head:  Normocephalic and atraumatic.  Mouth:  Moist mucous membranes.  Eyes:  Conjunctivae and EOM are normal. No scleral icterus.   Cardiovascular:  Normal rate, regular rhythm and normal heart sounds with no murmur. No JVD.   Pulmonary/Chest:  No respiratory distress, no wheezes, no crackles, with normal breath sounds and good air movement. Unlabored. No accessory muscle use.  Abdominal:  Soft. No distension and no tenderness.  Bowel sounds present. No rebound or guarding.  PEG tube with tube feeds.  Incision sites well-healed.  Ostomy with stool output.  Musculoskeletal: Cachectic.  No tenderness, and no deformity.  No red or swollen joints anywhere.    Neurological:  Alert and oriented to person, place, and time.  No cranial nerve deficit.   Nonfocal.   Skin:  Skin is warm and dry. No rash noted. No pallor.   Peripheral vascular:  No clubbing, no cyanosis, no edema. Pedal and tibial pulses 2 out of 4 bilaterally.     ----------------------------------------------------------------------------------------------------------------------  Results from last 7 days   Lab Units 04/14/25  0023 04/11/25  0112   LACTATE mmol/L  --  1.6   WBC 10*3/mm3 7.48 8.64   HEMOGLOBIN g/dL 7.2* 8.1*   HEMATOCRIT % 24.9* 29.3*   MCV fL 89.6 91.0   MCHC g/dL 28.9* 27.6*   PLATELETS 10*3/mm3 361 414         Results from last 7 days   Lab Units 04/14/25  0023 04/11/25  0112   SODIUM mmol/L 132* 134*   POTASSIUM mmol/L 4.1 4.4   MAGNESIUM mg/dL 1.9  --    CHLORIDE mmol/L 101 101   CO2 mmol/L 25.0 23.7   BUN mg/dL 15 18   CREATININE mg/dL 0.52* 0.64*   CALCIUM mg/dL 7.2* 7.5*   GLUCOSE mg/dL 118* 69   ALBUMIN g/dL 1.5* 1.3*   BILIRUBIN mg/dL 0.2 0.3   ALK PHOS U/L 563* 585*   AST (SGOT) U/L 26 26  "  ALT (SGPT) U/L 19 15   Estimated Creatinine Clearance: 97.9 mL/min (A) (by C-G formula based on SCr of 0.52 mg/dL (L)).  No results found for: \"AMMONIA\"                  No results found for: \"HGBA1C\", \"POCGLU\"    Lab Results   Component Value Date    TSH 5.270 (H) 03/29/2025    FREET4 1.02 03/29/2025     No results found for: \"PREGTESTUR\", \"PREGSERUM\", \"HCG\", \"HCGQUANT\"  Pain Management Panel           No data to display              Brief Urine Lab Results       None          No results found for: \"BLOODCX\"  No results found for: \"URINECX\"  No results found for: \"WOUNDCX\"  No results found for: \"STOOLCX\"  No results found for: \"RESPCX\"  No results found for: \"AFBCX\"  Results from last 7 days   Lab Units 04/11/25  0112   LACTATE mmol/L 1.6       I have personally looked at the labs and they are summarized above.  ----------------------------------------------------------------------------------------------------------------------  Detailed radiology reports for the last 24 hours:  Imaging Results (Last 24 Hours)       ** No results found for the last 24 hours. **          Assessment & Plan      Patient is a 74-year-old male with no known significant medical problems who presented to the ER with reports of bright red blood per rectum, weight loss and poor appetite.    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  --CT abdomen/pelvis with contrast noted rectal mass with extensive local disease into the peritoneum measuring 3.3 x 3.8 cm with local invasion into the posterior aspect of the prostate gland with pelvic and RP adenopathy and possible metastatic disease involving the liver  --Venous Dopplers noted DVT in the left common/superficial femoral veins  --Status post biopsy of " rectal mass/colostomy/PEG  --Pathology noted invasive squamous cell carcinoma  --Patient has been able to tolerate radiation better with IV dose of morphine prior, will continue this prior to XRT.  Given social situation and debility, ultimate plan is most likely nursing facility (niece works at [employer]), unable to discharge home due to social situation therefore palliative XRT will be continued inpatient  --Patient opted to discontinue XRT due to pain  --continue scheduled Tylenol, prn aki  --continue midodrine 10 mg 3 times daily  --Continue OxyContin 10 mg twice daily XR  --Continue Eliquis starter pack  --Radiation oncology following, undergoing inpatient radiation  --Heme/onc evaluated and signed off, appreciate recommendation  --Palliative care following, appreciate assistance    #Acute on chronic blood loss anemia  #Possible gastritis/UGIB  --received 2 unit PRBC total  --Received 3 days of IV iron, continue PPI twice daily  --Surgery following peripherally    #Goals of care  #Severe malnutrition  #Failure to thrive  --PEG placed with supplemental tube feeds  --Palliative care following, appreciate assistance    #Constipation, KUB noted retained stool, schedule bowel regimen  #Oral candidiasis, completed nystatin swish and spit    Copied portions of this report have been reviewed and are accurate as of 04/17/25     CHECKLIST:  Abx: None  VTE: Eliquis  GI ppx: PPI twice daily  Diet: Modified, tube feeds  Code: No CPR, limited  Dispo: Patient is medically stable.  Patient opted to discontinue XRT.  Case management following for SNF.    Quintin Jon DO  [facility] Hospitalist  04/17/25  11:44 EDT

## 2025-04-17 NOTE — TELEPHONE ENCOUNTER
Spoke with Cecilia (pts floor nurse), states that pt is declining radiation at this time and wants to be removed from the schedule. RadOnc team made aware.

## 2025-04-17 NOTE — PROGRESS NOTES
"Adult Nutrition  Assessment/PES    Patient Name:  Pradeep Donald  YOB: 1950  MRN: [id number]  Admit Date:  3/29/2025    Assessment Date:  4/17/2025    Comments:  Continue with current TF regimen per MD order.      Reason for Assessment       Row Name 04/17/25 1041          Reason for Assessment    Reason For Assessment follow-up protocol                    Nutrition/Diet History       Row Name 04/17/25 1042          Nutrition/Diet History    Typical Intake (Food/Fluid/EN/PN) pt eating fair to poor 25-50% of meals.  TF bolus continues.                    Labs/Tests/Procedures/Meds       Row Name 04/17/25 1042          Labs/Procedures/Meds    Lab Results Reviewed reviewed        Medications    Pertinent Medications Reviewed reviewed                    Physical Findings       Row Name 04/17/25 1042          Physical Findings    Enteral Access Devices PEG                    Estimated/Assessed Needs - Anthropometrics       Row Name 04/17/25 1042          Anthropometrics    Height for Calculation 1.905 m (6' 3\")     Calculation Weight 85 kg (187 lb 6.3 oz)        KCAL/KG    KCAL/KG 25 Kcal/Kg (kcal)     25 Kcal/Kg (kcal) 2125        Protein Requirements    Weight Used For Protein Calculations 85 kg (187 lb 6.3 oz)     Est Protein Requirement Amount (gms/kg) 1.2 gm protein     Estimated Protein Requirements (gms/day) 102        Fluid Requirements    Fluid Requirements (mL/day) 2125     Estimated Fluid Requirement Method RDA Method                    Nutrition Prescription Ordered       Row Name 04/17/25 1043          Nutrition Prescription PO    Current PO Diet Regular;Soft Texture     Texture Chopped     Fluid Consistency Thin     Supplement Boost Breeze (Ensure)     Supplement Frequency 2 times a day        Nutrition Prescription EN    Enteral Route PEG     Product Isosource 1.5 (Jevity 1.5)     TF Delivery Method Continuous;Bolus     TF Bolus Goal Volume (mL) 120 mL     TF Bolus Current Volume (mL) 120 " mL     TF Bolus Frequency 6 times a day     Water flush (mL)  40 mL     Water Flush Frequency Every feeding  q 6 hrs                    Evaluation of Received Nutrient/Fluid Intake       Row Name 04/17/25 1044          Calories Evaluation    Total Calorie Target (kcal) 2125     Oral Calories (kcal) 0     Enteral Calories (kcal) 1125     Parenteral Calories (kcal) 0     Other Calories (kcal) 0     Total Calories (kcal) 1125     % of Kcal Needs 52.94        Protein Evaluation    Total Protein Target (g) 102     Oral Protein (g) 0     Enteral Protein (g) 51     Parenteral Protein (g) 0     Other Protein (g) 0     Total Protein (g) 51     % of Protein Needs 50        Fluid Intake Evaluation    Total Fluid Target (mL) 2125     Oral Fluid (mL) 480     Enteral Fluid (mL) 813     Parenteral Fluid (mL) 0     Other Fluid (mL) 0     Total Fluid Intake (mL) 1293     % Total Fluid Intake 60.85        PO Evaluation    Number of Meals 4     % PO Intake 25-50%                       Problem/Interventions:   Problem 1       Row Name 04/17/25 1045          Nutrition Diagnoses Problem 1    Problem 1 Nutrition Appropriate for Condition at this Time     Etiology (related to) Medical Diagnosis     Oncology Cancer w/mets;Colon cancer     Signs/Symptoms (evidenced by) EN Intake Delivery;Report/Observation;Unintended Weight Change;PO Intake     Percent (%) intake recorded --  25-50     Over number of meals 4     Percent (%) of EN goal 50 %     Unintended Weight Change Loss     Reported/Observed By Patient;RN;MD;Family                          Intervention Goal       Row Name 04/17/25 1045          Intervention Goal    General Nutrition support treatment     PO Maintain intake     TF/PN Maintain TF/PN                    Nutrition Intervention       Row Name 04/17/25 1045          Nutrition Intervention    RD/Tech Action Follow Tx progress                    Nutrition Prescription       Row Name 04/17/25 1046          Nutrition Prescription EN     Enteral Prescription Continue same protocol  per Dr. Monzon order                    Education/Evaluation       Row Name 04/17/25 1046          Monitor/Evaluation    Monitor Per protocol;PO intake;Supplement intake;TF delivery/tolerance;Weight                     Electronically signed by:  Janae Collins RD  04/17/25 10:46 EDT

## 2025-04-17 NOTE — SIGNIFICANT NOTE
04/17/25 0902   OTHER   Discipline physical therapist   Rehab Time/Intention   Session Not Performed other (see comments)  (treatment deferred d/t medical decline; PT will continue with pt. on caseload to assist with SNF placement.)

## 2025-04-17 NOTE — DISCHARGE PLACEMENT REQUEST
Rosa Rodriguez, RN     Case Management     Case Management/Social Work      Signed     Date of Service: 03/29/25 1051  Creation Time: 03/29/25 1051     Signed         Discharge Planning Assessment  [facility]     Patient Name: Pradeep Donald             MRN: [id number]  Today's Date: 3/29/2025                Admit Date: 3/29/2025     Plan: Pt lives at home alone plans to return home at discharge pt's sister is at bedside. Pcp is Carmen Pretty though pt states he has not been to see her in a long time, he uses [facility] and has [employer]. Pt does not use home health. Pt reports having issues with performing adl's. Pt uses a walker and cane prn from unknown provider and he does not use o2.              Janae Collins RD   Registered Dietitian  Nutrition     Progress Notes      Signed     Date of Service: 04/17/25 1046  Creation Time: 04/17/25 1046     Signed         Adult Nutrition  Assessment/PES     Patient Name:  Pradeep Donald  YOB: 1950  MRN: [id number]  Admit Date:  3/29/2025     Assessment Date:  4/17/2025     Comments:  Continue with current TF regimen per MD order.        Reason for Assessment         Row Name 04/17/25 1041                 Reason for Assessment     Reason For Assessment follow-up protocol                           Nutrition/Diet History         Row Name 04/17/25 1042                 Nutrition/Diet History     Typical Intake (Food/Fluid/EN/PN) pt eating fair to poor 25-50% of meals.  TF bolus continues.                           Labs/Tests/Procedures/Meds         Row Name 04/17/25 1042                 Labs/Procedures/Meds     Lab Results Reviewed reviewed              Medications     Pertinent Medications Reviewed reviewed                           Physical Findings         Row Name 04/17/25 1042                 Physical Findings     Enteral Access Devices PEG                           Estimated/Assessed Needs -  "Anthropometrics         Row Name 04/17/25 1042                 Anthropometrics     Height for Calculation 1.905 m (6' 3\")       Calculation Weight 85 kg (187 lb 6.3 oz)              KCAL/KG     KCAL/KG 25 Kcal/Kg (kcal)       25 Kcal/Kg (kcal) 2125              Protein Requirements     Weight Used For Protein Calculations 85 kg (187 lb 6.3 oz)       Est Protein Requirement Amount (gms/kg) 1.2 gm protein       Estimated Protein Requirements (gms/day) 102              Fluid Requirements     Fluid Requirements (mL/day) 2125       Estimated Fluid Requirement Method RDA Method                           Nutrition Prescription Ordered         Row Name 04/17/25 1043                 Nutrition Prescription PO     Current PO Diet Regular;Soft Texture       Texture Chopped       Fluid Consistency Thin       Supplement Boost Breeze (Ensure)       Supplement Frequency 2 times a day              Nutrition Prescription EN     Enteral Route PEG       Product Isosource 1.5 (Jevity 1.5)       TF Delivery Method Continuous;Bolus       TF Bolus Goal Volume (mL) 120 mL       TF Bolus Current Volume (mL) 120 mL       TF Bolus Frequency 6 times a day       Water flush (mL)  40 mL       Water Flush Frequency Every feeding  q 6 hrs                           Evaluation of Received Nutrient/Fluid Intake         Row Name 04/17/25 1044                 Calories Evaluation     Total Calorie Target (kcal) 2125       Oral Calories (kcal) 0       Enteral Calories (kcal) 1125       Parenteral Calories (kcal) 0       Other Calories (kcal) 0       Total Calories (kcal) 1125       % of Kcal Needs 52.94              Protein Evaluation     Total Protein Target (g) 102       Oral Protein (g) 0       Enteral Protein (g) 51       Parenteral Protein (g) 0       Other Protein (g) 0       Total Protein (g) 51       % of Protein Needs 50              Fluid Intake Evaluation     Total Fluid Target (mL) 2125       Oral Fluid (mL) 480       Enteral Fluid (mL) 813   "     Parenteral Fluid (mL) 0       Other Fluid (mL) 0       Total Fluid Intake (mL) 1293       % Total Fluid Intake 60.85              PO Evaluation     Number of Meals 4       % PO Intake 25-50%                               Problem/Interventions:    Problem 1         Row Name 04/17/25 1045                 Nutrition Diagnoses Problem 1     Problem 1 Nutrition Appropriate for Condition at this Time       Etiology (related to) Medical Diagnosis       Oncology Cancer w/mets;Colon cancer       Signs/Symptoms (evidenced by) EN Intake Delivery;Report/Observation;Unintended Weight Change;PO Intake       Percent (%) intake recorded --  25-50       Over number of meals 4       Percent (%) of EN goal 50 %       Unintended Weight Change Loss       Reported/Observed By Patient;RN;MD;Family                                    Intervention Goal         Row Name 04/17/25 1045                 Intervention Goal     General Nutrition support treatment       PO Maintain intake       TF/PN Maintain TF/PN                           Nutrition Intervention         Row Name 04/17/25 1045                 Nutrition Intervention     RD/Tech Action Follow Tx progress                           Nutrition Prescription         Row Name 04/17/25 1046                 Nutrition Prescription EN     Enteral Prescription Continue same protocol  per Dr. Na edouard                           Education/Evaluation         Row Name 04/17/25 1046                 Monitor/Evaluation     Monitor Per protocol;PO intake;Supplement intake;TF delivery/tolerance;Weight                            Electronically signed by:  Janae Collins RD  04/17/25 10:46 Pradeep Wang (74 y.o. Male)       Date of Birth   1950    Social Security Number       Address   [address] [address]    Home Phone   [phone]    MRN   [id number]       Mandaeism   None    Marital Status   Single                            Admission  "Date   3/29/2025    Admission Type   Emergency    Admitting Provider   Jayden Barney MD    Attending Provider   Quintin Jon DO    Department, Room/Bed   [address], 3332/1P       Discharge Date       Discharge Disposition       Discharge Destination                                 Attending Provider: Quintin Jon DO    Allergies: Penicillins    Isolation: None   Infection: None   Code Status: No CPR    Ht: 190.5 cm (75\")   Wt: 57.3 kg (126 lb 4 oz)    Admission Cmt: None   Principal Problem: Metastatic colon cancer to liver [C18.9,C78.7]                   Active Insurance as of 3/29/2025       Primary Coverage       Payor Plan Insurance Group Employer/Plan Group    [employer] MEDICARE REPLACEMENT UHC MEDICARE ADVANTAGE GROUP [id number]       Payor Plan Address Payor Plan Phone Number Payor Plan Fax Number Effective Dates    [address] [address]   1/1/2025 - None Entered    [facility] [id number]         Subscriber Name Subscriber Birth Date Member ID       JACLYN DONALD 1950 [id number]                     Emergency Contacts        (Rel.) Home Phone Work Phone Mobile Phone    JaspreetKisha (HCS) (Sister) [phone] -- --                 History & Physical        Jayden Barney MD at 03/29/25 0819              [facility] HOSPITALIST HISTORY AND PHYSICAL      Admit Date: 3/29/2025     Chief complaint blood per rectum    Subjective  Mr. Donald is a pleasant 74-year-old male without significant past medical history and has not seen a doctor for long time, not taking any medications at home and never had colonoscopy who presenting to the ER with complaint of bloody stool per rectum associated with lower left quadrant pain, fever or chills and anorexia.  He also had weakness, fatigue, extreme weight loss as well as diarrhea.  Patient sister said that she found him to be in a state of disarray at his home today.  He said that " he has been losing weight over the past couple of months and that he can no longer get out of bed and take care of himself.  He has been having profuse diarrhea nonbloody.  Says that he has what he thinks is hemorrhoids and a hard area in his left groin.    - In ER, chest x-ray showed no acute abnormality of the chest.  - CT of abdomen with contrast showed rectal mass with extensive local disease involving the perineum.  Abscess or necrotic mass in the right perineum measured 3.3 x 3.8 cm.  There is also local invasion into the posterior aspect of the prostate gland, pelvic and retroperitoneal adenopathy.  Metastatic disease involving the left hepatic lobe and possibly the lung bases.  - Scrotal ultrasound showed large left hydrocele.  - Lower extremity Doppler was positive for DVT in the left common and superficial femoral veins      History  Past Medical History:   Diagnosis Date    Kidney stones      No past surgical history on file.  No family history on file.  Social History     Tobacco Use    Smoking status: Never    Smokeless tobacco: Never   Substance Use Topics    Alcohol use: No     (Not in a hospital admission)    Allergies:  Penicillins      Review of Systems  12 point review of systems is negative unless stated above in the history of present illness      Objective    Vital Signs  Temp:  [98.3 °F (36.8 °C)] 98.3 °F (36.8 °C)  Heart Rate:  [72-86] 72  Resp:  [18] 18  BP: ()/(42-57) 104/53    Physical Exam:  General alert oriented x 3 cachectic in mild distress.    Head atraumatic normocephalic.    Neck supple JVD supple.    Cardiovascular regular rate and rhythm with no murmurs.    Respiratory clear with clear breath sounds bilaterally no wheezing and no crackles.    GI left lower quadrant tenderness, not distended with normal bowel sounds.    Extremities pitting edema and swelling in the left lower extremity.    Skin no jaundice and no erythema.      Results Review:     I reviewed the patient's  new imaging results and agree with the interpretation.  I reviewed the patient's other test results and agree with the interpretation.    Results from last 7 days   Lab Units 03/29/25  0434   WBC 10*3/mm3 12.85*   HEMOGLOBIN g/dL 8.5*   PLATELETS 10*3/mm3 309     Results from last 7 days   Lab Units 03/29/25  0434   SODIUM mmol/L 133*   POTASSIUM mmol/L 4.1   CHLORIDE mmol/L 101   CO2 mmol/L 22.2   BUN mg/dL 21   CREATININE mg/dL 1.14   CALCIUM mg/dL 8.7   GLUCOSE mg/dL 106*     Results from last 7 days   Lab Units 03/29/25  0434   BILIRUBIN mg/dL 0.4   ALK PHOS U/L 316*   AST (SGOT) U/L 20   ALT (SGPT) U/L 11     Results from last 7 days   Lab Units 03/29/25  0434   MAGNESIUM mg/dL 2.2         Results from last 7 days   Lab Units 03/29/25  0636 03/29/25  0434   CK TOTAL U/L  --  25   HSTROP T ng/L 41* 44*       Imaging Results (Last 24 Hours)       Procedure Component Value Units Date/Time    CT Chest With Contrast Diagnostic [[id number]] Resulted: 03/29/25 0807     Updated: 03/29/25 0807    US Scrotum & Testicles [[id number]] Collected: 03/29/25 0646     Updated: 03/29/25 0648    Narrative:      CLINICAL HISTORY: Scrotal swelling.     COMPARISON: CT of the abdomen and pelvis performed today.     TECHNIQUE: Grayscale and duplex Doppler sonography of the testicles was  obtained.     FINDINGS: The right testicle is not visible sonographically.     There is a large left hydrocele.  The left testis is normal in size and  echogenicity, measuring 3.1 x 3.9 x 2 cm.  There is blood flow within  the left testis.       Impression:         LARGE LEFT HYDROCELE.     RIGHT TESTIS NOT SEEN.     This report was finalized on 3/29/2025 6:46 AM by Sarah Canseco MD.       US Venous Doppler Lower Extremity Bilateral (duplex) [[id number]] Collected: 03/29/25 0640     Updated: 03/29/25 0646    Narrative:      CLINICAL HISTORY: Lower extremity swelling.  Mass.     COMPARISON: No ultrasound available for direct comparison.     TECHNIQUE:    Grayscale and color Doppler sonography of the lower  extremities was obtained, with Doppler spectral analysis of the inflow  and outflow vasculature of the lower extremities.     FINDINGS:     Right lower extremity:  The right common femoral, femoral, popliteal veins are patent,  compressible, and have normal waveforms with duplex Doppler.  The  peroneal and posterior tibial veins are patent with color Doppler.     Left lower extremity:  The left common femoral vein and the proximal and mid portions of the  left superficial femoral vein are noncompressible and have no flow with  duplex Doppler.  The distal superficial femoral vein is patent with  color Doppler.  The popliteal vein is patent and has a present waveform.   The posterior tibial and peroneal veins are patent and compressible.       Impression:         POSITIVE FOR DEEP VENOUS THROMBOSIS IN THE LEFT COMMON AND SUPERFICIAL  FEMORAL VEINS.     NEGATIVE FOR DEEP VENOUS THROMBOSIS IN THE RIGHT LOWER EXTREMITY.     This report was finalized on 3/29/2025 6:44 AM by Sarah Canseco MD.       XR Chest AP [[id number]] Collected: 03/29/25 0614     Updated: 03/29/25 0618    Narrative:      CLINICAL HISTORY: Fatigue.     COMPARISON: None available.     TECHNIQUE:  Single AP view of the chest.     FINDINGS: Lungs are clear; the known nodules at the lung bases are too  small to see radiographically.  There is mild fullness in the left  hilum. No pleural effusion or pneumothorax is identified.   Cardiomediastinal silhouette is normal.  No acute osseous or upper  abdominal abnormality is seen.       Impression:         NO ACUTE ABNORMALITY IN THE CHEST.     MILD FULLNESS IN THE LEFT HILUM, WHICH COULD BE DUE TO ENLARGED LYMPH  NODES, MASS, OR ENLARGED VESSELS.  CT SUGGESTED FOR FURTHER EVALUATION.     This report was finalized on 3/29/2025 6:16 AM by Sarah Canseco MD.       CT Abdomen Pelvis With Contrast [[id number]] Collected: 03/29/25 0555     Updated: 03/29/25 0616     Narrative:      CLINICAL HISTORY: Fatigue, pelvic and rectal mass, diarrhea.     COMPARISON: None available.     TECHNIQUE: IV contrast was administered and CT of the abdomen and pelvis  was obtained.  Multiplanar reformats were generated. Limited exposure  control, adjustment of the mA and/or KV according to patient size or use  of iterative reconstruction technique was utilized.     FINDINGS:    Lower chest: Scattered bibasilar pulmonary nodules measuring up to 5 mm.     Hepatobiliary: Hypodense mass in segment 2 of the liver measuring 3.4 x  3.5 cm.  The liver is not cirrhotic.  Gallbladder is normal.     Pancreas: normal.     Spleen: Calcified granulomata scattered through the otherwise normal  spleen.     Adrenals: normal.      Kidneys, ureters, bladder: Bilateral nonobstructing renal calculi with  an index calculus in the upper pole right kidney measuring 5 mm.   Ureters are normal in caliber.  Urinary bladder is normal.     Reproductive: Posterior wall of the prostate gland is invaded by the  heterogeneous rectal mass.  A left-sided hydrocele is noted.  The  irregular mucosal thickening and enhancement also extends to the lateral  aspect of the scrotal skin on the left side.     GI tract/Bowel: Irregular diffuse thickening of the rectal mucosa from  the level of the rectosigmoid junction and extending into the anal  region, as well as the skin surface of the perineum.  A rim-enhancing  fluid collection in the right perianal region measures 3.2 x 3.8 x 3 cm.   The rectal mass produces moderate constipation.  The small bowel is  normal in caliber and wall thickness     Appendix: Not well seen.  No findings for acute appendicitis.     Peritoneum: normal, no free air or fluid.     Vascular: Aortoiliac atherosclerosis is noted.  There is a questionable  thrombus in the left femoral vein with edema in the imaged left lower  extremity.     Lymph nodes: Severely enlarged heterogeneous lymph nodes in the groin,  with  an index left groin mass measuring 8 x 4.4 cm.  The cortex of the  lymph node is irregular, with some posterior invasion into the left  adductor musculature.  An index left external iliac node measures up to  2.8 cm in short axis.  Multiple retroperitoneal nodes are also noted.     Musculoskeletal and abdominal wall: Degenerative changes in the lumbar  spine and the hip joints.     Acute DVT and possible abscess were discussed with Dr. Rodriguez at 6:12 AM  EST on 3/29/2025.       Impression:         RECTAL MASS WITH EXTENSIVE LOCAL DISEASE INVOLVING THE PERINEUM,  INCLUDING AN ABSCESS OR NECROTIC MASS IN THE RIGHT PERINEUM MEASURING  3.3 X 3.8 X 3 CM.  THERE IS ALSO LOCAL INVASION INTO THE POSTERIOR  ASPECT OF THE PROSTATE GLAND.  EXTENSIVE INGUINAL, PELVIC, AND  RETROPERITONEAL ADENOPATHY, INCLUDING A LOCALLY AGGRESSIVE LEFT INGUINAL  NODE THAT INVADES INTO THE LEFT ADDUCTOR MUSCULATURE.     METASTATIC DISEASE INVOLVING THE LEFT HEPATIC LOBE AND POSSIBLY THE LUNG  BASES.     POSSIBLE DEEP VENOUS THROMBOSIS IN THE LEFT SUPERFICIAL FEMORAL VEIN.   CONFIRMATION WITH ULTRASOUND IS RECOMMENDED.     CONSTIPATION AS A RESULT OF THE EXTENSIVE RECTAL MASS.     LARGE LEFT HYDROCELE.     This report was finalized on 3/29/2025 6:14 AM by Sarah Canseco MD.                   Assessment & Plan  - New diagnosis colorectal cancer  - Abscess or necrotic mass in the right perineum measured 3.3 x 3.8 cm  - Anemia from bleeding per rectum  - blood per rectum   - Leukocytosis  - Acute left lower extremity  DVT  - Elevated lactic acid from cancer  - Failure to thrive, anorexia and weight loss  - Non-STEMI    Patient looks very weak and lost significant weight.  I had long discussion with his sister at bedside.   Admit to [facility] bed.    IV fluid and keep on clear liquid diet.  Appreciate surgery input. Started on heparin for DVT proph.  CT angiogram pending.  Consulted hematology/oncology for metastatic colon cancer and also DVT treatment  options.  Follow-up cultures. Continue antibiotic empiric treatment with cefepime.    Stress ulcer prophylaxis  VTE prophylaxis    CODE STATUS: Full code      I discussed the patient's findings and my recommendations with patient and family.       Jayden Barney MD  03/29/25  08:19 EDT        Electronically signed by Jayden Barney MD at 03/29/25 1225       Intake & Output (last 2 days)         04/15 0701 04/16 0700 04/16 0701  04/17 0700 04/17 0701  04/18 0700    P.O. 600 480     I.V. (mL/kg) 0 (0) 0 (0)     Other 300 60     NG/ 480     Total Intake(mL/kg) 1380 (24.1) 1020 (17.8)     Urine (mL/kg/hr) 2250 (1.6) 2300 (1.7)     Stool  100     Total Output 2250 2400     Net -870 -1380                  Current Facility-Administered Medications   Medication Dose Route Frequency Provider Last Rate Last Admin    acetaminophen (TYLENOL) tablet 1,000 mg  1,000 mg Per PEG Tube TID Quintin Jon DO   1,000 mg at 04/17/25 0811    apixaban (ELIQUIS) tablet 5 mg  5 mg Oral Q12H Quintin Jon DO   5 mg at 04/17/25 0811    castor oil-balsam peru (VENELEX) ointment 1 Application  1 Application Topical Daily Quintin Jon DO   1 Application at 04/16/25 0833    folic acid (FOLVITE) tablet 1 mg  1 mg Oral Daily Fouzia Weems MD   1 mg at 04/17/25 0811    Hydrocortisone (Perianal) (ANUSOL-HC) 2.5 % rectal cream   Rectal BID Nisreen Wong APRN   Given at 04/17/25 0816    hydrOXYzine (ATARAX) tablet 10 mg  10 mg Oral Nightly Nisreen Wong APRN   10 mg at 04/16/25 2124    lactulose (CHRONULAC) 10 GM/15ML solution 20 g  20 g Per PEG Tube TID Matilde Monzon MD   20 g at 04/15/25 2212    Lidocaine Viscous HCl (XYLOCAINE) 2 % solution 10 mL  10 mL Topical Q3H PRN Nisreen Wong, APRN        midodrine (PROAMATINE) tablet 10 mg  10 mg Per PEG Tube TID AC Quintin Jon,    10 mg at 04/17/25 0811    morphine injection 4 mg  4 mg Intravenous Daily PRN  Quintin Jon, DO   4 mg at 04/17/25 0143    morphine injection 4 mg  4 mg Intravenous Daily PRN Quintin Jon DO   4 mg at 04/13/25 0145    nystatin (MYCOSTATIN) powder   Topical Q12H Quintin Jon DO   Given at 04/17/25 0816    ondansetron (ZOFRAN) injection 4 mg  4 mg Intravenous Q4H PRN Loly Gutierrez APRN   4 mg at 04/15/25 1956    oxyCODONE (oxyCONTIN) 12 hr tablet 10 mg  10 mg Oral Q12H Quintin Jon DO   10 mg at 04/17/25 0812    oxyCODONE (ROXICODONE) immediate release tablet 5 mg  5 mg Per PEG Tube Q4H PRN Óscar Jeong MD   5 mg at 04/16/25 0830    pantoprazole (PROTONIX) injection 40 mg  40 mg Intravenous BID AC Quintin Jon DO   40 mg at 04/17/25 0811    sennosides-docusate (PERICOLACE) 8.6-50 MG per tablet 2 tablet  2 tablet Per PEG Tube BID Quintin Jon DO   2 tablet at 04/17/25 0811    And    [Held by provider] polyethylene glycol (MIRALAX) packet 17 g  17 g Per PEG Tube BID Quintin Jon DO   17 g at 04/09/25 0948    sodium chloride 0.9 % flush 10 mL  10 mL Intravenous Q12H Matilde Monzon MD   10 mL at 04/17/25 0815    sodium chloride 0.9 % flush 10 mL  10 mL Intravenous PRN Matilde Monzon MD   10 mL at 04/04/25 0834    witch hazel-glycerin (TUCKS) pad   Topical PRN Nisreen Wong APRN            Physician Progress Notes (most recent note)        Elian Rvias MD at 04/16/25 1456          Patient was brought down for radiation therapy today while an inpatient.  He has not been down for a radiation treatment for over a week.  When we placed him on the hard radiation table he requested to be taken off because of pain.  I discussed with him enrolling in hospice and he seemed reluctant.  I explained that 1 radiation treatment every week was ineffective and making his cancer worse.  We will address this situation again tomorrow.  He is already spoken with palliative care.  I would encourage any  ancillary services to entice him into the hospice program as any meaningful therapeutic intervention is unlikely to be fruitful.  Sincerely, Elian Velez MD radiation oncology    Electronically signed by Elian Rivas MD at 25 1501          Consult Notes (most recent note)        Ilir Kim PA-C at 25 0815        Consult Orders    1. Inpatient Urology Consult [[id number]] ordered by Jayden Barney MD at 25 0824              Attestation signed by Jose Mena MD at 25 1055      I have reviewed this documentation and agree.                      Name:  Pradeep Donald  :  1950    DATE OF ADMISSION  3/29/2025    DATE OF CONSULT  2025     REFERRING PHYSICIAN  * No referring provider recorded for this case *    PRIMARY CARE PHYSICIAN  Carmen Pretty APRN    REASON FOR CONSULT  Urinary retention    CHIEF COMPLAINT  Chief Complaint   Patient presents with    Leg Swelling    Hemorrhoids       HISTORY OF PRESENT ILLNESS:   Pradeep Donald is a 74 y.o. male who presented to [facility] emergency department secondary to rectal bleeding and lower extremity swelling.  He had a CT scan the abdomen pelvis that showed a significant large rectal mass with extensive local disease involving the perineum including abscess of necrotic mass measuring 3.3 x 3.8 x 3 cm.  There was local invasion of the aspect of the prostate with extensive inguinal retroperitoneal adenopathy on the left side.  Metastatic disease involving the left hepatic lobe and possible lung base was seen as well.  On CT report he was also noted to have a large left hydrocele and this was confirmed on ultrasound.  Ultrasound was not able to identify the right testicle.  Patient did undergo a PEG tube placement as well as diverting colostomy by Dr. Monzon on 3/29/2025. Since surgery he has found to be significant anemia requiring replacement by packed red blood cells.  He has received 1 unit  so far.  Patient was also having significant urinary retention postop and indwelling Walsh catheters been placed by Dr. Monzon in the OR. He has been evaluated by oncology and radiation oncology for possible palliative treatments.    I saw Pradeep Donald in their hospital room this morning.  Patient was lying in bed in no acute distress.  Patient is very feeble and weak.  He denied any complaints with his catheter at this time.  Physical exam he does have significant large hydrocele as well as anasarca of the penis.  There is lower extremity edema.  Indwelling Walsh catheter was draining well with some sediments.  He had no other urological complaints.  Biopsy results are still pending at this time.    PAST MEDICAL HISTORY  Past Medical History:   Diagnosis Date    Kidney stones        PAST SURGICAL HISTORY  Past Surgical History:   Procedure Laterality Date    COLOSTOMY N/A 3/29/2025    Procedure: COLOSTOMY LAPAROSCOPIC;  Surgeon: Matilde Monzon MD;  Location: [facility];  Service: General;  Laterality: N/A;    PEG TUBE INSERTION N/A 3/29/2025    Procedure: PERCUTANEOUS ENDOSCOPIC GASTROSTOMY TUBE INSERTION;  Surgeon: Matilde Monzon MD;  Location: [facility] OR;  Service: General;  Laterality: N/A;    RECTAL MASS EXCISION N/A 3/29/2025    Procedure: RECTAL MASS EXCISION - biopsy of rectal mass;  Surgeon: Matilde Monzon MD;  Location: [facility];  Service: General;  Laterality: N/A;       SOCIAL HISTORY  Social History     Socioeconomic History    Marital status: Single   Tobacco Use    Smoking status: Never    Smokeless tobacco: Never   Vaping Use    Vaping status: Never Used   Substance and Sexual Activity    Alcohol use: No    Drug use: Never    Sexual activity: Defer       FAMILY HISTORY  History reviewed. No pertinent family history.    ALLERGIES  Allergies   Allergen Reactions    Penicillins      INPATIENT MEDICATIONS  Current Facility-Administered Medications   Medication Dose Route Frequency Provider  Last Rate Last Admin    acetaminophen (TYLENOL) tablet 1,000 mg  1,000 mg Per PEG Tube TID Quintin Jon DO   1,000 mg at 03/31/25 2013    sennosides-docusate (PERICOLACE) 8.6-50 MG per tablet 2 tablet  2 tablet Oral BID Matilde Monzon MD   2 tablet at 03/31/25 2013    And    polyethylene glycol (MIRALAX) packet 17 g  17 g Oral BID Matilde Monzon MD   17 g at 03/31/25 2013    And    bisacodyl (DULCOLAX) EC tablet 5 mg  5 mg Oral Daily PRN Matilde Monzon MD        ferric gluconate (FERRLECIT) 125 mg in sodium chloride 0.9 % 100 mL IVPB  125 mg Intravenous Daily Jayden Barney MD   Stopped at 03/31/25 1030    folic acid (FOLVITE) tablet 1 mg  1 mg Per PEG Tube Daily Quintin Jon DO   1 mg at 03/31/25 1206    heparin 06260 units/250 mL (100 units/mL) in 0.45 % NaCl infusion  24 Units/kg/hr (Dosing Weight) Intravenous Titrated Quintin Jon DO 13.75 mL/hr at 04/01/25 0653 24 Units/kg/hr at 04/01/25 0653    hydrOXYzine (ATARAX) tablet 10 mg  10 mg Oral Nightly Nisreen Wong APRN   10 mg at 03/31/25 2013    midodrine (PROAMATINE) tablet 5 mg  5 mg Per PEG Tube TID AC Quintin Jon DO   5 mg at 03/31/25 1717    morphine injection 2 mg  2 mg Intravenous Q4H PRN Matilde Monzon MD   2 mg at 03/31/25 1206    And    naloxone (NARCAN) injection 0.4 mg  0.4 mg Intravenous Q5 Min PRN Matilde Monzon MD        morphine injection 2 mg  2 mg Intravenous Q4H PRN Matilde Monzon MD   2 mg at 03/30/25 1324    nystatin (MYCOSTATIN) 100,000 unit/mL suspension 500,000 Units  5 mL Swish & Spit 4x Daily Quintin Jon DO   500,000 Units at 03/31/25 1718    oxyCODONE (ROXICODONE) immediate release tablet 5 mg  5 mg Per PEG Tube Q4H PRN Quintin Jon DO        pantoprazole (PROTONIX) injection 40 mg  40 mg Intravenous BID AC Quintin Jon DO   40 mg at 03/31/25 1717    Pharmacy to Dose Heparin   Not Applicable Continuous PRN  "Quintin Jon DO        sodium chloride 0.9 % flush 10 mL  10 mL Intravenous Q12H Matilde Monzon MD   10 mL at 03/31/25 2019    sodium chloride 0.9 % flush 10 mL  10 mL Intravenous PRN Matilde Monzon MD        sodium chloride 0.9 % infusion 40 mL  40 mL Intravenous PRN Matilde Monzon MD        traMADol (ULTRAM) tablet 50 mg  50 mg Per PEG Tube TID Quintin Jon DO   50 mg at 03/31/25 2013    vitamin B-12 (CYANOCOBALAMIN) tablet 1,000 mcg  1,000 mcg Per PEG Tube Daily Quintin Jon DO   1,000 mcg at 03/31/25 1206       PHYSICAL EXAMINATION    BP 93/47 (BP Location: Left arm, Patient Position: Lying) Comment: rn Nataliia aware.  Pulse 65   Temp 98.7 °F (37.1 °C) (Oral)   Resp 20   Ht 190.5 cm (75\")   Wt 57.3 kg (126 lb 4 oz)   SpO2 98%   BMI 15.78 kg/m²     GENERAL: Chronically ill-appearing under nourished elderly frail white male in no acute distress.  HEENT:  Pupils equally round and reactive to light.  Extraocular muscles intact.  CARDIOVASCULAR:  Regular rate and rhythm.    ABDOMEN:  Soft, slight tenderness to palpation, nondistended.  Colostomy.  EXTREMITIES: Lower extremity edema.     : Indwelling Walsh catheter in place with yellow urine noted in collection tubing and bag.  Anasarca of the penis.    PSYCH:  Alert and oriented x3.    LABORATORY     WBC   Date Value Ref Range Status   04/01/2025 12.02 (H) 3.40 - 10.80 10*3/mm3 Final     RBC   Date Value Ref Range Status   04/01/2025 2.97 (L) 4.14 - 5.80 10*6/mm3 Final     Hemoglobin   Date Value Ref Range Status   04/01/2025 7.2 (L) 13.0 - 17.7 g/dL Final     Hematocrit   Date Value Ref Range Status   04/01/2025 25.3 (L) 37.5 - 51.0 % Final     MCV   Date Value Ref Range Status   04/01/2025 85.2 79.0 - 97.0 fL Final     MCH   Date Value Ref Range Status   04/01/2025 24.2 (L) 26.6 - 33.0 pg Final     MCHC   Date Value Ref Range Status   04/01/2025 28.5 (L) 31.5 - 35.7 g/dL Final     RDW   Date Value Ref " Range Status   04/01/2025 15.6 (H) 12.3 - 15.4 % Final     RDW-SD   Date Value Ref Range Status   04/01/2025 48.4 37.0 - 54.0 fl Final     MPV   Date Value Ref Range Status   04/01/2025 8.5 6.0 - 12.0 fL Final     Platelets   Date Value Ref Range Status   04/01/2025 299 140 - 450 10*3/mm3 Final     Neutrophil %   Date Value Ref Range Status   04/01/2025 64.1 42.7 - 76.0 % Final     Lymphocyte %   Date Value Ref Range Status   04/01/2025 19.9 19.6 - 45.3 % Final     Monocyte %   Date Value Ref Range Status   04/01/2025 11.1 5.0 - 12.0 % Final     Eosinophil %   Date Value Ref Range Status   04/01/2025 3.3 0.3 - 6.2 % Final     Basophil %   Date Value Ref Range Status   04/01/2025 0.7 0.0 - 1.5 % Final     Immature Grans %   Date Value Ref Range Status   04/01/2025 0.9 (H) 0.0 - 0.5 % Final     Neutrophils, Absolute   Date Value Ref Range Status   04/01/2025 7.70 (H) 1.70 - 7.00 10*3/mm3 Final     Lymphocytes, Absolute   Date Value Ref Range Status   04/01/2025 2.39 0.70 - 3.10 10*3/mm3 Final     Monocytes, Absolute   Date Value Ref Range Status   04/01/2025 1.33 (H) 0.10 - 0.90 10*3/mm3 Final     Eosinophils, Absolute   Date Value Ref Range Status   04/01/2025 0.40 0.00 - 0.40 10*3/mm3 Final     Basophils, Absolute   Date Value Ref Range Status   04/01/2025 0.09 0.00 - 0.20 10*3/mm3 Final     Immature Grans, Absolute   Date Value Ref Range Status   04/01/2025 0.11 (H) 0.00 - 0.05 10*3/mm3 Final     nRBC   Date Value Ref Range Status   04/01/2025 0.0 0.0 - 0.2 /100 WBC Final       Glucose   Date Value Ref Range Status   04/01/2025 88 65 - 99 mg/dL Final     Sodium   Date Value Ref Range Status   04/01/2025 133 (L) 136 - 145 mmol/L Final     Potassium   Date Value Ref Range Status   04/01/2025 4.6 3.5 - 5.2 mmol/L Final     CO2   Date Value Ref Range Status   04/01/2025 24.7 22.0 - 29.0 mmol/L Final     Chloride   Date Value Ref Range Status   04/01/2025 105 98 - 107 mmol/L Final     Anion Gap   Date Value Ref Range  "Status   04/01/2025 3.3 (L) 5.0 - 15.0 mmol/L Final     Creatinine   Date Value Ref Range Status   04/01/2025 1.12 0.76 - 1.27 mg/dL Final     BUN   Date Value Ref Range Status   04/01/2025 20 8 - 23 mg/dL Final     BUN/Creatinine Ratio   Date Value Ref Range Status   04/01/2025 17.9 7.0 - 25.0 Final     Calcium   Date Value Ref Range Status   04/01/2025 7.8 (L) 8.6 - 10.5 mg/dL Final     Alkaline Phosphatase   Date Value Ref Range Status   04/01/2025 270 (H) 39 - 117 U/L Final     Total Protein   Date Value Ref Range Status   04/01/2025 5.1 (L) 6.0 - 8.5 g/dL Final     ALT (SGPT)   Date Value Ref Range Status   04/01/2025 7 1 - 41 U/L Final     AST (SGOT)   Date Value Ref Range Status   04/01/2025 20 1 - 40 U/L Final     Total Bilirubin   Date Value Ref Range Status   04/01/2025 0.2 0.0 - 1.2 mg/dL Final     Albumin   Date Value Ref Range Status   04/01/2025 1.5 (L) 3.5 - 5.2 g/dL Final     Globulin   Date Value Ref Range Status   04/01/2025 3.6 gm/dL Final       Magnesium   Date Value Ref Range Status   03/31/2025 2.1 1.6 - 2.4 mg/dL Final     Phosphorus   Date Value Ref Range Status   03/31/2025 4.5 2.5 - 4.5 mg/dL Final     No results found for: \"LDH\", \"URICACID\"     IMAGING  Imaging Results (Last 72 Hours)       Procedure Component Value Units Date/Time    CT Chest With Contrast Diagnostic [[id number]] Collected: 03/29/25 0849     Updated: 03/29/25 0855    Narrative:      EXAMINATION: CTA chest with contrast     CLINICAL HISTORY: Pain     COMPARISON: None available     TECHNIQUE: Contiguous 3 mm thick slices were obtained through the chest  after the administration of Isovue-370 intravenous contrast. Coronal and  sagittal reformats were performed.     FINDINGS:  The heart is normal in size configuration. The thoracic aorta is normal  in caliber. No aneurysmal dilatation. No dissection. The central  pulmonary arteries are normal in caliber. No pulmonary embolus. The  central airways are patent. No pneumothorax is " appreciated. The few  small nodules are noted within both lungs. These measure up to 5 mm and  are of uncertain clinical significance. Correlation with prior imaging  would be helpful as available. In the absence of prior imaging, chest CT  follow-up in 6 months is recommended.       Impression:      1. No pulmonary embolus.  2. Normal caliber thoracic aorta without aneurysmal dilatation or  dissection.  3. Multiple tiny pulmonary nodules. These may be inflammatory in nature  representing noncalcified granulomas. However, the findings are  nonspecific. Given the appearance and multiplicity of the lesions,  6-month follow-up CT is recommended.     This report was finalized on 3/29/2025 8:53 AM by Bairon Sales MD.               CT Abdomen and Pelvis: No results found for this or any previous visit.       CT Stone Protocol: No results found for this or any previous visit.       KUB: No results found for this or any previous visit.   \    LABS:   No results displayed because visit has over 200 results.           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  Given inguinal lymphadenopathy this is probably contributing to his retention as well.  We appreciate Dr. Monzon placing a doe catheter.  Recommend to continue with indwelling Doe catheter at this time. We would recommend close follow-up in roughly 2 to 3 weeks post discharge for discussion of Doe catheter  removal/voiding trial.  Given patient's difficult anatomy from colon mass he may have to have Walsh catheter replaced under general anesthesia.   Large left hydrocele -patient's large left hydrocele is nonemergent and recommend to continue with observation.  Given significant anemia patient is not a candidate for any surgical intervention at this time.  We will sign off from urological standpoint.  Do not hesitate to call with any questions or concerns.    The patient was in agreement with these plans.    Thank you for asking us to participate in Pradeep Donald's care.  We will sign off from urological standpoint.  Please do not hesitate to call with any questions or concerns that you may have.    A total of 60 minutes were spent coordinating this patient’s care in clinic today; 30 minutes of which were face-to-face with the patient, reviewing medical history and counseling on the current treatment and followup plan.  All questions were answered to patient's satisfaction.         This document has been electronically signed by Ilir Kim PA-C  2025 08:15 EDT    Part of this note may be an electronic transcription/translation of spoken language to printed text using the Dragon Dictation System.    Electronically signed by Jose Mena MD at 25 1055          Physical Therapy Notes (most recent note)        Haley Armstrong, PT at 25 0903  Version 1 of 1            25 0902   OTHER   Discipline physical therapist   Rehab Time/Intention   Session Not Performed other (see comments)  (treatment deferred d/t medical decline; PT will continue with pt. on caseload to assist with SNF placement.)         Electronically signed by Haley Armstrong, PT at 25 0903          Occupational Therapy Notes (most recent note)        Lonnie Cho OT at 25 1151          Acute Care - Occupational Therapy Treatment Note   [facility]     Patient Name: Pradeep Donald  : 1950  MRN:  [id number]  Today's Date: 4/2/2025  Onset of Illness/Injury or Date of Surgery: 03/29/25     Referring Physician: Dr. Monzon    Admit Date: 3/29/2025       ICD-10-CM ICD-9-CM   1. Failure to thrive in adult  R62.7 783.7   2. Diarrhea, unspecified type  R19.7 787.91   3. Lymphadenopathy  R59.1 785.6   4. Acute deep vein thrombosis (DVT) of femoral vein of left lower extremity  I82.412 453.41   5. Metastatic colon cancer to liver  C18.9 153.9    C78.7 197.7     Patient Active Problem List   Diagnosis    Metastatic colon cancer to liver    Severe protein-calorie malnutrition     Past Medical History:   Diagnosis Date    Kidney stones      Past Surgical History:   Procedure Laterality Date    COLOSTOMY N/A 3/29/2025    Procedure: COLOSTOMY LAPAROSCOPIC;  Surgeon: Matilde Monzon MD;  Location: [facility];  Service: General;  Laterality: N/A;    PEG TUBE INSERTION N/A 3/29/2025    Procedure: PERCUTANEOUS ENDOSCOPIC GASTROSTOMY TUBE INSERTION;  Surgeon: Matilde Monzon MD;  Location: [facility] OR;  Service: General;  Laterality: N/A;    RECTAL MASS EXCISION N/A 3/29/2025    Procedure: RECTAL MASS EXCISION - biopsy of rectal mass;  Surgeon: Matilde Monzon MD;  Location: [facility];  Service: General;  Laterality: N/A;         OT ASSESSMENT FLOWSHEET (Last 12 Hours)       OT Evaluation and Treatment       Row Name 04/02/25 1149                   OT Time and Intention    Document Type therapy note (daily note)  -KR        Mode of Treatment occupational therapy  -KR        Patient Effort adequate  -KR        Symptoms Noted During/After Treatment fatigue  -KR           General Information    General Observations of Patient alert/cooperative  -KR           Bed Mobility    Bed Mobility bed mobility (all) activities  -KR        All Activities, [place] (Bed Mobility) maximum assist (25% patient effort)  -KR           Sit-Stand Transfer    Sit-Stand [place] (Transfers) moderate assist (50% patient effort)  -KR            Motor Skills    Therapeutic Exercise shoulder;elbow/forearm;hand  -KR           Shoulder (Therapeutic Exercise)    Shoulder (Therapeutic Exercise) AROM (active range of motion)  -KR        Shoulder AROM (Therapeutic Exercise) bilateral;flexion;extension  -KR           Elbow/Forearm (Therapeutic Exercise)    Elbow/Forearm (Therapeutic Exercise) AROM (active range of motion)  -KR        Elbow/Forearm AROM (Therapeutic Exercise) bilateral;flexion;extension  -KR           Hand (Therapeutic Exercise)    Hand (Therapeutic Exercise) AROM (active range of motion)  -KR        Hand AROM/AAROM (Therapeutic Exercise) bilateral;finger flexion;finger extension  -KR           Wound 03/29/25 1533 abdomen Surgical    Wound - Properties Group Placement Date: 03/29/25  -LT Placement Time: 1533  -LT Location: abdomen  -LT Primary Wound Type: Surgical  -LT, x2 trochar sites     Retired Wound - Properties Group Placement Date: 03/29/25  -LT Placement Time: 1533  -LT Location: abdomen  -LT    Retired Wound - Properties Group Placement Date: 03/29/25  -LT Placement Time: 1533  -LT Location: abdomen  -LT    Retired Wound - Properties Group Date first assessed: 03/29/25  -LT Time first assessed: 1533  -LT Location: abdomen  -LT       Wound 03/30/25 0955 Right posterior elbow Pressure Injury    Wound - Properties Group Placement Date: 03/30/25  -CF Placement Time: 0955  -CF Present on Original Admission: N  -CF Side: Right  -CF Orientation: posterior  -CF Location: elbow  -CF Primary Wound Type: Pressure Inj  -TW    Retired Wound - Properties Group Placement Date: 03/30/25  -CF Placement Time: 0955  -CF Present on Original Admission: N  -CF Side: Right  -CF Orientation: posterior  -CF Location: elbow  -CF    Retired Wound - Properties Group Placement Date: 03/30/25  -CF Placement Time: 0955  -CF Present on Original Admission: N  -CF Side: Right  -CF Orientation: posterior  -CF Location: elbow  -CF    Retired Wound - Properties Group Date  first assessed: 03/30/25  -CF Time first assessed: 0955  -CF Present on Original Admission: N  -CF Side: Right  -CF Location: elbow  -CF       Wound 03/30/25 1201 Right lower arm Infiltration/Extravasation    Wound - Properties Group Placement Date: 03/30/25  -CF Placement Time: 1201  -CF Side: Right  -CF Orientation: lower  -CF Location: arm  -CF Primary Wound Type: Infilt/Extra  -CF    Retired Wound - Properties Group Placement Date: 03/30/25  -CF Placement Time: 1201  -CF Side: Right  -CF Orientation: lower  -CF Location: arm  -CF    Retired Wound - Properties Group Placement Date: 03/30/25  -CF Placement Time: 1201  -CF Side: Right  -CF Orientation: lower  -CF Location: arm  -CF    Retired Wound - Properties Group Date first assessed: 03/30/25  -CF Time first assessed: 1201  -CF Side: Right  -CF Location: arm  -CF       Wound 03/30/25 1900 Right posterior greater trochanter Pressure Injury    Wound - Properties Group Placement Date: 03/30/25  -SL Placement Time: 1900  -SL Present on Original Admission: N  -SL Side: Right  -SL Orientation: posterior  -SL Location: greater trochanter  -SL Primary Wound Type: Pressure Inj  -SL, blister     Retired Wound - Properties Group Placement Date: 03/30/25  -SL Placement Time: 1900  -SL Present on Original Admission: N  -SL Side: Right  -SL Orientation: posterior  -SL Location: greater trochanter  -SL    Retired Wound - Properties Group Placement Date: 03/30/25  -SL Placement Time: 1900  -SL Present on Original Admission: N  -SL Side: Right  -SL Orientation: posterior  -SL Location: greater trochanter  -SL    Retired Wound - Properties Group Date first assessed: 03/30/25  -SL Time first assessed: 1900  -SL Present on Original Admission: N  -SL Side: Right  -SL Location: greater trochanter  -SL       Wound 03/30/25 1900 scrotum    Wound - Properties Group Placement Date: 03/30/25  -SL Placement Time: 1900  -SL Present on Original Admission: Y  -SL Location: scrotum  -SL     Retired Wound - Properties Group Placement Date: 03/30/25  -SL Placement Time: 1900  -SL Present on Original Admission: Y  -SL Location: scrotum  -SL    Retired Wound - Properties Group Placement Date: 03/30/25  -SL Placement Time: 1900  -SL Present on Original Admission: Y  -SL Location: scrotum  -SL    Retired Wound - Properties Group Date first assessed: 03/30/25  -SL Time first assessed: 1900  -SL Present on Original Admission: Y  -SL Location: scrotum  -SL       Wound 03/31/25 1553 medial perirectal Atypical Malignant Ulcer    Wound - Properties Group Placement Date: 03/31/25  -TW Placement Time: 1553  -TW Present on Original Admission: Y  -TW Orientation: medial  -TW Location: perirectal  -TW Primary Wound Type: Atypical  -TW Secondary Wound Type - Atypical: Malignant Ul  -TW    Retired Wound - Properties Group Placement Date: 03/31/25  -TW Placement Time: 1553  -TW Present on Original Admission: Y  -TW Orientation: medial  -TW Location: perirectal  -TW    Retired Wound - Properties Group Placement Date: 03/31/25  -TW Placement Time: 1553  -TW Present on Original Admission: Y  -TW Orientation: medial  -TW Location: perirectal  -TW    Retired Wound - Properties Group Date first assessed: 03/31/25  -TW Time first assessed: 1553  -TW Present on Original Admission: Y  -TW Location: perirectal  -TW       Plan of Care Review    Plan of Care Reviewed With patient;family  -KR           Dressing Goal 1 (OT)    Progress/Outcome (Dressing Goal 1, OT) continuing progress toward goal  -KR            Activity Tolerance Goal 1 (OT)    Progress/Outcome (Activity Tolerance Goal 1, OT) continuing progress toward goal  -KR                  User Key  (r) = Recorded By, (t) = Taken By, (c) = Cosigned By      Initials Name Effective Dates    TW Erica Jurado, TEE 06/16/21 -     KR Lonnie Cho OT 06/16/21 -     LT Narcisa Baron RN 06/16/21 -     SL Ofelia Hester RN 09/16/24 -     CF Naima Lemus RN 06/25/24 -                             OT Recommendation and Plan  Planned Therapy Interventions (OT): activity tolerance training, adaptive equipment training, BADL retraining  Plan of Care Review  Plan of Care Reviewed With: patient, family  Plan of Care Reviewed With: patient, family        Time Calculation:     Therapy Charges for Today       Code Description Service Date Service Provider Modifiers Qty    94601078390  OT EVAL HIGH COMPLEXITY 4 4/1/2025 Lonnie Cho OT GO 1    [id number]  OT THERAPEUTIC ACT EA 15 MIN 4/2/2025 Lonnie Cho OT GO 1    [id number]  OT SELF CARE/MGMT/TRAIN EA 15 MIN 4/2/2025 Lonnie Cho OT GO 1                 Lonnie Cho OT  4/2/2025    Electronically signed by Lonnie Cho OT at 04/02/25 1152         Speech Language Pathology Notes (most recent note)    No notes exist for this encounter.

## 2025-04-17 NOTE — CASE MANAGEMENT/SOCIAL WORK
Discharge Planning Assessment   [place]     Patient Name: Pradeep Donald  MRN: [id number]  Today's Date: 4/17/2025    Admit Date: 3/29/2025       Discharge Plan       Row Name 04/17/25 1029       Plan    Plan SS received a call from Palliative Care provider yesterday evening. Pt and family have opted to discontinue radiation treatments and would like SNF pre-authorization to be submitted for [facility]. Manager to submit SNF pre-authorization on this date. SS provided an update to [facility] per Jessica. Palliative Care continues to follow. SS to follow.    Addendum @ 15:08: SS visited pt and sisterKisha at bedside. SS provided an update to pt and sister. SS to follow.                   Continued Care and Services - Admitted Since 3/29/2025       Destination       Service Provider Request Status Services Address Phone Fax Patient Preferred    [facility] Pending - Request Sent -- [address] [place] [id number] [phone] --                  Expected Discharge Date and Time       Expected Discharge Date Expected Discharge Time    Apr 17, 2025         GIFTY Mccormick

## 2025-04-17 NOTE — PLAN OF CARE
Goal Outcome Evaluation:      Pt resting in bed at this time. VSS on 2 liters O2. Wound care completed per orders. Changed colostomy bag due to it leaking. BP ran low. Will continue plan of care.

## 2025-04-17 NOTE — PROGRESS NOTES
Pradeep Donald  [id number]  1950  4/17/2025      Reason for Followup:   Metastatic squamous cell carcinoma  Weight loss    Patient Care Team:  Carmen Pretty APRN as PCP - General (Family Medicine)    Chief Complaint:  Anorectal mass      Subjective:    Patient is resting in bed. He reports that he has decided to stop radiation and focus on symptom management. Case management is working to find SNF placement. He reports pain is controlled. He is otherwise without specific complaints.      Allergies:    Penicillins        Outpatient Medications:  No medications prior to admission.         Inpatient Medications:  Scheduled Meds:  acetaminophen, 1,000 mg, Per PEG Tube, TID  apixaban, 5 mg, Oral, Q12H  castor oil-balsam peru, 1 Application, Topical, Daily  folic acid, 1 mg, Oral, Daily  Hydrocortisone (Perianal), , Rectal, BID  hydrOXYzine, 10 mg, Oral, Nightly  lactulose, 20 g, Per PEG Tube, TID  midodrine, 10 mg, Per PEG Tube, TID AC  nystatin, , Topical, Q12H  oxyCODONE, 10 mg, Oral, Q12H  pantoprazole, 40 mg, Intravenous, BID AC  senna-docusate sodium, 2 tablet, Per PEG Tube, BID   And  [Held by provider] polyethylene glycol, 17 g, Per PEG Tube, BID  sodium chloride, 10 mL, Intravenous, Q12H        Continuous Infusions:         PRN Meds:    Lidocaine Viscous HCl    Morphine    Morphine    ondansetron    oxyCODONE    sodium chloride    witch hazel-glycerin      Review of Systems:  A comprehensive 14-point review of systems performed.  Significant findings as mentioned above.  All other systems reviewed and are negative.       Physical Exam:  Vital Signs: These were reviewed and listed as per patient’s electronic medical chart  Vitals:    04/17/25 0646   BP: 90/43   Pulse: 73   Resp: 16   Temp: 99.8 °F (37.7 °C)   SpO2: 98%     General: Awake, alert and oriented, chronically ill appearing, cachectic  HEENT: Head is atraumatic, normocephalic, extraocular movements full, oropharynx clear, no scleral icterus, pink  moist mucous membranes  Neck: supple, no jvd, lymphadenopathy or masses  Cardiovascular: regular rate and rhythm without murmurs, rubs or gallops  Pulmonary: non-labored, clear to auscultation bilaterally, no wheezing  Abdomen: soft, non-tender, non-distended, normal active bowel sounds present, no organomegaly  Extremities: No clubbing or cyanosis, positive LE pitting edema  Lymph: No cervical, supraclavicular adenopathy  Neurologic: Mental status as above, alert, awake and oriented, grossly non-focal exam  Skin: warm, dry, intact          Labs / Studies:  Lab Results (last 72 hours)       Procedure Component Value Units Date/Time    POC Glucose Once [[id number]]  (Normal) Collected: 25    Specimen: Blood Updated: 25     Glucose 85 mg/dL     TISSUE EXAM, [facility]) [[id number]] Collected: 25 165    Specimen: Tissue from Large Intestine, Rectum Updated: 25 1526     Reference Lab Report --     [facility] [id number] [place], [place]  [address]Mzbia: [phone] or [phone]: [phone] Melgar M.D., Medical DirectorPATIENT NAME                           LABORATORY NO.[id number]  JACLYN DEL REAL                      [id number]                         AGE              SEX  SSN           CLIENT REF #[facility]              74      1950      [id number]   [id number] [facility]                     REQUESTING M.D.     ATTENDING 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  The neoplastic cells are positive for CK5/6,p40, EMA and p16.  The tumor cells are negative for EpCAM andsynaptophysin.  The immunohistochemical results support squamous cellcarcinoma.  MSI stains are not routinely performed on squamous cell carcinomainvolving rectum but can be performed on request, if clinically desired.Representative tumor blocks: A1, A2.CLINICAL HISTORY:Metastatic colon cancer to liverSPECIMENS RECEIVED:RECTAL BIOPSY , MASSMICROSCOPIC DESCRIPTION:Tissue blocks are prepared and slides are examined microscopically. Seediagnosis for details.The internal and external (both positive and negative) controls reactedappropriately. Some of our immunohistochemical and in situ hybridizationstudies are performed as analyte specific reagents. The following statementapplies to those tests: This test was   developed, and its performancecharacteristics determined by [facility]. It has not beencleared or approved by the US Food and Drug Administration. However, theA has determined that approval and clearance are not necessary.Professional interpretation rendered by Mary Santamaria M.D., KARLEE.C.A.P. at[facility], [address] [place] [id number].GROSS DESCRIPTION:Labeled \"rectal mass biopsy\".  Consists of 2 pieces of tan soft tissue ranging insize from 1.5 x 0.6 to 0.4 cm to 1.8 x 1.1 x 0.5 cm, submitted entirely in 3blocks, with the larger piece serially sectioned and submitted sequentially inblocks A1 and A2 and the smaller piece serially sectioned and submitted inblock A3.  JPREVIEWED, DIAGNOSED AND ELECTRONICALLYSIGNED BY:Mary Santamaria M.D., F.C.A.P.CPT CODES:  11795, 88341x5, 62847    POC Glucose Once [[id number]]  (Abnormal) Collected: 04/02/25 1201    Specimen: Blood Updated: 04/02/25 1207     Glucose 288 mg/dL     Comprehensive Metabolic Panel [[id number]]  (Abnormal) Collected: 04/02/25 0936    " Specimen: Blood Updated: 04/02/25 1014     Glucose 122 mg/dL      BUN 18 mg/dL      Creatinine 1.02 mg/dL      Sodium 130 mmol/L      Potassium 4.6 mmol/L      Chloride 100 mmol/L      CO2 26.4 mmol/L      Calcium 7.9 mg/dL      Total Protein 5.3 g/dL      Albumin 1.6 g/dL      ALT (SGPT) 12 U/L      AST (SGOT) 26 U/L      Alkaline Phosphatase 325 U/L      Total Bilirubin 0.2 mg/dL      Globulin 3.7 gm/dL      A/G Ratio 0.4 g/dL      BUN/Creatinine Ratio 17.6     Anion Gap 3.6 mmol/L      eGFR 76.2 mL/min/1.73     Narrative:      GFR Categories in Chronic Kidney Disease (CKD)      GFR Category          GFR (mL/min/1.73)    Interpretation  G1                     90 or greater         Normal or high (1)  G2                      60-89                Mild decrease (1)  G3a                   45-59                Mild to moderate decrease  G3b                   30-44                Moderate to severe decrease  G4                    15-29                Severe decrease  G5                    14 or less           Kidney failure          (1)In the absence of evidence of kidney disease, neither GFR category G1 or G2 fulfill the criteria for CKD.    eGFR calculation 2021 CKD-EPI creatinine equation, which does not include race as a factor    Hemoglobin & Hematocrit, Blood [[id number]]  (Abnormal) Collected: 04/02/25 0936    Specimen: Blood Updated: 04/02/25 0942     Hemoglobin 8.0 g/dL      Hematocrit 27.6 %     Heparin Anti-Xa [[id number]]  (Normal) Collected: 04/02/25 0736    Specimen: Blood Updated: 04/02/25 0811     Heparin Anti-Xa (UFH) 0.31 IU/ml     POC Glucose Once [[id number]]  (Normal) Collected: 04/02/25 0620    Specimen: Blood Updated: 04/02/25 0626     Glucose 92 mg/dL     POC Glucose Once [[id number]]  (Normal) Collected: 04/02/25 0057    Specimen: Blood Updated: 04/02/25 0103     Glucose 118 mg/dL     Heparin Anti-Xa [[id number]]  (Normal) Collected: 04/01/25 2316    Specimen: Blood Updated: 04/01/25 2331     Heparin  Anti-Xa (UFH) 0.34 IU/ml     CBC & Differential [[id number]]  (Abnormal) Collected: 04/01/25 2316    Specimen: Blood Updated: 04/01/25 2321    Narrative:      The following orders were created for panel order CBC & Differential.  Procedure                               Abnormality         Status                     ---------                               -----------         ------                     CBC Auto Differential[[id number]]        Abnormal            Final result                 Please view results for these tests on the individual orders.    CBC Auto Differential [[id number]]  (Abnormal) Collected: 04/01/25 2316    Specimen: Blood Updated: 04/01/25 2321     WBC 11.93 10*3/mm3      RBC 2.91 10*6/mm3      Hemoglobin 7.1 g/dL      Hematocrit 24.4 %      MCV 83.8 fL      MCH 24.4 pg      MCHC 29.1 g/dL      RDW 15.3 %      RDW-SD 46.7 fl      MPV 8.7 fL      Platelets 264 10*3/mm3      Neutrophil % 64.1 %      Lymphocyte % 18.7 %      Monocyte % 11.1 %      Eosinophil % 4.2 %      Basophil % 0.7 %      Immature Grans % 1.2 %      Neutrophils, Absolute 7.66 10*3/mm3      Lymphocytes, Absolute 2.23 10*3/mm3      Monocytes, Absolute 1.32 10*3/mm3      Eosinophils, Absolute 0.50 10*3/mm3      Basophils, Absolute 0.08 10*3/mm3      Immature Grans, Absolute 0.14 10*3/mm3      nRBC 0.0 /100 WBC     POC Glucose Once [[id number]]  (Abnormal) Collected: 04/01/25 1828    Specimen: Blood Updated: 04/01/25 1833     Glucose 189 mg/dL     Heparin Anti-Xa [[id number]]  (Abnormal) Collected: 04/01/25 1614    Specimen: Blood Updated: 04/01/25 1659     Heparin Anti-Xa (UFH) 0.20 IU/ml     POC Glucose Once [[id number]]  (Normal) Collected: 04/01/25 1219    Specimen: Blood Updated: 04/01/25 1225     Glucose 99 mg/dL     Heparin Anti-Xa [[id number]]  (Normal) Collected: 04/01/25 0914    Specimen: Blood Updated: 04/01/25 0929     Heparin Anti-Xa (UFH) 0.38 IU/ml     POC Glucose Once [[id number]]  (Normal) Collected: 04/01/25 0639     Specimen: Blood Updated: 04/01/25 0645     Glucose 85 mg/dL     Heparin Anti-Xa [[id number]]  (Abnormal) Collected: 04/01/25 0202    Specimen: Blood Updated: 04/01/25 0246     Heparin Anti-Xa (UFH) 0.25 IU/ml     Comprehensive Metabolic Panel [[id number]]  (Abnormal) Collected: 04/01/25 0104    Specimen: Blood Updated: 04/01/25 0141     Glucose 88 mg/dL      BUN 20 mg/dL      Creatinine 1.12 mg/dL      Sodium 133 mmol/L      Potassium 4.6 mmol/L      Chloride 105 mmol/L      CO2 24.7 mmol/L      Calcium 7.8 mg/dL      Total Protein 5.1 g/dL      Albumin 1.5 g/dL      ALT (SGPT) 7 U/L      AST (SGOT) 20 U/L      Alkaline Phosphatase 270 U/L      Total Bilirubin 0.2 mg/dL      Globulin 3.6 gm/dL      A/G Ratio 0.4 g/dL      BUN/Creatinine Ratio 17.9     Anion Gap 3.3 mmol/L      eGFR 68.1 mL/min/1.73     Narrative:      GFR Categories in Chronic Kidney Disease (CKD)      GFR Category          GFR (mL/min/1.73)    Interpretation  G1                     90 or greater         Normal or high (1)  G2                      60-89                Mild decrease (1)  G3a                   45-59                Mild to moderate decrease  G3b                   30-44                Moderate to severe decrease  G4                    15-29                Severe decrease  G5                    14 or less           Kidney failure          (1)In the absence of evidence of kidney disease, neither GFR category G1 or G2 fulfill the criteria for CKD.    eGFR calculation 2021 CKD-EPI creatinine equation, which does not include race as a factor    Protime-INR [[id number]]  (Abnormal) Collected: 04/01/25 0104    Specimen: Blood Updated: 04/01/25 0126     Protime 16.6 Seconds      INR 1.33    Narrative:      Suggested INR therapeutic range for stable oral anticoagulant therapy:    Low Intensity therapy:   1.5-2.0  Moderate Intensity therapy:   2.0-3.0  High Intensity therapy:   2.5-4.0    CBC & Differential [[id number]]  (Abnormal) Collected:  04/01/25 0104    Specimen: Blood Updated: 04/01/25 0125    Narrative:      The following orders were created for panel order CBC & Differential.  Procedure                               Abnormality         Status                     ---------                               -----------         ------                     CBC Auto Differential[[id number]]        Abnormal            Final result                 Please view results for these tests on the individual orders.    CBC Auto Differential [[id number]]  (Abnormal) Collected: 04/01/25 0104    Specimen: Blood Updated: 04/01/25 0125     WBC 12.02 10*3/mm3      RBC 2.97 10*6/mm3      Hemoglobin 7.2 g/dL      Hematocrit 25.3 %      MCV 85.2 fL      MCH 24.2 pg      MCHC 28.5 g/dL      RDW 15.6 %      RDW-SD 48.4 fl      MPV 8.5 fL      Platelets 299 10*3/mm3      Neutrophil % 64.1 %      Lymphocyte % 19.9 %      Monocyte % 11.1 %      Eosinophil % 3.3 %      Basophil % 0.7 %      Immature Grans % 0.9 %      Neutrophils, Absolute 7.70 10*3/mm3      Lymphocytes, Absolute 2.39 10*3/mm3      Monocytes, Absolute 1.33 10*3/mm3      Eosinophils, Absolute 0.40 10*3/mm3      Basophils, Absolute 0.09 10*3/mm3      Immature Grans, Absolute 0.11 10*3/mm3      nRBC 0.0 /100 WBC     POC Glucose Once [[id number]]  (Normal) Collected: 04/01/25 0015    Specimen: Blood Updated: 04/01/25 0021     Glucose 103 mg/dL     Heparin Anti-Xa [[id number]]  (Abnormal) Collected: 03/31/25 1543    Specimen: Blood Updated: 03/31/25 1636     Heparin Anti-Xa (UFH) <0.10 IU/ml     POC Glucose Once [[id number]]  (Normal) Collected: 03/31/25 1626    Specimen: Blood Updated: 03/31/25 1635     Glucose 112 mg/dL     Hemoglobin & Hematocrit, Blood [[id number]]  (Abnormal) Collected: 03/31/25 1221    Specimen: Blood Updated: 03/31/25 1223     Hemoglobin 7.6 g/dL      Hematocrit 25.9 %     POC Glucose Once [[id number]]  (Normal) Collected: 03/31/25 1114    Specimen: Blood Updated: 03/31/25 1121     Glucose 101 mg/dL      Heparin Anti-Xa [[id number]]  (Abnormal) Collected: 03/31/25 0904    Specimen: Blood Updated: 03/31/25 0928     Heparin Anti-Xa (UFH) <0.10 IU/ml     Protime-INR [[id number]]  (Abnormal) Collected: 03/31/25 0904    Specimen: Blood Updated: 03/31/25 0927     Protime 16.0 Seconds      INR 1.26    Narrative:      Suggested INR therapeutic range for stable oral anticoagulant therapy:    Low Intensity therapy:   1.5-2.0  Moderate Intensity therapy:   2.0-3.0  High Intensity therapy:   2.5-4.0    aPTT [[id number]]  (Abnormal) Collected: 03/31/25 0904    Specimen: Blood Updated: 03/31/25 0927     PTT 36.0 seconds     Narrative:      PTT Heparin Therapeutic Range:  45 - 116 seconds                Imaging Results (Last 72 Hours)       ** No results found for the last 72 hours. **                  Assessment & Plan:  Pradeep Donald is a 74 y.o. year-old male presenting with rectal bleeding and obstructing rectal mass currently consulted for     Metastatic squamous anal cancer  -   Given locally advanced obstructive cancer patient had to undergo surgical diversion and diverting colostomy was performed by Dr. Monzon on 3/29/25.  -  He has invasion locally into the prostate and perineum as well as evidence of retroperitoneal LAD, bulky L inguinal LAD, and liver metastasis along with tiny lung nodules which may also represent metastatic disease versus granulomatous disease.    -  Pathology significant for an invasive squamous cell carcinoma.  -  Given metastatic disease, he understands that any treatment would not be curative and would be for palliation only.   - Dr. Poon with radiation medicine was consulted for consideration of palliative radiation to reduce tumor bulk at the primary rectal/anal canal site, to reduce inguinal adenopathy that was felt to be causing lower extremity swelling, and to help relieve pain. Patient has been tolerating radiation better with pain medications. He completed 6/10 fractions but has  ultimately decided that he does not wish to pursue any further radiation and focus on symptom management.   - However, patient continues to be quite debilitated and unfortunately the risks of chemotherapy would likely outweigh any potential benefit and would cause more harm. I've had a long goals of care discussion with the patient and his sister and family who have been at bedside (sister not present today) and I have recommended focusing on symptom management ideally with hospice unless patient should significantly improve.   - Case management following and working on SNF placement.      Left lower extremity DVT  - Patient currently on Eliquis. Likely related to bulky adenopathy.     Dysphagia  - Would likely benefit from swallow study as patient reports this is worsening but given that he is focusing on symptom management, would defer.    Normocytic anemia   - B12 level is normal, folate level is borderline therefore he was started on folic acid 1 mg daily.   - Patient also likely has a iron deficiency anemia along with anemia of chronic disease and was started on Ferrlecit and was given four doses prior to it being discontinued due to possible comfort measures.   - Would recommend PRBC transfusion for Hb <7 or if symptomatic <8.     Difficulty voiding  - Invasion of the prostate from the anorectal mass which is likely contributing to obstructive uropathy.   - Walsh catheter was placed by Dr. Monzon under anesthesia.      Thank you for allowing us to participate in Mr. Donald's care, we will sign off.        TODD Casanova  04/17/25  10:17 EDT        A total of 35 minutes were spent helping to coordinate this patient’s care today; ~20 minutes of this time were spent face-to-face with the patient in his hospital room this morning, reviewing his interim medical history and counseling on the current treatment and follow up plans. All questions were answered to his satisfaction.

## 2025-04-17 NOTE — PROGRESS NOTES
"Palliative Care Daily Progress Note     S: Medical record reviewed, followed up with Primary RN Cecilia and Dr Jon regarding patient's condition. When I saw Pradeep today he appeared to be feeling better. He stated he was feeling better than the last few days. He denied nausea, anxiety or shortness of breath and was not labored, and also stated he was not having pain at this time.      O:   Palliative Performance Scale Score:     BP 92/43 (Patient Position: Lying)   Pulse 67   Temp 99.8 °F (37.7 °C) (Oral)   Resp 16   Ht 190.5 cm (75\")   Wt 57.3 kg (126 lb 4 oz)   SpO2 98%   BMI 15.78 kg/m²     Intake/Output Summary (Last 24 hours) at 4/17/2025 1416  Last data filed at 4/17/2025 1254  Gross per 24 hour   Intake 720 ml   Output 2050 ml   Net -1330 ml       PE:  General Appearance:    Chronically ill appearing, alert, thin, frail, cooperative, NAD   HEENT:    NC/AT, without obvious abnormality, EOMI, anicteric , dry MM    Neck:   supple, trachea midline, no JVD   Lungs:     Unlabored respirations, no wheezing, rhonchi or rales     Heart:    RRR, normal S1 and S2, no M/R/G   Abdomen:     Soft, no distension no tenderness, NABS , abd concave, PEG tube noted. Ostomy noted   Extremities:   Moves all extremities with generalized weakness, no edema, cachetic   Pulses:   Pulses palpable and equal bilaterally   Skin:   Warm, dry, better color today   Neurologic:   A/Ox3, cooperative   Psych:   Calm and appropriate today             Meds: Reviewed and changes noted    Labs:   Results from last 7 days   Lab Units 04/14/25  0023   WBC 10*3/mm3 7.48   HEMOGLOBIN g/dL 7.2*   HEMATOCRIT % 24.9*   PLATELETS 10*3/mm3 361     Results from last 7 days   Lab Units 04/14/25  0023   SODIUM mmol/L 132*   POTASSIUM mmol/L 4.1   CHLORIDE mmol/L 101   CO2 mmol/L 25.0   BUN mg/dL 15   CREATININE mg/dL 0.52*   GLUCOSE mg/dL 118*   CALCIUM mg/dL 7.2*     Results from last 7 days   Lab Units 04/14/25  0023   SODIUM mmol/L 132* " "  POTASSIUM mmol/L 4.1   CHLORIDE mmol/L 101   CO2 mmol/L 25.0   BUN mg/dL 15   CREATININE mg/dL 0.52*   CALCIUM mg/dL 7.2*   BILIRUBIN mg/dL 0.2   ALK PHOS U/L 563*   ALT (SGPT) U/L 19   AST (SGOT) U/L 26   GLUCOSE mg/dL 118*     Imaging Results (Last 72 Hours)       ** No results found for the last 72 hours. **              Diagnostics: Reviewed    A: Pradeep Donald is a 74 y.o. male  admitted  on 3/29/2025 for metastatic colon cancer to liver. The only history that he reports is kidney stones in the past. He hasn't seeked medical care in nearly a decade or longer. He took no medications at home other than his yearly flu shot. His girlfriend reports that he had had a \"bad fall\" a few weeks ago. He has been having rectal pain and bleeding for over a year now. He reports having to use a pedro-pad to contain the blood but never told his family. He reports weight loss, weakness, fatigue, and bloody diarrhea. He reports that upon arriving to the ED, he was having left sided abd pain/left groin pain. CT of abdomen with contrast showed rectal mass with extensive local disease involving the perineum. Abscess or necrotic mass in the right perineum measured 3.3 x 3.8 cm. There is also local invasion into the posterior aspect of the prostate gland, pelvic and retroperitoneal adenopathy. Metastatic disease involving the left hepatic lobe and possibly the lung bases, Scrotal ultrasound showed large left hydrocele and lower extremity Doppler was positive for DVT in the left common and superficial femoral veins.     Today 4/17/2025  T 99.8, HR 73, BP of 90/43, RR 16 saturating 98% on 2 L NC, he was awake alert and oriented.      P:  I was able to speak with Pradeep's sister Kisha to provide support and update. She tells me again that Pradeep does not want to continue radiation and asked what next steps were. I reminded her that I had spoke with Janey yesterday and let her know that Pradeep did not want further radiation and that " they wanted to start the process of nursing home placement. She stated she had some questions and wanted to speak with Janey and I let Janey know.       We will continue to follow along. Please do not hesitate to contact us regarding further sx mgmt or GOC needs, including after hours or on weekends via our on call provider at [phone].     Loly Gutierrez, APRN    4/17/2025

## 2025-04-18 LAB
ALBUMIN SERPL-MCNC: 1.4 G/DL (ref 3.5–5.2)
ALBUMIN/GLOB SERPL: 0.4 G/DL
ALP SERPL-CCNC: 533 U/L (ref 39–117)
ALT SERPL W P-5'-P-CCNC: 18 U/L (ref 1–41)
ANION GAP SERPL CALCULATED.3IONS-SCNC: 7 MMOL/L (ref 5–15)
ANISOCYTOSIS BLD QL: ABNORMAL
AST SERPL-CCNC: 17 U/L (ref 1–40)
BASOPHILS # BLD MANUAL: 0.04 10*3/MM3 (ref 0–0.2)
BASOPHILS NFR BLD MANUAL: 1 % (ref 0–1.5)
BILIRUB SERPL-MCNC: 0.2 MG/DL (ref 0–1.2)
BUN SERPL-MCNC: 17 MG/DL (ref 8–23)
BUN/CREAT SERPL: 31.5 (ref 7–25)
CALCIUM SPEC-SCNC: 7.4 MG/DL (ref 8.6–10.5)
CHLORIDE SERPL-SCNC: 102 MMOL/L (ref 98–107)
CO2 SERPL-SCNC: 25 MMOL/L (ref 22–29)
CREAT SERPL-MCNC: 0.54 MG/DL (ref 0.76–1.27)
DEPRECATED RDW RBC AUTO: 66.2 FL (ref 37–54)
EGFRCR SERPLBLD CKD-EPI 2021: 103.3 ML/MIN/1.73
ELLIPTOCYTES BLD QL SMEAR: ABNORMAL
EOSINOPHIL # BLD MANUAL: 0.04 10*3/MM3 (ref 0–0.4)
EOSINOPHIL NFR BLD MANUAL: 1 % (ref 0.3–6.2)
ERYTHROCYTE [DISTWIDTH] IN BLOOD BY AUTOMATED COUNT: 18.7 % (ref 12.3–15.4)
GLOBULIN UR ELPH-MCNC: 4 GM/DL
GLUCOSE SERPL-MCNC: 81 MG/DL (ref 65–99)
HCT VFR BLD AUTO: 27.5 % (ref 37.5–51)
HGB BLD-MCNC: 7.5 G/DL (ref 13–17.7)
HYPOCHROMIA BLD QL: ABNORMAL
INR PPP: 2.21 (ref 0.9–1.1)
LYMPHOCYTES # BLD MANUAL: 0.34 10*3/MM3 (ref 0.7–3.1)
LYMPHOCYTES NFR BLD MANUAL: 11 % (ref 5–12)
MCH RBC QN AUTO: 26 PG (ref 26.6–33)
MCHC RBC AUTO-ENTMCNC: 27.3 G/DL (ref 31.5–35.7)
MCV RBC AUTO: 95.5 FL (ref 79–97)
MONOCYTES # BLD: 0.47 10*3/MM3 (ref 0.1–0.9)
NEUTROPHILS # BLD AUTO: 3.37 10*3/MM3 (ref 1.7–7)
NEUTROPHILS NFR BLD MANUAL: 79 % (ref 42.7–76)
PLAT MORPH BLD: NORMAL
PLATELET # BLD AUTO: 423 10*3/MM3 (ref 140–450)
PMV BLD AUTO: 8.8 FL (ref 6–12)
POTASSIUM SERPL-SCNC: 4.7 MMOL/L (ref 3.5–5.2)
PROT SERPL-MCNC: 5.4 G/DL (ref 6–8.5)
PROTHROMBIN TIME: 24.8 SECONDS (ref 11.6–15.1)
RBC # BLD AUTO: 2.88 10*6/MM3 (ref 4.14–5.8)
SCAN SLIDE: NORMAL
SODIUM SERPL-SCNC: 134 MMOL/L (ref 136–145)
VARIANT LYMPHS NFR BLD MANUAL: 8 % (ref 19.6–45.3)
WBC NRBC COR # BLD AUTO: 4.26 10*3/MM3 (ref 3.4–10.8)

## 2025-04-18 PROCEDURE — 94799 UNLISTED PULMONARY SVC/PX: CPT

## 2025-04-18 PROCEDURE — 25010000002 MORPHINE PER 10 MG

## 2025-04-18 PROCEDURE — 25010000002 MORPHINE PER 10 MG: Performed by: STUDENT IN AN ORGANIZED HEALTH CARE EDUCATION/TRAINING PROGRAM

## 2025-04-18 PROCEDURE — 25010000002 PROCHLORPERAZINE 10 MG/2ML SOLUTION

## 2025-04-18 PROCEDURE — 25010000002 PHYTONADIONE 10 MG/ML SOLUTION 1 ML AMPULE: Performed by: STUDENT IN AN ORGANIZED HEALTH CARE EDUCATION/TRAINING PROGRAM

## 2025-04-18 PROCEDURE — 85025 COMPLETE CBC W/AUTO DIFF WBC: CPT | Performed by: STUDENT IN AN ORGANIZED HEALTH CARE EDUCATION/TRAINING PROGRAM

## 2025-04-18 PROCEDURE — 85007 BL SMEAR W/DIFF WBC COUNT: CPT | Performed by: STUDENT IN AN ORGANIZED HEALTH CARE EDUCATION/TRAINING PROGRAM

## 2025-04-18 PROCEDURE — 80053 COMPREHEN METABOLIC PANEL: CPT | Performed by: STUDENT IN AN ORGANIZED HEALTH CARE EDUCATION/TRAINING PROGRAM

## 2025-04-18 PROCEDURE — 99232 SBSQ HOSP IP/OBS MODERATE 35: CPT | Performed by: STUDENT IN AN ORGANIZED HEALTH CARE EDUCATION/TRAINING PROGRAM

## 2025-04-18 PROCEDURE — 85610 PROTHROMBIN TIME: CPT | Performed by: STUDENT IN AN ORGANIZED HEALTH CARE EDUCATION/TRAINING PROGRAM

## 2025-04-18 PROCEDURE — 25010000002 ONDANSETRON PER 1 MG: Performed by: NURSE PRACTITIONER

## 2025-04-18 RX ORDER — MORPHINE SULFATE 2 MG/ML
2 INJECTION, SOLUTION INTRAMUSCULAR; INTRAVENOUS
Status: COMPLETED | OUTPATIENT
Start: 2025-04-18 | End: 2025-04-19

## 2025-04-18 RX ORDER — PYRIDOSTIGMINE BROMIDE 60 MG/1
60 TABLET ORAL EVERY 8 HOURS SCHEDULED
Status: DISCONTINUED | OUTPATIENT
Start: 2025-04-18 | End: 2025-04-21 | Stop reason: HOSPADM

## 2025-04-18 RX ORDER — OXYCODONE HCL 10 MG/1
10 TABLET, FILM COATED, EXTENDED RELEASE ORAL ONCE
Refills: 0 | Status: COMPLETED | OUTPATIENT
Start: 2025-04-18 | End: 2025-04-18

## 2025-04-18 RX ORDER — PROCHLORPERAZINE EDISYLATE 5 MG/ML
5 INJECTION INTRAMUSCULAR; INTRAVENOUS ONCE
Status: COMPLETED | OUTPATIENT
Start: 2025-04-18 | End: 2025-04-18

## 2025-04-18 RX ORDER — AMOXICILLIN 250 MG
2 CAPSULE ORAL 2 TIMES DAILY
Status: DISCONTINUED | OUTPATIENT
Start: 2025-04-18 | End: 2025-04-21 | Stop reason: HOSPADM

## 2025-04-18 RX ORDER — MORPHINE SULFATE 2 MG/ML
2 INJECTION, SOLUTION INTRAMUSCULAR; INTRAVENOUS ONCE
Status: COMPLETED | OUTPATIENT
Start: 2025-04-18 | End: 2025-04-18

## 2025-04-18 RX ORDER — POLYETHYLENE GLYCOL 3350 17 G/17G
17 POWDER, FOR SOLUTION ORAL 2 TIMES DAILY PRN
Status: DISCONTINUED | OUTPATIENT
Start: 2025-04-18 | End: 2025-04-21 | Stop reason: HOSPADM

## 2025-04-18 RX ORDER — OXYCODONE HCL 20 MG/1
20 TABLET, FILM COATED, EXTENDED RELEASE ORAL EVERY 12 HOURS SCHEDULED
Refills: 0 | Status: COMPLETED | OUTPATIENT
Start: 2025-04-18 | End: 2025-04-20

## 2025-04-18 RX ADMIN — LACTULOSE 20 G: 20 SOLUTION ORAL at 17:44

## 2025-04-18 RX ADMIN — MIDODRINE HYDROCHLORIDE 10 MG: 2.5 TABLET ORAL at 08:05

## 2025-04-18 RX ADMIN — PROCHLORPERAZINE EDISYLATE 5 MG: 5 INJECTION INTRAMUSCULAR; INTRAVENOUS at 21:02

## 2025-04-18 RX ADMIN — PANTOPRAZOLE SODIUM 40 MG: 40 INJECTION, POWDER, FOR SOLUTION INTRAVENOUS at 08:05

## 2025-04-18 RX ADMIN — NYSTATIN: 100000 POWDER TOPICAL at 21:24

## 2025-04-18 RX ADMIN — MORPHINE SULFATE 2 MG: 2 INJECTION, SOLUTION INTRAMUSCULAR; INTRAVENOUS at 22:29

## 2025-04-18 RX ADMIN — SENNOSIDES AND DOCUSATE SODIUM 2 TABLET: 50; 8.6 TABLET ORAL at 08:04

## 2025-04-18 RX ADMIN — Medication 10 ML: at 21:02

## 2025-04-18 RX ADMIN — APIXABAN 5 MG: 5 TABLET, FILM COATED ORAL at 21:24

## 2025-04-18 RX ADMIN — PHYTONADIONE 10 MG: 10 INJECTION, EMULSION INTRAMUSCULAR; INTRAVENOUS; SUBCUTANEOUS at 09:08

## 2025-04-18 RX ADMIN — MORPHINE SULFATE 4 MG: 4 INJECTION, SOLUTION INTRAMUSCULAR; INTRAVENOUS at 10:09

## 2025-04-18 RX ADMIN — MIDODRINE HYDROCHLORIDE 10 MG: 2.5 TABLET ORAL at 10:15

## 2025-04-18 RX ADMIN — HYDROXYZINE HYDROCHLORIDE 10 MG: 10 TABLET, FILM COATED ORAL at 21:24

## 2025-04-18 RX ADMIN — ACETAMINOPHEN 1000 MG: 500 TABLET ORAL at 08:05

## 2025-04-18 RX ADMIN — OXYCODONE HYDROCHLORIDE 20 MG: 20 TABLET, FILM COATED, EXTENDED RELEASE ORAL at 21:24

## 2025-04-18 RX ADMIN — ACETAMINOPHEN 1000 MG: 500 TABLET ORAL at 21:24

## 2025-04-18 RX ADMIN — LACTULOSE 20 G: 20 SOLUTION ORAL at 08:04

## 2025-04-18 RX ADMIN — PANTOPRAZOLE SODIUM 40 MG: 40 INJECTION, POWDER, FOR SOLUTION INTRAVENOUS at 17:44

## 2025-04-18 RX ADMIN — HYDROCORTISONE 2.5%: 25 CREAM TOPICAL at 21:24

## 2025-04-18 RX ADMIN — Medication 1 APPLICATION: at 08:14

## 2025-04-18 RX ADMIN — MORPHINE SULFATE 2 MG: 2 INJECTION, SOLUTION INTRAMUSCULAR; INTRAVENOUS at 04:54

## 2025-04-18 RX ADMIN — OXYCODONE 5 MG: 5 TABLET ORAL at 00:49

## 2025-04-18 RX ADMIN — PYRIDOSTIGMINE BROMIDE 60 MG: 60 TABLET ORAL at 12:35

## 2025-04-18 RX ADMIN — PYRIDOSTIGMINE BROMIDE 60 MG: 60 TABLET ORAL at 21:24

## 2025-04-18 RX ADMIN — OXYCODONE HYDROCHLORIDE 10 MG: 10 TABLET, FILM COATED, EXTENDED RELEASE ORAL at 10:16

## 2025-04-18 RX ADMIN — FOLIC ACID 1 MG: 1 TABLET ORAL at 08:05

## 2025-04-18 RX ADMIN — OXYCODONE HYDROCHLORIDE 10 MG: 10 TABLET, FILM COATED, EXTENDED RELEASE ORAL at 08:05

## 2025-04-18 RX ADMIN — MORPHINE SULFATE 4 MG: 4 INJECTION, SOLUTION INTRAMUSCULAR; INTRAVENOUS at 03:52

## 2025-04-18 RX ADMIN — ACETAMINOPHEN 1000 MG: 500 TABLET ORAL at 17:44

## 2025-04-18 RX ADMIN — APIXABAN 5 MG: 5 TABLET, FILM COATED ORAL at 08:05

## 2025-04-18 RX ADMIN — NYSTATIN: 100000 POWDER TOPICAL at 08:06

## 2025-04-18 RX ADMIN — MIDODRINE HYDROCHLORIDE 10 MG: 2.5 TABLET ORAL at 17:44

## 2025-04-18 RX ADMIN — ONDANSETRON 4 MG: 2 INJECTION INTRAMUSCULAR; INTRAVENOUS at 18:19

## 2025-04-18 RX ADMIN — HYDROCORTISONE 2.5%: 25 CREAM TOPICAL at 08:06

## 2025-04-18 NOTE — CASE MANAGEMENT/SOCIAL WORK
Discharge Planning Assessment  [facility]     Patient Name: Pradeep Donald  MRN: [id number]  Today's Date: 4/18/2025    Admit Date: 3/29/2025     Discharge Plan       Row Name 04/18/25 1402       Plan    Plan SS spoke to [facility] provider and pt changed to comfort measures. Peer to peer review has been offered by insurance. PA completed peer to peer review and insurance will contact /CM department with a determination. SS provided an update to sister and significant other. SS to follow.    Addendum @ 16:28: SS discussed pt with provider. SS provided an update to [facility] per Jessica. SS/CM awaiting on determination for SNF pre-authorization from medical director at insurance. SS provided another update to sister and significant other. Sister voices agreement for inpatient hospice placement if insurance denies SNF pre-authorization. SS to follow.                  Continued Care and Services - Admitted Since 3/29/2025       Destination       Service Provider Request Status Services Address Phone Fax Patient Preferred    [facility] Pending - Request Sent -- [address] [place] [id number] [phone] --                  Expected Discharge Date and Time       Expected Discharge Date Expected Discharge Time    Apr 28, 2025      GIFTY Mccormick

## 2025-04-18 NOTE — SIGNIFICANT NOTE
04/18/25 1345   OTHER   Discipline physical therapy assistant   Rehab Time/Intention   Session Not Performed patient/family declined treatment  (Pt declined PT session secondary to c/o pain. Will f/u at later date.)

## 2025-04-18 NOTE — PLAN OF CARE
Goal Outcome Evaluation:  Plan of Care Reviewed With: patient        Progress: no change  Outcome Evaluation: Patient resting in bed at this time. A&O. VSS on 2L NC with mild hypotention noted this AM. Patient transitioned to comfort measures this shift per patient request. PRN pain meds given per orders. Patient refused to work with PT this AM d/t pain. Had bath this shift. Wound care completed per orders. No other requests or complaints at this time. Will continue with POC.

## 2025-04-18 NOTE — PROGRESS NOTES
"Palliative Care Daily Progress Note     S: Medical record reviewed, followed up with Primary TEE Smith and Dr Jon regarding patient's condition. When I saw Pradeep today he tells me that he didn't sleep well as he was in pain. I asked him if he asked for pain medicine and he said he did but was told he could not have it d/t his blood pressure being low. He denied nausea this morning, as well as shortness of breath, he was calm, could tell he was fatigued.       O:   Palliative Performance Scale Score:     /58 (BP Location: Right arm, Patient Position: Lying) Comment: TEE Smith aware  Pulse 71   Temp 98.6 °F (37 °C) (Oral)   Resp 18   Ht 190.5 cm (75\")   Wt 57.3 kg (126 lb 4 oz)   SpO2 99%   BMI 15.78 kg/m²     Intake/Output Summary (Last 24 hours) at 4/18/2025 1453  Last data filed at 4/18/2025 1300  Gross per 24 hour   Intake 1500 ml   Output 1000 ml   Net 500 ml       PE:  Pradeep was awake alert and oriented, was calm, unlabored respirations, abdomen non tender non distended, peg and ostomy noted. Pradeep is now comfort measures.      Meds: Reviewed and changes noted    Labs:   Results from last 7 days   Lab Units 04/18/25  0202   WBC 10*3/mm3 4.26   HEMOGLOBIN g/dL 7.5*   HEMATOCRIT % 27.5*   PLATELETS 10*3/mm3 423     Results from last 7 days   Lab Units 04/18/25  0202   SODIUM mmol/L 134*   POTASSIUM mmol/L 4.7   CHLORIDE mmol/L 102   CO2 mmol/L 25.0   BUN mg/dL 17   CREATININE mg/dL 0.54*   GLUCOSE mg/dL 81   CALCIUM mg/dL 7.4*     Results from last 7 days   Lab Units 04/18/25  0202   SODIUM mmol/L 134*   POTASSIUM mmol/L 4.7   CHLORIDE mmol/L 102   CO2 mmol/L 25.0   BUN mg/dL 17   CREATININE mg/dL 0.54*   CALCIUM mg/dL 7.4*   BILIRUBIN mg/dL 0.2   ALK PHOS U/L 533*   ALT (SGPT) U/L 18   AST (SGOT) U/L 17   GLUCOSE mg/dL 81     Imaging Results (Last 72 Hours)       ** No results found for the last 72 hours. **              Diagnostics: Reviewed    A: Pradeep Donald is a 74 y.o.  male  admitted " " on 3/29/2025 for metastatic colon cancer to liver. The only history that he reports is kidney stones in the past. He hasn't seeked medical care in nearly a decade or longer. He took no medications at home other than his yearly flu shot. His girlfriend reports that he had had a \"bad fall\" a few weeks ago. He has been having rectal pain and bleeding for over a year now. He reports having to use a pedro-pad to contain the blood but never told his family. He reports weight loss, weakness, fatigue, and bloody diarrhea. He reports that upon arriving to the ED, he was having left sided abd pain/left groin pain. CT of abdomen with contrast showed rectal mass with extensive local disease involving the perineum. Abscess or necrotic mass in the right perineum measured 3.3 x 3.8 cm. There is also local invasion into the posterior aspect of the prostate gland, pelvic and retroperitoneal adenopathy. Metastatic disease involving the left hepatic lobe and possibly the lung bases, Scrotal ultrasound showed large left hydrocele and lower extremity Doppler was positive for DVT in the left common and superficial femoral veins.      Today 4/18/2025  T 98.6, HR 71, BP of 100/58, RR 18 was saturating 98% on 2 L NC.      P:  I was able to have a discussion with Pradeep this morning with his sister Kisha and his friend at bedside. Pradeep states that he feels like his pain has not been controlled overnight d/t his blood pressure being low. I discussed with him what comfort measures was again, that we would not do labs, do procedures, tests, or treatments, and that we would not have to focus on his blood pressure allowing us to more adequately treat his pain. I told him that I would leave him to think about this. I was asked to return to his room and he told me that he wanted to be comfort measures. I changed him to comfort measures and let TEE Smith and Dr Jon know as well as Janey in  know.      We will continue to follow along. " Please do not hesitate to contact us regarding further sx mgmt or GOC needs, including after hours or on weekends via our on call provider at [phone].     Loly Gutierrez, APRN    4/18/2025

## 2025-04-18 NOTE — PROGRESS NOTES
[facility] HOSPITALIST PROGRESS NOTE     Patient Identification:  Name:  Pradeep Donald  Age:  74 y.o.  Sex:  male  :  1950  MRN:  [id number]  Visit Number:  [id number]  ROOM: [address] [place] [address]     Primary Care Provider:  Carmen Pretty APRN    Length of stay in inpatient status:  20    Subjective     Chief Compliant:    Chief Complaint   Patient presents with    Leg Swelling    Hemorrhoids       History of Presenting Illness:    Patient seen in follow-up for large rectal mass status post resection, ostomy and DVT.  Patient is awake, alert and oriented x 3.  His sister is present at bedside. He has decided to transition to comfort measures.     Objective     Current Hospital Meds:acetaminophen, 1,000 mg, Per PEG Tube, TID  apixaban, 5 mg, Oral, Q12H  castor oil-balsam peru, 1 Application, Topical, Daily  folic acid, 1 mg, Oral, Daily  Hydrocortisone (Perianal), , Rectal, BID  hydrOXYzine, 10 mg, Oral, Nightly  lactulose, 20 g, Per PEG Tube, TID  midodrine, 10 mg, Per PEG Tube, TID AC  nystatin, , Topical, Q12H  oxyCODONE, 20 mg, Oral, Q12H  pantoprazole, 40 mg, Intravenous, BID AC  pyridostigmine, 60 mg, Oral, Q8H  senna-docusate sodium, 2 tablet, Per PEG Tube, BID  sodium chloride, 10 mL, Intravenous, Q12H           Current Antimicrobial Therapy:  Anti-Infectives (From admission, onward)      Ordered     Dose/Rate Route Frequency Start Stop    25 1442  ceFAZolin 2000 mg IVPB in 100 mL NS (VTB)  Status:  Discontinued        Ordering Provider: Matilde Monzon MD    2,000 mg  over 30 Minutes Intravenous On Call to O.R. 25 0600 25 1718    25 1146  cefepime 1000 mg IVPB in 100 mL NS (VTB)  Status:  Discontinued        Ordering Provider: Matilde Monzon MD    1,000 mg  over 4 Hours Intravenous Every 12 Hours 25 2100 25 2027    25 1146  cefepime 1000 mg IVPB in 100 mL NS (VTB)        Ordering Provider: Jayden Barney MD    1,000 mg  over 30 Minutes  Intravenous Once 03/29/25 1245 03/29/25 1402    03/29/25 0613  cefepime 2000 mg IVPB in 100 mL NS (VTB)        Ordering Provider: Óscar Rodriguez MD    2,000 mg  over 30 Minutes Intravenous Once 03/29/25 0629 03/29/25 0700    03/29/25 0613  vancomycin (VANCOCIN) 1,000 mg in sodium chloride 0.9 % 250 mL IVPB-VTB        Ordering Provider: Óscar Rodriguez MD    20 mg/kg × 49.9 kg  250 mL/hr over 60 Minutes Intravenous Once 03/29/25 0629 03/29/25 0802          Current Diuretic Therapy:  Diuretics (From admission, onward)      None          ----------------------------------------------------------------------------------------------------------------------  Vital Signs:  Temp:  [98.6 °F (37 °C)-99.3 °F (37.4 °C)] 98.6 °F (37 °C)  Heart Rate:  [63-71] 71  Resp:  [16-18] 18  BP: ()/(48-70) 100/58  SpO2:  [95 %-99 %] 99 %  on  Flow (L/min) (Oxygen Therapy):  [2] 2;   Device (Oxygen Therapy): nasal cannula  Body mass index is 15.78 kg/m².    Wt Readings from Last 3 Encounters:   03/29/25 57.3 kg (126 lb 4 oz)   06/27/22 79.4 kg (175 lb)   01/07/18 83.9 kg (185 lb)     Intake & Output (last 3 days)         03/29 0701 03/30 0700 03/30 0701 03/31 0700 03/31 0701 04/01 0700    P.O. 60 1680 360    I.V. (mL/kg) 1000 (17.5)  1084 (18.9)    Blood   315.8    Other  110 80    NG/GT  83 130    IV Piggyback   200    Total Intake(mL/kg) 1060 (18.5) 1873 (32.7) 2169.8 (37.9)    Urine (mL/kg/hr) 90 (0.1) 2050 (1.5) 750 (1)    Total Output 90 2050 750    Net +970 -177 +1419.8           Urine Unmeasured Occurrence 1 x            Diet: Regular/House; Texture: Soft to Chew (NDD 3); Soft to Chew: Chopped Meat; Fluid Consistency: Thin (IDDSI 0)  ----------------------------------------------------------------------------------------------------------------------  Physical exam:  Constitutional: Elderly male, appears cachectic, resting comfortably in bed, no acute distress.      HENT:  Head:  Normocephalic and atraumatic.  Mouth:   "Moist mucous membranes.  Eyes:  Conjunctivae and EOM are normal. No scleral icterus.   Cardiovascular:  Normal rate, regular rhythm and normal heart sounds with no murmur. No JVD.   Pulmonary/Chest:  No respiratory distress, no wheezes, no crackles, with normal breath sounds and good air movement. Unlabored. No accessory muscle use.  Abdominal:  Soft. No distension and no tenderness.  Bowel sounds present. No rebound or guarding.  PEG tube with tube feeds.  Incision sites well-healed.  Ostomy with stool output.  Musculoskeletal: Cachectic.  No tenderness, and no deformity.  No red or swollen joints anywhere.    Neurological:  Alert and oriented to person, place, and time.  No cranial nerve deficit.   Nonfocal.   Skin:  Skin is warm and dry. No rash noted. No pallor.   Peripheral vascular:  No clubbing, no cyanosis, no edema. Pedal and tibial pulses 2 out of 4 bilaterally.     ----------------------------------------------------------------------------------------------------------------------  Results from last 7 days   Lab Units 04/18/25  0202 04/14/25  0023   WBC 10*3/mm3 4.26 7.48   HEMOGLOBIN g/dL 7.5* 7.2*   HEMATOCRIT % 27.5* 24.9*   MCV fL 95.5 89.6   MCHC g/dL 27.3* 28.9*   PLATELETS 10*3/mm3 423 361   INR  2.21*  --          Results from last 7 days   Lab Units 04/18/25  0202 04/14/25  0023   SODIUM mmol/L 134* 132*   POTASSIUM mmol/L 4.7 4.1   MAGNESIUM mg/dL  --  1.9   CHLORIDE mmol/L 102 101   CO2 mmol/L 25.0 25.0   BUN mg/dL 17 15   CREATININE mg/dL 0.54* 0.52*   CALCIUM mg/dL 7.4* 7.2*   GLUCOSE mg/dL 81 118*   ALBUMIN g/dL 1.4* 1.5*   BILIRUBIN mg/dL 0.2 0.2   ALK PHOS U/L 533* 563*   AST (SGOT) U/L 17 26   ALT (SGPT) U/L 18 19   Estimated Creatinine Clearance: 94.3 mL/min (A) (by C-G formula based on SCr of 0.54 mg/dL (L)).  No results found for: \"AMMONIA\"                  No results found for: \"HGBA1C\", \"POCGLU\"    Lab Results   Component Value Date    TSH 5.270 (H) 03/29/2025    FREET4 1.02 " "03/29/2025     No results found for: \"PREGTESTUR\", \"PREGSERUM\", \"HCG\", \"HCGQUANT\"  Pain Management Panel           No data to display              Brief Urine Lab Results       None          No results found for: \"BLOODCX\"  No results found for: \"URINECX\"  No results found for: \"WOUNDCX\"  No results found for: \"STOOLCX\"  No results found for: \"RESPCX\"  No results found for: \"AFBCX\"          I have personally looked at the labs and they are summarized above.  ----------------------------------------------------------------------------------------------------------------------  Detailed radiology reports for the last 24 hours:  Imaging Results (Last 24 Hours)       ** No results found for the last 24 hours. **          Assessment & Plan      Patient is a 74-year-old male with no known significant medical problems who presented to the ER with reports of bright red blood per rectum, weight loss and poor appetite.    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  --CT abdomen/pelvis with contrast noted rectal mass with extensive local disease into the peritoneum measuring 3.3 x 3.8 cm with local invasion into the posterior aspect of the prostate gland with pelvic and RP adenopathy and possible metastatic disease involving the liver  --Venous Dopplers noted DVT in the left common/superficial femoral veins  --Status post biopsy of rectal mass/colostomy/PEG  --Pathology noted invasive squamous cell carcinoma  --Patient opted to discontinue XRT due to pain  --continue scheduled Tylenol, prn aki  --continue midodrine 10 mg 3 times daily  --increase OxyContin to 20mg twice daily XR  --Continue Eliquis for DVT  --patient transitioned to comfort measures on 4/18-->continue " other meds as usual as he is not actively dying  --Palliative care following, appreciate assistance    #Acute on chronic blood loss anemia  #Possible gastritis/UGIB  #Goals of care  #Severe malnutrition  #Failure to thrive  #Constipation  #Oral candidiasis    Copied portions of this report have been reviewed and are accurate as of 04/18/25     CHECKLIST:  Abx: None  VTE: Eliquis  GI ppx: PPI twice daily  Diet: Modified, tube feeds  Code: comfort measyres  Dispo: Patient is medically stable.  Patient opted to discontinue XRT. Comfort measures initiated.     Quintin Jon DO  [facility] Hospitalist  04/18/25  16:54 EDT

## 2025-04-18 NOTE — PLAN OF CARE
Goal Outcome Evaluation:  Plan of Care Reviewed With: patient        Progress: no change  Outcome Evaluation: Patient resting in bed at this time. VSS on 2L NC. Pt bolus feed given per order. Wound care completed per order. Pt c/o pain, PRN pain medication given per MAR. No concerns or complaints at this time. Will continue with plan of care.

## 2025-04-19 PROCEDURE — 25010000002 MORPHINE PER 10 MG: Performed by: STUDENT IN AN ORGANIZED HEALTH CARE EDUCATION/TRAINING PROGRAM

## 2025-04-19 PROCEDURE — 99232 SBSQ HOSP IP/OBS MODERATE 35: CPT | Performed by: FAMILY MEDICINE

## 2025-04-19 RX ADMIN — HYDROCORTISONE 2.5%: 25 CREAM TOPICAL at 09:23

## 2025-04-19 RX ADMIN — Medication 10 ML: at 21:29

## 2025-04-19 RX ADMIN — PANTOPRAZOLE SODIUM 40 MG: 40 INJECTION, POWDER, FOR SOLUTION INTRAVENOUS at 17:38

## 2025-04-19 RX ADMIN — OXYCODONE 5 MG: 5 TABLET ORAL at 12:39

## 2025-04-19 RX ADMIN — LACTULOSE 20 G: 20 SOLUTION ORAL at 21:31

## 2025-04-19 RX ADMIN — OXYCODONE HYDROCHLORIDE 20 MG: 20 TABLET, FILM COATED, EXTENDED RELEASE ORAL at 09:25

## 2025-04-19 RX ADMIN — APIXABAN 5 MG: 5 TABLET, FILM COATED ORAL at 21:29

## 2025-04-19 RX ADMIN — MORPHINE SULFATE 2 MG: 2 INJECTION, SOLUTION INTRAMUSCULAR; INTRAVENOUS at 15:08

## 2025-04-19 RX ADMIN — NYSTATIN: 100000 POWDER TOPICAL at 09:22

## 2025-04-19 RX ADMIN — Medication 10 ML: at 09:23

## 2025-04-19 RX ADMIN — OXYCODONE HYDROCHLORIDE 20 MG: 20 TABLET, FILM COATED, EXTENDED RELEASE ORAL at 21:29

## 2025-04-19 RX ADMIN — NYSTATIN: 100000 POWDER TOPICAL at 21:31

## 2025-04-19 RX ADMIN — SENNOSIDES AND DOCUSATE SODIUM 2 TABLET: 50; 8.6 TABLET ORAL at 21:31

## 2025-04-19 RX ADMIN — PYRIDOSTIGMINE BROMIDE 60 MG: 60 TABLET ORAL at 05:40

## 2025-04-19 RX ADMIN — HYDROCORTISONE 2.5%: 25 CREAM TOPICAL at 21:30

## 2025-04-19 RX ADMIN — Medication 1 APPLICATION: at 09:23

## 2025-04-19 RX ADMIN — MIDODRINE HYDROCHLORIDE 10 MG: 2.5 TABLET ORAL at 11:40

## 2025-04-19 RX ADMIN — PYRIDOSTIGMINE BROMIDE 60 MG: 60 TABLET ORAL at 21:29

## 2025-04-19 RX ADMIN — PYRIDOSTIGMINE BROMIDE 60 MG: 60 TABLET ORAL at 14:16

## 2025-04-19 RX ADMIN — LACTULOSE 20 G: 20 SOLUTION ORAL at 09:21

## 2025-04-19 RX ADMIN — MIDODRINE HYDROCHLORIDE 10 MG: 2.5 TABLET ORAL at 17:38

## 2025-04-19 RX ADMIN — OXYCODONE 5 MG: 5 TABLET ORAL at 05:40

## 2025-04-19 RX ADMIN — APIXABAN 5 MG: 5 TABLET, FILM COATED ORAL at 09:22

## 2025-04-19 RX ADMIN — ACETAMINOPHEN 1000 MG: 500 TABLET ORAL at 21:29

## 2025-04-19 RX ADMIN — FOLIC ACID 1 MG: 1 TABLET ORAL at 09:22

## 2025-04-19 RX ADMIN — PANTOPRAZOLE SODIUM 40 MG: 40 INJECTION, POWDER, FOR SOLUTION INTRAVENOUS at 09:21

## 2025-04-19 RX ADMIN — MIDODRINE HYDROCHLORIDE 10 MG: 2.5 TABLET ORAL at 09:22

## 2025-04-19 RX ADMIN — ACETAMINOPHEN 1000 MG: 500 TABLET ORAL at 14:16

## 2025-04-19 RX ADMIN — SENNOSIDES AND DOCUSATE SODIUM 2 TABLET: 50; 8.6 TABLET ORAL at 09:22

## 2025-04-19 RX ADMIN — LACTULOSE 20 G: 20 SOLUTION ORAL at 15:09

## 2025-04-19 RX ADMIN — ACETAMINOPHEN 1000 MG: 500 TABLET ORAL at 09:22

## 2025-04-19 RX ADMIN — HYDROXYZINE HYDROCHLORIDE 10 MG: 10 TABLET, FILM COATED ORAL at 21:29

## 2025-04-19 NOTE — PROGRESS NOTES
Nutrition Services    Patient Name:  Pradeep Donald  YOB: 1950  MRN: [id number]  Admit Date:  3/29/2025    F/u TF:  Tf of Isosource 1.5 out of stock will provide appropriate sub of Nutren 1.5 at this time.  Isosource will be available on 4/21/25.     Electronically signed by:  Janae Collins RD  04/19/25 10:13 EDT

## 2025-04-19 NOTE — PLAN OF CARE
Goal Outcome Evaluation:  Plan of Care Reviewed With: patient        Progress: no change  Outcome Evaluation: Pt's resting in bed, A&O, O2 at 2L n/c in use, pain controlled with PRN/schedule meds. Tube feeding boluses given, wound care done per orders. Will con't to monitor.

## 2025-04-19 NOTE — PLAN OF CARE
Goal Outcome Evaluation:              Outcome Evaluation: Patient has rested in bed this shift, 2L NC, PRN medication for complaints of pain, wound care completed per order, no other request or complaints at this time, VSS, Will continue plan of care.

## 2025-04-19 NOTE — PROGRESS NOTES
[facility] HOSPITALIST PROGRESS NOTE     Patient Identification:  Name:  Pradeep Donald  Age:  74 y.o.  Sex:  male  :  1950  MRN:  [id number]  Visit Number:  [id number]  ROOM: [address] [place] [address]     Primary Care Provider:  Carmen Pretty APRN     Date of Admission: 3/29/2025    Length of stay in inpatient status:  21    Subjective     Chief Compliant:    Chief Complaint   Patient presents with    Leg Swelling    Hemorrhoids     History of Presenting Illness:    Patient was seen and examined face-to-face.  I was surprised without awake alert and comfortable the patient was.  Patient does not appear to be actively dying at this point.  We will continue comfort measures  Objective     Current Hospital Meds:  acetaminophen, 1,000 mg, Per PEG Tube, TID  apixaban, 5 mg, Oral, Q12H  castor oil-balsam peru, 1 Application, Topical, Daily  folic acid, 1 mg, Oral, Daily  Hydrocortisone (Perianal), , Rectal, BID  hydrOXYzine, 10 mg, Oral, Nightly  lactulose, 20 g, Per PEG Tube, TID  midodrine, 10 mg, Per PEG Tube, TID AC  nystatin, , Topical, Q12H  oxyCODONE, 20 mg, Oral, Q12H  pantoprazole, 40 mg, Intravenous, BID AC  pyridostigmine, 60 mg, Oral, Q8H  senna-docusate sodium, 2 tablet, Per PEG Tube, BID  sodium chloride, 10 mL, Intravenous, Q12H       Current Antimicrobial Therapy:  Anti-Infectives (From admission, onward)      Ordered     Dose/Rate Route Frequency Start Stop    25 1442  ceFAZolin 2000 mg IVPB in 100 mL NS (VTB)  Status:  Discontinued        Ordering Provider: Matilde Monzon MD    2,000 mg  over 30 Minutes Intravenous On Call to O.R. 25 0600 25 1718    25 1146  cefepime 1000 mg IVPB in 100 mL NS (VTB)  Status:  Discontinued        Ordering Provider: Matilde Monzon MD    1,000 mg  over 4 Hours Intravenous Every 12 Hours 25 2100 25 2027    25 1146  cefepime 1000 mg IVPB in 100 mL NS (VTB)        Ordering Provider: Jayden Barney MD    1,000  mg  over 30 Minutes Intravenous Once 03/29/25 1245 03/29/25 1402    03/29/25 0613  cefepime 2000 mg IVPB in 100 mL NS (VTB)        Ordering Provider: Óscar Rodriguez MD    2,000 mg  over 30 Minutes Intravenous Once 03/29/25 0629 03/29/25 0700    03/29/25 0613  vancomycin (VANCOCIN) 1,000 mg in sodium chloride 0.9 % 250 mL IVPB-VTB        Ordering Provider: Óscar Rodriguez MD    20 mg/kg × 49.9 kg  250 mL/hr over 60 Minutes Intravenous Once 03/29/25 0629 03/29/25 0802          Current Diuretic Therapy:  Diuretics (From admission, onward)      None          ----------------------------------------------------------------------------------------------------------------------  Vital Signs:  Temp:  [98.2 °F (36.8 °C)] 98.2 °F (36.8 °C)  Heart Rate:  [65-67] 65  Resp:  [16-18] 16  BP: ()/(41-70) 81/41     on  Flow (L/min) (Oxygen Therapy):  [2] 2;   Device (Oxygen Therapy): nasal cannula  Body mass index is 15.78 kg/m².    Wt Readings from Last 3 Encounters:   03/29/25 57.3 kg (126 lb 4 oz)   06/27/22 79.4 kg (175 lb)   01/07/18 83.9 kg (185 lb)     Intake & Output (last 3 days)         04/16 0701  04/17 0700 04/17 0701 04/18 0700 04/18 0701  04/19 0700 04/19 0701 04/20 0700    P.O.  220    I.V. (mL/kg) 0 (0) 0 (0)      Other 60 60 90     NG/ 720 360     Total Intake(mL/kg) 1020 (17.8) 1620 (28.3) 1530 (26.7) 220 (3.8)    Urine (mL/kg/hr) 2300 (1.7) 1550 (1.1) 500 (0.4) 750 (2)    Stool 100  50     Total Output 2400 1550 550 750    Net -1380 +70 +980 -530            Urine Unmeasured Occurrence  1 x            Diet: Regular/House; Texture: Soft to Chew (NDD 3); Soft to Chew: Chopped Meat; Fluid Consistency: Thin (IDDSI 0)  ----------------------------------------------------------------------------------------------------------------------  Physical Exam  Constitutional:  Well-developed and well-nourished.  No acute distress.      HENT:  Head:  Normocephalic and atraumatic.  Mouth:  Moist mucous  membranes.    Eyes:  Conjunctivae and EOM are normal. No scleral icterus.    Neck:  Neck supple.  No JVD present.    Cardiovascular:  Normal rate, regular rhythm and normal heart sounds with no murmur.  Pulmonary/Chest:  No respiratory distress, no wheezes, no crackles, with normal breath sounds and good air movement.  Abdominal:  Soft. No distension and no tenderness.   Musculoskeletal:  No tenderness, and no deformity.  No red or swollen joints anywhere.    Neurological:  Alert and oriented to person, place, and time.  No cranial nerve deficit.    Skin:  Skin is warm and dry. No rash noted. No pallor.   Peripheral vascular:  No clubbing, no cyanosis, no edema.  Psychiatric: Appropriate mood and affect  :    ----------------------------------------------------------------------------------------------------------------------  Tele:    ----------------------------------------------------------------------------------------------------------------------  LABS:    CBC and coagulation:  Results from last 7 days   Lab Units 04/18/25  0202 04/14/25  0023   WBC 10*3/mm3 4.26 7.48   HEMOGLOBIN g/dL 7.5* 7.2*   HEMATOCRIT % 27.5* 24.9*   MCV fL 95.5 89.6   MCHC g/dL 27.3* 28.9*   PLATELETS 10*3/mm3 423 361   INR  2.21*  --      Acid/base balance:      Renal and electrolytes:  Results from last 7 days   Lab Units 04/18/25 0202 04/14/25  0023   SODIUM mmol/L 134* 132*   POTASSIUM mmol/L 4.7 4.1   MAGNESIUM mg/dL  --  1.9   CHLORIDE mmol/L 102 101   CO2 mmol/L 25.0 25.0   BUN mg/dL 17 15   CREATININE mg/dL 0.54* 0.52*   CALCIUM mg/dL 7.4* 7.2*   GLUCOSE mg/dL 81 118*     Estimated Creatinine Clearance: 94.3 mL/min (A) (by C-G formula based on SCr of 0.54 mg/dL (L)).    Liver and pancreatic function:  Results from last 7 days   Lab Units 04/18/25  0202 04/14/25  0023   ALBUMIN g/dL 1.4* 1.5*   BILIRUBIN mg/dL 0.2 0.2   ALK PHOS U/L 533* 563*   AST (SGOT) U/L 17 26   ALT (SGPT) U/L 18 19     Endocrine function:  No results  "found for: \"HGBA1C\"  Point of care bedside glucose levels:      Glucose levels from the [facility]:  Results from last 7 days   Lab Units 04/18/25  0202 04/14/25  0023   GLUCOSE mg/dL 81 118*     Lab Results   Component Value Date    TSH 5.270 (H) 03/29/2025    FREET4 1.02 03/29/2025     Cardiac:        Cultures:  No results found for: \"COLORU\", \"CLARITYU\", \"SPECGRAV\", \"PHUR\", \"PROTEINUR\", \"GLUCOSEU\", \"KETONESU\", \"BLOODU\", \"NITRITEU\", \"LEUKOCYTESUR\", \"BILIRUBINUR\", \"UROBILINOGEN\", \"RBCUA\", \"WBCUA\", \"BACTERIA\", \"UACOMMENT\"  Microbiology Results (last 10 days)       ** No results found for the last 240 hours. **            No results found for: \"PREGTESTUR\", \"PREGSERUM\", \"HCG\", \"HCGQUANT\"  Pain Management Panel           No data to display                I have personally looked at the labs and they are summarized above.  ----------------------------------------------------------------------------------------------------------------------  Detailed radiology reports for the last 24 hours:    Imaging Results (Last 24 Hours)       ** No results found for the last 24 hours. **          I have personally looked at the radiology images and I have read the available final and preliminary reports.    Assessment & Plan    Comfort measures  Invasive anal squamous cell carcinoma  S/p rectal biopsy  DVT  Cancer-related pain  - Patient is having a good day  - Is rating his pain as a 2 or 3 out of 10  - Continue pain management  - Will continue Eliquis for patient's DVT as patient has no current active bleeding  - Palliative care is following  - Patient is not actively in the dying phase    Acute on chronic blood loss anemia  Gastritis with upper GI bleed  - Patient started on PPI twice daily  - No further upper GI bleed and hemoglobin is stabilized  - And with no active bleeding Eliquis restarted    VTE Prophylaxis:   Active VTE Prophylaxis:  Pharmacologic:        Start     Dose Route Frequency Stop    04/09/25 2100  apixaban (ELIQUIS) " "tablet 5 mg        Placed in \"Followed by\" Linked Group    5 mg PO Every 12 Hours Scheduled --                  Select Mechanical VTE Prophylaxis if Desired & Appropriate     The patient is high risk due to the following diagnoses/reasons:      Disposition:        Gaston Alvarado DO  [facility]  04/19/25  13:32 EDT   "

## 2025-04-20 LAB
RAD ONC ARIA COURSE END DATE: NORMAL
RAD ONC ARIA COURSE ID: NORMAL
RAD ONC ARIA COURSE INTENT: NORMAL
RAD ONC ARIA COURSE LAST TREATMENT DATE: NORMAL
RAD ONC ARIA COURSE START DATE: NORMAL
RAD ONC ARIA COURSE TREATMENT ELAPSED DAYS: 12
RAD ONC ARIA FIRST TREATMENT DATE: NORMAL
RAD ONC ARIA PLAN FRACTIONS TREATED TO DATE: 6
RAD ONC ARIA PLAN ID: NORMAL
RAD ONC ARIA PLAN NAME: NORMAL
RAD ONC ARIA PLAN PRESCRIBED DOSE PER FRACTION: 3 GY
RAD ONC ARIA PLAN PRIMARY REFERENCE POINT: NORMAL
RAD ONC ARIA PLAN TOTAL FRACTIONS PRESCRIBED: 10
RAD ONC ARIA PLAN TOTAL PRESCRIBED DOSE: 3000 CGY
RAD ONC ARIA REFERENCE POINT DOSAGE GIVEN TO DATE: 18 GY
RAD ONC ARIA REFERENCE POINT ID: NORMAL

## 2025-04-20 PROCEDURE — 99232 SBSQ HOSP IP/OBS MODERATE 35: CPT | Performed by: FAMILY MEDICINE

## 2025-04-20 PROCEDURE — 25010000002 MORPHINE PER 10 MG: Performed by: FAMILY MEDICINE

## 2025-04-20 RX ADMIN — APIXABAN 5 MG: 5 TABLET, FILM COATED ORAL at 21:41

## 2025-04-20 RX ADMIN — MIDODRINE HYDROCHLORIDE 10 MG: 2.5 TABLET ORAL at 10:48

## 2025-04-20 RX ADMIN — MIDODRINE HYDROCHLORIDE 10 MG: 2.5 TABLET ORAL at 06:19

## 2025-04-20 RX ADMIN — MIDODRINE HYDROCHLORIDE 10 MG: 2.5 TABLET ORAL at 17:18

## 2025-04-20 RX ADMIN — PYRIDOSTIGMINE BROMIDE 60 MG: 60 TABLET ORAL at 21:41

## 2025-04-20 RX ADMIN — PYRIDOSTIGMINE BROMIDE 60 MG: 60 TABLET ORAL at 05:28

## 2025-04-20 RX ADMIN — MORPHINE SULFATE 4 MG: 4 INJECTION, SOLUTION INTRAMUSCULAR; INTRAVENOUS at 17:11

## 2025-04-20 RX ADMIN — SENNOSIDES AND DOCUSATE SODIUM 2 TABLET: 50; 8.6 TABLET ORAL at 21:41

## 2025-04-20 RX ADMIN — PANTOPRAZOLE SODIUM 40 MG: 40 INJECTION, POWDER, FOR SOLUTION INTRAVENOUS at 17:18

## 2025-04-20 RX ADMIN — PANTOPRAZOLE SODIUM 40 MG: 40 INJECTION, POWDER, FOR SOLUTION INTRAVENOUS at 06:19

## 2025-04-20 RX ADMIN — SENNOSIDES AND DOCUSATE SODIUM 2 TABLET: 50; 8.6 TABLET ORAL at 08:25

## 2025-04-20 RX ADMIN — ACETAMINOPHEN 1000 MG: 500 TABLET ORAL at 21:40

## 2025-04-20 RX ADMIN — LACTULOSE 20 G: 20 SOLUTION ORAL at 21:41

## 2025-04-20 RX ADMIN — NYSTATIN: 100000 POWDER TOPICAL at 08:26

## 2025-04-20 RX ADMIN — OXYCODONE 5 MG: 5 TABLET ORAL at 05:28

## 2025-04-20 RX ADMIN — PYRIDOSTIGMINE BROMIDE 60 MG: 60 TABLET ORAL at 14:32

## 2025-04-20 RX ADMIN — ACETAMINOPHEN 1000 MG: 500 TABLET ORAL at 14:32

## 2025-04-20 RX ADMIN — Medication 1 APPLICATION: at 08:27

## 2025-04-20 RX ADMIN — APIXABAN 5 MG: 5 TABLET, FILM COATED ORAL at 08:26

## 2025-04-20 RX ADMIN — OXYCODONE 5 MG: 5 TABLET ORAL at 14:32

## 2025-04-20 RX ADMIN — OXYCODONE 5 MG: 5 TABLET ORAL at 00:38

## 2025-04-20 RX ADMIN — OXYCODONE HYDROCHLORIDE 20 MG: 20 TABLET, FILM COATED, EXTENDED RELEASE ORAL at 10:08

## 2025-04-20 RX ADMIN — ACETAMINOPHEN 1000 MG: 500 TABLET ORAL at 08:26

## 2025-04-20 RX ADMIN — HYDROCORTISONE 2.5%: 25 CREAM TOPICAL at 08:26

## 2025-04-20 RX ADMIN — HYDROCORTISONE 2.5%: 25 CREAM TOPICAL at 21:41

## 2025-04-20 RX ADMIN — FOLIC ACID 1 MG: 1 TABLET ORAL at 08:26

## 2025-04-20 RX ADMIN — HYDROXYZINE HYDROCHLORIDE 10 MG: 10 TABLET, FILM COATED ORAL at 21:41

## 2025-04-20 RX ADMIN — OXYCODONE HYDROCHLORIDE 20 MG: 20 TABLET, FILM COATED, EXTENDED RELEASE ORAL at 21:40

## 2025-04-20 RX ADMIN — Medication 10 ML: at 08:26

## 2025-04-20 RX ADMIN — Medication 10 ML: at 21:41

## 2025-04-20 NOTE — PLAN OF CARE
Goal Outcome Evaluation:  Plan of Care Reviewed With: patient        Progress: no change  Outcome Evaluation: Patient resting in bed. Patient alert and oriented x4. PRN pain meds given per orders. Tube feeding bluses given, per orders. Wound care completed x 2, per orders. No acute changes or complaints.  Will continue with plan of care.

## 2025-04-20 NOTE — PLAN OF CARE
Goal Outcome Evaluation:              Outcome Evaluation: Pt resting in bed, VSS. Pt is comfort measures. Pt wounds care completed x2 per orders. PRN medications given per MAR. Tube feeding boluss given per orders. Pt voices no concerns or complaints at this time, will continue with POC.

## 2025-04-20 NOTE — PROGRESS NOTES
"    [facility] HOSPITALIST PROGRESS NOTE     Patient Identification:  Name:  Pradeep Donald  Age:  74 y.o.  Sex:  male  :  1950  MRN:  [id number]  Visit Number:  [id number]  ROOM: [address] [place] [address]     Primary Care Provider:  Carmen Pretty APRN     Date of Admission: 3/29/2025    Length of stay in inpatient status:  22    Subjective     Chief Compliant:    Chief Complaint   Patient presents with    Leg Swelling    Hemorrhoids     History of Presenting Illness:    Patient was seen and examined face-to-face.  He was a little bit more lethargic today.  He said he felt \"exhausted\".  Says he did not rest well however nursing says that he has been reluctant to call out for his as needed medications.  Objective     Current Hospital Meds:  acetaminophen, 1,000 mg, Per PEG Tube, TID  apixaban, 5 mg, Oral, Q12H  castor oil-balsam peru, 1 Application, Topical, Daily  folic acid, 1 mg, Oral, Daily  Hydrocortisone (Perianal), , Rectal, BID  hydrOXYzine, 10 mg, Oral, Nightly  lactulose, 20 g, Per PEG Tube, TID  midodrine, 10 mg, Per PEG Tube, TID AC  nystatin, , Topical, Q12H  oxyCODONE, 20 mg, Oral, Q12H  pantoprazole, 40 mg, Intravenous, BID AC  pyridostigmine, 60 mg, Oral, Q8H  senna-docusate sodium, 2 tablet, Per PEG Tube, BID  sodium chloride, 10 mL, Intravenous, Q12H       Current Antimicrobial Therapy:  Anti-Infectives (From admission, onward)      Ordered     Dose/Rate Route Frequency Start Stop    25 1442  ceFAZolin 2000 mg IVPB in 100 mL NS (VTB)  Status:  Discontinued        Ordering Provider: Matilde Monzon MD    2,000 mg  over 30 Minutes Intravenous On Call to O.R. 25 0600 25 1718    25 1146  cefepime 1000 mg IVPB in 100 mL NS (VTB)  Status:  Discontinued        Ordering Provider: Matilde Monzon MD    1,000 mg  over 4 Hours Intravenous Every 12 Hours 25 2100 25 1146  cefepime 1000 mg IVPB in 100 mL NS (VTB)        Ordering Provider: Savita, " Jayden Lynch MD    1,000 mg  over 30 Minutes Intravenous Once 03/29/25 1245 03/29/25 1402    03/29/25 0613  cefepime 2000 mg IVPB in 100 mL NS (VTB)        Ordering Provider: Óscar Rodriguez MD    2,000 mg  over 30 Minutes Intravenous Once 03/29/25 0629 03/29/25 0700    03/29/25 0613  vancomycin (VANCOCIN) 1,000 mg in sodium chloride 0.9 % 250 mL IVPB-VTB        Ordering Provider: Óscar Rodriguez MD    20 mg/kg × 49.9 kg  250 mL/hr over 60 Minutes Intravenous Once 03/29/25 0629 03/29/25 0802          Current Diuretic Therapy:  Diuretics (From admission, onward)      None          ----------------------------------------------------------------------------------------------------------------------  Vital Signs:  Temp:  [98.2 °F (36.8 °C)-98.5 °F (36.9 °C)] 98.2 °F (36.8 °C)  Heart Rate:  [62-65] 65  Resp:  [16-18] 18  BP: (90-91)/(48-49) 91/49  SpO2:  [96 %] 96 %  on  Flow (L/min) (Oxygen Therapy):  [2] 2;   Device (Oxygen Therapy): nasal cannula  Body mass index is 15.78 kg/m².    Wt Readings from Last 3 Encounters:   03/29/25 57.3 kg (126 lb 4 oz)   06/27/22 79.4 kg (175 lb)   01/07/18 83.9 kg (185 lb)     Intake & Output (last 3 days)         04/16 0701  04/17 0700 04/17 0701  04/18 0700 04/18 0701 04/19 0700 04/19 0701 04/20 0700    P.O.  220    I.V. (mL/kg) 0 (0) 0 (0)      Other 60 60 90     NG/ 720 360     Total Intake(mL/kg) 1020 (17.8) 1620 (28.3) 1530 (26.7) 220 (3.8)    Urine (mL/kg/hr) 2300 (1.7) 1550 (1.1) 500 (0.4) 750 (2)    Stool 100  50     Total Output 2400 1550 550 750    Net -1380 +70 +980 -530            Urine Unmeasured Occurrence  1 x            Diet: Regular/House; Texture: Soft to Chew (NDD 3); Soft to Chew: Chopped Meat; Fluid Consistency: Thin (IDDSI 0)  ----------------------------------------------------------------------------------------------------------------------  Physical Exam  Constitutional:  Well-developed and well-nourished.  No acute distress.       HENT:  Head:  Normocephalic and atraumatic.  Mouth:  Moist mucous membranes.    Eyes:  Conjunctivae and EOM are normal. No scleral icterus.    Neck:  Neck supple.  No JVD present.    Cardiovascular:  Normal rate, regular rhythm and normal heart sounds with no murmur.  Pulmonary/Chest:  No respiratory distress, no wheezes, no crackles, with normal breath sounds and good air movement.  Abdominal:  Soft. No distension and no tenderness.   Musculoskeletal:  No tenderness, and no deformity.  No red or swollen joints anywhere.    Neurological:  Alert and oriented to person, place, and time.  No cranial nerve deficit.    Skin:  Skin is warm and dry. No rash noted. No pallor.   Peripheral vascular:  No clubbing, no cyanosis, no edema.  Psychiatric: Appropriate mood and affect  :    ----------------------------------------------------------------------------------------------------------------------  Tele:    ----------------------------------------------------------------------------------------------------------------------  LABS:    CBC and coagulation:  Results from last 7 days   Lab Units 04/18/25  0202 04/14/25  0023   WBC 10*3/mm3 4.26 7.48   HEMOGLOBIN g/dL 7.5* 7.2*   HEMATOCRIT % 27.5* 24.9*   MCV fL 95.5 89.6   MCHC g/dL 27.3* 28.9*   PLATELETS 10*3/mm3 423 361   INR  2.21*  --      Acid/base balance:      Renal and electrolytes:  Results from last 7 days   Lab Units 04/18/25  0202 04/14/25  0023   SODIUM mmol/L 134* 132*   POTASSIUM mmol/L 4.7 4.1   MAGNESIUM mg/dL  --  1.9   CHLORIDE mmol/L 102 101   CO2 mmol/L 25.0 25.0   BUN mg/dL 17 15   CREATININE mg/dL 0.54* 0.52*   CALCIUM mg/dL 7.4* 7.2*   GLUCOSE mg/dL 81 118*     Estimated Creatinine Clearance: 94.3 mL/min (A) (by C-G formula based on SCr of 0.54 mg/dL (L)).    Liver and pancreatic function:  Results from last 7 days   Lab Units 04/18/25  0202 04/14/25  0023   ALBUMIN g/dL 1.4* 1.5*   BILIRUBIN mg/dL 0.2 0.2   ALK PHOS U/L 533* 563*   AST (SGOT) U/L  "17 26   ALT (SGPT) U/L 18 19     Endocrine function:  No results found for: \"HGBA1C\"  Point of care bedside glucose levels:      Glucose levels from the [facility]:  Results from last 7 days   Lab Units 04/18/25  0202 04/14/25  0023   GLUCOSE mg/dL 81 118*     Lab Results   Component Value Date    TSH 5.270 (H) 03/29/2025    FREET4 1.02 03/29/2025     Cardiac:        Cultures:  No results found for: \"COLORU\", \"CLARITYU\", \"SPECGRAV\", \"PHUR\", \"PROTEINUR\", \"GLUCOSEU\", \"KETONESU\", \"BLOODU\", \"NITRITEU\", \"LEUKOCYTESUR\", \"BILIRUBINUR\", \"UROBILINOGEN\", \"RBCUA\", \"WBCUA\", \"BACTERIA\", \"UACOMMENT\"  Microbiology Results (last 10 days)       ** No results found for the last 240 hours. **            No results found for: \"PREGTESTUR\", \"PREGSERUM\", \"HCG\", \"HCGQUANT\"  Pain Management Panel           No data to display                I have personally looked at the labs and they are summarized above.  ----------------------------------------------------------------------------------------------------------------------  Detailed radiology reports for the last 24 hours:    Imaging Results (Last 24 Hours)       ** No results found for the last 24 hours. **          I have personally looked at the radiology images and I have read the available final and preliminary reports.    Assessment & Plan    Comfort measures  Invasive anal squamous cell carcinoma  S/p rectal biopsy  DVT  Cancer-related pain  - Patient is having a bad day with increased fatigue  - The patient is still denying any significant worsening of his pain  - Continue pain management  - Will continue Eliquis for patient's DVT as patient has no current active bleeding  - Palliative care is following  - Patient is not actively in the dying phase    Acute on chronic blood loss anemia  Gastritis with upper GI bleed  - Patient started on PPI twice daily  - No further upper GI bleed and hemoglobin is stabilized  - And with no active bleeding Eliquis restarted    VTE Prophylaxis:   Active " "VTE Prophylaxis:  Pharmacologic:        Start     Dose Route Frequency Stop    04/09/25 2100  apixaban (ELIQUIS) tablet 5 mg        Placed in \"Followed by\" Linked Group    5 mg PO Every 12 Hours Scheduled --                  Select Mechanical VTE Prophylaxis if Desired & Appropriate     The patient is high risk due to the following diagnoses/reasons:      Disposition:        Gaston Alvarado DO  [facility]  04/20/25  12:19 EDT   "

## 2025-04-21 VITALS
SYSTOLIC BLOOD PRESSURE: 101 MMHG | WEIGHT: 126.25 LBS | HEART RATE: 72 BPM | HEIGHT: 75 IN | BODY MASS INDEX: 15.7 KG/M2 | TEMPERATURE: 98.1 F | RESPIRATION RATE: 20 BRPM | OXYGEN SATURATION: 96 % | DIASTOLIC BLOOD PRESSURE: 53 MMHG

## 2025-04-21 PROCEDURE — 25010000002 MORPHINE PER 10 MG: Performed by: FAMILY MEDICINE

## 2025-04-21 PROCEDURE — 99238 HOSP IP/OBS DSCHRG MGMT 30/<: CPT | Performed by: STUDENT IN AN ORGANIZED HEALTH CARE EDUCATION/TRAINING PROGRAM

## 2025-04-21 PROCEDURE — 25010000002 ONDANSETRON PER 1 MG: Performed by: NURSE PRACTITIONER

## 2025-04-21 PROCEDURE — 25010000002 ONDANSETRON PER 1 MG: Performed by: STUDENT IN AN ORGANIZED HEALTH CARE EDUCATION/TRAINING PROGRAM

## 2025-04-21 RX ORDER — ONDANSETRON 2 MG/ML
4 INJECTION INTRAMUSCULAR; INTRAVENOUS ONCE
Status: COMPLETED | OUTPATIENT
Start: 2025-04-21 | End: 2025-04-21

## 2025-04-21 RX ORDER — OXYCODONE HYDROCHLORIDE 5 MG/1
5 TABLET ORAL EVERY 4 HOURS PRN
Qty: 6 TABLET | Refills: 0 | Status: SHIPPED | OUTPATIENT
Start: 2025-04-21 | End: 2025-04-24

## 2025-04-21 RX ORDER — PYRIDOSTIGMINE BROMIDE 60 MG/1
60 TABLET ORAL EVERY 8 HOURS SCHEDULED
Qty: 90 TABLET | Refills: 0 | Status: SHIPPED | OUTPATIENT
Start: 2025-04-21 | End: 2025-05-21

## 2025-04-21 RX ORDER — OXYCODONE HCL 20 MG/1
20 TABLET, FILM COATED, EXTENDED RELEASE ORAL EVERY 12 HOURS SCHEDULED
Qty: 6 TABLET | Refills: 0 | Status: SHIPPED | OUTPATIENT
Start: 2025-04-21 | End: 2025-04-21

## 2025-04-21 RX ORDER — OXYCODONE HCL 20 MG/1
20 TABLET, FILM COATED, EXTENDED RELEASE ORAL EVERY 12 HOURS SCHEDULED
Refills: 0 | Status: DISCONTINUED | OUTPATIENT
Start: 2025-04-21 | End: 2025-04-21 | Stop reason: HOSPADM

## 2025-04-21 RX ORDER — OXYCODONE HCL 20 MG/1
20 TABLET, FILM COATED, EXTENDED RELEASE ORAL EVERY 12 HOURS SCHEDULED
Refills: 0 | Status: DISCONTINUED | OUTPATIENT
Start: 2025-04-21 | End: 2025-04-21

## 2025-04-21 RX ORDER — MIDODRINE HYDROCHLORIDE 10 MG/1
10 TABLET ORAL
Qty: 90 TABLET | Refills: 0 | Status: SHIPPED | OUTPATIENT
Start: 2025-04-21 | End: 2025-05-21

## 2025-04-21 RX ORDER — AMOXICILLIN 250 MG
2 CAPSULE ORAL 2 TIMES DAILY
Qty: 120 TABLET | Refills: 0 | Status: SHIPPED | OUTPATIENT
Start: 2025-04-21 | End: 2025-05-21

## 2025-04-21 RX ORDER — OXYCODONE HCL 10 MG/1
20 TABLET, FILM COATED, EXTENDED RELEASE ORAL EVERY 12 HOURS SCHEDULED
Qty: 12 TABLET | Refills: 0 | Status: SHIPPED | OUTPATIENT
Start: 2025-04-21 | End: 2025-04-24

## 2025-04-21 RX ADMIN — NYSTATIN: 100000 POWDER TOPICAL at 01:11

## 2025-04-21 RX ADMIN — ACETAMINOPHEN 1000 MG: 500 TABLET ORAL at 08:27

## 2025-04-21 RX ADMIN — OXYCODONE 5 MG: 5 TABLET ORAL at 19:39

## 2025-04-21 RX ADMIN — PYRIDOSTIGMINE BROMIDE 60 MG: 60 TABLET ORAL at 14:13

## 2025-04-21 RX ADMIN — ONDANSETRON 4 MG: 2 INJECTION INTRAMUSCULAR; INTRAVENOUS at 16:14

## 2025-04-21 RX ADMIN — MORPHINE SULFATE 4 MG: 4 INJECTION, SOLUTION INTRAMUSCULAR; INTRAVENOUS at 10:31

## 2025-04-21 RX ADMIN — MIDODRINE HYDROCHLORIDE 10 MG: 2.5 TABLET ORAL at 18:07

## 2025-04-21 RX ADMIN — LACTULOSE 20 G: 20 SOLUTION ORAL at 08:27

## 2025-04-21 RX ADMIN — NYSTATIN: 100000 POWDER TOPICAL at 08:23

## 2025-04-21 RX ADMIN — APIXABAN 5 MG: 5 TABLET, FILM COATED ORAL at 08:24

## 2025-04-21 RX ADMIN — OXYCODONE HYDROCHLORIDE 20 MG: 20 TABLET, FILM COATED, EXTENDED RELEASE ORAL at 14:13

## 2025-04-21 RX ADMIN — PYRIDOSTIGMINE BROMIDE 60 MG: 60 TABLET ORAL at 05:20

## 2025-04-21 RX ADMIN — OXYCODONE 5 MG: 5 TABLET ORAL at 08:23

## 2025-04-21 RX ADMIN — MIDODRINE HYDROCHLORIDE 10 MG: 2.5 TABLET ORAL at 08:23

## 2025-04-21 RX ADMIN — Medication 10 ML: at 08:24

## 2025-04-21 RX ADMIN — PANTOPRAZOLE SODIUM 40 MG: 40 INJECTION, POWDER, FOR SOLUTION INTRAVENOUS at 08:23

## 2025-04-21 RX ADMIN — ACETAMINOPHEN 1000 MG: 500 TABLET ORAL at 14:13

## 2025-04-21 RX ADMIN — MIDODRINE HYDROCHLORIDE 10 MG: 2.5 TABLET ORAL at 10:33

## 2025-04-21 RX ADMIN — FOLIC ACID 1 MG: 1 TABLET ORAL at 08:23

## 2025-04-21 RX ADMIN — ONDANSETRON 4 MG: 2 INJECTION INTRAMUSCULAR; INTRAVENOUS at 12:12

## 2025-04-21 RX ADMIN — HYDROCORTISONE 2.5%: 25 CREAM TOPICAL at 08:23

## 2025-04-21 RX ADMIN — Medication 1 APPLICATION: at 08:25

## 2025-04-21 RX ADMIN — SENNOSIDES AND DOCUSATE SODIUM 2 TABLET: 50; 8.6 TABLET ORAL at 08:23

## 2025-04-21 NOTE — SIGNIFICANT NOTE
04/21/25 1443   Rehab Time/Intention   Session Not Performed unable to treat, medical status change  (pt. has been placed on comfort measures per Palliative Care.  PT will sign off at this time.)

## 2025-04-21 NOTE — PLAN OF CARE
Goal Outcome Evaluation:         Patient being discharged

## 2025-04-21 NOTE — PLAN OF CARE
Goal Outcome Evaluation:  Plan of Care Reviewed With: patient        Progress: no change  Outcome Evaluation: Patient resting in bed during shift. VSS on 2LNC. Wound care complete per orders. Tube feeding boluses given per orders. No acute changes noted at this time. Will continue with plan of care.

## 2025-04-21 NOTE — DISCHARGE INSTRUCTIONS
scrotum and bilateral groin/inner thighs assess and cleanse with foam cleanser, pat dry and apply nystatin powders BID; leave open to air; place ultra sorb dryer sheets in between folds for moisture control - please change when soiled.  Daily    tip of right ring finger assess daily and leave open to air daily      Jessica rectal cancersous lesion AND stage II PI's to bilat gluteal  Cleanse perirectal area and bilat gluteals with cleansing foam and pat dry. Apply Z-Guard to left and right gluteals including all stage II PIs (do NOT apply to canerous lesion). Cover the open cancersous leison with abd pads. Secure with mesh panties. Four times daily

## 2025-04-21 NOTE — CASE MANAGEMENT/SOCIAL WORK
Discharge Planning Assessment   [place]     Patient Name: Pradeep Donald  MRN: [id number]  Today's Date: 4/21/2025    Admit Date: 3/29/2025     Discharge Plan       Row Name 04/21/25 0824       Plan    Plan SNF pre-authorization has been approved for 5 days. SS notified [facility] per Jessica. [facility] to notify SS when pt can admit to their facility. SS to follow.    Addendum @ 10:16: SS was notified by [facility] per Jessica that they can admit pt today. SS notified provider. Provider plans to discharge pt to [facility] today. SS completed EMS PCS form. SS to follow.     Addendum @ 12:14: SS spoke to pt and sisterKisha at bedside and provided an update. Pt and sister continue to be agreeable to discharge plan. SS to follow.     Addendum @ 14:00: Pt is being discharged to [facility] today. Pharmacy to notify SS after AVS review is completed. SS to follow.     Addendum @ 14:42: Pharmacy notified SS that AVS review is complete. SS sent a message via secure chat to lead RN requesting to know if AVS review is complete. SS to follow.     Addendum @ 15:09: [facility] per Jessica. notified that AVS can be printed. SS to follow.     Addendum @ 15:22: RN notified to call report to [facility]. SS to follow.     Addendum @ 15:52: RN called report to [facility]. SS arranged EMS via EMS dispatch. No other needs identified.     Addendum @ 16:25: SS notified sisterKisha that pt is being discharged to [facility]. No other needs identified.     Addendum @ 16:30: AVS has been updated. SS notified [facility] per Jessica and she printed most recent AVS. SS notified lead RN and RN and AVS was printed for nursing home packet. No other needs identified.                   Continued Care and Services - Admitted Since 3/29/2025       Destination       Service Provider Request Status Services Address Phone Fax Patient Preferred    [facility] Pending - Request Sent -- [address] [address] [phone] --                  Expected Discharge Date and Time       Expected Discharge Date Expected Discharge Time    Apr 28, 2025         GIFTY Mccormick

## 2025-04-21 NOTE — PROGRESS NOTES
"Palliative Care Daily Progress Note     S: Medical record reviewed, followed up with TEE Brooks and Dr Gavin regarding patient's condition. When I saw Pradeep he was in good spirits and stated he was feeling pretty good this morning, he did c/o pain, states he just got cleaned up and his pain was aggravated by this. We discussed with Cecilia and she is going to give pt his prn morphine. He denied nausea, shortness of breath or other symptoms at this time.      O:   Palliative Performance Scale Score:     /53 (BP Location: Left arm, Patient Position: Lying)   Pulse 72   Temp 98.1 °F (36.7 °C) (Oral)   Resp 20   Ht 190.5 cm (75\")   Wt 57.3 kg (126 lb 4 oz)   SpO2 96%   BMI 15.78 kg/m²     Intake/Output Summary (Last 24 hours) at 4/21/2025 1330  Last data filed at 4/21/2025 1000  Gross per 24 hour   Intake 960 ml   Output 2300 ml   Net -1340 ml       PE:  Pradeep was awake alert and oriented, was calm, unlabored respirations, abdomen non tender non distended, peg and ostomy noted. Pradeep is now comfort measures.         Meds: Reviewed and changes noted    Labs:   Results from last 7 days   Lab Units 04/18/25  0202   WBC 10*3/mm3 4.26   HEMOGLOBIN g/dL 7.5*   HEMATOCRIT % 27.5*   PLATELETS 10*3/mm3 423     Results from last 7 days   Lab Units 04/18/25  0202   SODIUM mmol/L 134*   POTASSIUM mmol/L 4.7   CHLORIDE mmol/L 102   CO2 mmol/L 25.0   BUN mg/dL 17   CREATININE mg/dL 0.54*   GLUCOSE mg/dL 81   CALCIUM mg/dL 7.4*     Results from last 7 days   Lab Units 04/18/25  0202   SODIUM mmol/L 134*   POTASSIUM mmol/L 4.7   CHLORIDE mmol/L 102   CO2 mmol/L 25.0   BUN mg/dL 17   CREATININE mg/dL 0.54*   CALCIUM mg/dL 7.4*   BILIRUBIN mg/dL 0.2   ALK PHOS U/L 533*   ALT (SGPT) U/L 18   AST (SGOT) U/L 17   GLUCOSE mg/dL 81     Imaging Results (Last 72 Hours)       ** No results found for the last 72 hours. **              Diagnostics: Reviewed    A: Pradeep MAKAYLA Donald is a 74 y.o. male admitted  on 3/29/2025 for metastatic " "colon cancer to liver. The only history that he reports is kidney stones in the past. He hasn't seeked medical care in nearly a decade or longer. He took no medications at home other than his yearly flu shot. His girlfriend reports that he had had a \"bad fall\" a few weeks ago. He has been having rectal pain and bleeding for over a year now. He reports having to use a pedro-pad to contain the blood but never told his family. He reports weight loss, weakness, fatigue, and bloody diarrhea. He reports that upon arriving to the ED, he was having left sided abd pain/left groin pain. CT of abdomen with contrast showed rectal mass with extensive local disease involving the perineum. Abscess or necrotic mass in the right perineum measured 3.3 x 3.8 cm. There is also local invasion into the posterior aspect of the prostate gland, pelvic and retroperitoneal adenopathy. Metastatic disease involving the left hepatic lobe and possibly the lung bases, Scrotal ultrasound showed large left hydrocele and lower extremity Doppler was positive for DVT in the left common and superficial femoral veins.     Today 4/21/2025  T 98.1, HR 72, BP of 101/53, RR 20    P:  I was able to touch base with Pradeep's sister Kisha at bedside, also discussed with Pradeep to request pain medications at , I was going to schedule two dose through out the night, however  was notified that Pradeep can transfer to [facility]. I did speak with Dr Gavin today regarding Pradeep d/c pain medication.      We will continue to follow along. Please do not hesitate to contact us regarding further sx mgmt or GOC needs, including after hours or on weekends via our on call provider at [phone].     oLly Gutierrez, APRN    4/21/2025  "

## 2025-04-21 NOTE — PROGRESS NOTES
"    [facility] HOSPITALIST PROGRESS NOTE     Patient Identification:  Name:  Pradeep Donald  Age:  74 y.o.  Sex:  male  :  1950  MRN:  [id number]  Visit Number:  [id number]  ROOM: [address] [place] [address]     Primary Care Provider:  Carmen Pretty APRN     Date of Admission: 3/29/2025    Length of stay in inpatient status:  23    Subjective     Chief Compliant:    Chief Complaint   Patient presents with    Leg Swelling    Hemorrhoids     History of Presenting Illness:    Patient was seen and examined face-to-face.  He was a little bit more lethargic today.  He said he felt \"exhausted\".  Says he did not rest well however nursing says that he has been reluctant to call out for his as needed medications.    DISCHARGED TO SNF WITH COMFORT CARE WHILE MEDICAID HOSPICE APPROVAL IS PENDING, I WAS INFORMED THAT THIS WAS THE PREVIOUS PLAN  FAMILY AND PATIENT in room and in agreement     Objective     Current Hospital Meds:  acetaminophen, 1,000 mg, Per PEG Tube, TID  apixaban, 5 mg, Oral, Q12H  castor oil-balsam peru, 1 Application, Topical, Daily  folic acid, 1 mg, Oral, Daily  Hydrocortisone (Perianal), , Rectal, BID  hydrOXYzine, 10 mg, Oral, Nightly  lactulose, 20 g, Per PEG Tube, TID  midodrine, 10 mg, Per PEG Tube, TID AC  nystatin, , Topical, Q12H  oxyCODONE, 20 mg, Oral, Q12H  pantoprazole, 40 mg, Intravenous, BID AC  pyridostigmine, 60 mg, Oral, Q8H  senna-docusate sodium, 2 tablet, Per PEG Tube, BID  sodium chloride, 10 mL, Intravenous, Q12H       Current Antimicrobial Therapy:  Anti-Infectives (From admission, onward)      Ordered     Dose/Rate Route Frequency Start Stop    25 1442  ceFAZolin 2000 mg IVPB in 100 mL NS (VTB)  Status:  Discontinued        Ordering Provider: Matilde Monzon MD    2,000 mg  over 30 Minutes Intravenous On Call to O.R. 25 0600 25 1718    25 1146  cefepime 1000 mg IVPB in 100 mL NS (VTB)  Status:  Discontinued        Ordering Provider: Matilde Monzon MD    " 1,000 mg  over 4 Hours Intravenous Every 12 Hours 03/29/25 2100 03/31/25 2027 03/29/25 1146  cefepime 1000 mg IVPB in 100 mL NS (VTB)        Ordering Provider: Jayden Barney MD    1,000 mg  over 30 Minutes Intravenous Once 03/29/25 1245 03/29/25 1402    03/29/25 0613  cefepime 2000 mg IVPB in 100 mL NS (VTB)        Ordering Provider: Óscar Rodriguez MD    2,000 mg  over 30 Minutes Intravenous Once 03/29/25 0629 03/29/25 0700    03/29/25 0613  vancomycin (VANCOCIN) 1,000 mg in sodium chloride 0.9 % 250 mL IVPB-VTB        Ordering Provider: Óscar Rodriguez MD    20 mg/kg × 49.9 kg  250 mL/hr over 60 Minutes Intravenous Once 03/29/25 0629 03/29/25 0802          Current Diuretic Therapy:  Diuretics (From admission, onward)      None          ----------------------------------------------------------------------------------------------------------------------  Vital Signs:  Temp:  [98.1 °F (36.7 °C)-98.6 °F (37 °C)] 98.1 °F (36.7 °C)  Heart Rate:  [61-72] 72  Resp:  [18-20] 20  BP: (101-112)/(53-54) 101/53     on  Flow (L/min) (Oxygen Therapy):  [2] 2;   Device (Oxygen Therapy): nasal cannula  Body mass index is 15.78 kg/m².    Wt Readings from Last 3 Encounters:   03/29/25 57.3 kg (126 lb 4 oz)   06/27/22 79.4 kg (175 lb)   01/07/18 83.9 kg (185 lb)     Intake & Output (last 3 days)         04/16 0701  04/17 0700 04/17 0701  04/18 0700 04/18 0701  04/19 0700 04/19 0701  04/20 0700    P.O.  220    I.V. (mL/kg) 0 (0) 0 (0)      Other 60 60 90     NG/ 720 360     Total Intake(mL/kg) 1020 (17.8) 1620 (28.3) 1530 (26.7) 220 (3.8)    Urine (mL/kg/hr) 2300 (1.7) 1550 (1.1) 500 (0.4) 750 (2)    Stool 100  50     Total Output 2400 1550 550 750    Net -1380 +70 +980 -530            Urine Unmeasured Occurrence  1 x            Diet: Regular/House; Texture: Soft to Chew (NDD 3); Soft to Chew: Chopped Meat; Fluid Consistency: Thin (IDDSI  "0)  ----------------------------------------------------------------------------------------------------------------------  Physical Exam  Constitutional:  frail,   No acute distress.       Cardiovascular:  Normal rate, regular rhythm and normal heart sounds with no murmur.  Pulmonary/Chest:  No respiratory distress, no wheezes, no crackles, with normal breath sounds and good air movement.  Abdominal:  Soft. No distension and no tenderness.   Musculoskeletal:  No tenderness, and no deformity.  No red or swollen joints anywhere.    Neurological:  Alert and oriented to person, place, and time.  No cranial nerve deficit.    Skin:  Skin is warm and dry. No rash noted. No pallor.   Peripheral vascular:  No clubbing, no cyanosis, no edema.  Psychiatric: Appropriate mood and affect    ----------------------------------------------------------------------------------------    Assessment & Plan    Comfort measures  Invasive anal squamous cell carcinoma  S/p rectal biopsy  DVT  Cancer-related pain  - Patient is having a bad day with increased fatigue  - The patient is still denying any significant worsening of his pain  - Continue pain management  - Will continue Eliquis for patient's DVT as patient has no current active bleeding  - Palliative care is following  - Patient is not actively in the dying phase    Acute on chronic blood loss anemia  Gastritis with upper GI bleed  - Patient started on PPI twice daily  - No further upper GI bleed and hemoglobin is stabilized  - And with no active bleeding Eliquis restarted    CODE: DNR     VTE Prophylaxis:   Active VTE Prophylaxis:  Pharmacologic:        Start     Dose Route Frequency Stop    04/09/25 2100  apixaban (ELIQUIS) tablet 5 mg        Placed in \"Followed by\" Linked Group    5 mg PO Every 12 Hours Scheduled --                  Select Mechanical VTE Prophylaxis if Desired & Appropriate     The patient is high risk due to the following diagnoses/reasons:      Disposition:    "     Trevor Gavin MD  [facility]  04/21/25  15:36 EDT

## 2025-04-22 NOTE — DISCHARGE SUMMARY
[facility] HOSPITALIST   DISCHARGE SUMMARY      Name:  Pradeep Donald   Age:  74 y.o.  Sex:  male  :  1950  MRN:  [id number]   Visit Number:  [id number]    Admission Date:  3/29/2025  Date of Discharge:  2025  Primary Care Physician:  Carmen Pretty APRN    Important issues to note:    DC to comfort  care pending administrative Medicaid approval for hospice per case management    Discharge Diagnoses:     Invasive anal squamous cell carcinoma , Comfort measures   S/p rectal biopsy  DVT  Cancer-related pain  Acute on chronic blood loss anemia  Gastritis with upper GI bleed    Problem List:     Active Hospital Problems    Diagnosis  POA    **Metastatic colon cancer to liver [C18.9, C78.7]  Yes    Severe protein-calorie malnutrition [E43]  Yes      Resolved Hospital Problems   No resolved problems to display.     Presenting Problem:    Chief Complaint   Patient presents with    Leg Swelling    Hemorrhoids      Consults:     Consulting Physician(s)         Provider   Role Specialty     Jayden Barney MD      Consulting Physician Hospitalist     Loly Gutierrez APRN      Consulting Physician Palliative Care          Procedures Performed:    Procedure(s):  COLOSTOMY LAPAROSCOPIC  PERCUTANEOUS ENDOSCOPIC GASTROSTOMY TUBE INSERTION  RECTAL MASS EXCISION - biopsy of rectal mass    History of presenting illness/Hospital Course:     Odilon is a pleasant 74-year-old male without significant past medical history and has not seen a doctor for long time, not taking any medications at home and never had colonoscopy who presenting to the ER with complaint of bloody stool per rectum associated with lower left quadrant pain, fever or chills and anorexia.  He also had weakness, fatigue, extreme weight loss as well as diarrhea.  Patient sister said that she found him to be in a state of disarray at his home today.  He said that he has been losing weight over the past couple of months and that he  can no longer get out of bed and take care of himself.  He has been having profuse diarrhea nonbloody.  Says that he has what he thinks is hemorrhoids and a hard area in his left groin.    - In ER, chest x-ray showed no acute abnormality of the chest.  - CT of abdomen with contrast showed rectal mass with extensive local disease involving the perineum.  Abscess or necrotic mass in the right perineum measured 3.3 x 3.8 cm.  There is also local invasion into the posterior aspect of the prostate gland, pelvic and retroperitoneal adenopathy.  Metastatic disease involving the left hepatic lobe and possibly the lung bases.  - Scrotal ultrasound showed large left hydrocele.  - Lower extremity Doppler was positive for DVT in the left common and superficial femoral veins      Patient had fluctuant pain secondary to his malignancy.  His hemoglobin stabilized and had no further GI bleed.  By the time I took over the case patient already was plan for comfort measures and discharged to nursing home with hospice pending Medicaid approval of hospice.  This was relayed to me by case management.  Patient was in agreement and voiced that he wanted to go to hospice.      Progress note latest:     Comfort measures  Invasive anal squamous cell carcinoma  S/p rectal biopsy  DVT  Cancer-related pain  - Patient is having a bad day with increased fatigue  - The patient is still denying any significant worsening of his pain  - Continue pain management  - Will continue Eliquis for patient's DVT as patient has no current active bleeding  - Palliative care is following  - Patient is not actively in the dying phase     Acute on chronic blood loss anemia  Gastritis with upper GI bleed  - Patient started on PPI twice daily  - No further upper GI bleed and hemoglobin is stabilized  - And with no active bleeding Eliquis restarted     CODE: DNR          Physical Exam:    Physical Exam  Constitutional:  frail,   No acute distress.        Cardiovascular:  Normal rate, regular rhythm and normal heart sounds with no murmur.  Pulmonary/Chest:  No respiratory distress, no wheezes, no crackles, with normal breath sounds and good air movement.  Abdominal:  Soft. No distension and no tenderness.   Musculoskeletal:  No tenderness, and no deformity.  No red or swollen joints anywhere.    Neurological:  Alert and oriented to person, place, and time.  No cranial nerve deficit.    Skin:  Skin is warm and dry. No rash noted. No pallor.   Peripheral vascular:  No clubbing, no cyanosis, no edema.  Psychiatric: Appropriate mood and affect    Pertinent Lab Results:     Results from last 7 days   Lab Units 04/18/25  0202   SODIUM mmol/L 134*   POTASSIUM mmol/L 4.7   CHLORIDE mmol/L 102   CO2 mmol/L 25.0   BUN mg/dL 17   CREATININE mg/dL 0.54*   CALCIUM mg/dL 7.4*   BILIRUBIN mg/dL 0.2   ALK PHOS U/L 533*   ALT (SGPT) U/L 18   AST (SGOT) U/L 17   GLUCOSE mg/dL 81     Results from last 7 days   Lab Units 04/18/25  0202   WBC 10*3/mm3 4.26   HEMOGLOBIN g/dL 7.5*   HEMATOCRIT % 27.5*   PLATELETS 10*3/mm3 423     Results from last 7 days   Lab Units 04/18/25  0202   INR  2.21*                               Pertinent Radiology Results:    Imaging Results (All)       Procedure Component Value Units Date/Time    XR Abdomen KUB [[id number]] Collected: 04/10/25 1331     Updated: 04/10/25 1333    Narrative:      EXAM:    XR Abdomen, 1 View     EXAM DATE:    4/10/2025 1:31 PM     CLINICAL HISTORY:    stool burden fu; R62.7-Adult failure to thrive; R19.7-Diarrhea,  unspecified; R59.1-Generalized enlarged lymph nodes; I82.412-Acute  embolism and thrombosis of left femoral vein; C18.9-Malignant neoplasm  of colon, unspecified; C78.7-Secondary malignant neoplasm of liver and  intrahepatic bile duct     TECHNIQUE:    Frontal supine view of the abdomen/pelvis.     COMPARISON:    No relevant prior studies available.     FINDINGS:    Gastrointestinal tract:  Gaseous distention of the  colon and fairly  large stool burden.    Bones/joints:  Unremarkable as visualized.  No acute fracture.    Tubes, lines and devices:  G-tube noted.       Impression:        Gaseous distention of the colon and fairly large stool burden.     This report was finalized on 4/10/2025 1:31 PM by Dr. Chava Munguia MD.       XR Abdomen KUB [[id number]] Collected: 04/08/25 0922     Updated: 04/08/25 0925    Narrative:      EXAM:    XR Abdomen, 1 View     EXAM DATE:    4/8/2025 9:22 AM     CLINICAL HISTORY:    Follow-up constipation; R62.7-Adult failure to thrive; R19.7-Diarrhea,  unspecified; R59.1-Generalized enlarged lymph nodes; I82.412-Acute  embolism and thrombosis of left femoral vein; C18.9-Malignant neoplasm  of colon, unspecified; C78.7-Secondary malignant neoplasm of liver and  intrahepatic bile duct     TECHNIQUE:    Frontal supine view of the abdomen/pelvis.     COMPARISON:    4/7/2025     FINDINGS:    Gastrointestinal tract:  Large amount of colonic stool noted.    Bones/joints:  Unremarkable as visualized.  No acute fracture.    Tubes, lines and devices:  Gastric tube balloon noted.       Impression:        Large amount of colonic stool noted.     This report was finalized on 4/8/2025 9:23 AM by Dr. Chava Munguia MD.       XR Abdomen KUB [[id number]] Collected: 04/07/25 1414     Updated: 04/07/25 1416    Narrative:      EXAM:    XR Abdomen, 1 View     EXAM DATE:    4/7/2025 2:14 PM     CLINICAL HISTORY:    Follow-up constipation; R62.7-Adult failure to thrive; R19.7-Diarrhea,  unspecified; R59.1-Generalized enlarged lymph nodes; I82.412-Acute  embolism and thrombosis of left femoral vein; C18.9-Malignant neoplasm  of colon, unspecified; C78.7-Secondary malignant neoplasm of liver and  intrahepatic bile duct     TECHNIQUE:    Frontal supine view of the abdomen/pelvis.     COMPARISON:    4/4/2025     FINDINGS:    Gastrointestinal tract:  Extensive colonic stool.  No dilation.    Bones/joints:  Unremarkable as  visualized.  No acute fracture.       Impression:        Extensive colonic stool.     This report was finalized on 4/7/2025 2:14 PM by Dr. Chava Munguia MD.       XR Abdomen KUB [[id number]] Collected: 04/04/25 1750     Updated: 04/04/25 1752    Narrative:      INDICATION: Abdominal pain and constipation.     TECHNIQUE: 3 views of the abdomen.     COMPARISON: No relevant priors.     FINDINGS:   Large amount retained colonic stool. A cystostomy tube in the stomach.  Left lower quadrant colostomy.     No dilated loops of bowel or free air. No pathologic calcifications.       Impression:      Large amount retained colonic stool.     This report was finalized on 4/4/2025 5:50 PM by Alex Pallas, DO.       CT Chest With Contrast Diagnostic [[id number]] Collected: 03/29/25 0849     Updated: 03/29/25 0855    Narrative:      EXAMINATION: CTA chest with contrast     CLINICAL HISTORY: Pain     COMPARISON: None available     TECHNIQUE: Contiguous 3 mm thick slices were obtained through the chest  after the administration of Isovue-370 intravenous contrast. Coronal and  sagittal reformats were performed.     FINDINGS:  The heart is normal in size configuration. The thoracic aorta is normal  in caliber. No aneurysmal dilatation. No dissection. The central  pulmonary arteries are normal in caliber. No pulmonary embolus. The  central airways are patent. No pneumothorax is appreciated. The few  small nodules are noted within both lungs. These measure up to 5 mm and  are of uncertain clinical significance. Correlation with prior imaging  would be helpful as available. In the absence of prior imaging, chest CT  follow-up in 6 months is recommended.       Impression:      1. No pulmonary embolus.  2. Normal caliber thoracic aorta without aneurysmal dilatation or  dissection.  3. Multiple tiny pulmonary nodules. These may be inflammatory in nature  representing noncalcified granulomas. However, the findings are  nonspecific. Given the  appearance and multiplicity of the lesions,  6-month follow-up CT is recommended.     This report was finalized on 3/29/2025 8:53 AM by Bairon Sales MD.       US Scrotum & Testicles [[id number]] Collected: 03/29/25 0646     Updated: 03/29/25 0648    Narrative:      CLINICAL HISTORY: Scrotal swelling.     COMPARISON: CT of the abdomen and pelvis performed today.     TECHNIQUE: Grayscale and duplex Doppler sonography of the testicles was  obtained.     FINDINGS: The right testicle is not visible sonographically.     There is a large left hydrocele.  The left testis is normal in size and  echogenicity, measuring 3.1 x 3.9 x 2 cm.  There is blood flow within  the left testis.       Impression:         LARGE LEFT HYDROCELE.     RIGHT TESTIS NOT SEEN.     This report was finalized on 3/29/2025 6:46 AM by Sarah Canseco MD.       US Venous Doppler Lower Extremity Bilateral (duplex) [[id number]] Collected: 03/29/25 0640     Updated: 03/29/25 0646    Narrative:      CLINICAL HISTORY: Lower extremity swelling.  Mass.     COMPARISON: No ultrasound available for direct comparison.     TECHNIQUE:   Grayscale and color Doppler sonography of the lower  extremities was obtained, with Doppler spectral analysis of the inflow  and outflow vasculature of the lower extremities.     FINDINGS:     Right lower extremity:  The right common femoral, femoral, popliteal veins are patent,  compressible, and have normal waveforms with duplex Doppler.  The  peroneal and posterior tibial veins are patent with color Doppler.     Left lower extremity:  The left common femoral vein and the proximal and mid portions of the  left superficial femoral vein are noncompressible and have no flow with  duplex Doppler.  The distal superficial femoral vein is patent with  color Doppler.  The popliteal vein is patent and has a present waveform.   The posterior tibial and peroneal veins are patent and compressible.       Impression:         POSITIVE FOR DEEP  VENOUS THROMBOSIS IN THE LEFT COMMON AND SUPERFICIAL  FEMORAL VEINS.     NEGATIVE FOR DEEP VENOUS THROMBOSIS IN THE RIGHT LOWER EXTREMITY.     This report was finalized on 3/29/2025 6:44 AM by Sarah Canseco MD.       XR Chest AP [[id number]] Collected: 03/29/25 0614     Updated: 03/29/25 0618    Narrative:      CLINICAL HISTORY: Fatigue.     COMPARISON: None available.     TECHNIQUE:  Single AP view of the chest.     FINDINGS: Lungs are clear; the known nodules at the lung bases are too  small to see radiographically.  There is mild fullness in the left  hilum. No pleural effusion or pneumothorax is identified.   Cardiomediastinal silhouette is normal.  No acute osseous or upper  abdominal abnormality is seen.       Impression:         NO ACUTE ABNORMALITY IN THE CHEST.     MILD FULLNESS IN THE LEFT HILUM, WHICH COULD BE DUE TO ENLARGED LYMPH  NODES, MASS, OR ENLARGED VESSELS.  CT SUGGESTED FOR FURTHER EVALUATION.     This report was finalized on 3/29/2025 6:16 AM by Sarah Canseco MD.       CT Abdomen Pelvis With Contrast [[id number]] Collected: 03/29/25 0555     Updated: 03/29/25 0616    Narrative:      CLINICAL HISTORY: Fatigue, pelvic and rectal mass, diarrhea.     COMPARISON: None available.     TECHNIQUE: IV contrast was administered and CT of the abdomen and pelvis  was obtained.  Multiplanar reformats were generated. Limited exposure  control, adjustment of the mA and/or KV according to patient size or use  of iterative reconstruction technique was utilized.     FINDINGS:    Lower chest: Scattered bibasilar pulmonary nodules measuring up to 5 mm.     Hepatobiliary: Hypodense mass in segment 2 of the liver measuring 3.4 x  3.5 cm.  The liver is not cirrhotic.  Gallbladder is normal.     Pancreas: normal.     Spleen: Calcified granulomata scattered through the otherwise normal  spleen.     Adrenals: normal.      Kidneys, ureters, bladder: Bilateral nonobstructing renal calculi with  an index calculus in the upper  pole right kidney measuring 5 mm.   Ureters are normal in caliber.  Urinary bladder is normal.     Reproductive: Posterior wall of the prostate gland is invaded by the  heterogeneous rectal mass.  A left-sided hydrocele is noted.  The  irregular mucosal thickening and enhancement also extends to the lateral  aspect of the scrotal skin on the left side.     GI tract/Bowel: Irregular diffuse thickening of the rectal mucosa from  the level of the rectosigmoid junction and extending into the anal  region, as well as the skin surface of the perineum.  A rim-enhancing  fluid collection in the right perianal region measures 3.2 x 3.8 x 3 cm.   The rectal mass produces moderate constipation.  The small bowel is  normal in caliber and wall thickness     Appendix: Not well seen.  No findings for acute appendicitis.     Peritoneum: normal, no free air or fluid.     Vascular: Aortoiliac atherosclerosis is noted.  There is a questionable  thrombus in the left femoral vein with edema in the imaged left lower  extremity.     Lymph nodes: Severely enlarged heterogeneous lymph nodes in the groin,  with an index left groin mass measuring 8 x 4.4 cm.  The cortex of the  lymph node is irregular, with some posterior invasion into the left  adductor musculature.  An index left external iliac node measures up to  2.8 cm in short axis.  Multiple retroperitoneal nodes are also noted.     Musculoskeletal and abdominal wall: Degenerative changes in the lumbar  spine and the hip joints.     Acute DVT and possible abscess were discussed with Dr. Rodriguez at 6:12 AM  EST on 3/29/2025.       Impression:         RECTAL MASS WITH EXTENSIVE LOCAL DISEASE INVOLVING THE PERINEUM,  INCLUDING AN ABSCESS OR NECROTIC MASS IN THE RIGHT PERINEUM MEASURING  3.3 X 3.8 X 3 CM.  THERE IS ALSO LOCAL INVASION INTO THE POSTERIOR  ASPECT OF THE PROSTATE GLAND.  EXTENSIVE INGUINAL, PELVIC, AND  RETROPERITONEAL ADENOPATHY, INCLUDING A LOCALLY AGGRESSIVE LEFT  INGUINAL  NODE THAT INVADES INTO THE LEFT ADDUCTOR MUSCULATURE.     METASTATIC DISEASE INVOLVING THE LEFT HEPATIC LOBE AND POSSIBLY THE LUNG  BASES.     POSSIBLE DEEP VENOUS THROMBOSIS IN THE LEFT SUPERFICIAL FEMORAL VEIN.   CONFIRMATION WITH ULTRASOUND IS RECOMMENDED.     CONSTIPATION AS A RESULT OF THE EXTENSIVE RECTAL MASS.     LARGE LEFT HYDROCELE.     This report was finalized on 3/29/2025 6:14 AM by Sarah Canseco MD.               Echo:      Condition on Discharge:      Stable.    Code status during the hospital stay:    Code Status and Medical Interventions: No CPR (Do Not Attempt to Resuscitate); Comfort Measures   Ordered at: 04/18/25 1201     Code Status (Patient has no pulse and is not breathing):    No CPR (Do Not Attempt to Resuscitate)     Medical Interventions (Patient has pulse or is breathing):    Comfort Measures     Level Of Support Discussed With:    Patient     Discharge Disposition:    Skilled Nursing Facility (DC - External)    Discharge Medications:       Discharge Medications        New Medications        Instructions Start Date   midodrine 10 MG tablet  Commonly known as: PROAMATINE   10 mg, Oral, 3 Times Daily Before Meals      oxyCODONE 5 MG immediate release tablet  Commonly known as: ROXICODONE   5 mg, Oral, Every 4 Hours PRN      oxyCODONE 10 MG 12 hr tablet  Commonly known as: oxyCONTIN   20 mg, Oral, Every 12 Hours Scheduled      pyridostigmine 60 MG tablet  Commonly known as: MESTINON   60 mg, Oral, Every 8 Hours Scheduled      sennosides-docusate 8.6-50 MG per tablet  Commonly known as: PERICOLACE   2 tablets, Per PEG Tube, 2 Times Daily             Discharge Diet:     Diet Instructions    Regular soft chopped diet           Activity at Discharge:     Activity Instructions    As tolerated           Follow-up Appointments:     Follow-up Information       Carmen Pretty APRN .    Specialty: Family Medicine  Contact information:  [address] [facility] [address] [facility]  [address]  [phone]                           No future appointments.  Test Results Pending at Discharge:    Pending Results       None                 Trevor Gavin MD  04/22/25  18:30 EDT    Time: I spent <30 minutes on this discharge activity which included: face-to-face encounter with the patient, reviewing the data in the system, coordination of the care with the nursing staff as well as consultants, documentation, and entering orders.     Dictated utilizing Dragon dictation.

## (undated) DEVICE — YANKAUER,POOLE TIP,STERILE,50/CS: Brand: MEDLINE

## (undated) DEVICE — HARMONIC 700 SHEARS, ADVANCED HEMOSTASIS: Brand: HARMONIC

## (undated) DEVICE — UNDYED BRAIDED (POLYGLACTIN 910), SYNTHETIC ABSORBABLE SUTURE: Brand: COATED VICRYL

## (undated) DEVICE — KT PEG ENDOVIVE ENFIT SFTY PULL 20F 1P/U

## (undated) DEVICE — PK LAP GEN 70

## (undated) DEVICE — DRSNG GZ PETROLTM XEROFORM CURAD 4X4IN STRL

## (undated) DEVICE — SUT MNCRYL PLS ANTIB UD 4/0 PS2 18IN

## (undated) DEVICE — ANTIBACTERIAL UNDYED BRAIDED (POLYGLACTIN 910), SYNTHETIC ABSORBABLE SUTURE: Brand: COATED VICRYL

## (undated) DEVICE — SUT SILK 3/0 SH CR8 18IN C013D

## (undated) DEVICE — DRSNG PAD ABD 8X10IN STRL

## (undated) DEVICE — TROCAR: Brand: KII FIOS FIRST ENTRY

## (undated) DEVICE — ELECTRD BLD EZ CLN MOD XLNG 2.75IN

## (undated) DEVICE — SET,IRRIGATION,CYSTO/TUR: Brand: MEDLINE

## (undated) DEVICE — ENDOPATH XCEL BLADELESS TROCARS WITH STABILITY SLEEVES: Brand: ENDOPATH XCEL

## (undated) DEVICE — HOLDER: Brand: DEROYAL

## (undated) DEVICE — INSUFFLATION NEEDLE TO ESTABLISH PNEUMOPERITONEUM.: Brand: INSUFFLATION NEEDLE

## (undated) DEVICE — ANTIBACTERIAL UNDYED BRAIDED (POLYGLACTIN 910), SYNTHETIC ABSORBABLE SURGICAL SUTURE: Brand: COATED VICRYL

## (undated) DEVICE — GLV SURG PREMIERPRO MIC LTX PF SZ7 BRN

## (undated) DEVICE — DRSNG TELFA PAD NONADH STR 1S 3X4IN

## (undated) DEVICE — PATIENT RETURN ELECTRODE, SINGLE-USE, CONTACT QUALITY MONITORING, ADULT, WITH 9FT CORD, FOR PATIENTS WEIGING OVER 33LBS. (15KG): Brand: MEGADYNE

## (undated) DEVICE — TROCAR: Brand: KII SLEEVE

## (undated) DEVICE — PK BASIC 70

## (undated) DEVICE — DRSNG WND BORDR/ADHS NONADHR/GZ LF 2X2IN STRL

## (undated) DEVICE — MONOPOLAR METZENBAUM SCISSOR TIP, DISPOSABLE: Brand: MONOPOLAR METZENBAUM SCISSOR TIP, DISPOSABLE

## (undated) DEVICE — 1/4 IN. X 12 IN. LENGTH: Brand: SILICONE TUBING, PENROSE DRAIN

## (undated) DEVICE — SKIN PREP TRAY 4 COMPARTM TRAY: Brand: MEDLINE INDUSTRIES, INC.

## (undated) DEVICE — NDL HYPO ECLPS SFTY 18G 1 1/2IN

## (undated) DEVICE — JELLY,LUBE,STERILE,FLIP TOP,TUBE,4-OZ: Brand: MEDLINE

## (undated) DEVICE — ELECTRD BLD EZ CLN MOD 4IN

## (undated) DEVICE — DRAPE,UTILTY,TAPE,15X26, 4EA/PK: Brand: MEDLINE